# Patient Record
Sex: FEMALE | Race: WHITE | Employment: OTHER | URBAN - NONMETROPOLITAN AREA
[De-identification: names, ages, dates, MRNs, and addresses within clinical notes are randomized per-mention and may not be internally consistent; named-entity substitution may affect disease eponyms.]

---

## 2017-01-12 ENCOUNTER — OFFICE VISIT (OUTPATIENT)
Dept: GASTROENTEROLOGY | Age: 52
End: 2017-01-12
Payer: MEDICARE

## 2017-01-12 VITALS
WEIGHT: 240 LBS | HEIGHT: 66 IN | SYSTOLIC BLOOD PRESSURE: 119 MMHG | OXYGEN SATURATION: 98 % | BODY MASS INDEX: 38.57 KG/M2 | DIASTOLIC BLOOD PRESSURE: 78 MMHG | RESPIRATION RATE: 20 BRPM | HEART RATE: 70 BPM

## 2017-01-12 DIAGNOSIS — K62.5 RECTAL BLEEDING: Primary | ICD-10-CM

## 2017-01-12 PROCEDURE — G8427 DOCREV CUR MEDS BY ELIG CLIN: HCPCS | Performed by: NURSE PRACTITIONER

## 2017-01-12 PROCEDURE — G8484 FLU IMMUNIZE NO ADMIN: HCPCS | Performed by: NURSE PRACTITIONER

## 2017-01-12 PROCEDURE — 1036F TOBACCO NON-USER: CPT | Performed by: NURSE PRACTITIONER

## 2017-01-12 PROCEDURE — 99215 OFFICE O/P EST HI 40 MIN: CPT | Performed by: NURSE PRACTITIONER

## 2017-01-12 PROCEDURE — G8419 CALC BMI OUT NRM PARAM NOF/U: HCPCS | Performed by: NURSE PRACTITIONER

## 2017-01-12 RX ORDER — ONDANSETRON HYDROCHLORIDE 8 MG/1
8 TABLET, FILM COATED ORAL EVERY 8 HOURS PRN
COMMUNITY

## 2017-01-12 RX ORDER — GABAPENTIN 100 MG/1
100 CAPSULE ORAL PRN
Status: ON HOLD | COMMUNITY
End: 2021-01-29 | Stop reason: ALTCHOICE

## 2017-01-12 ASSESSMENT — ENCOUNTER SYMPTOMS
BACK PAIN: 1
DIARRHEA: 0
COUGH: 0
VOMITING: 0
HEMATOCHEZIA: 1
NAUSEA: 0
BLOOD IN STOOL: 0
EYES NEGATIVE: 1
SORE THROAT: 0
SHORTNESS OF BREATH: 1
VOICE CHANGE: 0
RECTAL PAIN: 0
ABDOMINAL PAIN: 0
CHEST TIGHTNESS: 0
ALLERGIC/IMMUNOLOGIC NEGATIVE: 1
CONSTIPATION: 0

## 2017-02-06 ENCOUNTER — ANESTHESIA EVENT (OUTPATIENT)
Dept: ENDOSCOPY | Age: 52
End: 2017-02-06
Payer: MEDICARE

## 2017-02-08 ENCOUNTER — ANESTHESIA (OUTPATIENT)
Dept: ENDOSCOPY | Age: 52
End: 2017-02-08
Payer: MEDICARE

## 2017-02-08 ENCOUNTER — HOSPITAL ENCOUNTER (OUTPATIENT)
Age: 52
Setting detail: OUTPATIENT SURGERY
Discharge: HOME | End: 2017-02-08
Attending: INTERNAL MEDICINE | Admitting: INTERNAL MEDICINE
Payer: MEDICARE

## 2017-02-08 ENCOUNTER — TELEPHONE (OUTPATIENT)
Dept: GASTROENTEROLOGY | Age: 52
End: 2017-02-08

## 2017-02-08 ENCOUNTER — SURGERY (OUTPATIENT)
Age: 52
End: 2017-02-08

## 2017-02-08 VITALS
BODY MASS INDEX: 38.25 KG/M2 | TEMPERATURE: 97.7 F | SYSTOLIC BLOOD PRESSURE: 154 MMHG | HEIGHT: 66 IN | WEIGHT: 238 LBS | RESPIRATION RATE: 14 BRPM | HEART RATE: 65 BPM | DIASTOLIC BLOOD PRESSURE: 83 MMHG | OXYGEN SATURATION: 100 %

## 2017-02-08 VITALS
SYSTOLIC BLOOD PRESSURE: 127 MMHG | OXYGEN SATURATION: 98 % | RESPIRATION RATE: 19 BRPM | DIASTOLIC BLOOD PRESSURE: 54 MMHG

## 2017-02-08 LAB — HCG(URINE) PREGNANCY TEST: NEGATIVE

## 2017-02-08 PROCEDURE — 7100000010 HC PHASE II RECOVERY - FIRST 15 MIN: Performed by: INTERNAL MEDICINE

## 2017-02-08 PROCEDURE — 2500000003 HC RX 250 WO HCPCS: Performed by: NURSE ANESTHETIST, CERTIFIED REGISTERED

## 2017-02-08 PROCEDURE — 3609009500 HC COLONOSCOPY DIAGNOSTIC OR SCREENING: Performed by: INTERNAL MEDICINE

## 2017-02-08 PROCEDURE — 2500000003 HC RX 250 WO HCPCS: Performed by: INTERNAL MEDICINE

## 2017-02-08 PROCEDURE — 45378 DIAGNOSTIC COLONOSCOPY: CPT | Performed by: INTERNAL MEDICINE

## 2017-02-08 PROCEDURE — 6360000002 HC RX W HCPCS: Performed by: NURSE ANESTHETIST, CERTIFIED REGISTERED

## 2017-02-08 PROCEDURE — 2580000003 HC RX 258: Performed by: INTERNAL MEDICINE

## 2017-02-08 PROCEDURE — 81025 URINE PREGNANCY TEST: CPT

## 2017-02-08 PROCEDURE — 7100000011 HC PHASE II RECOVERY - ADDTL 15 MIN: Performed by: INTERNAL MEDICINE

## 2017-02-08 RX ORDER — PROPOFOL 10 MG/ML
INJECTION, EMULSION INTRAVENOUS PRN
Status: DISCONTINUED | OUTPATIENT
Start: 2017-02-08 | End: 2017-02-08 | Stop reason: SDUPTHER

## 2017-02-08 RX ORDER — LIDOCAINE HYDROCHLORIDE 20 MG/ML
INJECTION, SOLUTION INFILTRATION; PERINEURAL PRN
Status: DISCONTINUED | OUTPATIENT
Start: 2017-02-08 | End: 2017-02-08 | Stop reason: SDUPTHER

## 2017-02-08 RX ORDER — SODIUM CHLORIDE, SODIUM LACTATE, POTASSIUM CHLORIDE, CALCIUM CHLORIDE 600; 310; 30; 20 MG/100ML; MG/100ML; MG/100ML; MG/100ML
INJECTION, SOLUTION INTRAVENOUS CONTINUOUS
Status: DISCONTINUED | OUTPATIENT
Start: 2017-02-08 | End: 2017-02-15 | Stop reason: HOSPADM

## 2017-02-08 RX ORDER — LIDOCAINE HYDROCHLORIDE 10 MG/ML
1 INJECTION, SOLUTION EPIDURAL; INFILTRATION; INTRACAUDAL; PERINEURAL ONCE
Status: COMPLETED | OUTPATIENT
Start: 2017-02-08 | End: 2017-02-08

## 2017-02-08 RX ORDER — FENTANYL CITRATE 50 UG/ML
INJECTION, SOLUTION INTRAMUSCULAR; INTRAVENOUS PRN
Status: DISCONTINUED | OUTPATIENT
Start: 2017-02-08 | End: 2017-02-08 | Stop reason: SDUPTHER

## 2017-02-08 RX ORDER — HYDROCORTISONE ACETATE 25 MG/1
25 SUPPOSITORY RECTAL 2 TIMES DAILY
Qty: 28 SUPPOSITORY | Refills: 3 | Status: SHIPPED | OUTPATIENT
Start: 2017-02-08 | End: 2017-02-22

## 2017-02-08 RX ORDER — MIDAZOLAM HYDROCHLORIDE 1 MG/ML
INJECTION INTRAMUSCULAR; INTRAVENOUS PRN
Status: DISCONTINUED | OUTPATIENT
Start: 2017-02-08 | End: 2017-02-08 | Stop reason: SDUPTHER

## 2017-02-08 RX ADMIN — FENTANYL CITRATE 100 MCG: 50 INJECTION INTRAMUSCULAR; INTRAVENOUS at 14:27

## 2017-02-08 RX ADMIN — LIDOCAINE HYDROCHLORIDE 40 MG: 20 INJECTION, SOLUTION INFILTRATION; PERINEURAL at 14:27

## 2017-02-08 RX ADMIN — SODIUM CHLORIDE, POTASSIUM CHLORIDE, SODIUM LACTATE AND CALCIUM CHLORIDE: 600; 310; 30; 20 INJECTION, SOLUTION INTRAVENOUS at 13:54

## 2017-02-08 RX ADMIN — PROPOFOL 250 MG: 10 INJECTION, EMULSION INTRAVENOUS at 14:27

## 2017-02-08 RX ADMIN — MIDAZOLAM HYDROCHLORIDE 2 MG: 1 INJECTION, SOLUTION INTRAMUSCULAR; INTRAVENOUS at 14:27

## 2017-02-08 RX ADMIN — LIDOCAINE HYDROCHLORIDE 1 ML: 10 INJECTION, SOLUTION EPIDURAL; INFILTRATION; INTRACAUDAL; PERINEURAL at 13:54

## 2017-02-08 ASSESSMENT — PAIN - FUNCTIONAL ASSESSMENT: PAIN_FUNCTIONAL_ASSESSMENT: 0-10

## 2017-02-09 ENCOUNTER — TELEPHONE (OUTPATIENT)
Dept: GASTROENTEROLOGY | Age: 52
End: 2017-02-09

## 2017-02-09 DIAGNOSIS — K64.9 HEMORRHOIDS, UNSPECIFIED HEMORRHOID TYPE: Primary | ICD-10-CM

## 2017-03-08 ENCOUNTER — OFFICE VISIT (OUTPATIENT)
Dept: GASTROENTEROLOGY | Age: 52
End: 2017-03-08
Payer: MEDICARE

## 2017-03-08 VITALS
HEART RATE: 90 BPM | OXYGEN SATURATION: 98 % | SYSTOLIC BLOOD PRESSURE: 98 MMHG | WEIGHT: 245 LBS | DIASTOLIC BLOOD PRESSURE: 62 MMHG | HEIGHT: 65 IN | BODY MASS INDEX: 40.82 KG/M2

## 2017-03-08 DIAGNOSIS — K59.00 CONSTIPATION, UNSPECIFIED CONSTIPATION TYPE: ICD-10-CM

## 2017-03-08 DIAGNOSIS — K64.8 INTERNAL HEMORRHOIDS: Primary | ICD-10-CM

## 2017-03-08 PROCEDURE — G8484 FLU IMMUNIZE NO ADMIN: HCPCS | Performed by: NURSE PRACTITIONER

## 2017-03-08 PROCEDURE — 1036F TOBACCO NON-USER: CPT | Performed by: NURSE PRACTITIONER

## 2017-03-08 PROCEDURE — G8427 DOCREV CUR MEDS BY ELIG CLIN: HCPCS | Performed by: NURSE PRACTITIONER

## 2017-03-08 PROCEDURE — 99213 OFFICE O/P EST LOW 20 MIN: CPT | Performed by: NURSE PRACTITIONER

## 2017-03-08 PROCEDURE — G8417 CALC BMI ABV UP PARAM F/U: HCPCS | Performed by: NURSE PRACTITIONER

## 2017-03-15 ASSESSMENT — ENCOUNTER SYMPTOMS
COUGH: 0
NAUSEA: 0
BLOOD IN STOOL: 1
ANAL BLEEDING: 1
TROUBLE SWALLOWING: 0
ABDOMINAL PAIN: 0
VOMITING: 0
CHEST TIGHTNESS: 0
DIARRHEA: 0
RECTAL PAIN: 0
CONSTIPATION: 0

## 2018-04-10 ENCOUNTER — HOSPITAL ENCOUNTER (EMERGENCY)
Age: 53
Discharge: HOME | End: 2018-04-11
Payer: MEDICARE

## 2018-04-10 VITALS
TEMPERATURE: 97.9 F | OXYGEN SATURATION: 96 % | SYSTOLIC BLOOD PRESSURE: 134 MMHG | RESPIRATION RATE: 18 BRPM | DIASTOLIC BLOOD PRESSURE: 89 MMHG | HEART RATE: 84 BPM

## 2018-04-10 DIAGNOSIS — M79.674 PAIN OF TOE OF RIGHT FOOT: Primary | ICD-10-CM

## 2018-04-10 PROCEDURE — 99283 EMERGENCY DEPT VISIT LOW MDM: CPT

## 2018-04-10 RX ORDER — OXYCODONE HYDROCHLORIDE AND ACETAMINOPHEN 5; 325 MG/1; MG/1
1 TABLET ORAL ONCE
Status: COMPLETED | OUTPATIENT
Start: 2018-04-10 | End: 2018-04-11

## 2018-04-10 ASSESSMENT — ENCOUNTER SYMPTOMS
CONSTIPATION: 0
SHORTNESS OF BREATH: 0
DIARRHEA: 0
EYE PAIN: 0
COUGH: 0
CHEST TIGHTNESS: 0
SINUS PRESSURE: 0
NAUSEA: 0
APNEA: 0
ABDOMINAL PAIN: 0
RHINORRHEA: 0
VOMITING: 0
SORE THROAT: 0
WHEEZING: 0
ABDOMINAL DISTENTION: 0

## 2018-04-10 ASSESSMENT — PAIN DESCRIPTION - LOCATION: LOCATION: TOE (COMMENT WHICH ONE);FOOT

## 2018-04-10 ASSESSMENT — PAIN DESCRIPTION - ORIENTATION: ORIENTATION: RIGHT

## 2018-04-11 ENCOUNTER — APPOINTMENT (OUTPATIENT)
Dept: GENERAL RADIOLOGY | Age: 53
End: 2018-04-11
Payer: MEDICARE

## 2018-04-11 PROCEDURE — 73630 X-RAY EXAM OF FOOT: CPT

## 2018-04-11 PROCEDURE — 6370000000 HC RX 637 (ALT 250 FOR IP): Performed by: PHYSICIAN ASSISTANT

## 2018-04-11 PROCEDURE — 99283 EMERGENCY DEPT VISIT LOW MDM: CPT | Performed by: PHYSICIAN ASSISTANT

## 2018-04-11 RX ORDER — NAPROXEN 500 MG/1
500 TABLET ORAL 2 TIMES DAILY
Qty: 20 TABLET | Refills: 0 | Status: SHIPPED | OUTPATIENT
Start: 2018-04-11 | End: 2020-08-27

## 2018-04-11 RX ADMIN — OXYCODONE HYDROCHLORIDE AND ACETAMINOPHEN 1 TABLET: 5; 325 TABLET ORAL at 00:30

## 2018-04-11 ASSESSMENT — PAIN SCALES - GENERAL: PAINLEVEL_OUTOF10: 8

## 2018-12-26 ENCOUNTER — TRANSCRIBE ORDERS (OUTPATIENT)
Dept: LAB | Facility: HOSPITAL | Age: 53
End: 2018-12-26

## 2018-12-26 DIAGNOSIS — Z00.00 ROUTINE ADULT HEALTH MAINTENANCE: Primary | ICD-10-CM

## 2019-01-11 ENCOUNTER — HOSPITAL ENCOUNTER (OUTPATIENT)
Dept: MAMMOGRAPHY | Facility: HOSPITAL | Age: 54
Discharge: HOME | End: 2019-01-11
Admitting: FAMILY MEDICINE

## 2019-01-11 PROCEDURE — 77063 BREAST TOMOSYNTHESIS BI: CPT

## 2019-01-11 PROCEDURE — 77067 SCR MAMMO BI INCL CAD: CPT

## 2019-04-24 ENCOUNTER — NURSE TRIAGE (OUTPATIENT)
Dept: CALL CENTER | Facility: HOSPITAL | Age: 54
End: 2019-04-24

## 2019-04-24 NOTE — TELEPHONE ENCOUNTER
"Abdominal pain since 3A today, has gotten better but is still painful    Reason for Disposition  • [1] SEVERE pain (e.g., excruciating) AND [2] present > 1 hour    Additional Information  • Negative: Severe difficulty breathing (e.g., struggling for each breath, speaks in single words)  • Negative: Shock suspected (e.g., cold/pale/clammy skin, too weak to stand, low BP, rapid pulse)  • Negative: Difficult to awaken or acting confused (e.g., disoriented, slurred speech)  • Negative: Passed out (i.e., lost consciousness, collapsed and was not responding)  • Negative: Visible sweat on face or sweat dripping down face  • Negative: Sounds like a life-threatening emergency to the triager  • Negative: Followed an abdomen (stomach) injury  • Negative: Chest pain  • Negative: [1] Pain lasts > 10 minutes AND [2] age > 50  • Negative: [1] Pain lasts > 10 minutes AND [2] age > 40 AND [3] associated chest, arm, neck, upper back or jaw pain  • Negative: [1] Pain lasts > 10 minutes AND [2] age > 35 AND [3] at least one cardiac risk factor (i.e., hypertension, diabetes, obesity, smoker or strong family history of heart disease)  • Negative: [1] Pain lasts > 10 minutes AND [2] history of heart disease (i.e., heart attack, bypass surgery, angina, angioplasty, CHF; not just a heart murmur)  • Negative: [1] Pain lasts > 10 minutes AND [2] difficulty breathing  • Negative: [1] Vomiting AND [2] contains red blood  (Exception: few streaks and only occurred once)  • Negative: [1] Vomiting AND [2] contains black (\"coffee ground\") material  • Negative: Blood in bowel movements  (Exception: Blood on surface of BM with constipation)  • Negative: Black or tarry bowel movements  (Exception: chronic-unchanged  black-grey bowel movements AND is taking iron pills or Pepto-bismol)  • Negative: [1] Pregnant > 24 weeks AND [2] hand or face swelling  • Negative: Patient sounds very sick or weak to the triager  • Negative: [1] MILD-MODERATE pain AND [2] " "constant AND [3] present > 2 hours  • Negative: [1] MILD-MODERATE pain AND [2] not relieved by antacids  • Negative: [1] Vomiting AND [2] contains bile (green color)  • Negative: [1] Vomiting AND [2] abdomen looks much more swollen than usual  • Negative: White of the eyes have turned yellow (i.e., jaundice)    Answer Assessment - Initial Assessment Questions  1. LOCATION: \"Where does it hurt?\"       Abdominal pain  2. RADIATION: \"Does the pain shoot anywhere else?\" (e.g., chest, back)      back  3. ONSET: \"When did the pain begin?\" (e.g., minutes, hours or days ago)       3A  4. SUDDEN: \"Gradual or sudden onset?\"      suddne  5. PATTERN \"Does the pain come and go, or is it constant?\"     - If constant: \"Is it getting better, staying the same, or worsening?\"       (Note: Constant means the pain never goes away completely; most serious pain is constant and it progresses)      - If intermittent: \"How long does it last?\" \"Do you have pain now?\"      (Note: Intermittent means the pain goes away completely between bouts)      sharp  6. SEVERITY: \"How bad is the pain?\"  (e.g., Scale 1-10; mild, moderate, or severe)     - MILD (1-3): doesn't interfere with normal activities, abdomen soft and not tender to touch      - MODERATE (4-7): interferes with normal activities or awakens from sleep, tender to touch      - SEVERE (8-10): excruciating pain, doubled over, unable to do any normal activities        10, when first occurred she was unable to move  7. RECURRENT SYMPTOM: \"Have you ever had this type of abdominal pain before?\" If so, ask: \"When was the last time?\" and \"What happened that time?\"       no  8. AGGRAVATING FACTORS: \"Does anything seem to cause this pain?\" (e.g., foods, stress, alcohol)      no  9. CARDIAC SYMPTOMS: \"Do you have any of the following symptoms: chest pain, difficulty breathing, sweating, nausea?\"      na  10. OTHER SYMPTOMS: \"Do you have any other symptoms?\" (e.g., fever, vomiting, diarrhea)        " "no  11. PREGNANCY: \"Is there any chance you are pregnant?\" \"When was your last menstrual period?\"        na    Protocols used: ABDOMINAL PAIN - UPPER-ADULT-AH      "

## 2019-04-25 ENCOUNTER — HOSPITAL ENCOUNTER (EMERGENCY)
Facility: HOSPITAL | Age: 54
Discharge: HOME | End: 2019-04-25
Attending: EMERGENCY MEDICINE | Admitting: EMERGENCY MEDICINE

## 2019-04-25 ENCOUNTER — APPOINTMENT (OUTPATIENT)
Dept: CT IMAGING | Facility: HOSPITAL | Age: 54
End: 2019-04-25

## 2019-04-25 VITALS
WEIGHT: 293 LBS | HEIGHT: 65 IN | RESPIRATION RATE: 18 BRPM | BODY MASS INDEX: 48.82 KG/M2 | TEMPERATURE: 98.8 F | DIASTOLIC BLOOD PRESSURE: 91 MMHG | HEART RATE: 91 BPM | OXYGEN SATURATION: 97 % | SYSTOLIC BLOOD PRESSURE: 149 MMHG

## 2019-04-25 DIAGNOSIS — B96.89 BACTERIAL VAGINOSIS: ICD-10-CM

## 2019-04-25 DIAGNOSIS — N76.0 BACTERIAL VAGINOSIS: ICD-10-CM

## 2019-04-25 DIAGNOSIS — N20.0 KIDNEY STONE: Primary | ICD-10-CM

## 2019-04-25 LAB
ALBUMIN SERPL-MCNC: 4.1 G/DL (ref 3.5–5)
ALBUMIN/GLOB SERPL: 1.2 G/DL (ref 1.1–2.5)
ALP SERPL-CCNC: 100 U/L (ref 24–120)
ALT SERPL W P-5'-P-CCNC: 16 U/L (ref 0–54)
ANION GAP SERPL CALCULATED.3IONS-SCNC: 11 MMOL/L (ref 4–13)
AST SERPL-CCNC: 24 U/L (ref 7–45)
BACTERIA UR QL AUTO: ABNORMAL /HPF
BASOPHILS # BLD AUTO: 0.04 10*3/MM3 (ref 0–0.2)
BASOPHILS NFR BLD AUTO: 0.5 % (ref 0–2)
BILIRUB SERPL-MCNC: 0.2 MG/DL (ref 0.1–1)
BILIRUB UR QL STRIP: NEGATIVE
BUN BLD-MCNC: 18 MG/DL (ref 5–21)
BUN/CREAT SERPL: 22.8 (ref 7–25)
CALCIUM SPEC-SCNC: 9.4 MG/DL (ref 8.4–10.4)
CHLORIDE SERPL-SCNC: 102 MMOL/L (ref 98–110)
CLARITY UR: CLEAR
CLUE CELLS SPEC QL WET PREP: ABNORMAL
CO2 SERPL-SCNC: 27 MMOL/L (ref 24–31)
COLOR UR: YELLOW
CREAT BLD-MCNC: 0.79 MG/DL (ref 0.5–1.4)
D-LACTATE SERPL-SCNC: 1.6 MMOL/L (ref 0.5–2)
DEPRECATED RDW RBC AUTO: 41.7 FL (ref 40–54)
EOSINOPHIL # BLD AUTO: 0.19 10*3/MM3 (ref 0–0.7)
EOSINOPHIL NFR BLD AUTO: 2.4 % (ref 0–4)
ERYTHROCYTE [DISTWIDTH] IN BLOOD BY AUTOMATED COUNT: 13.9 % (ref 12–15)
GFR SERPL CREATININE-BSD FRML MDRD: 77 ML/MIN/1.73
GLOBULIN UR ELPH-MCNC: 3.3 GM/DL
GLUCOSE BLD-MCNC: 147 MG/DL (ref 70–100)
GLUCOSE UR STRIP-MCNC: NEGATIVE MG/DL
HCG SERPL QL: NEGATIVE
HCT VFR BLD AUTO: 38.4 % (ref 37–47)
HGB BLD-MCNC: 12.6 G/DL (ref 12–16)
HGB UR QL STRIP.AUTO: ABNORMAL
HYALINE CASTS UR QL AUTO: ABNORMAL /LPF
HYDATID CYST SPEC WET PREP: ABNORMAL
IMM GRANULOCYTES # BLD AUTO: 0.02 10*3/MM3 (ref 0–0.05)
IMM GRANULOCYTES NFR BLD AUTO: 0.3 % (ref 0–5)
KETONES UR QL STRIP: NEGATIVE
LEUKOCYTE ESTERASE UR QL STRIP.AUTO: NEGATIVE
LIPASE SERPL-CCNC: 80 U/L (ref 23–203)
LYMPHOCYTES # BLD AUTO: 2.39 10*3/MM3 (ref 0.72–4.86)
LYMPHOCYTES NFR BLD AUTO: 30.7 % (ref 15–45)
MCH RBC QN AUTO: 27.3 PG (ref 28–32)
MCHC RBC AUTO-ENTMCNC: 32.8 G/DL (ref 33–36)
MCV RBC AUTO: 83.1 FL (ref 82–98)
MONOCYTES # BLD AUTO: 0.86 10*3/MM3 (ref 0.19–1.3)
MONOCYTES NFR BLD AUTO: 11.1 % (ref 4–12)
NEUTROPHILS # BLD AUTO: 4.28 10*3/MM3 (ref 1.87–8.4)
NEUTROPHILS NFR BLD AUTO: 55 % (ref 39–78)
NITRITE UR QL STRIP: NEGATIVE
NRBC BLD AUTO-RTO: 0 /100 WBC (ref 0–0.2)
PH UR STRIP.AUTO: <=5 [PH] (ref 5–8)
PLATELET # BLD AUTO: 344 10*3/MM3 (ref 130–400)
PMV BLD AUTO: 10.2 FL (ref 6–12)
POTASSIUM BLD-SCNC: 4.1 MMOL/L (ref 3.5–5.3)
PROT SERPL-MCNC: 7.4 G/DL (ref 6.3–8.7)
PROT UR QL STRIP: NEGATIVE
RBC # BLD AUTO: 4.62 10*6/MM3 (ref 4.2–5.4)
RBC # UR: ABNORMAL /HPF
REF LAB TEST METHOD: ABNORMAL
SODIUM BLD-SCNC: 140 MMOL/L (ref 135–145)
SP GR UR STRIP: 1.02 (ref 1–1.03)
SQUAMOUS #/AREA URNS HPF: ABNORMAL /HPF
T VAGINALIS SPEC QL WET PREP: ABNORMAL
UROBILINOGEN UR QL STRIP: ABNORMAL
WBC NRBC COR # BLD: 7.78 10*3/MM3 (ref 4.8–10.8)
WBC SPEC QL WET PREP: ABNORMAL
WBC UR QL AUTO: ABNORMAL /HPF
YEAST GENITAL QL WET PREP: ABNORMAL

## 2019-04-25 PROCEDURE — 96375 TX/PRO/DX INJ NEW DRUG ADDON: CPT

## 2019-04-25 PROCEDURE — 25010000002 KETOROLAC TROMETHAMINE PER 15 MG: Performed by: NURSE PRACTITIONER

## 2019-04-25 PROCEDURE — 99285 EMERGENCY DEPT VISIT HI MDM: CPT

## 2019-04-25 PROCEDURE — 84703 CHORIONIC GONADOTROPIN ASSAY: CPT | Performed by: NURSE PRACTITIONER

## 2019-04-25 PROCEDURE — 83605 ASSAY OF LACTIC ACID: CPT | Performed by: NURSE PRACTITIONER

## 2019-04-25 PROCEDURE — 87210 SMEAR WET MOUNT SALINE/INK: CPT | Performed by: NURSE PRACTITIONER

## 2019-04-25 PROCEDURE — 83690 ASSAY OF LIPASE: CPT | Performed by: NURSE PRACTITIONER

## 2019-04-25 PROCEDURE — 74177 CT ABD & PELVIS W/CONTRAST: CPT

## 2019-04-25 PROCEDURE — 87491 CHLMYD TRACH DNA AMP PROBE: CPT | Performed by: NURSE PRACTITIONER

## 2019-04-25 PROCEDURE — 87591 N.GONORRHOEAE DNA AMP PROB: CPT | Performed by: NURSE PRACTITIONER

## 2019-04-25 PROCEDURE — 25010000002 ONDANSETRON PER 1 MG: Performed by: EMERGENCY MEDICINE

## 2019-04-25 PROCEDURE — 81001 URINALYSIS AUTO W/SCOPE: CPT | Performed by: NURSE PRACTITIONER

## 2019-04-25 PROCEDURE — 25010000002 IOPAMIDOL 61 % SOLUTION: Performed by: EMERGENCY MEDICINE

## 2019-04-25 PROCEDURE — 80053 COMPREHEN METABOLIC PANEL: CPT | Performed by: NURSE PRACTITIONER

## 2019-04-25 PROCEDURE — 85025 COMPLETE CBC W/AUTO DIFF WBC: CPT | Performed by: NURSE PRACTITIONER

## 2019-04-25 PROCEDURE — 96374 THER/PROPH/DIAG INJ IV PUSH: CPT

## 2019-04-25 RX ORDER — KETOROLAC TROMETHAMINE 15 MG/ML
15 INJECTION, SOLUTION INTRAMUSCULAR; INTRAVENOUS ONCE
Status: COMPLETED | OUTPATIENT
Start: 2019-04-25 | End: 2019-04-25

## 2019-04-25 RX ORDER — METRONIDAZOLE 500 MG/1
500 TABLET ORAL 2 TIMES DAILY
Qty: 14 TABLET | Refills: 0 | Status: SHIPPED | OUTPATIENT
Start: 2019-04-25 | End: 2020-11-13

## 2019-04-25 RX ORDER — OXYCODONE AND ACETAMINOPHEN 7.5; 325 MG/1; MG/1
1 TABLET ORAL EVERY 6 HOURS PRN
Qty: 8 TABLET | Refills: 0 | Status: SHIPPED | OUTPATIENT
Start: 2019-04-25 | End: 2020-11-13

## 2019-04-25 RX ORDER — MEPERIDINE HYDROCHLORIDE 25 MG/ML
25 INJECTION INTRAMUSCULAR; INTRAVENOUS; SUBCUTANEOUS ONCE
Status: COMPLETED | OUTPATIENT
Start: 2019-04-25 | End: 2019-04-25

## 2019-04-25 RX ORDER — ALBUTEROL SULFATE 90 UG/1
2 AEROSOL, METERED RESPIRATORY (INHALATION) EVERY 4 HOURS PRN
COMMUNITY

## 2019-04-25 RX ORDER — FLUCONAZOLE 150 MG/1
150 TABLET ORAL ONCE
Qty: 1 TABLET | Refills: 0 | Status: SHIPPED | OUTPATIENT
Start: 2019-04-25 | End: 2019-04-25

## 2019-04-25 RX ORDER — ONDANSETRON 2 MG/ML
4 INJECTION INTRAMUSCULAR; INTRAVENOUS ONCE
Status: COMPLETED | OUTPATIENT
Start: 2019-04-25 | End: 2019-04-25

## 2019-04-25 RX ORDER — SODIUM CHLORIDE 0.9 % (FLUSH) 0.9 %
10 SYRINGE (ML) INJECTION AS NEEDED
Status: DISCONTINUED | OUTPATIENT
Start: 2019-04-25 | End: 2019-04-25 | Stop reason: HOSPADM

## 2019-04-25 RX ORDER — ALPRAZOLAM 1 MG/1
1 TABLET ORAL NIGHTLY PRN
COMMUNITY

## 2019-04-25 RX ADMIN — SODIUM CHLORIDE 1000 ML: 9 INJECTION, SOLUTION INTRAVENOUS at 02:56

## 2019-04-25 RX ADMIN — MEPERIDINE HYDROCHLORIDE 25 MG: 25 INJECTION INTRAMUSCULAR; INTRAVENOUS; SUBCUTANEOUS at 04:32

## 2019-04-25 RX ADMIN — IOPAMIDOL 125 ML: 612 INJECTION, SOLUTION INTRAVENOUS at 03:40

## 2019-04-25 RX ADMIN — ONDANSETRON HYDROCHLORIDE 4 MG: 2 INJECTION, SOLUTION INTRAMUSCULAR; INTRAVENOUS at 04:32

## 2019-04-25 RX ADMIN — KETOROLAC TROMETHAMINE 15 MG: 15 INJECTION, SOLUTION INTRAMUSCULAR; INTRAVENOUS at 02:38

## 2019-04-26 ENCOUNTER — NURSE TRIAGE (OUTPATIENT)
Dept: CALL CENTER | Facility: HOSPITAL | Age: 54
End: 2019-04-26

## 2019-04-26 LAB
C TRACH RRNA SPEC DONR QL NAA+PROBE: NEGATIVE
N GONORRHOEA DNA SPEC QL NAA+PROBE: NEGATIVE

## 2019-04-26 NOTE — TELEPHONE ENCOUNTER
"Was seen at [facility] ER dx: with kidney stone. Prescribed antibiotic and Percocet for pain was prescribed 8 tablets but she hasn't taken anything for pain.  She says she is andrade scared of pain medication.  Was told by friends that she should sit in a tub of hot water and to drink fresh squeezed lemon juice. Will that help?  Is using the strainer with each urination, has not passed a stone. Has not made appt. with urology. Advised hot bath may help with pain. Drink plenty of fluids. Make appt with Dr. Tsang, phone number given to caller. No fever. Caller says she was also tested for STD's and has not heard from them.  Advised if tests are positive she will be notified.     Reason for Disposition  • Health Information question, no triage required and triager able to answer question    Additional Information  • Negative: [1] Caller is not with the adult (patient) AND [2] reporting urgent symptoms  • Negative: Lab result questions  • Negative: Medication questions  • Negative: Caller cannot be reached by phone  • Negative: Caller has already spoken to PCP or another triager  • Negative: RN needs further essential information from caller in order to complete triage  • Negative: Requesting regular office appointment  • Negative: [1] Caller requesting NON-URGENT health information AND [2] PCP's office is the best resource    Answer Assessment - Initial Assessment Questions  1. REASON FOR CALL or QUESTION: \"What is your reason for calling today?\" or \"How can I best help you?\" or \"What question do you have that I can help answer?\"      Information on kidney stone    Protocols used: INFORMATION ONLY CALL-ADULT-      "

## 2019-05-07 NOTE — PROGRESS NOTES
Ms. Agarwal is 53 y.o. female    CHIEF COMPLAINT: I am here for kidney stone.    HPI    Acute stone event  Location: Right sided ureterovesical junction  Quality: Unknown type of stone since patient was unable to recover it.  Severity: Mild now but her pain was very severe at the time of presentation to the emergency room on April 25, 2019  Duration: Started about 3 weeks ago  Timing: Out of the blue  Context: Out of the blue.    Modifying factors: Oral analgesics helped her discomfort.  Associated signs or symptoms: Initially she had severe flank and lower abdominal discomfort.  That gave way to some urinary urgency which still persists.  She thinks however she is probably passed it.  She did not recover the stone.            The following portions of the patient's history were reviewed and updated as appropriate: allergies, current medications, past family history, past medical history, past social history, past surgical history and problem list.      Review of Systems   Constitutional: Negative for appetite change, diaphoresis and fever.   HENT: Negative for facial swelling and sore throat.    Eyes: Negative for discharge and visual disturbance.   Respiratory: Negative for cough and shortness of breath.    Cardiovascular: Negative for chest pain and leg swelling.   Gastrointestinal: Negative for anal bleeding, nausea and vomiting.   Endocrine: Negative for cold intolerance and heat intolerance.   Genitourinary: Positive for frequency. Negative for flank pain, hematuria, pelvic pain and urgency.   Musculoskeletal: Negative for back pain and gait problem.   Skin: Negative for pallor and rash.   Allergic/Immunologic: Negative for immunocompromised state.   Neurological: Negative for seizures and headaches.   Hematological: Negative for adenopathy. Does not bruise/bleed easily.   Psychiatric/Behavioral: Negative for dysphoric mood, self-injury and suicidal ideas.           Current Outpatient Medications:   •   "albuterol sulfate  (90 Base) MCG/ACT inhaler, Inhale 2 puffs Every 4 (Four) Hours As Needed for Wheezing., Disp: , Rfl:   •  ALPRAZolam (XANAX) 0.25 MG tablet, Take 0.25 mg by mouth At Night As Needed for Sleep., Disp: , Rfl:   •  metroNIDAZOLE (FLAGYL) 500 MG tablet, Take 1 tablet by mouth 2 (Two) Times a Day., Disp: 14 tablet, Rfl: 0  •  oxyCODONE-acetaminophen (PERCOCET) 7.5-325 MG per tablet, Take 1 tablet by mouth Every 6 (Six) Hours As Needed for Severe Pain ., Disp: 8 tablet, Rfl: 0    Past Medical History:   Diagnosis Date   • COPD (chronic obstructive pulmonary disease) (CMS/HCC)    • Disease of thyroid gland    • Hypertension        Past Surgical History:   Procedure Laterality Date   • BACK SURGERY     • CARPAL TUNNEL RELEASE     • CHOLECYSTECTOMY     • GASTRIC SLEEVE LAPAROSCOPIC         Social History     Socioeconomic History   • Marital status: Single     Spouse name: Not on file   • Number of children: Not on file   • Years of education: Not on file   • Highest education level: Not on file   Tobacco Use   • Smoking status: Former Smoker     Last attempt to quit: 6/10/2011     Years since quittin.9   Substance and Sexual Activity   • Alcohol use: No     Frequency: Never   • Drug use: No   • Sexual activity: Defer       History reviewed. No pertinent family history.      /70   Temp 97.1 °F (36.2 °C)   Ht 165.1 cm (65\")   Wt (!) 140 kg (309 lb 6.4 oz)   LMP 2019   BMI 51.49 kg/m²       Physical Exam  Constitutional: Morbidly obese; No apparent distress; Vital reviewed as above  Psychiatric: Appropriate affect; Alert and oriented  Eyes: Unremarkable  Musculoskeletal: Normal gait and station  GI: Abdomen is soft, non-tender  Respiratory: No distress; Unlabored movement; No accessory musculature needed with symmetric movements  Skin: No pallor or diaphoresis  Lymphatic: No adenopathy neck or groin      Data  Results for orders placed or performed in visit on 19   POC " Urinalysis Dipstick, Multipro   Result Value Ref Range    Color Yellow Yellow, Straw, Dark Yellow, Jenny    Clarity, UA Clear Clear    Glucose, UA Negative Negative, 1000 mg/dL (3+) mg/dL    Bilirubin Negative Negative    Ketones, UA Negative Negative    Specific Gravity  1.030 1.005 - 1.030    Blood, UA Large (A) Negative    pH, Urine 5.5 5.0 - 8.0    Protein, POC Negative Negative mg/dL    Urobilinogen, UA Normal Normal    Nitrite, UA Negative Negative    Leukocytes Negative Negative     CT ABDOMEN PELVIS W CONTRAST-      4/25/2019 3:31 AM CDT     HISTORY: RLQ pain, rt flank pain; R10.31-Right lower quadrant pain     In order to have a CT radiation dose as low as reasonably achievable  Automated Exposure Control was utilized for adjustment of the mA and/or  KV according to patient size.     DLP in mGycm= 1201.     A preliminary [facility] report was faxed to the Emergency Department  immediately after this study was interpreted at 4:12 AM.     Abdomen/pelvis CT with IV contrast injection.  No comparison.     Normal heart size.  Clear lung bases.     No focal liver abnormality.  Cholecystectomy clips are present.  Normal pancreas and spleen.     Normal and symmetric adrenal glands.  Normal left kidney.     Mild right hydronephrosis and mild perinephric edema.  Mild ureteral dilation based on a 2-3 mm obstructing stone at the right  ureterovesical junction.     No bowel dilation.  No appendicitis, diverticulitis, or colitis.     No pelvic mass or fluid.     Incidental 24 mm left ovarian cyst or follicle.     Summary:  1. Low-grade right-sided obstruction based on a 2-3 mm right UVJ stone.  This report was finalized on 04/25/2019 07:10 by Dr. iMchael Sanders MD.    These images were made available to me to review independently.  I did review the radiologist's report described above with regard to the urologic findings.  Small distal stone with hydronephrosis.  /Nino Drew MD      XR ABDOMEN KUB- 5/9/2019 1:01 PM CDT      HISTORY: kidney stone; N20.0-Calculus of kidney       COMPARISON: None     FINDINGS:  There is a nonspecific bowel gas pattern. No pathologic calcifications  identified. Surgical clip is noted from previous cholecystectomy.  Sutures are present from gastric sleeve procedure..     No acute skeletal abnormality is identified.      IMPRESSION:  1. Non specific bowel gas pattern..         This report was finalized on 05/09/2019 13:31 by Dr. Tien Nix MD.  I am unable to identify the stone either on today's film.    Patient's Body mass index is 51.49 kg/m². BMI is above normal parameters. Recommendations include: educational material.      Assessment and Plan  Diagnoses and all orders for this visit:    Right ureteral calculus  -     POC Urinalysis Dipstick, Multipro  -     XR abdomen kub; Future      I think she spontaneously passed the stone.  She should increase water intake and weight loss would be beneficial.  It is probably her biggest stone risk.  Her BMI exceeds 50.  It is possible she still has a stone especially with the frequency and urgency.    (Please note that portions of this note were completed with a voice recognition program.)  Nino Drew MD  05/09/19  3:26 PM      Cc: Myron Cabrales MD

## 2019-05-09 ENCOUNTER — OFFICE VISIT (OUTPATIENT)
Dept: UROLOGY | Facility: CLINIC | Age: 54
End: 2019-05-09

## 2019-05-09 ENCOUNTER — HOSPITAL ENCOUNTER (OUTPATIENT)
Dept: GENERAL RADIOLOGY | Facility: HOSPITAL | Age: 54
Discharge: HOME | End: 2019-05-09
Admitting: UROLOGY

## 2019-05-09 VITALS
SYSTOLIC BLOOD PRESSURE: 130 MMHG | TEMPERATURE: 97.1 F | DIASTOLIC BLOOD PRESSURE: 70 MMHG | HEIGHT: 65 IN | WEIGHT: 293 LBS | BODY MASS INDEX: 48.82 KG/M2

## 2019-05-09 DIAGNOSIS — N20.0 KIDNEY STONE: ICD-10-CM

## 2019-05-09 DIAGNOSIS — N20.1 RIGHT URETERAL CALCULUS: Primary | ICD-10-CM

## 2019-05-09 LAB
BILIRUB BLD-MCNC: NEGATIVE MG/DL
CLARITY, POC: CLEAR
COLOR UR: YELLOW
GLUCOSE UR STRIP-MCNC: NEGATIVE MG/DL
KETONES UR QL: NEGATIVE
LEUKOCYTE EST, POC: NEGATIVE
NITRITE UR-MCNC: NEGATIVE MG/ML
PH UR: 5.5 [PH] (ref 5–8)
PROT UR STRIP-MCNC: NEGATIVE MG/DL
RBC # UR STRIP: ABNORMAL /UL
SP GR UR: 1.03 (ref 1–1.03)
UROBILINOGEN UR QL: NORMAL

## 2019-05-09 PROCEDURE — 74018 RADEX ABDOMEN 1 VIEW: CPT

## 2019-05-09 PROCEDURE — 81003 URINALYSIS AUTO W/O SCOPE: CPT | Performed by: UROLOGY

## 2019-05-09 PROCEDURE — 99203 OFFICE O/P NEW LOW 30 MIN: CPT | Performed by: UROLOGY

## 2019-05-09 NOTE — PATIENT INSTRUCTIONS
BMI for Adults  Body mass index (BMI) is a number that is calculated from a person's weight and height. In most adults, the number is used to find how much of an adult's weight is made up of fat. BMI is not as accurate as a direct measure of body fat.  How is BMI calculated?  BMI is calculated by dividing weight in kilograms by height in meters squared. It can also be calculated by dividing weight in pounds by height in inches squared, then multiplying the resulting number by 703. Charts are available to help you find your BMI quickly and easily without doing this calculation.  How is BMI interpreted?  Health care professionals use BMI charts to identify whether an adult is underweight, at a normal weight, or overweight based on the following guidelines:  · Underweight: BMI less than 18.5.  · Normal weight: BMI between 18.5 and 24.9.  · Overweight: BMI between 25 and 29.9.  · Obese: BMI of 30 and above.    BMI is usually interpreted the same for males and females.  Weight includes both fat and muscle, so someone with a muscular build, such as an athlete, may have a BMI that is higher than 24.9. In cases like these, BMI may not accurately depict body fat. To determine if excess body fat is the cause of a BMI of 25 or higher, further assessments may need to be done by a health care provider.  Why is BMI a useful tool?  BMI is used to identify a possible weight problem that may be related to a medical problem or may increase the risk for medical problems. BMI can also be used to promote changes to reach a healthy weight.  This information is not intended to replace advice given to you by your health care provider. Make sure you discuss any questions you have with your health care provider.  Document Released: 08/29/2005 Document Revised: 04/27/2017 Document Reviewed: 05/15/2015  "[employer]" Interactive Patient Education © 2018 "[employer]    STONE DIET RECOMMENDATIONS  -We recommend increasing the fluid intake so that a  urine volume will be somewhere between 2-4 L/day.  To accomplish this will require a special effort to hydrate during the evening hours to produce nocturia.  This is probably the most challenging portion of hydrating to prevent stones.  It is recommended that you have two  8 ounce glasses of water between supper and bedtime.  Another 8 ounce glass should be consumed when you get up to go to the bathroom during the night.  It is explained that water hardness does not seem to predispose to stone formation and epidemiologic studies indicate the incidence of stones is lower and hard water regions then in soft water regions.  With regard to other types of fluids it is best to avoid large amounts of tea, cocoa, cola drinks, and fruit juice(excluding lemonade), which contain significant amounts of oxalate in soluble form. You can use a small amount of  Lemonade and/or soft drinks that are high in citrate (Diet Sprite, Diet 7-UP, Diet Fresca, Diet Mountain Dew, Diet Kraig Yellow) to increase the urine volume as well as the amount of citrate that is in the urine.      With adequate hydration you should notice .........   -The urine should remain colorless.   -Urine specific gravity should measure between 1.005-1.010 on any urinalysis.   -Direct volume measurement should be greater than 2 L.. Avoid tea, excessive amounts of caffeine and alcohol.     You should consider measuring your urine output to make at least 2.5 liters of urine per day. Be sure to hydrate when urine appears dark, concentrated or excessively colored.     Most patients do not need to restrict calcium in your diet. A moderate amount of calcium is believed to reduce calcium oxalate absorption in the intestine.     Limit the amount of non-dairy protein consumed to two palm sized servings per day.     Limit sodium intake to 2 grams each day.     Increase number of servings with fruits and vegetables to make urine less acidic (more basic) and increase citrate  in the urine.     High-oxalate foods to limit, if you eat them, are:   Spinach.   Bran flakes.   Rhubarb.   Beets.   Potato chips.   French fries.   Nuts and nut butters.  Tea  Chocolate

## 2019-05-15 NOTE — ED PROVIDER NOTES
"Subjective   History of Present Illness    Review of Systems    Past Medical History:   Diagnosis Date   • COPD (chronic obstructive pulmonary disease) (CMS/HCC)    • Disease of thyroid gland    • Hypertension        Allergies   Allergen Reactions   • Codeine Other (See Comments)     \"I DONT REMEMBER\"   • Penicillins Other (See Comments)     WHEN I WAS A CHILD, I DON'T KNOW   • Adhesive Tape Rash   • Latex Rash       Past Surgical History:   Procedure Laterality Date   • BACK SURGERY     • CARPAL TUNNEL RELEASE     • CHOLECYSTECTOMY     • GASTRIC SLEEVE LAPAROSCOPIC         History reviewed. No pertinent family history.    Social History     Socioeconomic History   • Marital status: Single     Spouse name: Not on file   • Number of children: Not on file   • Years of education: Not on file   • Highest education level: Not on file   Tobacco Use   • Smoking status: Former Smoker     Last attempt to quit: 6/10/2011     Years since quittin.9   Substance and Sexual Activity   • Alcohol use: No     Frequency: Never   • Drug use: No   • Sexual activity: Defer           Objective   Physical Exam    Procedures           ED Course      CT Abdomen Pelvis With Contrast   Final Result        Labs Reviewed   WET PREP, GENITAL - Abnormal; Notable for the following components:       Result Value    WBC'S 1+ WBC's seen (*)     Clue Cells, Wet Prep 1+ Clue cells seen (*)     All other components within normal limits   COMPREHENSIVE METABOLIC PANEL - Abnormal; Notable for the following components:    Glucose 147 (*)     All other components within normal limits    Narrative:     GFR Normal >60  Chronic Kidney Disease <60  Kidney Failure <15   URINALYSIS W/ CULTURE IF INDICATED - Abnormal; Notable for the following components:    Blood, UA Large (3+) (*)     All other components within normal limits   CBC WITH AUTO DIFFERENTIAL - Abnormal; Notable for the following components:    MCH 27.3 (*)     MCHC 32.8 (*)     All other " components within normal limits   URINALYSIS, MICROSCOPIC ONLY - Abnormal; Notable for the following components:    RBC, UA Too Numerous to Count (*)     WBC, UA 3-5 (*)     Squamous Epithelial Cells, UA 3-6 (*)     All other components within normal limits   LIPASE - Normal   LACTIC ACID, PLASMA - Normal   HCG, SERUM, QUALITATIVE - Normal   CHLAMYDIA TRACHOMATIS, NEISSERIA GONORRHOEAE, PCR    Narrative:     Performed at:  [address]  [id number]  : Ana Lilia Fournier MD, Phone:  [phone]   CBC AND DIFFERENTIAL    Narrative:     The following orders were created for panel order CBC & Differential.  Procedure                               Abnormality         Status                     ---------                               -----------         ------                     CBC Auto Differential[[id number]]         Abnormal            Final result                 Please view results for these tests on the individual orders.               MDM  Number of Diagnoses or Management Options  Bacterial vaginosis: new and requires workup  Kidney stone: new and requires workup     Amount and/or Complexity of Data Reviewed  Clinical lab tests: ordered  Tests in the radiology section of CPT®: ordered  Discuss the patient with other providers: yes    Patient Progress  Patient progress: stable        Final diagnoses:   Kidney stone   Bacterial vaginosis            Beatris Mendes, APRN  05/15/19 1525

## 2019-08-20 ENCOUNTER — TRANSCRIBE ORDERS (OUTPATIENT)
Dept: ADMINISTRATIVE | Facility: HOSPITAL | Age: 54
End: 2019-08-20

## 2019-08-20 DIAGNOSIS — M25.511 RIGHT SHOULDER PAIN, UNSPECIFIED CHRONICITY: Primary | ICD-10-CM

## 2019-09-06 ENCOUNTER — HOSPITAL ENCOUNTER (OUTPATIENT)
Dept: GENERAL RADIOLOGY | Facility: HOSPITAL | Age: 54
Discharge: HOME | End: 2019-09-06
Admitting: FAMILY MEDICINE

## 2019-09-06 DIAGNOSIS — M25.511 RIGHT SHOULDER PAIN, UNSPECIFIED CHRONICITY: ICD-10-CM

## 2019-09-06 PROCEDURE — 73030 X-RAY EXAM OF SHOULDER: CPT

## 2020-08-27 ENCOUNTER — TELEPHONE (OUTPATIENT)
Dept: GASTROENTEROLOGY | Age: 55
End: 2020-08-27

## 2020-08-27 ENCOUNTER — OFFICE VISIT (OUTPATIENT)
Dept: GASTROENTEROLOGY | Age: 55
End: 2020-08-27
Payer: MEDICARE

## 2020-08-27 VITALS
BODY MASS INDEX: 46.28 KG/M2 | HEART RATE: 88 BPM | WEIGHT: 288 LBS | HEIGHT: 66 IN | OXYGEN SATURATION: 98 % | SYSTOLIC BLOOD PRESSURE: 130 MMHG | DIASTOLIC BLOOD PRESSURE: 80 MMHG

## 2020-08-27 DIAGNOSIS — D64.9 ANEMIA, UNSPECIFIED TYPE: ICD-10-CM

## 2020-08-27 PROBLEM — K62.89 RECTAL PAIN: Status: ACTIVE | Noted: 2020-08-27

## 2020-08-27 PROBLEM — R11.0 CHRONIC NAUSEA: Status: ACTIVE | Noted: 2020-08-27

## 2020-08-27 PROBLEM — N92.1 MENORRHAGIA WITH IRREGULAR CYCLE: Status: ACTIVE | Noted: 2020-08-27

## 2020-08-27 PROBLEM — L29.0 RECTAL ITCHING: Status: ACTIVE | Noted: 2020-08-27

## 2020-08-27 PROBLEM — K62.5 BRBPR (BRIGHT RED BLOOD PER RECTUM): Status: ACTIVE | Noted: 2020-08-27

## 2020-08-27 PROBLEM — R12 CHRONIC HEARTBURN: Status: ACTIVE | Noted: 2020-08-27

## 2020-08-27 PROBLEM — R10.2 PELVIC PAIN: Status: ACTIVE | Noted: 2020-08-27

## 2020-08-27 PROBLEM — Z98.84 S/P LAPAROSCOPIC SLEEVE GASTRECTOMY: Status: ACTIVE | Noted: 2020-08-27

## 2020-08-27 PROBLEM — K52.9 CHRONIC DIARRHEA: Status: ACTIVE | Noted: 2020-08-27

## 2020-08-27 PROBLEM — K92.0 HEMATEMESIS WITH NAUSEA: Status: ACTIVE | Noted: 2020-08-27

## 2020-08-27 PROBLEM — R20.8 RECTAL BURNING: Status: ACTIVE | Noted: 2020-08-27

## 2020-08-27 LAB
FOLATE: >20 NG/ML (ref 4.8–37.3)
IRON SATURATION: 4 % (ref 14–50)
IRON: 18 UG/DL (ref 37–145)
TOTAL IRON BINDING CAPACITY: 456 UG/DL (ref 250–400)
VITAMIN B-12: 1210 PG/ML (ref 211–946)

## 2020-08-27 PROCEDURE — 3017F COLORECTAL CA SCREEN DOC REV: CPT | Performed by: NURSE PRACTITIONER

## 2020-08-27 PROCEDURE — 99204 OFFICE O/P NEW MOD 45 MIN: CPT | Performed by: NURSE PRACTITIONER

## 2020-08-27 PROCEDURE — 1036F TOBACCO NON-USER: CPT | Performed by: NURSE PRACTITIONER

## 2020-08-27 PROCEDURE — G8417 CALC BMI ABV UP PARAM F/U: HCPCS | Performed by: NURSE PRACTITIONER

## 2020-08-27 PROCEDURE — G8427 DOCREV CUR MEDS BY ELIG CLIN: HCPCS | Performed by: NURSE PRACTITIONER

## 2020-08-27 RX ORDER — ASCORBIC ACID 500 MG
1000 TABLET ORAL DAILY
COMMUNITY

## 2020-08-27 RX ORDER — MECLIZINE HYDROCHLORIDE 25 MG/1
25 TABLET ORAL 3 TIMES DAILY PRN
COMMUNITY

## 2020-08-27 RX ORDER — AZITHROMYCIN 250 MG/1
TABLET, FILM COATED ORAL
Status: ON HOLD | COMMUNITY
Start: 2020-08-20 | End: 2020-09-11 | Stop reason: ALTCHOICE

## 2020-08-27 RX ORDER — LISINOPRIL AND HYDROCHLOROTHIAZIDE 20; 12.5 MG/1; MG/1
1 TABLET ORAL DAILY
COMMUNITY
Start: 2020-08-21

## 2020-08-27 ASSESSMENT — ENCOUNTER SYMPTOMS
COUGH: 0
TROUBLE SWALLOWING: 0
PHOTOPHOBIA: 0
SORE THROAT: 0
VOICE CHANGE: 0
RECTAL PAIN: 1
BACK PAIN: 1
BLOOD IN STOOL: 0
VOMITING: 1
CONSTIPATION: 0
NAUSEA: 1
ABDOMINAL DISTENTION: 0
ABDOMINAL PAIN: 0
SHORTNESS OF BREATH: 0
ANAL BLEEDING: 1
DIARRHEA: 1

## 2020-08-27 NOTE — TELEPHONE ENCOUNTER
Please let pt know I have reviewed her labs. Her B12 is high, this is good. Folate is normal.  Iron is low at 18, normal is >37. Have her start on OTC ferrous sulfate 325mg daily for 3-6 months please. Please let her know it may make her stools look black.  thanks

## 2020-08-27 NOTE — PATIENT INSTRUCTIONS

## 2020-08-27 NOTE — PROGRESS NOTES
Subjective:      Toña Latif is a53 y.o. female  Chief Complaint   Patient presents with    Anemia     from PCP       HPI  PCP: Lisa Marroquin MD  Referring Provider: Kevin Carrington MD  Pt is referred here by her PCP for anemia. Pt reports this is a new finding. Labs per referral notes 8/2020 notes H/H of 9.8/ 34.3  Pt reports she has had 3 episodes of  hematemesis over the past 9 months. She vomits and sometimes will have brb in it. Also notices brbpr, which is chronic for >10 years. Occurs at random episodes. Has comorbid rectal pain,burning, and itching. Uses preparation H and this helps. Reports she has very heavy menses, sometimes her period lasts 3 weeks. And she uses the super thick/large maxi pads for extra heavy flow. Reports this has been ongoing since 1988. She reports her last pap was at least 3-4 years ago. [facility]. Reports she has chronic nausea. Ever since her gastric sleeve in Jan 2014. C/o diarrhea. Reports she was advised she has \"dumping syndrome\" s/p gastric sleeve and cholecystectomy. She has 3-4 diarrhea stools daily on average. On a rare occasion she has constipation. She doesn't want to take any medications to treat this. She reports prior to when all this started she had chronic constipation and would go 3 weeks with no BM. So these loose stools are good for her. C/o pelvic pain. Noted during times of constipation only. Having a good BM resolves it. C/o heartburn. Chronic since 2004. Currently on prilosec 40mg daily as prescribed by her PCP. GI scope reports in history per MA per OV policy, I reviewed this. Family HX:  Maternal grandfather had colon cancer  Pt denies family hx of colon polyps, inflammatory bowel dx, gastric CA and esophageal CA.     Past Medical History:   Diagnosis Date    Asthma     Bronchitis, acute     COPD (chronic obstructive pulmonary disease) (HCC)     Hyperlipidemia     Hypertension     Hyperthyroidism           Past Surgical History:   Procedure Laterality Date    AXILLARY SURGERY Bilateral     excision and drainage of staph abscesses    BACK SURGERY  10/2004    Dr. Dudley Singh, [place] L4, L5    CARPAL TUNNEL RELEASE Right     CHOLECYSTECTOMY      COLONOSCOPY  2007    Dr Garrido Reynolds  2017    Dr Rojas Decker, actively inflamed internal hemorrhoids, residulal liquid and some solid food particles in the colon-5 yr recall due to prep    [place] COLONOSCOPY FLX DX W/COLLJ SPEC WHEN PFRMD N/A 2017    COLONOSCOPY DIAGNOSTIC OR SCREENING performed by Cheryl Yao MD at [facility]    STOMACH SURGERY  2014    Dr. Henok De La Torre ( gastric sleeve)    UPPER GASTROINTESTINAL ENDOSCOPY  10/30/2012    Dr Benita Dupree esophagitis, gastritis, Iliana (-)    UPPER GASTROINTESTINAL ENDOSCOPY  2008    Dr Roel Prasad esophagitis       Social History     Socioeconomic History    Marital status:      Spouse name: None    Number of children: None    Years of education: None    Highest education level: None   Occupational History    None   Social Needs    Financial resource strain: None    Food insecurity     Worry: None     Inability: None    Transportation needs     Medical: None     Non-medical: None   Tobacco Use    Smoking status: Former Smoker     Last attempt to quit: 2011     Years since quittin.3    Smokeless tobacco: Never Used   Substance and Sexual Activity    Alcohol use: No    Drug use: No    Sexual activity: Yes     Partners: Male   Lifestyle    Physical activity     Days per week: None     Minutes per session: None    Stress: None   Relationships    Social connections     Talks on phone: None     Gets together: None     Attends Hoahaoism service: None     Active member of club or organization: None     Attends meetings of clubs or organizations: None     Relationship status: None    Intimate partner violence     Fear of current or ex partner: None     Emotionally abused: None     Physically abused: None     Forced sexual activity: None   Other Topics Concern    None   Social History Narrative    None       Allergies   Allergen Reactions    Latex Rash    Codeine Itching    Penicillins Itching    Tape [Adhesive Tape] Rash       Current Outpatient Medications   Medication Sig Dispense Refill    Omega-3 Fatty Acids (FISH OIL) 1200 MG CPDR Take by mouth daily      vitamin C (ASCORBIC ACID) 500 MG tablet Take 1,000 mg by mouth daily      hydrocortisone (ANUSOL-HC) 2.5 % rectal cream Apply rectally 1-2 times daily (after a BM) for 5 days during hemorrhoid flare. 30 g 1    gabapentin (NEURONTIN) 100 MG capsule Take 100 mg by mouth as needed.  ondansetron (ZOFRAN) 8 MG tablet Take 8 mg by mouth every 8 hours as needed for Nausea or Vomiting      albuterol (PROVENTIL) (2.5 MG/3ML) 0.083% nebulizer solution Take 2.5 mg by nebulization every 4 hours as needed       ALPRAZolam (XANAX) 1 MG tablet Take 1 mg by mouth nightly as needed       cyanocobalamin 1000 MCG/ML injection Inject 1,000 mcg into the muscle every 7 days       furosemide (LASIX) 40 MG tablet Take 40 mg by mouth as needed       HYDROcodone-acetaminophen (NORCO) 7.5-325 MG per tablet Take 1 tablet by mouth every 6 hours as needed  .       levothyroxine (SYNTHROID) 100 MCG tablet Take 100 mcg by mouth daily       loratadine (CLARITIN) 10 MG tablet Take 10 mg by mouth daily       omeprazole (PRILOSEC) 20 MG capsule Take 40 mg by mouth Daily       polyethylene glycol (GLYCOLAX) powder Take 17 g by mouth daily       pravastatin (PRAVACHOL) 40 MG tablet Take 40 mg by mouth daily       B-D 3CC LUER-SHO SYR 25GX1\" 25G X 1\" 3 ML MISC       timolol (TIMOPTIC) 0.5 % ophthalmic solution       lisinopril-hydroCHLOROthiazide (PRINZIDE;ZESTORETIC) 20-12.5 MG per tablet       meclizine (ANTIVERT) 25 MG tablet Take 25 mg by mouth 3 times daily as needed for Dizziness or Nausea  diclofenac sodium (VOLTAREN) 1 % GEL       azithromycin (ZITHROMAX) 250 MG tablet        No current facility-administered medications for this visit. Review of Systems   Constitutional: Negative for appetite change, fatigue, fever and unexpected weight change. HENT: Negative for sore throat, trouble swallowing and voice change. Eyes: Negative for photophobia and visual disturbance. Respiratory: Negative for cough and shortness of breath. Cardiovascular: Negative for chest pain, palpitations and leg swelling. Gastrointestinal: Positive for anal bleeding, diarrhea, nausea, rectal pain and vomiting. Negative for abdominal distention, abdominal pain, blood in stool and constipation. Genitourinary: Positive for hematuria, menstrual problem and pelvic pain. Musculoskeletal: Positive for back pain and neck pain. Negative for arthralgias. Allergic/Immunologic: Negative for immunocompromised state. Neurological: Positive for weakness. Negative for syncope. Hematological: Negative for adenopathy. Does not bruise/bleed easily. Psychiatric/Behavioral: Positive for dysphoric mood. Negative for sleep disturbance. The patient is nervous/anxious. All other systems reviewed and are negative. Objective:     Physical Exam  Vitals signs and nursing note reviewed. Constitutional:       General: She is not in acute distress. Appearance: She is well-developed. Comments: /80   Pulse 88   Ht 5' 5.5\" (1.664 m)   Wt 288 lb (130.6 kg)   SpO2 98%   BMI 47.20 kg/m²    HENT:      Head: Normocephalic and atraumatic. Right Ear: External ear normal.      Left Ear: External ear normal.   Eyes:      General: No scleral icterus. Conjunctiva/sclera: Conjunctivae normal.      Pupils: Pupils are equal, round, and reactive to light. Neck:      Musculoskeletal: Normal range of motion and neck supple. Thyroid: No thyromegaly.    Cardiovascular:      Rate and Rhythm: Normal rate and regular rhythm. Heart sounds: Normal heart sounds. No murmur. No friction rub. No gallop. Comments: No edema noted to albertina lower extremities   Pulmonary:      Effort: Pulmonary effort is normal. No respiratory distress. Breath sounds: Normal breath sounds. Abdominal:      General: Bowel sounds are normal. There is no distension. Palpations: Abdomen is soft. Tenderness: There is abdominal tenderness (pelvic pain with palpation). There is no rebound. Comments: Hepatosplenomegaly not appreciated   Musculoskeletal: Normal range of motion. General: No deformity. Comments: Normal gait on exam   Neurological:      Mental Status: She is alert and oriented to person, place, and time. Cranial Nerves: No cranial nerve deficit. Psychiatric:         Judgment: Judgment normal.           Assessment:       Diagnosis Orders   1. Anemia, unspecified type  COLONOSCOPY W/ OR W/O BIOPSY    ESOPHAGOSCOPY / EGD    Blood Occult Stool Screen #3    Blood Occult Stool Screen #2    Blood Occult Stool Screen #1    Folate    Vitamin B12    Iron and TIBC   2. Menorrhagia with irregular cycle  [facility]   3. BRBPR (bright red blood per rectum)  COLONOSCOPY W/ OR W/O BIOPSY   4. Rectal pain  COLONOSCOPY W/ OR W/O BIOPSY   5. Rectal burning  COLONOSCOPY W/ OR W/O BIOPSY   6. Rectal itching  COLONOSCOPY W/ OR W/O BIOPSY   7. Hematemesis with nausea  ESOPHAGOSCOPY / EGD   8. Chronic nausea  ESOPHAGOSCOPY / EGD   9. S/P laparoscopic sleeve gastrectomy  ESOPHAGOSCOPY / EGD   10. Chronic diarrhea  COLONOSCOPY W/ OR W/O BIOPSY    ESOPHAGOSCOPY / EGD   11. Pelvic pain  COLONOSCOPY W/ OR W/O BIOPSY   12. Chronic heartburn  ESOPHAGOSCOPY / EGD         Plan:      1. Labs and stool OB x3- will call her with results  2. [facility] m[facility]/anemia  3. Schedule outpatient endoscopy r/o celiac and h pylori.   Patient advised no Aspirin, Fish Oil, Vit E or NSAIDs 5 (five) days before procedure. Follow-up Visit: per   Pt Education:   Risks, benefits, and alternatives to endoscopy were discussed. Patient voices understanding of risks of, but not limited to, perforation, bleeding, and infection. The risk of perforation is increased with esophageal dilatation. All questions answered to patient's satisfaction. Patient is agreable to proceed. 4. Schedule outpatient colonoscopy with random colon bx. Patient advised no Aspirin, Fish Oil, Vit E or NSAIDs 5 (five) days before procedure. Follow-up Visit: per Dr Senait Mishra  Pt education:  Risks, benefits, and alternatives to colonoscopy were discussed. Risks of colonoscopy include, but are not limited to, perforation, bleeding, and infection. We discussed that the risk for perforation is 1-3 in 5,000  at the time of colonoscopy;   and 1-2% risk of bleeding post-polypectomy. All questions answered to the satisfaction of the patient. Pt is agreeable to proceed.

## 2020-08-28 NOTE — TELEPHONE ENCOUNTER
8/28/20  Pt has been notified. She asked to send RX to [id number]. Her insurance will pay and they [employer] will deliver it to her. She does not drive.   Thanks

## 2020-08-31 DIAGNOSIS — D64.9 ANEMIA, UNSPECIFIED TYPE: ICD-10-CM

## 2020-08-31 LAB
HEMOCCULT SP2 STL QL: NORMAL
HEMOCCULT SP3 STL QL: NORMAL
OCCULT BLOOD QC: NORMAL
OCCULT BLOOD SCREENING: NORMAL

## 2020-09-01 ENCOUNTER — TELEPHONE (OUTPATIENT)
Dept: GASTROENTEROLOGY | Age: 55
End: 2020-09-01

## 2020-09-08 ENCOUNTER — OFFICE VISIT (OUTPATIENT)
Age: 55
End: 2020-09-08

## 2020-09-08 VITALS — TEMPERATURE: 97.4 F | OXYGEN SATURATION: 98 % | HEART RATE: 90 BPM

## 2020-09-08 PROCEDURE — 99999 PR OFFICE/OUTPT VISIT,PROCEDURE ONLY: CPT | Performed by: NURSE PRACTITIONER

## 2020-09-10 LAB — SARS-COV-2, NAA: NOT DETECTED

## 2020-09-11 ENCOUNTER — ANESTHESIA EVENT (OUTPATIENT)
Dept: ENDOSCOPY | Age: 55
End: 2020-09-11
Payer: MEDICARE

## 2020-09-11 ENCOUNTER — HOSPITAL ENCOUNTER (OUTPATIENT)
Age: 55
Setting detail: OUTPATIENT SURGERY
Discharge: HOME | End: 2020-09-11
Attending: INTERNAL MEDICINE | Admitting: INTERNAL MEDICINE
Payer: MEDICARE

## 2020-09-11 ENCOUNTER — ANESTHESIA (OUTPATIENT)
Dept: ENDOSCOPY | Age: 55
End: 2020-09-11
Payer: MEDICARE

## 2020-09-11 VITALS
OXYGEN SATURATION: 98 % | BODY MASS INDEX: 46.61 KG/M2 | RESPIRATION RATE: 18 BRPM | TEMPERATURE: 98 F | WEIGHT: 290 LBS | SYSTOLIC BLOOD PRESSURE: 150 MMHG | HEIGHT: 66 IN | HEART RATE: 86 BPM | DIASTOLIC BLOOD PRESSURE: 90 MMHG

## 2020-09-11 VITALS — DIASTOLIC BLOOD PRESSURE: 72 MMHG | OXYGEN SATURATION: 98 % | SYSTOLIC BLOOD PRESSURE: 132 MMHG

## 2020-09-11 LAB — HCG(URINE) PREGNANCY TEST: NEGATIVE

## 2020-09-11 PROCEDURE — 6360000002 HC RX W HCPCS: Performed by: NURSE ANESTHETIST, CERTIFIED REGISTERED

## 2020-09-11 PROCEDURE — 3700000001 HC ADD 15 MINUTES (ANESTHESIA): Performed by: INTERNAL MEDICINE

## 2020-09-11 PROCEDURE — 2580000003 HC RX 258: Performed by: INTERNAL MEDICINE

## 2020-09-11 PROCEDURE — 3609027000 HC COLONOSCOPY: Performed by: INTERNAL MEDICINE

## 2020-09-11 PROCEDURE — 2709999900 HC NON-CHARGEABLE SUPPLY: Performed by: INTERNAL MEDICINE

## 2020-09-11 PROCEDURE — 43239 EGD BIOPSY SINGLE/MULTIPLE: CPT | Performed by: INTERNAL MEDICINE

## 2020-09-11 PROCEDURE — 3700000000 HC ANESTHESIA ATTENDED CARE: Performed by: INTERNAL MEDICINE

## 2020-09-11 PROCEDURE — 7100000011 HC PHASE II RECOVERY - ADDTL 15 MIN: Performed by: INTERNAL MEDICINE

## 2020-09-11 PROCEDURE — 88305 TISSUE EXAM BY PATHOLOGIST: CPT

## 2020-09-11 PROCEDURE — 3609012400 HC EGD TRANSORAL BIOPSY SINGLE/MULTIPLE: Performed by: INTERNAL MEDICINE

## 2020-09-11 PROCEDURE — 45378 DIAGNOSTIC COLONOSCOPY: CPT | Performed by: INTERNAL MEDICINE

## 2020-09-11 PROCEDURE — 2500000003 HC RX 250 WO HCPCS: Performed by: NURSE ANESTHETIST, CERTIFIED REGISTERED

## 2020-09-11 PROCEDURE — 84703 CHORIONIC GONADOTROPIN ASSAY: CPT

## 2020-09-11 PROCEDURE — 88342 IMHCHEM/IMCYTCHM 1ST ANTB: CPT

## 2020-09-11 PROCEDURE — 7100000010 HC PHASE II RECOVERY - FIRST 15 MIN: Performed by: INTERNAL MEDICINE

## 2020-09-11 RX ORDER — PROPOFOL 10 MG/ML
INJECTION, EMULSION INTRAVENOUS PRN
Status: DISCONTINUED | OUTPATIENT
Start: 2020-09-11 | End: 2020-09-11 | Stop reason: SDUPTHER

## 2020-09-11 RX ORDER — PROMETHAZINE HYDROCHLORIDE 25 MG/ML
6.25 INJECTION, SOLUTION INTRAMUSCULAR; INTRAVENOUS
Status: DISCONTINUED | OUTPATIENT
Start: 2020-09-11 | End: 2020-09-11 | Stop reason: HOSPADM

## 2020-09-11 RX ORDER — ACETAMINOPHEN 160 MG
TABLET,DISINTEGRATING ORAL DAILY
COMMUNITY

## 2020-09-11 RX ORDER — SODIUM CHLORIDE, SODIUM LACTATE, POTASSIUM CHLORIDE, CALCIUM CHLORIDE 600; 310; 30; 20 MG/100ML; MG/100ML; MG/100ML; MG/100ML
INJECTION, SOLUTION INTRAVENOUS CONTINUOUS
Status: DISCONTINUED | OUTPATIENT
Start: 2020-09-11 | End: 2020-09-11 | Stop reason: HOSPADM

## 2020-09-11 RX ORDER — LIDOCAINE HYDROCHLORIDE 10 MG/ML
INJECTION, SOLUTION INFILTRATION; PERINEURAL PRN
Status: DISCONTINUED | OUTPATIENT
Start: 2020-09-11 | End: 2020-09-11 | Stop reason: SDUPTHER

## 2020-09-11 RX ORDER — FENTANYL CITRATE 50 UG/ML
INJECTION, SOLUTION INTRAMUSCULAR; INTRAVENOUS PRN
Status: DISCONTINUED | OUTPATIENT
Start: 2020-09-11 | End: 2020-09-11 | Stop reason: SDUPTHER

## 2020-09-11 RX ORDER — DIPHENHYDRAMINE HYDROCHLORIDE 50 MG/ML
12.5 INJECTION INTRAMUSCULAR; INTRAVENOUS
Status: DISCONTINUED | OUTPATIENT
Start: 2020-09-11 | End: 2020-09-11 | Stop reason: HOSPADM

## 2020-09-11 RX ORDER — ONDANSETRON 2 MG/ML
4 INJECTION INTRAMUSCULAR; INTRAVENOUS EVERY 6 HOURS PRN
Status: DISCONTINUED | OUTPATIENT
Start: 2020-09-11 | End: 2020-09-11 | Stop reason: HOSPADM

## 2020-09-11 RX ORDER — ONDANSETRON 2 MG/ML
4 INJECTION INTRAMUSCULAR; INTRAVENOUS ONCE
Status: COMPLETED | OUTPATIENT
Start: 2020-09-11 | End: 2020-09-11

## 2020-09-11 RX ORDER — FERROUS SULFATE 325(65) MG
325 TABLET ORAL
Status: ON HOLD | COMMUNITY
End: 2021-01-29 | Stop reason: ALTCHOICE

## 2020-09-11 RX ORDER — ONDANSETRON 2 MG/ML
4 INJECTION INTRAMUSCULAR; INTRAVENOUS
Status: DISCONTINUED | OUTPATIENT
Start: 2020-09-11 | End: 2020-09-11

## 2020-09-11 RX ORDER — MIDAZOLAM HYDROCHLORIDE 1 MG/ML
2 INJECTION INTRAMUSCULAR; INTRAVENOUS ONCE
Status: COMPLETED | OUTPATIENT
Start: 2020-09-11 | End: 2020-09-11

## 2020-09-11 RX ADMIN — SODIUM CHLORIDE, SODIUM LACTATE, POTASSIUM CHLORIDE, AND CALCIUM CHLORIDE: 600; 310; 30; 20 INJECTION, SOLUTION INTRAVENOUS at 12:22

## 2020-09-11 RX ADMIN — LIDOCAINE HYDROCHLORIDE 50 MG: 10 INJECTION, SOLUTION INFILTRATION; PERINEURAL at 13:15

## 2020-09-11 RX ADMIN — FENTANYL CITRATE 100 MCG: 50 INJECTION INTRAMUSCULAR; INTRAVENOUS at 13:15

## 2020-09-11 RX ADMIN — PROPOFOL 350 MG: 10 INJECTION, EMULSION INTRAVENOUS at 13:15

## 2020-09-11 RX ADMIN — SODIUM CHLORIDE, SODIUM LACTATE, POTASSIUM CHLORIDE, AND CALCIUM CHLORIDE: 600; 310; 30; 20 INJECTION, SOLUTION INTRAVENOUS at 13:07

## 2020-09-11 RX ADMIN — MIDAZOLAM HYDROCHLORIDE 2 MG: 2 INJECTION, SOLUTION INTRAMUSCULAR; INTRAVENOUS at 13:05

## 2020-09-11 RX ADMIN — ONDANSETRON HYDROCHLORIDE 4 MG: 2 INJECTION, SOLUTION INTRAMUSCULAR; INTRAVENOUS at 13:02

## 2020-09-11 ASSESSMENT — LIFESTYLE VARIABLES: SMOKING_STATUS: 0

## 2020-09-11 ASSESSMENT — PAIN - FUNCTIONAL ASSESSMENT: PAIN_FUNCTIONAL_ASSESSMENT: 0-10

## 2020-09-11 ASSESSMENT — PAIN SCALES - GENERAL: PAINLEVEL_OUTOF10: 0

## 2020-09-11 NOTE — OP NOTE
of Dr. Bernadette Gresham MD.  Electronically signed by Cresencio Castellon RN on 9/11/2020 at 12:25 PM    I personally performed the services described in this documentation as scribed by Farhad Godfrey, and it appears accurate and complete.      Calin Hagan MD  9/11/2020

## 2020-09-11 NOTE — ANESTHESIA PRE PROCEDURE
Department of Anesthesiology  Preprocedure Note       Name:  Abhishek James   Age:  50 y.o.  :  1965                                          MRN:  [id number]         Date:  2020      Surgeon: Sherine Jain):  Avani Lockwood MD    Procedure: Procedure(s):  EGD ESOPHAGOGASTRODUODENOSCOPY  COLONOSCOPY DIAGNOSTIC    Medications prior to admission:   Prior to Admission medications    Medication Sig Start Date End Date Taking? Authorizing Provider   Omega-3 Fatty Acids (FISH OIL) 1200 MG CPDR Take by mouth daily    Historical Provider, MD   vitamin C (ASCORBIC ACID) 500 MG tablet Take 1,000 mg by mouth daily    Historical Provider, MD   lisinopril-hydroCHLOROthiazide (PRINZIDE;ZESTORETIC) 20-12.5 MG per tablet  20   Historical Provider, MD   meclizine (ANTIVERT) 25 MG tablet Take 25 mg by mouth 3 times daily as needed for Dizziness or Nausea    Historical Provider, MD   diclofenac sodium (VOLTAREN) 1 % GEL  20   Historical Provider, MD   azithromycin (ZITHROMAX) 250 MG tablet  20   Historical Provider, MD   hydrocortisone (ANUSOL-HC) 2.5 % rectal cream Apply rectally 1-2 times daily (after a BM) for 5 days during hemorrhoid flare. 3/8/17   PELON Capellan   gabapentin (NEURONTIN) 100 MG capsule Take 100 mg by mouth as needed.      Historical Provider, MD   ondansetron (ZOFRAN) 8 MG tablet Take 8 mg by mouth every 8 hours as needed for Nausea or Vomiting    Historical Provider, MD   albuterol (PROVENTIL) (2.5 MG/3ML) 0.083% nebulizer solution Take 2.5 mg by nebulization every 4 hours as needed  4/9/15   Historical Provider, MD   ALPRAZolam [employer]) 1 MG tablet Take 1 mg by mouth nightly as needed  4/9/15   Historical Provider, MD   cyanocobalamin 1000 MCG/ML injection Inject 1,000 mcg into the muscle every 7 days  4/14/15   Historical Provider, MD   furosemide (LASIX) 40 MG tablet Take 40 mg by mouth as needed  4/9/15   Historical Provider, MD   HYDROcodone-acetaminophen ([facility]) 7.5-325 MG abscesses    BACK SURGERY  10/2004    Dr. Patrice Larsen, [place] L4, L5    CARPAL TUNNEL RELEASE Right     CHOLECYSTECTOMY      COLONOSCOPY  2007    Dr Katelyn Paulino COLONOSCOPY  2017    Dr Colby Abel, actively inflamed internal hemorrhoids, residulal liquid and some solid food particles in the colon-5 yr recall due to prep    [place] COLONOSCOPY FLX DX W/COLLJ SPEC WHEN PFRMD N/A 2017    COLONOSCOPY DIAGNOSTIC OR SCREENING performed by Payton Mckeon MD at [facility]    STOMACH SURGERY  2014    Dr. Ihsan Baig ( gastric sleeve)    UPPER GASTROINTESTINAL ENDOSCOPY  10/30/2012    Dr Toño Dunham esophagitis, gastritis, Iliana (-)    UPPER GASTROINTESTINAL ENDOSCOPY  2008    Dr Marlene Miramontes esophagitis       Social History:    Social History     Tobacco Use    Smoking status: Former Smoker     Last attempt to quit: 2011     Years since quittin.3    Smokeless tobacco: Never Used   Substance Use Topics    Alcohol use: No                                Counseling given: Not Answered      Vital Signs (Current): There were no vitals filed for this visit.                                            BP Readings from Last 3 Encounters:   20 130/80   04/10/18 134/89   17 98/62       NPO Status:                                                                                 BMI:   Wt Readings from Last 3 Encounters:   20 288 lb (130.6 kg)   17 245 lb (111.1 kg)   17 238 lb (108 kg)     There is no height or weight on file to calculate BMI.    CBC:   Lab Results   Component Value Date    WBC 3.71 2011    RBC 5.16 2011    HGB 13.6 2011    HCT 42.2 2011    MCV 81.8 2011    RDW 15.4 2011     2011       CMP:   Lab Results   Component Value Date     2011    K 3.9 2011     2011    CO2 25 2011    BUN 10 2011    CREATININE 0.9 2011    LABGLOM 73 2011    GLUCOSE 95 2011 PROT 7.1 06/19/2011    CALCIUM 9.2 06/19/2011    ALKPHOS 123 06/19/2011    AST 31 06/19/2011    ALT 35 06/19/2011       POC Tests: No results for input(s): POCGLU, POCNA, POCK, POCCL, POCBUN, POCHEMO, POCHCT in the last 72 hours. Coags: No results found for: PROTIME, INR, APTT    HCG (If Applicable):   Lab Results   Component Value Date    PREGTESTUR Negative 02/08/2017        ABGs: No results found for: PHART, PO2ART, EUN2LBM, BUH1SEO, BEART, K7ZKEPAP     Type & Screen (If Applicable):  No results found for: LABABO, LABRH    Drug/Infectious Status (If Applicable):  No results found for: HIV, HEPCAB    COVID-19 Screening (If Applicable):   Lab Results   Component Value Date    COVID19 NOT DETECTED 09/08/2020         Anesthesia Evaluation  Patient summary reviewed no history of anesthetic complications:   Airway: Mallampati: II  TM distance: >3 FB   Neck ROM: full  Mouth opening: > = 3 FB Dental: normal exam         Pulmonary: breath sounds clear to auscultation  (+) COPD:  sleep apnea: on CPAP,  asthma:     (-) not a current smoker                           Cardiovascular:  Exercise tolerance: good (>4 METS),   (+) hypertension:, hyperlipidemia                  Neuro/Psych:   Negative Neuro/Psych ROS  (+) psychiatric history:            GI/Hepatic/Renal: Neg GI/Hepatic/Renal ROS  (+) GERD:, morbid obesity          Endo/Other:    (+) hypothyroidism::., .                 Abdominal:           Vascular:                                    Anesthesia Plan      general and TIVA     ASA 3       Induction: intravenous. Anesthetic plan and risks discussed with patient. Plan discussed with CRNA.     Attending anesthesiologist reviewed and agrees with Khai Gallo 192, PELON - CRNA   9/11/2020

## 2020-09-11 NOTE — H&P
Patient Name: Jhonatan Galdamez  : 1965  MRN: [id number]  DATE: 20    Allergies: Allergies   Allergen Reactions    Latex Rash    Codeine Itching     Other reaction(s): Other (See Comments)  \"I DONT REMEMBER\"    Penicillins Itching and Other (See Comments)     WHEN I WAS A CHILD, I DON'T KNOW    Tape [Adhesive Tape] Rash        ENDOSCOPY  History and Physical    Procedure:    [x] Diagnostic Colonoscopy       [] Screening Colonoscopy  [x] EGD      [] ERCP      [] EUS       [] Other    [x] Previous office notes/History and Physical reviewed from the patients chart. Please see EMR for further details of HPI. I have examined the patient's status immediately prior to the procedure and:      Indications/HPI:    []Abdominal Pain   []Cancer- GI/Lung     []Fhx of colon CA/polyps  []History of Polyps  []Barretts            []Melena  []Abnormal Imaging              []Dysphagia              []Persistent Pneumonia   [x]Anemia                            []Food Impaction        []History of Polyps  [] GI Bleed             []Pulmonary nodule/Mass   []Change in bowel habits [x]Heartburn/Reflux  [x]Rectal Bleed (BRBPR)  []Chest Pain - Non Cardiac []Heme (+) Stool []Ulcers  []Constipation  []Hemoptysis  []Varices  []Diarrhea  []Hypoxemia    []Nausea/Vomiting   []Screening   []Crohns/Colitis  []Other:     Anesthesia:   [x] MAC [] Moderate Sedation   [] General   [] None     ROS: 12 pt Review of Symptoms was negative unless mentioned above    Medications:   Prior to Admission medications    Medication Sig Start Date End Date Taking?  Authorizing Provider   Omega-3 Fatty Acids (FISH OIL) 1200 MG CPDR Take by mouth daily    Historical Provider, MD   vitamin C (ASCORBIC ACID) 500 MG tablet Take 1,000 mg by mouth daily    Historical Provider, MD   lisinopril-hydroCHLOROthiazide (PRINZIDE;ZESTORETIC) 20-12.5 MG per tablet  20   Historical Provider, MD   meclizine (ANTIVERT) 25 MG tablet Take 25 mg by mouth 3 times daily as needed for Dizziness or Nausea    Historical Provider, MD   diclofenac sodium (VOLTAREN) 1 % GEL  8/20/20   Historical Provider, MD   azithromycin (ZITHROMAX) 250 MG tablet  8/20/20   Historical Provider, MD   hydrocortisone (ANUSOL-HC) 2.5 % rectal cream Apply rectally 1-2 times daily (after a BM) for 5 days during hemorrhoid flare. 3/8/17   PELON Suarez   gabapentin (NEURONTIN) 100 MG capsule Take 100 mg by mouth as needed. Historical Provider, MD   ondansetron (ZOFRAN) 8 MG tablet Take 8 mg by mouth every 8 hours as needed for Nausea or Vomiting    Historical Provider, MD   albuterol (PROVENTIL) (2.5 MG/3ML) 0.083% nebulizer solution Take 2.5 mg by nebulization every 4 hours as needed  4/9/15   Historical Provider, MD   ALPRAZolam [employer]) 1 MG tablet Take 1 mg by mouth nightly as needed  4/9/15   Historical Provider, MD   cyanocobalamin 1000 MCG/ML injection Inject 1,000 mcg into the muscle every 7 days  4/14/15   Historical Provider, MD   furosemide (LASIX) 40 MG tablet Take 40 mg by mouth as needed  4/9/15   Historical Provider, MD   HYDROcodone-acetaminophen (NORCO) 7.5-325 MG per tablet Take 1 tablet by mouth every 6 hours as needed  .  4/9/15   Historical Provider, MD   levothyroxine (SYNTHROID) 100 MCG tablet Take 100 mcg by mouth daily  4/9/15   Historical Provider, MD   loratadine (CLARITIN) 10 MG tablet Take 10 mg by mouth daily  4/9/15   Historical Provider, MD   omeprazole (PRILOSEC) 20 MG capsule Take 40 mg by mouth Daily  4/9/15   Historical Provider, MD   polyethylene glycol (GLYCOLAX) powder Take 17 g by mouth daily  4/9/15   Historical Provider, MD   pravastatin (PRAVACHOL) 40 MG tablet Take 40 mg by mouth daily  4/9/15   Historical Provider, MD ÁLVAREZ 3CC LUER-SHO SYR 25GX1\" 25G X 1\" 3 ML MISC  4/14/15   Historical Provider, MD   timolol (TIMOPTIC) 0.5 % ophthalmic solution  4/9/15   Historical Provider, MD       Past Medical History:  Past Medical History:   Diagnosis Date    Anemia     Asthma     Bronchitis, acute     COPD (chronic obstructive pulmonary disease) (HCC)     Glaucoma     Hyperlipidemia     Hypertension     Hyperthyroidism     Sleep apnea     uses c-pap       Past Surgical History:  Past Surgical History:   Procedure Laterality Date    AXILLARY SURGERY Bilateral     excision and drainage of staph abscesses    BACK SURGERY  10/2004    Dr. Laila Sweet, [place] L4, L5    CARPAL TUNNEL RELEASE Right     CHOLECYSTECTOMY      COLONOSCOPY  2007    Dr Juhi Cardenas COLONOSCOPY  2017    Dr Flanagan Rater, actively inflamed internal hemorrhoids, residulal liquid and some solid food particles in the colon-5 yr recall due to prep    [place] COLONOSCOPY FLX DX W/COLLJ SPEC WHEN PFRMD N/A 2017    COLONOSCOPY DIAGNOSTIC OR SCREENING performed by Satish Tanner MD at [facility]    STOMACH SURGERY  2014    Dr. Erica Breen ( gastric sleeve)    UPPER GASTROINTESTINAL ENDOSCOPY  10/30/2012    Dr Josue Wakefield esophagitis, gastritis, Iliana (-)    UPPER GASTROINTESTINAL ENDOSCOPY  2008    Dr Johanna Herring esophagitis       Social History:  Social History     Tobacco Use    Smoking status: Former Smoker     Last attempt to quit: 2011     Years since quittin.3    Smokeless tobacco: Never Used   Substance Use Topics    Alcohol use: No    Drug use: No       Vital Signs:   Vitals:    20 1201   BP: 125/85   Pulse: 112   Resp: 22   Temp: 96.5 °F (35.8 °C)   SpO2: 98%        Physical Exam:  Cardiac:  [x]WNL  []Comments:  Pulmonary:  [x]WNL   []Comments:  Neuro/Mental Status:  [x]WNL  []Comments:  Abdominal:  [x]WNL    []Comments:  Other:   []WNL  []Comments:    Informed Consent:  The risks and benefits of the procedure have been discussed with either the patient or if they cannot consent, their representative. Assessment:  Patient examined and appropriate for planned sedation and procedure.      Plan:  Proceed with planned sedation and procedure as above.     Prashant Rivero am scribing for and in the presence of Dr. Phil Gonzalez MD.  Electronically signed by Sheyla Hernández RN on 9/11/2020 at 12:23 PM    I personally performed the services described in this documentation as scribed by Deep Garcia, and it appears accurate and complete.      Phil Gonzalez MD  9/11/2020

## 2020-09-11 NOTE — ANESTHESIA POSTPROCEDURE EVALUATION
Department of Anesthesiology  Postprocedure Note    Patient: Bree Arriaza  MRN: [id number]  YOB: 1965  Date of evaluation: 9/11/2020  Time:  1:33 PM     Procedure Summary     Date:  09/11/20 Room / Location:  [address]    Anesthesia Start:  8286 Anesthesia Stop:  0799    Procedures:       EGD BIOPSY (N/A )      COLONOSCOPY DIAGNOSTIC (N/A ) Diagnosis:  (ANEMIA)    Surgeon:  Lebron oBnilla MD Responsible Provider:  PELON Marcus CRNA    Anesthesia Type:  general, TIVA ASA Status:  3          Anesthesia Type: general, TIVA    Abelino Phase I: Abelino Score: 10    Abelion Phase II:      Last vitals: Reviewed and per EMR flowsheets. Anesthesia Post Evaluation    Patient location during evaluation: PACU  Patient participation: complete - patient participated  Level of consciousness: awake and alert  Pain score: 0  Airway patency: patent  Nausea & Vomiting: no nausea and no vomiting  Complications: no  Cardiovascular status: hemodynamically stable and blood pressure returned to baseline  Respiratory status: acceptable and nasal cannula  Hydration status: stable  Comments: Vital Signs Stable. Exchanging well. PACU RN received care.

## 2020-10-07 ENCOUNTER — LAB (OUTPATIENT)
Dept: LAB | Facility: HOSPITAL | Age: 55
End: 2020-10-07

## 2020-10-07 ENCOUNTER — CONSULT (OUTPATIENT)
Dept: ONCOLOGY | Facility: CLINIC | Age: 55
End: 2020-10-07

## 2020-10-07 ENCOUNTER — TELEPHONE (OUTPATIENT)
Dept: ONCOLOGY | Facility: CLINIC | Age: 55
End: 2020-10-07

## 2020-10-07 VITALS
TEMPERATURE: 97.3 F | HEIGHT: 65 IN | WEIGHT: 292 LBS | OXYGEN SATURATION: 97 % | SYSTOLIC BLOOD PRESSURE: 140 MMHG | RESPIRATION RATE: 20 BRPM | HEART RATE: 86 BPM | DIASTOLIC BLOOD PRESSURE: 100 MMHG | BODY MASS INDEX: 48.65 KG/M2

## 2020-10-07 DIAGNOSIS — Z12.31 SCREENING MAMMOGRAM, ENCOUNTER FOR: ICD-10-CM

## 2020-10-07 DIAGNOSIS — D64.9 ANEMIA, UNSPECIFIED TYPE: ICD-10-CM

## 2020-10-07 DIAGNOSIS — D50.9 MICROCYTIC ANEMIA: Primary | ICD-10-CM

## 2020-10-07 DIAGNOSIS — D50.9 IRON DEFICIENCY ANEMIA, UNSPECIFIED IRON DEFICIENCY ANEMIA TYPE: ICD-10-CM

## 2020-10-07 LAB
ALBUMIN SERPL-MCNC: 4.4 G/DL (ref 3.5–5.2)
ALBUMIN/GLOB SERPL: 1.4 G/DL
ALP SERPL-CCNC: 115 U/L (ref 39–117)
ALT SERPL W P-5'-P-CCNC: 17 U/L (ref 1–33)
ANION GAP SERPL CALCULATED.3IONS-SCNC: 9 MMOL/L (ref 5–15)
AST SERPL-CCNC: 21 U/L (ref 1–32)
BASOPHILS # BLD MANUAL: 0.06 10*3/MM3 (ref 0–0.2)
BASOPHILS NFR BLD AUTO: 1 % (ref 0–1.5)
BILIRUB SERPL-MCNC: 0.2 MG/DL (ref 0–1.2)
BUN SERPL-MCNC: 16 MG/DL (ref 6–20)
BUN/CREAT SERPL: 25.4 (ref 7–25)
CALCIUM SPEC-SCNC: 9.3 MG/DL (ref 8.6–10.5)
CHLORIDE SERPL-SCNC: 106 MMOL/L (ref 98–107)
CO2 SERPL-SCNC: 26 MMOL/L (ref 22–29)
CREAT SERPL-MCNC: 0.63 MG/DL (ref 0.57–1)
DEPRECATED RDW RBC AUTO: 60.1 FL (ref 37–54)
EOSINOPHIL # BLD MANUAL: 0.44 10*3/MM3 (ref 0–0.4)
EOSINOPHIL NFR BLD MANUAL: 7.1 % (ref 0.3–6.2)
ERYTHROCYTE [DISTWIDTH] IN BLOOD BY AUTOMATED COUNT: 23.4 % (ref 12.3–15.4)
FOLATE SERPL-MCNC: >20 NG/ML (ref 4.78–24.2)
GFR SERPL CREATININE-BSD FRML MDRD: 99 ML/MIN/1.73
GIANT PLATELETS: ABNORMAL
GLOBULIN UR ELPH-MCNC: 3.1 GM/DL
GLUCOSE SERPL-MCNC: 130 MG/DL (ref 65–99)
HAPTOGLOB SERPL-MCNC: 214 MG/DL (ref 30–200)
HCT VFR BLD AUTO: 36.8 % (ref 34–46.6)
HGB BLD-MCNC: 10.8 G/DL (ref 12–15.9)
HOLD SPECIMEN: NORMAL
IRON 24H UR-MRATE: 21 MCG/DL (ref 37–145)
IRON SATN MFR SERPL: 4 % (ref 20–50)
LYMPHOCYTES # BLD MANUAL: 1.58 10*3/MM3 (ref 0.7–3.1)
LYMPHOCYTES NFR BLD MANUAL: 25.3 % (ref 19.6–45.3)
LYMPHOCYTES NFR BLD MANUAL: 9.1 % (ref 5–12)
MCH RBC QN AUTO: 21.5 PG (ref 26.6–33)
MCHC RBC AUTO-ENTMCNC: 29.3 G/DL (ref 31.5–35.7)
MCV RBC AUTO: 73.2 FL (ref 79–97)
MICROCYTES BLD QL: ABNORMAL
MONOCYTES # BLD AUTO: 0.57 10*3/MM3 (ref 0.1–0.9)
NEUTROPHILS # BLD AUTO: 3.54 10*3/MM3 (ref 1.7–7)
NEUTROPHILS NFR BLD MANUAL: 56.6 % (ref 42.7–76)
PLATELET # BLD AUTO: 446 10*3/MM3 (ref 140–450)
PMV BLD AUTO: 9.2 FL (ref 6–12)
POTASSIUM SERPL-SCNC: 4.3 MMOL/L (ref 3.5–5.2)
PROT SERPL-MCNC: 7.5 G/DL (ref 6–8.5)
RBC # BLD AUTO: 5.03 10*6/MM3 (ref 3.77–5.28)
RETICS # AUTO: 0.06 10*6/MM3 (ref 0.02–0.13)
RETICS/RBC NFR AUTO: 1.23 % (ref 0.7–1.9)
SODIUM SERPL-SCNC: 141 MMOL/L (ref 136–145)
TIBC SERPL-MCNC: 516 MCG/DL (ref 298–536)
TRANSFERRIN SERPL-MCNC: 346 MG/DL (ref 200–360)
VARIANT LYMPHS NFR BLD MANUAL: 1 % (ref 0–5)
VIT B12 BLD-MCNC: 1330 PG/ML (ref 211–946)
WBC # BLD AUTO: 6.26 10*3/MM3 (ref 3.4–10.8)
WBC MORPH BLD: NORMAL

## 2020-10-07 PROCEDURE — 99203 OFFICE O/P NEW LOW 30 MIN: CPT | Performed by: NURSE PRACTITIONER

## 2020-10-07 PROCEDURE — 36415 COLL VENOUS BLD VENIPUNCTURE: CPT | Performed by: NURSE PRACTITIONER

## 2020-10-07 PROCEDURE — 85007 BL SMEAR W/DIFF WBC COUNT: CPT | Performed by: NURSE PRACTITIONER

## 2020-10-07 PROCEDURE — 82728 ASSAY OF FERRITIN: CPT | Performed by: NURSE PRACTITIONER

## 2020-10-07 PROCEDURE — 82525 ASSAY OF COPPER: CPT | Performed by: NURSE PRACTITIONER

## 2020-10-07 PROCEDURE — 80053 COMPREHEN METABOLIC PANEL: CPT | Performed by: NURSE PRACTITIONER

## 2020-10-07 PROCEDURE — 84466 ASSAY OF TRANSFERRIN: CPT | Performed by: NURSE PRACTITIONER

## 2020-10-07 PROCEDURE — 83540 ASSAY OF IRON: CPT | Performed by: NURSE PRACTITIONER

## 2020-10-07 PROCEDURE — 83010 ASSAY OF HAPTOGLOBIN QUANT: CPT | Performed by: NURSE PRACTITIONER

## 2020-10-07 PROCEDURE — 82746 ASSAY OF FOLIC ACID SERUM: CPT | Performed by: NURSE PRACTITIONER

## 2020-10-07 PROCEDURE — 85025 COMPLETE CBC W/AUTO DIFF WBC: CPT | Performed by: NURSE PRACTITIONER

## 2020-10-07 PROCEDURE — 82607 VITAMIN B-12: CPT | Performed by: NURSE PRACTITIONER

## 2020-10-07 PROCEDURE — 85045 AUTOMATED RETICULOCYTE COUNT: CPT | Performed by: NURSE PRACTITIONER

## 2020-10-07 RX ORDER — HYDROCODONE BITARTRATE AND ACETAMINOPHEN 7.5; 325 MG/1; MG/1
1 TABLET ORAL EVERY 6 HOURS PRN
COMMUNITY

## 2020-10-07 NOTE — TELEPHONE ENCOUNTER
TRIED TO CALL PT TO GIVE APPT DATE AND TIME FOR MAMMOGRAM BUT COULD NOT LEAVE VM.     APPOINTMENT IS SCHEDULED FOR October 14TH AT 12:20 AT [facility]. INSTRUCT PT NOT TO WEAR DEODORANT OR BODY SPRAY BEFORE APPT TIME.    ALSO, WE DID RECEIVE PTS MED LIST FROM [facility].

## 2020-10-07 NOTE — PROGRESS NOTES
[facility] HEMATOLOGY AND ONCOLOGY  [address]  [place] [facility] [place] [address] [phone]    Patient Name: Vicki Agarwal  Encounter Date: 10/07/2020  YOB: 1965  Patient Number: [id number]    Initial Note    REASON FOR CONSULTATION: Vicki Agarwal is a 55 y.o. female referred by Myron Cabrales MD for diagnostic and management recommendations for anemia. History is obtained from patient. History is considered to be accurate.    HISTORY OF PRESENT ILLNESS: Vicki Agarwal is a 56 yo female patient that presents today for initial consultation for anemia .Mrs. Agarwal had lab work obtained by her primary care provider on 8/20/2020 that showed a hemoglobin of 9.8, hematocrit 34.3, MCV 70.6 and MCHC of 28.6.  Her platelet count was slightly elevated at 471,000 and had a normal white count of 7.1.  She did have an iron saturation on August 27 of 20 that showed 4%.  This was in comparison to back in April 2019 when her hemoglobin was normal at 12.3 MCV was normal at 87.3.    Mrs. Agarwal underwent a colonoscopy on 9/11/2020 due to her anemia that was negative.  She also had an endoscopy on the same date that also was negative.  They found no evidence of bleeding.  She does have significant menstrual blood loss.  She states that she has a menstrual cycle that last anywhere from 12 to 23 days at a time.  She states this last month was the shortest one she has had in a long time and it was 9 days.  She states they are very heavy and lengthy.  She does have an appointment with a GYN on October 15.      She has attempted iron pills but they cause her significant amount of nausea.  She had a gastric sleeve procedure back in January 2014 and just does not tolerate things well she states.  She does have significant fatigue and tiredness.  She states that she tires out extremely easy.  She has no significant shortness of breath or chest  "pain.  No headaches or dizziness.  No GI complaints.    LABS    Lab Results - Last 18 Months   Lab Units 10/07/20  1041 04/25/19  0153   HEMOGLOBIN g/dL 10.8* 12.6   HEMATOCRIT % 36.8 38.4   MCV fL 73.2* 83.1   WBC 10*3/mm3 6.26 7.78   RDW % 23.4* 13.9   MPV fL 9.2 10.2   PLATELETS 10*3/mm3 446 344   IMM GRAN % %  --  0.3   NEUTROS ABS 10*3/mm3 3.54 4.28   LYMPHS ABS 10*3/mm3  --  2.39   MONOS ABS 10*3/mm3  --  0.86   EOS ABS 10*3/mm3 0.44* 0.19   BASOS ABS 10*3/mm3 0.06 0.04   IMMATURE GRANS (ABS) 10*3/mm3  --  0.02   NRBC /100 WBC  --  0.0   NEUTROPHIL % % 56.6  --    MONOCYTES % % 9.1  --    BASOPHIL % % 1.0  --    ATYP LYMPH % % 1.0  --    GIANT PLT  Slight/1+  --        Lab Results - Last 18 Months   Lab Units 10/07/20  1041 04/25/19  0153   GLUCOSE mg/dL 130* 147*   SODIUM mmol/L 141 140   POTASSIUM mmol/L 4.3 4.1   CO2 mmol/L 26.0 27.0   CHLORIDE mmol/L 106 102   ANION GAP mmol/L 9.0 11.0   CREATININE mg/dL 0.63 0.79   BUN mg/dL 16 18   BUN / CREAT RATIO  25.4* 22.8   CALCIUM mg/dL 9.3 9.4   EGFR IF NONAFRICN AM mL/min/1.73 99 77   ALK PHOS U/L 115 100   TOTAL PROTEIN g/dL 7.5 7.4   ALT (SGPT) U/L 17 16   AST (SGOT) U/L 21 24   BILIRUBIN mg/dL 0.2 0.2   ALBUMIN g/dL 4.40 4.10   GLOBULIN gm/dL 3.1 3.3       Lab Results - Last 18 Months   Lab Units 10/07/20  1041 08/27/20  1452   IRON mcg/dL 21* 18*   TIBC mcg/dL 516 456*   IRON SATURATION % 4* 4*   FERRITIN ng/mL 7.30*  --    FOLATE ng/mL >20.00 >20.0         PAST MEDICAL HISTORY:  ALLERGIES:  Allergies   Allergen Reactions   • Codeine Other (See Comments)     \"I DONT REMEMBER\"   • Penicillins Other (See Comments)     WHEN I WAS A CHILD, I DON'T KNOW   • Adhesive Tape Rash   • Latex Rash     CURRENT MEDICATIONS:  Outpatient Encounter Medications as of 10/7/2020   Medication Sig Dispense Refill   • albuterol sulfate  (90 Base) MCG/ACT inhaler Inhale 2 puffs Every 4 (Four) Hours As Needed for Wheezing.     • ALPRAZolam (XANAX) 0.25 MG tablet Take 0.25 mg " by mouth At Night As Needed for Sleep.     • HYDROcodone-acetaminophen (NORCO) 7.5-325 MG per tablet Take 1 tablet by mouth Every 6 (Six) Hours As Needed for Moderate Pain .     • metroNIDAZOLE (FLAGYL) 500 MG tablet Take 1 tablet by mouth 2 (Two) Times a Day. 14 tablet 0   • oxyCODONE-acetaminophen (PERCOCET) 7.5-325 MG per tablet Take 1 tablet by mouth Every 6 (Six) Hours As Needed for Severe Pain . 8 tablet 0     No facility-administered encounter medications on file as of 10/7/2020.      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  SOCIAL HISTORY:  Social History     Socioeconomic History   • Marital status: Single     Spouse name: Not on file   • Number of children: Not on file   • Years of education: Not on file   • Highest education level: Not on file   Tobacco Use   • Smoking status: Former Smoker     Quit date: 6/10/2011     Years since quittin.3   Substance and Sexual Activity   • Alcohol use: No     Frequency: Never   • Drug use: No   • Sexual activity: Defer       REVIEW OF SYSTEMS:  Review of Systems   Constitutional: Positive for fatigue. Negative for activity change, appetite change, chills, diaphoresis, fever and unexpected weight loss.   HENT: Negative for ear pain, nosebleeds, sinus pressure, sore throat and voice change.    Eyes: Negative for blurred vision, double vision,  "pain and visual disturbance.   Respiratory: Negative for cough and shortness of breath.    Cardiovascular: Negative for chest pain, palpitations and leg swelling.   Gastrointestinal: Negative for abdominal pain, anal bleeding, blood in stool, constipation, diarrhea, nausea and vomiting.   Endocrine: Negative for heat intolerance, polydipsia and polyuria.   Genitourinary: Positive for menstrual problem. Negative for dysuria, frequency, hematuria, urgency and urinary incontinence.   Musculoskeletal: Positive for arthralgias and myalgias.   Skin: Negative for rash and skin lesions.   Neurological: Positive for weakness. Negative for dizziness, tremors, seizures, syncope, speech difficulty and headache.   Hematological: Negative for adenopathy. Does not bruise/bleed easily.   Psychiatric/Behavioral: Negative for dysphoric mood, sleep disturbance, suicidal ideas and depressed mood.       /100   Pulse 86   Temp 97.3 °F (36.3 °C)   Resp 20   Ht 165.1 cm (65\")   Wt 132 kg (292 lb)   SpO2 97%   Breastfeeding No   BMI 48.59 kg/m²  Body surface area is 2.32 meters squared.  Pain Score    10/07/20 0926   PainSc:   8       Physical Exam:  Physical Exam  Vitals signs reviewed.   Constitutional:       Appearance: Normal appearance. She is well-developed.   HENT:      Head: Atraumatic.      Mouth/Throat:      Mouth: Mucous membranes are moist.   Eyes:      General: No scleral icterus.     Pupils: Pupils are equal, round, and reactive to light.   Neck:      Musculoskeletal: Neck supple.      Trachea: Trachea normal.   Cardiovascular:      Rate and Rhythm: Normal rate and regular rhythm.      Heart sounds: No murmur. No friction rub. No gallop.    Pulmonary:      Effort: Pulmonary effort is normal.      Breath sounds: Normal breath sounds. No wheezing, rhonchi or rales.   Abdominal:      Palpations: Abdomen is soft.      Tenderness: There is no abdominal tenderness. There is no guarding or rebound.   Lymphadenopathy:    "   Cervical: No cervical adenopathy.      Upper Body:      Right upper body: No supraclavicular adenopathy.      Left upper body: No supraclavicular adenopathy.   Skin:     General: Skin is warm and dry.   Neurological:      Mental Status: She is alert and oriented to person, place, and time.      Sensory: No sensory deficit.   Psychiatric:         Judgment: Judgment normal.         Vicki Agarwal reports a pain score of 8   Pain Score    10/07/20 0926   PainSc:   8     Given her pain assessment as noted, treatment options were discussed and the following options were decided upon as a follow-up plan to address the patient's pain: continuation of current treatment plan for pain per pcp.    Patient's Body mass index is 48.59 kg/m². BMI is above normal parameters. Recommendations include: referral to a weight management program and defer to pcp.      ASSESSMENT/PLANS  **Microcytic anemia likely secondary to iron deficiency from menorrhagia  • Based on history of the patient she has a menstrual cycle that last typically 12 to 23 days and are quite heavy.  This significant menstrual loss could definitely be the source of her iron deficiency.  She had an iron saturation on 8/27/2020 of only 4%.  Hemoglobin was around 9.8, MCV 70.6 as mentioned above.  • Colonoscopy and endoscopy found no signs of bleeding  • She has normal renal function   • Normal liver function studies   • Normal bilirubin and with low MCV do not expect hemolysis   Additional work-up will include:  o reticulocyte counts  o folate  o B12 level  o Haptoglobin  o Ferritin  o Iron profile  o Copper  o CBC with differential  o CMP  • Other possible causes for anemia in this case include the following:  Anemia secondary to hypothyroidism (thyroid hormones have been shown to potentiate the effects of EPO on erythroid colony formation) thyroid studies have been normal in the past.  Splenomegaly could also cause anemia.  CAT scan of the abdomen pelvis back in  April 2019 found a normal spleen therefore doubt this is etiology as well.    --Wants obtain results of the work-up above, we can make further recommendations.  She has already attempted iron pills for which she cannot tolerate therefore IV iron replacement will be recommendation.  We will give Injectafer [facility] at [facility] if needed.  Then will make further recommendations upon work-up results.    ** Infection status:   · No current issues   **Metabolic / Renal:   · No current issues   ** Endocrine:   · No current issues   ** Coagulation / VTE prophylaxis:   - no current issues  ** GI:   -no current issues  ** Pulmonary:   -Former smoker, quit in 2011   - no current issues  ** Cardiology:   --Hypertension controlled with medications.  Blood pressure today initially was at 140/100 for which she states is secondary to being nervous as she is at a cancer clinic.  This was rechecked at 138/90.  ** Skin / Rash:   · No current issues   ** :   Significant menorrhagia with very lengthy heavy periods.  Patient seeing GYN on 10/15/2020 for which I encouraged her to keep this appointment  ** Neurologic:   · No current issues   ** Psychosocial:   · No current issues   ** Pain control:   - no current issue    FOLLOW UP  Return in 4-6 weeks for follow up. We will be calling her with the work up results and recommendations.  When she returns she will need a pre-office CBC, CMP, iron profile and ferritin    I spent 42 total minutes, face-to-face, caring for Vicki today.  Greater than 50% of this time involved counseling and/or coordination of care as documented within this note regarding the patient's illness(es), pros and cons of various treatment options, instructions and/or risk reduction.    Rosalinda Freedman, APRN  10/07/2020

## 2020-10-09 ENCOUNTER — TELEPHONE (OUTPATIENT)
Dept: ONCOLOGY | Facility: CLINIC | Age: 55
End: 2020-10-09

## 2020-10-09 DIAGNOSIS — D50.9 IRON DEFICIENCY ANEMIA, UNSPECIFIED IRON DEFICIENCY ANEMIA TYPE: Primary | ICD-10-CM

## 2020-10-09 DIAGNOSIS — K90.9 IRON MALABSORPTION: ICD-10-CM

## 2020-10-09 DIAGNOSIS — D50.9 MICROCYTIC ANEMIA: Primary | ICD-10-CM

## 2020-10-09 LAB
COPPER SERPL-MCNC: 141 UG/DL (ref 72–166)
FERRITIN SERPL-MCNC: 7.3 NG/ML (ref 13–150)

## 2020-10-09 RX ORDER — FAMOTIDINE 10 MG/ML
20 INJECTION, SOLUTION INTRAVENOUS AS NEEDED
Status: CANCELLED | OUTPATIENT
Start: 2020-10-21

## 2020-10-09 RX ORDER — DIPHENHYDRAMINE HYDROCHLORIDE 50 MG/ML
50 INJECTION INTRAMUSCULAR; INTRAVENOUS AS NEEDED
Status: CANCELLED | OUTPATIENT
Start: 2020-10-14

## 2020-10-09 RX ORDER — SODIUM CHLORIDE 9 MG/ML
250 INJECTION, SOLUTION INTRAVENOUS ONCE
Status: CANCELLED | OUTPATIENT
Start: 2020-10-21

## 2020-10-09 RX ORDER — DIPHENHYDRAMINE HYDROCHLORIDE 50 MG/ML
50 INJECTION INTRAMUSCULAR; INTRAVENOUS AS NEEDED
Status: CANCELLED | OUTPATIENT
Start: 2020-10-21

## 2020-10-09 RX ORDER — FAMOTIDINE 10 MG/ML
20 INJECTION, SOLUTION INTRAVENOUS AS NEEDED
Status: CANCELLED | OUTPATIENT
Start: 2020-10-14

## 2020-10-09 RX ORDER — SODIUM CHLORIDE 9 MG/ML
250 INJECTION, SOLUTION INTRAVENOUS ONCE
Status: CANCELLED | OUTPATIENT
Start: 2020-10-14

## 2020-10-09 NOTE — TELEPHONE ENCOUNTER
Patient returned call to the office I confirmed Mammogram appt pt confirmed she will be there  She would like a call back with her Lab results  Best call back number [phone]

## 2020-10-09 NOTE — TELEPHONE ENCOUNTER
Called patient regarding low iron sat level 4%, ferritin 7.3.  Instructed that DC Jarrett reviewed her results and wants her to get 2 doses of IV Injectafer.  Instructed patient on medication, dosage, and frequency.  This has been set up  For 10/14 and 10/21 for 1400.  Patient v/u and agreeable to plan.

## 2020-10-14 ENCOUNTER — INFUSION (OUTPATIENT)
Dept: ONCOLOGY | Facility: HOSPITAL | Age: 55
End: 2020-10-14

## 2020-10-14 ENCOUNTER — HOSPITAL ENCOUNTER (OUTPATIENT)
Dept: MAMMOGRAPHY | Facility: HOSPITAL | Age: 55
Discharge: HOME | End: 2020-10-14
Admitting: NURSE PRACTITIONER

## 2020-10-14 VITALS
OXYGEN SATURATION: 100 % | BODY MASS INDEX: 47.09 KG/M2 | WEIGHT: 293 LBS | RESPIRATION RATE: 18 BRPM | TEMPERATURE: 97.7 F | SYSTOLIC BLOOD PRESSURE: 150 MMHG | DIASTOLIC BLOOD PRESSURE: 77 MMHG | HEART RATE: 75 BPM | HEIGHT: 66 IN

## 2020-10-14 DIAGNOSIS — K90.9 IRON MALABSORPTION: ICD-10-CM

## 2020-10-14 DIAGNOSIS — D50.9 IRON DEFICIENCY ANEMIA, UNSPECIFIED IRON DEFICIENCY ANEMIA TYPE: Primary | ICD-10-CM

## 2020-10-14 DIAGNOSIS — Z12.31 SCREENING MAMMOGRAM, ENCOUNTER FOR: ICD-10-CM

## 2020-10-14 PROCEDURE — 25010000002 FERRIC CARBOXYMALTOSE 750 MG/15ML SOLUTION 15 ML VIAL: Performed by: NURSE PRACTITIONER

## 2020-10-14 PROCEDURE — 96365 THER/PROPH/DIAG IV INF INIT: CPT

## 2020-10-14 PROCEDURE — 77063 BREAST TOMOSYNTHESIS BI: CPT

## 2020-10-14 PROCEDURE — 77067 SCR MAMMO BI INCL CAD: CPT

## 2020-10-14 RX ORDER — SODIUM CHLORIDE 9 MG/ML
250 INJECTION, SOLUTION INTRAVENOUS ONCE
Status: COMPLETED | OUTPATIENT
Start: 2020-10-14 | End: 2020-10-14

## 2020-10-14 RX ORDER — DIPHENHYDRAMINE HYDROCHLORIDE 50 MG/ML
50 INJECTION INTRAMUSCULAR; INTRAVENOUS AS NEEDED
Status: DISCONTINUED | OUTPATIENT
Start: 2020-10-14 | End: 2020-10-14 | Stop reason: HOSPADM

## 2020-10-14 RX ORDER — FAMOTIDINE 10 MG/ML
20 INJECTION, SOLUTION INTRAVENOUS AS NEEDED
Status: DISCONTINUED | OUTPATIENT
Start: 2020-10-14 | End: 2020-10-14 | Stop reason: HOSPADM

## 2020-10-14 RX ADMIN — SODIUM CHLORIDE 250 ML: 9 INJECTION, SOLUTION INTRAVENOUS at 14:50

## 2020-10-14 RX ADMIN — FERRIC CARBOXYMALTOSE INJECTION 750 MG: 50 INJECTION, SOLUTION INTRAVENOUS at 14:50

## 2020-10-15 ENCOUNTER — HOSPITAL ENCOUNTER (OUTPATIENT)
Dept: ULTRASOUND IMAGING | Age: 55
Discharge: HOME | End: 2020-10-15
Payer: MEDICARE

## 2020-10-15 ENCOUNTER — OFFICE VISIT (OUTPATIENT)
Dept: OBGYN | Age: 55
End: 2020-10-15
Payer: MEDICARE

## 2020-10-15 VITALS
BODY MASS INDEX: 48.82 KG/M2 | DIASTOLIC BLOOD PRESSURE: 84 MMHG | WEIGHT: 293 LBS | SYSTOLIC BLOOD PRESSURE: 157 MMHG | HEIGHT: 65 IN | HEART RATE: 83 BPM

## 2020-10-15 PROCEDURE — 1036F TOBACCO NON-USER: CPT | Performed by: NURSE PRACTITIONER

## 2020-10-15 PROCEDURE — 99204 OFFICE O/P NEW MOD 45 MIN: CPT | Performed by: NURSE PRACTITIONER

## 2020-10-15 PROCEDURE — G8484 FLU IMMUNIZE NO ADMIN: HCPCS | Performed by: NURSE PRACTITIONER

## 2020-10-15 PROCEDURE — G8427 DOCREV CUR MEDS BY ELIG CLIN: HCPCS | Performed by: NURSE PRACTITIONER

## 2020-10-15 PROCEDURE — 76830 TRANSVAGINAL US NON-OB: CPT

## 2020-10-15 PROCEDURE — 3017F COLORECTAL CA SCREEN DOC REV: CPT | Performed by: NURSE PRACTITIONER

## 2020-10-15 PROCEDURE — G8417 CALC BMI ABV UP PARAM F/U: HCPCS | Performed by: NURSE PRACTITIONER

## 2020-10-15 PROCEDURE — G0101 CA SCREEN;PELVIC/BREAST EXAM: HCPCS | Performed by: NURSE PRACTITIONER

## 2020-10-15 RX ORDER — MEGESTROL ACETATE 40 MG/1
40 TABLET ORAL DAILY
Qty: 30 TABLET | Refills: 0 | Status: SHIPPED | OUTPATIENT
Start: 2020-10-15 | End: 2020-10-27 | Stop reason: SDUPTHER

## 2020-10-15 ASSESSMENT — ENCOUNTER SYMPTOMS
EYES NEGATIVE: 1
ABDOMINAL PAIN: 1
CONSTIPATION: 1
ALLERGIC/IMMUNOLOGIC NEGATIVE: 1
RESPIRATORY NEGATIVE: 1
DIARRHEA: 0

## 2020-10-15 NOTE — PROGRESS NOTES
Pt presents today to establish care and for pap smear and breast exam. She states her periods are long and heavy she usually has a period for about 23 days. She is wanting to have an us. She was on b/p medicine and was taken off for it being low and now is running high. Mammo:10/14/2020 [employer]   Pap smear:2015  Contraception:none   G:  P:  Ab:   Bone density:NA   Colonoscopy:09/11/2020

## 2020-10-15 NOTE — PATIENT INSTRUCTIONS
Patient Education        Breast Self-Exam: Care Instructions  Your Care Instructions     A breast self-exam is when you check your breasts for lumps or changes. This regular exam helps you learn how your breasts normally look and feel. Most breast problems or changes are not because of cancer. Breast self-exam is not a substitute for a mammogram. Having regular breast exams by your doctor and regular mammograms improve your chances of finding any problems with your breasts. Some women set a time each month to do a step-by-step breast self-exam. Other women like a less formal system. They might look at their breasts as they brush their teeth, or feel their breasts once in a while in the shower. If you notice a change in your breast, tell your doctor. Follow-up care is a key part of your treatment and safety. Be sure to make and go to all appointments, and call your doctor if you are having problems. It's also a good idea to know your test results and keep a list of the medicines you take. How do you do a breast self-exam?  · The best time to examine your breasts is usually one week after your menstrual period begins. Your breasts should not be tender then. If you do not have periods, you might do your exam on a day of the month that is easy to remember. · To examine your breasts:  ? Remove all your clothes above the waist and lie down. When you are lying down, your breast tissue spreads evenly over your chest wall, which makes it easier to feel all your breast tissue. ? Use the pads--not the fingertips--of the 3 middle fingers of your left hand to check your right breast. Move your fingers slowly in small coin-sized circles that overlap. ? Use three levels of pressure to feel of all your breast tissue. Use light pressure to feel the tissue close to the skin surface. Use medium pressure to feel a little deeper. Use firm pressure to feel your tissue close to your breastbone and ribs.  Use each pressure level to feel your breast tissue before moving on to the next spot. ? Check your entire breast, moving up and down as if following a strip from the collarbone to the bra line, and from the armpit to the ribs. Repeat until you have covered the entire breast.  ? Repeat this procedure for your left breast, using the pads of the 3 middle fingers of your right hand. · To examine your breasts while in the shower:  ? Place one arm over your head and lightly soap your breast on that side. ? Using the pads of your fingers, gently move your hand over your breast (in the strip pattern described above), feeling carefully for any lumps or changes. ? Repeat for the other breast.  · Have your doctor inspect anything you notice to see if you need further testing. Where can you learn more? Go to [email or web address].[email or web address]. org and sign in to your [employer] account. Enter P148 in the [employer] box to learn more about \"Breast Self-Exam: Care Instructions. \"     If you do not have an account, please click on the \"Sign Up Now\" link. Current as of: April 29, 2020               Content Version: 12.6  © 8644-9016 [employer]. Care instructions adapted under license by [facility] ([facility]). If you have questions about a medical condition or this instruction, always ask your healthcare professional. [address] any warranty or liability for your use of this information.

## 2020-10-15 NOTE — PROGRESS NOTES
Robel Sykes is a 50 y.o. female who presents today for her medical conditions/ complaints as noted below. Robel Sykes is c/o of Establish Care; Gynecologic Exam; and Menorrhagia        HPI  New pt presents for annual exam and pap smear. Also c/o having very heavy, irregular periods. Has been having to get iron infusions. Has had normal endoscopy and colonoscopy. Feels she is anemic because her periods last 9-15 days and are heavy with clots. Has not been a full year without a period. Having hot flashes. Wanting to have a hysterectomy for definitive treatment. Patient's last menstrual period was 2020 (exact date).       Past Medical History:   Diagnosis Date    Anemia     Asthma     Bronchitis, acute     COPD (chronic obstructive pulmonary disease) (HCC)     Glaucoma     Hyperlipidemia     Hypertension     Hyperthyroidism     Sleep apnea     uses c-pap     Past Surgical History:   Procedure Laterality Date    AXILLARY SURGERY Bilateral     excision and drainage of staph abscesses    BACK SURGERY  10/2004    Dr. Arlene Babinski, [place] L4, L5    CARPAL TUNNEL RELEASE Right     CHOLECYSTECTOMY      COLONOSCOPY  2007    Dr Supriya Delacruz N/A 2020    Dr Kaleigh Guevara yr recall    DC COLONOSCOPY FLX DX W/COLLJ SPEC WHEN PFRMD N/A 2017    Dr Jose Antonio Scott, actively inflamed internal hemorrhoids, residulal liquid and some solid food particles in the colon-5 yr recall due to prep    STOMACH SURGERY  2014    Dr. Mayank Pitt ( gastric sleeve)    UPPER GASTROINTESTINAL ENDOSCOPY  10/30/2012    Dr Cannon Serum esophagitis, gastritis, Iliana (-)    UPPER GASTROINTESTINAL ENDOSCOPY  2008    Dr Maddie Perez esophagitis    UPPER GASTROINTESTINAL ENDOSCOPY N/A 2020    Dr Yasemin Bui     Family History   Problem Relation Age of Onset    Lung Cancer Mother     Kidney Cancer Mother     Lung Cancer Maternal Grandmother     Colon Cancer Maternal Grandfather  Liver Cancer Neg Hx     Liver Disease Neg Hx     Stomach Cancer Neg Hx     Rectal Cancer Neg Hx     Esophageal Cancer Neg Hx     Colon Polyps Neg Hx      Social History     Tobacco Use    Smoking status: Former Smoker     Last attempt to quit: 2011     Years since quittin.4    Smokeless tobacco: Never Used   Substance Use Topics    Alcohol use: No       Current Outpatient Medications   Medication Sig Dispense Refill    NONFORMULARY Indications: iv       megestrol (MEGACE) 40 MG tablet Take 1 tablet by mouth daily 30 tablet 0    Cholecalciferol (VITAMIN D3) 50 MCG (2000 UT) CAPS Take by mouth daily      ferrous sulfate (IRON 325) 325 (65 Fe) MG tablet Take 325 mg by mouth daily (with breakfast)      Omega-3 Fatty Acids (FISH OIL) 1200 MG CPDR Take by mouth daily      vitamin C (ASCORBIC ACID) 500 MG tablet Take 1,000 mg by mouth daily      lisinopril-hydroCHLOROthiazide (PRINZIDE;ZESTORETIC) 20-12.5 MG per tablet       meclizine (ANTIVERT) 25 MG tablet Take 25 mg by mouth 3 times daily as needed for Dizziness or Nausea      diclofenac sodium (VOLTAREN) 1 % GEL       hydrocortisone (ANUSOL-HC) 2.5 % rectal cream Apply rectally 1-2 times daily (after a BM) for 5 days during hemorrhoid flare. 30 g 1    gabapentin (NEURONTIN) 100 MG capsule Take 100 mg by mouth as needed.  ondansetron (ZOFRAN) 8 MG tablet Take 8 mg by mouth every 8 hours as needed for Nausea or Vomiting      albuterol (PROVENTIL) (2.5 MG/3ML) 0.083% nebulizer solution Take 2.5 mg by nebulization every 4 hours as needed       ALPRAZolam (XANAX) 1 MG tablet Take 1 mg by mouth nightly as needed       cyanocobalamin 1000 MCG/ML injection Inject 1,000 mcg into the muscle every 7 days       furosemide (LASIX) 40 MG tablet Take 40 mg by mouth as needed       HYDROcodone-acetaminophen (NORCO) 7.5-325 MG per tablet Take 1 tablet by mouth every 6 hours as needed  .       levothyroxine (SYNTHROID) 100 MCG tablet Take 100 mcg by mouth daily       loratadine (CLARITIN) 10 MG tablet Take 10 mg by mouth daily       omeprazole (PRILOSEC) 20 MG capsule Take 40 mg by mouth Daily       polyethylene glycol (GLYCOLAX) powder Take 17 g by mouth daily       pravastatin (PRAVACHOL) 40 MG tablet Take 40 mg by mouth daily       B-D 3CC LUER-SHO SYR 25GX1\" 25G X 1\" 3 ML MISC       timolol (TIMOPTIC) 0.5 % ophthalmic solution Place into both eyes 2 times daily        No current facility-administered medications for this visit. Allergies   Allergen Reactions    Latex Rash    Codeine Itching     Other reaction(s): Other (See Comments)  \"I DONT REMEMBER\"    Penicillins Itching and Other (See Comments)     WHEN I WAS A CHILD, I DON'T KNOW    Tape [Adhesive Tape] Rash     Vitals:    10/15/20 1107   BP: (!) 157/84   Pulse: 83     Body mass index is 49.59 kg/m². Review of Systems   Constitutional: Negative. HENT: Negative. Eyes: Negative. Respiratory: Negative. Cardiovascular: Negative. Gastrointestinal: Positive for abdominal pain and constipation. Negative for diarrhea. Endocrine: Negative. Genitourinary: Positive for menstrual problem and pelvic pain. Negative for frequency and urgency. Musculoskeletal: Negative. Skin: Negative. Allergic/Immunologic: Negative. Neurological: Negative. Hematological: Negative. Psychiatric/Behavioral: Negative. All other systems reviewed and are negative. Physical Exam  Vitals signs and nursing note reviewed. Constitutional:       Appearance: She is well-developed. HENT:      Head: Normocephalic. Neck:      Musculoskeletal: Normal range of motion and neck supple. Thyroid: No thyroid mass or thyromegaly. Cardiovascular:      Rate and Rhythm: Normal rate and regular rhythm. Pulmonary:      Effort: Pulmonary effort is normal.      Breath sounds: Normal breath sounds.    Chest:      Breasts:         Right: No inverted nipple, mass, nipple discharge or skin change. Left: No inverted nipple, mass, nipple discharge or skin change. Abdominal:      Palpations: Abdomen is soft. There is no mass. Tenderness: There is no abdominal tenderness. Genitourinary:     General: Normal vulva. Vagina: Normal.      Cervix: No cervical motion tenderness. Uterus: Normal. Not enlarged. Adnexa:         Right: Tenderness present. No mass. Left: Tenderness present. No mass. Comments: Pap collected  Exam difficult d/t pt body habitus  Musculoskeletal: Normal range of motion. Skin:     General: Skin is warm and dry. Neurological:      Mental Status: She is alert and oriented to person, place, and time. Psychiatric:         Attention and Perception: Attention normal.         Mood and Affect: Mood normal.         Speech: Speech normal.         Behavior: Behavior normal.         Thought Content: Thought content normal.         Cognition and Memory: Cognition normal.         Judgment: Judgment normal.          Diagnosis Orders   1. Encounter to establish care     2. Women's annual routine gynecological examination  DC CA SCREEN;PELVIC/BREAST EXAM   3. Screening for cervical cancer  PAP SMEAR    Human papillomavirus (HPV) DNA probe thin prep high risk    DC CA SCREEN;PELVIC/BREAST EXAM   4. Screening breast examination  DC CA SCREEN;PELVIC/BREAST EXAM   5. Menorrhagia with irregular cycle  US NON OB TRANSVAGINAL    CBC Auto Differential    Follicle Stimulating Hormone    Prolactin    TSH without Reflex    T4, Free   6.  Other specified hypothyroidism   TSH without Reflex    T4, Free       MEDICATIONS:  Orders Placed This Encounter   Medications    megestrol (MEGACE) 40 MG tablet     Sig: Take 1 tablet by mouth daily     Dispense:  30 tablet     Refill:  0       ORDERS:  Orders Placed This Encounter   Procedures    US NON OB TRANSVAGINAL    CBC Auto Differential    Follicle Stimulating Hormone    Prolactin    TSH without Reflex    breast. Move your fingers slowly in small coin-sized circles that overlap. ? Use three levels of pressure to feel of all your breast tissue. Use light pressure to feel the tissue close to the skin surface. Use medium pressure to feel a little deeper. Use firm pressure to feel your tissue close to your breastbone and ribs. Use each pressure level to feel your breast tissue before moving on to the next spot. ? Check your entire breast, moving up and down as if following a strip from the collarbone to the bra line, and from the armpit to the ribs. Repeat until you have covered the entire breast.  ? Repeat this procedure for your left breast, using the pads of the 3 middle fingers of your right hand. · To examine your breasts while in the shower:  ? Place one arm over your head and lightly soap your breast on that side. ? Using the pads of your fingers, gently move your hand over your breast (in the strip pattern described above), feeling carefully for any lumps or changes. ? Repeat for the other breast.  · Have your doctor inspect anything you notice to see if you need further testing. Where can you learn more? Go to [email or web address]://[email or web address] Technologyeb.[employer]. org and sign in to your GameSkinny account. Enter P148 in the [facility] box to learn more about \"Breast Self-Exam: Care Instructions. \"     If you do not have an account, please click on the \"Sign Up Now\" link. Current as of: April 29, 2020               Content Version: 12.6  © 2006-2020 [employer]. Care instructions adapted under license by [facility] ([facility]). If you have questions about a medical condition or this instruction, always ask your healthcare professional. [place] [address] any warranty or liability for your use of this information.

## 2020-10-19 ENCOUNTER — TELEPHONE (OUTPATIENT)
Dept: OBGYN | Age: 55
End: 2020-10-19

## 2020-10-19 NOTE — TELEPHONE ENCOUNTER
----- Message from PELON Isabel - CNP sent at 10/16/2020  5:11 PM CDT -----  Pt not scheduled for f/u. Was having heavy bleeding and wanting hyst. Let her know u/s looks good, but lining is thickened. Will need biopsy to r/o any abnormal cells.  Thanks ML

## 2020-10-21 ENCOUNTER — INFUSION (OUTPATIENT)
Dept: ONCOLOGY | Facility: HOSPITAL | Age: 55
End: 2020-10-21

## 2020-10-21 VITALS
WEIGHT: 293 LBS | HEIGHT: 66 IN | SYSTOLIC BLOOD PRESSURE: 120 MMHG | RESPIRATION RATE: 16 BRPM | HEART RATE: 69 BPM | TEMPERATURE: 97.3 F | BODY MASS INDEX: 47.09 KG/M2 | OXYGEN SATURATION: 97 % | DIASTOLIC BLOOD PRESSURE: 70 MMHG

## 2020-10-21 DIAGNOSIS — D50.9 IRON DEFICIENCY ANEMIA, UNSPECIFIED IRON DEFICIENCY ANEMIA TYPE: ICD-10-CM

## 2020-10-21 DIAGNOSIS — K90.9 IRON MALABSORPTION: Primary | ICD-10-CM

## 2020-10-21 LAB
HPV COMMENT: NORMAL
HPV TYPE 16: NOT DETECTED
HPV TYPE 18: NOT DETECTED
HPVOH (OTHER TYPES): NOT DETECTED

## 2020-10-21 PROCEDURE — 25010000002 FERRIC CARBOXYMALTOSE 750 MG/15ML SOLUTION 15 ML VIAL: Performed by: NURSE PRACTITIONER

## 2020-10-21 PROCEDURE — 96365 THER/PROPH/DIAG IV INF INIT: CPT

## 2020-10-21 RX ORDER — SODIUM CHLORIDE 9 MG/ML
250 INJECTION, SOLUTION INTRAVENOUS ONCE
Status: COMPLETED | OUTPATIENT
Start: 2020-10-21 | End: 2020-10-21

## 2020-10-21 RX ADMIN — FERRIC CARBOXYMALTOSE INJECTION 750 MG: 50 INJECTION, SOLUTION INTRAVENOUS at 14:40

## 2020-10-21 RX ADMIN — SODIUM CHLORIDE 250 ML: 9 INJECTION, SOLUTION INTRAVENOUS at 14:33

## 2020-10-27 ENCOUNTER — PROCEDURE VISIT (OUTPATIENT)
Dept: OBGYN | Age: 55
End: 2020-10-27
Payer: MEDICARE

## 2020-10-27 VITALS
WEIGHT: 293 LBS | BODY MASS INDEX: 48.82 KG/M2 | SYSTOLIC BLOOD PRESSURE: 146 MMHG | DIASTOLIC BLOOD PRESSURE: 91 MMHG | TEMPERATURE: 97.8 F | HEIGHT: 65 IN | HEART RATE: 75 BPM

## 2020-10-27 DIAGNOSIS — N92.1 MENORRHAGIA WITH IRREGULAR CYCLE: ICD-10-CM

## 2020-10-27 DIAGNOSIS — E03.8 OTHER SPECIFIED HYPOTHYROIDISM: ICD-10-CM

## 2020-10-27 DIAGNOSIS — N92.6 IRREGULAR MENSES: ICD-10-CM

## 2020-10-27 LAB
ANISOCYTOSIS: ABNORMAL
BASOPHILS ABSOLUTE: 0.1 K/UL (ref 0–0.2)
BASOPHILS RELATIVE PERCENT: 0.8 % (ref 0–1)
EOSINOPHILS ABSOLUTE: 0.2 K/UL (ref 0–0.6)
EOSINOPHILS RELATIVE PERCENT: 2.4 % (ref 0–5)
FOLLICLE STIMULATING HORMONE: 13 MIU/ML
HCT VFR BLD CALC: 41.6 % (ref 37–47)
HEMOGLOBIN: 12.4 G/DL (ref 12–16)
HYPOCHROMIA: ABNORMAL
IMMATURE GRANULOCYTES #: 0 K/UL
LYMPHOCYTES ABSOLUTE: 2 K/UL (ref 1.1–4.5)
LYMPHOCYTES RELATIVE PERCENT: 28.3 % (ref 20–40)
MCH RBC QN AUTO: 23.5 PG (ref 27–31)
MCHC RBC AUTO-ENTMCNC: 29.8 G/DL (ref 33–37)
MCV RBC AUTO: 78.9 FL (ref 81–99)
MICROCYTES: ABNORMAL
MONOCYTES ABSOLUTE: 0.8 K/UL (ref 0–0.9)
MONOCYTES RELATIVE PERCENT: 10.4 % (ref 0–10)
NEUTROPHILS ABSOLUTE: 4.1 K/UL (ref 1.5–7.5)
NEUTROPHILS RELATIVE PERCENT: 57.5 % (ref 50–65)
PDW BLD-RTO: 27.9 % (ref 11.5–14.5)
PLATELET # BLD: 332 K/UL (ref 130–400)
PLATELET SLIDE REVIEW: ADEQUATE
PMV BLD AUTO: 10.2 FL (ref 9.4–12.3)
POIKILOCYTES: ABNORMAL
POLYCHROMASIA: ABNORMAL
PROLACTIN: 4.34 NG/ML (ref 4.79–23.3)
RBC # BLD: 5.27 M/UL (ref 4.2–5.4)
T4 FREE: 1.06 NG/DL (ref 0.93–1.7)
TSH SERPL DL<=0.05 MIU/L-ACNC: 0.7 UIU/ML (ref 0.27–4.2)
WBC # BLD: 7.2 K/UL (ref 4.8–10.8)

## 2020-10-27 PROCEDURE — 58100 BIOPSY OF UTERUS LINING: CPT | Performed by: NURSE PRACTITIONER

## 2020-10-27 PROCEDURE — 99213 OFFICE O/P EST LOW 20 MIN: CPT | Performed by: NURSE PRACTITIONER

## 2020-10-27 RX ORDER — MEGESTROL ACETATE 40 MG/1
40 TABLET ORAL DAILY
Qty: 30 TABLET | Refills: 0 | Status: ON HOLD | OUTPATIENT
Start: 2020-10-27 | End: 2021-01-29 | Stop reason: HOSPADM

## 2020-10-27 ASSESSMENT — ENCOUNTER SYMPTOMS
CONSTIPATION: 0
RESPIRATORY NEGATIVE: 1
EYES NEGATIVE: 1
ALLERGIC/IMMUNOLOGIC NEGATIVE: 1
DIARRHEA: 0
GASTROINTESTINAL NEGATIVE: 1

## 2020-10-27 NOTE — PROGRESS NOTES
Ward Quevedo is a 50 y.o. female who presents today for her medical conditions/ complaints as noted below. Ward Quevedo is c/o of Procedure (EMB biospy )        HPI  Pt presents for f/u on heavy, irregular bleeding. Had TVUS showing endo lining 12 mm. Was started on megace but never picked it up from the pharmacy. Started bleeding again 3 days ago, heavy. Questions if she can get pregnant. Interested in treatment options for heavy periods, mainly ablation. Patient's last menstrual period was 10/24/2020 (exact date).       Past Medical History:   Diagnosis Date    Anemia     Asthma     Bronchitis, acute     COPD (chronic obstructive pulmonary disease) (HCC)     Glaucoma     Hyperlipidemia     Hypertension     Hyperthyroidism     Sleep apnea     uses c-pap     Past Surgical History:   Procedure Laterality Date    AXILLARY SURGERY Bilateral     excision and drainage of staph abscesses    BACK SURGERY  10/2004    Dr. Rosebud Meckel, [place] L4, L5    CARPAL TUNNEL RELEASE Right     CHOLECYSTECTOMY      COLONOSCOPY  2007    Dr Park Grimm N/A 2020    Dr Nereyda Hewitt yr recall    MA COLONOSCOPY FLX DX W/COLLJ SPEC WHEN PFRMD N/A 2017    Dr Luis Antonio Garibay, actively inflamed internal hemorrhoids, residulal liquid and some solid food particles in the colon-5 yr recall due to prep    STOMACH SURGERY  2014    Dr. Sammy Mackay ( gastric sleeve)    UPPER GASTROINTESTINAL ENDOSCOPY  10/30/2012    Dr Gayl Prader esophagitis, gastritis, Iliana (-)    UPPER GASTROINTESTINAL ENDOSCOPY  2008    Dr Vanessa Torres esophagitis    UPPER GASTROINTESTINAL ENDOSCOPY N/A 2020    Dr Reagan Villela     Family History   Problem Relation Age of Onset    Lung Cancer Mother     Kidney Cancer Mother     Lung Cancer Maternal Grandmother     Colon Cancer Maternal Grandfather     Liver Cancer Neg Hx     Liver Disease Neg Hx     Stomach Cancer Neg Hx     Rectal Cancer Neg Hx  Esophageal Cancer Neg Hx     Colon Polyps Neg Hx      Social History     Tobacco Use    Smoking status: Former Smoker     Last attempt to quit: 2011     Years since quittin.4    Smokeless tobacco: Never Used   Substance Use Topics    Alcohol use: No       Current Outpatient Medications   Medication Sig Dispense Refill    megestrol (MEGACE) 40 MG tablet Take 1 tablet by mouth daily 30 tablet 0    NONFORMULARY Indications: iv       Cholecalciferol (VITAMIN D3) 50 MCG (2000 UT) CAPS Take by mouth daily      ferrous sulfate (IRON 325) 325 (65 Fe) MG tablet Take 325 mg by mouth daily (with breakfast)      Omega-3 Fatty Acids (FISH OIL) 1200 MG CPDR Take by mouth daily      lisinopril-hydroCHLOROthiazide (PRINZIDE;ZESTORETIC) 20-12.5 MG per tablet       meclizine (ANTIVERT) 25 MG tablet Take 25 mg by mouth 3 times daily as needed for Dizziness or Nausea      diclofenac sodium (VOLTAREN) 1 % GEL       hydrocortisone (ANUSOL-HC) 2.5 % rectal cream Apply rectally 1-2 times daily (after a BM) for 5 days during hemorrhoid flare. 30 g 1    gabapentin (NEURONTIN) 100 MG capsule Take 100 mg by mouth as needed.  ondansetron (ZOFRAN) 8 MG tablet Take 8 mg by mouth every 8 hours as needed for Nausea or Vomiting      albuterol (PROVENTIL) (2.5 MG/3ML) 0.083% nebulizer solution Take 2.5 mg by nebulization every 4 hours as needed       ALPRAZolam (XANAX) 1 MG tablet Take 1 mg by mouth nightly as needed       cyanocobalamin 1000 MCG/ML injection Inject 1,000 mcg into the muscle every 7 days       furosemide (LASIX) 40 MG tablet Take 40 mg by mouth as needed       HYDROcodone-acetaminophen (NORCO) 7.5-325 MG per tablet Take 1 tablet by mouth every 6 hours as needed  .       levothyroxine (SYNTHROID) 100 MCG tablet Take 100 mcg by mouth daily       loratadine (CLARITIN) 10 MG tablet Take 10 mg by mouth daily       omeprazole (PRILOSEC) 20 MG capsule Take 40 mg by mouth Daily       polyethylene glycol (GLYCOLAX) powder Take 17 g by mouth daily       pravastatin (PRAVACHOL) 40 MG tablet Take 40 mg by mouth daily       B-D 3CC LUER-SHO SYR 25GX1\" 25G X 1\" 3 ML MISC       timolol (TIMOPTIC) 0.5 % ophthalmic solution Place into both eyes 2 times daily       vitamin C (ASCORBIC ACID) 500 MG tablet Take 1,000 mg by mouth daily       No current facility-administered medications for this visit. Allergies   Allergen Reactions    Latex Rash    Codeine Itching     Other reaction(s): Other (See Comments)  \"I DONT REMEMBER\"    Penicillins Itching and Other (See Comments)     WHEN I WAS A CHILD, I DON'T KNOW    Tape [Adhesive Tape] Rash     Vitals:    10/27/20 1324   BP: (!) 146/91   Pulse: 75   Temp: 97.8 °F (36.6 °C)     Body mass index is 49.42 kg/m². Review of Systems   Constitutional: Negative. HENT: Negative. Eyes: Negative. Respiratory: Negative. Cardiovascular: Negative. Gastrointestinal: Negative. Negative for constipation and diarrhea. Endocrine: Negative. Genitourinary: Positive for menstrual problem. Negative for frequency and urgency. Musculoskeletal: Negative. Skin: Negative. Allergic/Immunologic: Negative. Neurological: Negative. Hematological: Negative. Psychiatric/Behavioral: Negative. All other systems reviewed and are negative. Physical Exam  Vitals signs and nursing note reviewed. Constitutional:       Appearance: She is well-developed. HENT:      Head: Normocephalic. Right Ear: External ear normal.      Left Ear: External ear normal.      Nose: Nose normal.   Neck:      Musculoskeletal: Normal range of motion. Genitourinary:     Comments: Gertrudis Craven is a 50 y.o.  with history of menorrhagia who presents today for endometrial biopsy.     BP (!) 146/91   Pulse 75   Temp 97.8 °F (36.6 °C)   Ht 5' 5\" (1.651 m)   Wt 297 lb (134.7 kg)   LMP 10/24/2020 (Exact Date)   Breastfeeding No   BMI 49.42 kg/m²     Endometrial Biopsy Procedure Note    Pre-operative Diagnosis: heavy periods    Post-operative Diagnosis: same    Indications: abnormal uterine bleeding    Procedure Details    Urine pregnancy test was not done. The risks (including infection, bleeding, pain, and uterine perforation) and benefits of the procedure were explained to the patient and Written informed consent was obtained. The patient was placed in the dorsal lithotomy position. Speculum inserted in the vagina, and the cervix prepped with povidone iodine. Single tooth tenaculum applied to anterior cervical lip. Uterus was sounded to 7cm. Pipelle endometrial aspirator was inserted and gentle pressure applied. Adequate tissue sample obtained. Sent for pathology. Pt had significant pain and felt faint. Was given cool cloth, water and Tylenol. Pt took her own Zofran for nausea. Condition:  Stable    Complications:  None    Plan:    The patient was advised to call for any fever or for prolonged or severe pain or bleeding. She was advised to use OTC acetaminophen as needed for mild to moderate pain. She was advised to avoid vaginal intercourse for 48 hours or until the bleeding has completely stopped. Musculoskeletal: Normal range of motion. Skin:     General: Skin is warm and dry. Neurological:      Mental Status: She is alert and oriented to person, place, and time. Psychiatric:         Attention and Perception: Attention normal.         Mood and Affect: Mood normal.         Speech: Speech normal.         Behavior: Behavior normal.         Thought Content: Thought content normal.         Cognition and Memory: Cognition normal.         Judgment: Judgment normal.          Diagnosis Orders   1. Menorrhagia with irregular cycle  Surgical Pathology   2. Irregular menses  Follicle Stimulating Hormone    71316 - MD BIOPSY OF UTERUS LINING   3.  History of heavy vaginal bleeding  62309 - MD BIOPSY OF UTERUS LINING       MEDICATIONS:  Orders Placed This Encounter   Medications    megestrol (MEGACE) 40 MG tablet     Sig: Take 1 tablet by mouth daily     Dispense:  30 tablet     Refill:  0       ORDERS:  Orders Placed This Encounter   Procedures    Follicle Stimulating Hormone    Surgical Pathology    93224 - DC BIOPSY OF UTERUS LINING       PLAN:  [address] pending  Called in Megace to help with bleeding  Endo bx done  Will forward chart to MD to schedule ablation    Patient Instructions   Patient Education        Endometrial Biopsy: About This Test  What is it? An endometrial biopsy is a way for your doctor to take a small sample of the lining of the uterus (endometrium). The sample is looked at under a microscope for abnormal cells. An endometrial biopsy helps your doctor find problems in the endometrium. Why is this test done? An endometrial biopsy is done to check for cancer of the uterus. The test is also done if you have abnormal bleeding from your uterus. The test results show how your body's hormones are affecting the lining of the uterus, such as during menopause. How do you prepare for the test?  · If you take aspirin or some other blood thinner, ask your doctor if you should stop taking it before your test. Make sure that you understand exactly what your doctor wants you to do. These medicines increase the risk of bleeding. · Ask your doctor if you should take a pain reliever, such as ibuprofen (Advil or Motrin), 30 to 60 minutes before the test. This can help reduce any cramping pain that the test can cause. How is the test done? · You will lie on an exam table. Your feet will be in stirrups. · The doctor may use a spray or injection to numb your cervix. The cervix is the opening to the uterus. · The doctor will use a tool called a speculum to see the cervix. · Then the doctor will pass a thin tube through the cervix to take a sample of the uterus lining. · The sample is sent to a lab. How long does the test take?   The test will take about 5 to 15 minutes. What happens after the test?  · You will probably be able to go home right away. · You likely will have mild vaginal bleeding and may have cramps for a few days after the test. The cramps may feel like bad menstrual cramps. How can you care for yourself at home? · Ask your doctor if you can take an over-the-counter pain medicine, such as acetaminophen (Tylenol), ibuprofen (Advil, Motrin), or naproxen (Aleve). Be safe with medicines. Read and follow all instructions on the label. · Use pads or tampons for vaginal bleeding or discharge. · You may return to all your usual activities (including sex) when you feel like it. Follow-up care is a key part of your treatment and safety. Be sure to make and go to all appointments, and call your doctor if you are having problems. It's also a good idea to keep a list of the medicines you take. Ask your doctor when you can expect to have your test results. Where can you learn more? Go to https://CodasystempeFreshOffice.[employer]. org and sign in to your NewsiT account. Enter [phone] in the [facility] box to learn more about \"Endometrial Biopsy: About This Test.\"     If you do not have an account, please click on the \"Sign Up Now\" link. Current as of: November 8, 2019               Content Version: 12.6  © 4770-5662 [employer]. Care instructions adapted under license by [facility] ([facility]). If you have questions about a medical condition or this instruction, always ask your healthcare professional. [address] any warranty or liability for your use of this information.

## 2020-10-27 NOTE — PATIENT INSTRUCTIONS
Patient Education        Endometrial Biopsy: About This Test  What is it? An endometrial biopsy is a way for your doctor to take a small sample of the lining of the uterus (endometrium). The sample is looked at under a microscope for abnormal cells. An endometrial biopsy helps your doctor find problems in the endometrium. Why is this test done? An endometrial biopsy is done to check for cancer of the uterus. The test is also done if you have abnormal bleeding from your uterus. The test results show how your body's hormones are affecting the lining of the uterus, such as during menopause. How do you prepare for the test?  · If you take aspirin or some other blood thinner, ask your doctor if you should stop taking it before your test. Make sure that you understand exactly what your doctor wants you to do. These medicines increase the risk of bleeding. · Ask your doctor if you should take a pain reliever, such as ibuprofen (Advil or Motrin), 30 to 60 minutes before the test. This can help reduce any cramping pain that the test can cause. How is the test done? · You will lie on an exam table. Your feet will be in stirrups. · The doctor may use a spray or injection to numb your cervix. The cervix is the opening to the uterus. · The doctor will use a tool called a speculum to see the cervix. · Then the doctor will pass a thin tube through the cervix to take a sample of the uterus lining. · The sample is sent to a lab. How long does the test take? The test will take about 5 to 15 minutes. What happens after the test?  · You will probably be able to go home right away. · You likely will have mild vaginal bleeding and may have cramps for a few days after the test. The cramps may feel like bad menstrual cramps. How can you care for yourself at home? · Ask your doctor if you can take an over-the-counter pain medicine, such as acetaminophen (Tylenol), ibuprofen (Advil, Motrin), or naproxen (Aleve).  Be safe with medicines. Read and follow all instructions on the label. · Use pads or tampons for vaginal bleeding or discharge. · You may return to all your usual activities (including sex) when you feel like it. Follow-up care is a key part of your treatment and safety. Be sure to make and go to all appointments, and call your doctor if you are having problems. It's also a good idea to keep a list of the medicines you take. Ask your doctor when you can expect to have your test results. Where can you learn more? Go to [email or web address] and sign in to your [employer] account. Enter [phone] in the Critical Links box to learn more about \"Endometrial Biopsy: About This Test.\"     If you do not have an account, please click on the \"Sign Up Now\" link. Current as of: November 8, 2019               Content Version: 12.6  © 1974-7803 [employer]. Care instructions adapted under license by [employer]. If you have questions about a medical condition or this instruction, always ask your healthcare professional. [address] any warranty or liability for your use of this information.

## 2020-10-28 ENCOUNTER — TELEPHONE (OUTPATIENT)
Dept: OBGYN | Age: 55
End: 2020-10-28

## 2020-10-28 NOTE — TELEPHONE ENCOUNTER
Called to schedule surgery for D&C, hysteroscopy, and ablation per Dr. Yeni Mendez. Pt saw ML and is desiring surgery. No answer and no option to leave a message. Can arlin surgery on 11/13 or 12/11. Will need preop with AW about 1 week before surgery. Will try to call pt again on Friday.

## 2020-10-29 NOTE — TELEPHONE ENCOUNTER
Pt called back. She is more interested in surgery in January. Please call her ASAP.  Pt asks that you call her more than once if she doesn't answer at first.

## 2020-10-30 ENCOUNTER — TELEPHONE (OUTPATIENT)
Dept: OBGYN | Age: 55
End: 2020-10-30

## 2020-10-30 NOTE — TELEPHONE ENCOUNTER
Ok to let her know her biopsy was normal. She is DR Lewis Going pt and wanting to be set up for ablation. Can send to [employer] for scheduling. Thanks ML     Pt informed and she already has her surgery for the ablation scheduled. She asked about her labs and informed when they are reviewed we will call her.  Nishi/DIOGO

## 2020-10-30 NOTE — TELEPHONE ENCOUNTER
Surgery arlin'd on 1/29/21 and preop on 1/22 with Dr. Jakub Dickerson. All questions answered.  Pt VU

## 2020-11-10 ENCOUNTER — TELEPHONE (OUTPATIENT)
Dept: ONCOLOGY | Facility: CLINIC | Age: 55
End: 2020-11-10

## 2020-11-13 ENCOUNTER — OFFICE VISIT (OUTPATIENT)
Dept: ONCOLOGY | Facility: CLINIC | Age: 55
End: 2020-11-13

## 2020-11-13 ENCOUNTER — LAB (OUTPATIENT)
Dept: LAB | Facility: HOSPITAL | Age: 55
End: 2020-11-13

## 2020-11-13 VITALS
HEIGHT: 66 IN | HEART RATE: 84 BPM | TEMPERATURE: 97.1 F | DIASTOLIC BLOOD PRESSURE: 82 MMHG | OXYGEN SATURATION: 98 % | SYSTOLIC BLOOD PRESSURE: 128 MMHG | RESPIRATION RATE: 16 BRPM | WEIGHT: 293 LBS | BODY MASS INDEX: 47.09 KG/M2

## 2020-11-13 DIAGNOSIS — I10 ESSENTIAL HYPERTENSION: ICD-10-CM

## 2020-11-13 DIAGNOSIS — D50.9 IRON DEFICIENCY ANEMIA, UNSPECIFIED IRON DEFICIENCY ANEMIA TYPE: Primary | ICD-10-CM

## 2020-11-13 DIAGNOSIS — N92.1 MENORRHAGIA WITH IRREGULAR CYCLE: ICD-10-CM

## 2020-11-13 DIAGNOSIS — D50.9 IRON DEFICIENCY ANEMIA, UNSPECIFIED IRON DEFICIENCY ANEMIA TYPE: ICD-10-CM

## 2020-11-13 LAB
ALBUMIN SERPL-MCNC: 4 G/DL (ref 3.5–5.2)
ALBUMIN/GLOB SERPL: 1.3 G/DL
ALP SERPL-CCNC: 114 U/L (ref 39–117)
ALT SERPL W P-5'-P-CCNC: 17 U/L (ref 1–33)
ANION GAP SERPL CALCULATED.3IONS-SCNC: 8 MMOL/L (ref 5–15)
AST SERPL-CCNC: 18 U/L (ref 1–32)
BASOPHILS # BLD AUTO: 0.04 10*3/MM3 (ref 0–0.2)
BASOPHILS NFR BLD AUTO: 0.7 % (ref 0–1.5)
BILIRUB SERPL-MCNC: 0.3 MG/DL (ref 0–1.2)
BUN SERPL-MCNC: 10 MG/DL (ref 6–20)
BUN/CREAT SERPL: 21.7 (ref 7–25)
CALCIUM SPEC-SCNC: 8.7 MG/DL (ref 8.6–10.5)
CHLORIDE SERPL-SCNC: 104 MMOL/L (ref 98–107)
CO2 SERPL-SCNC: 27 MMOL/L (ref 22–29)
CREAT SERPL-MCNC: 0.46 MG/DL (ref 0.57–1)
DEPRECATED RDW RBC AUTO: 78.3 FL (ref 37–54)
EOSINOPHIL # BLD AUTO: 0.13 10*3/MM3 (ref 0–0.4)
EOSINOPHIL NFR BLD AUTO: 2.1 % (ref 0.3–6.2)
ERYTHROCYTE [DISTWIDTH] IN BLOOD BY AUTOMATED COUNT: 27.1 % (ref 12.3–15.4)
FERRITIN SERPL-MCNC: 142.1 NG/ML (ref 13–150)
GFR SERPL CREATININE-BSD FRML MDRD: 142 ML/MIN/1.73
GLOBULIN UR ELPH-MCNC: 3.1 GM/DL
GLUCOSE SERPL-MCNC: 124 MG/DL (ref 65–99)
HCT VFR BLD AUTO: 41.9 % (ref 34–46.6)
HGB BLD-MCNC: 13.2 G/DL (ref 12–15.9)
IMM GRANULOCYTES # BLD AUTO: 0.01 10*3/MM3 (ref 0–0.05)
IMM GRANULOCYTES NFR BLD AUTO: 0.2 % (ref 0–0.5)
IRON 24H UR-MRATE: 101 MCG/DL (ref 37–145)
IRON SATN MFR SERPL: 28 % (ref 20–50)
LYMPHOCYTES # BLD AUTO: 1.68 10*3/MM3 (ref 0.7–3.1)
LYMPHOCYTES NFR BLD AUTO: 27.5 % (ref 19.6–45.3)
MCH RBC QN AUTO: 25.7 PG (ref 26.6–33)
MCHC RBC AUTO-ENTMCNC: 31.5 G/DL (ref 31.5–35.7)
MCV RBC AUTO: 81.5 FL (ref 79–97)
MONOCYTES # BLD AUTO: 0.66 10*3/MM3 (ref 0.1–0.9)
MONOCYTES NFR BLD AUTO: 10.8 % (ref 5–12)
NEUTROPHILS NFR BLD AUTO: 3.59 10*3/MM3 (ref 1.7–7)
NEUTROPHILS NFR BLD AUTO: 58.7 % (ref 42.7–76)
NRBC BLD AUTO-RTO: 0 /100 WBC (ref 0–0.2)
PLATELET # BLD AUTO: 262 10*3/MM3 (ref 140–450)
PMV BLD AUTO: 9.7 FL (ref 6–12)
POTASSIUM SERPL-SCNC: 3.8 MMOL/L (ref 3.5–5.2)
PROT SERPL-MCNC: 7.1 G/DL (ref 6–8.5)
RBC # BLD AUTO: 5.14 10*6/MM3 (ref 3.77–5.28)
SODIUM SERPL-SCNC: 139 MMOL/L (ref 136–145)
TIBC SERPL-MCNC: 365 MCG/DL (ref 298–536)
TRANSFERRIN SERPL-MCNC: 245 MG/DL (ref 200–360)
WBC # BLD AUTO: 6.11 10*3/MM3 (ref 3.4–10.8)

## 2020-11-13 PROCEDURE — 36415 COLL VENOUS BLD VENIPUNCTURE: CPT

## 2020-11-13 PROCEDURE — 99214 OFFICE O/P EST MOD 30 MIN: CPT | Performed by: NURSE PRACTITIONER

## 2020-11-13 PROCEDURE — 84466 ASSAY OF TRANSFERRIN: CPT

## 2020-11-13 PROCEDURE — 80053 COMPREHEN METABOLIC PANEL: CPT

## 2020-11-13 PROCEDURE — 83540 ASSAY OF IRON: CPT

## 2020-11-13 PROCEDURE — 85025 COMPLETE CBC W/AUTO DIFF WBC: CPT

## 2020-11-13 PROCEDURE — 82728 ASSAY OF FERRITIN: CPT

## 2020-11-13 NOTE — PROGRESS NOTES
[facility] HEMATOLOGY AND ONCOLOGY  [address]  [place] [address] [phone]    Patient Name: Vicki Agarwal  Encounter Date: 11/13/2020  YOB: 1965  Patient Number: [id number]     HEMATOLOGIC HISTORY  Vicki Agarwal is a 54 yo female patient that was referred on 10/7/2020 for anemia .Mrs. Agarwal had lab work obtained by her primary care provider on 8/20/2020 that showed a hemoglobin of 9.8, hematocrit 34.3, MCV 70.6 and MCHC of 28.6.  Her platelet count was slightly elevated at 471,000 and had a normal white count of 7.1.  She did have an iron saturation on August 27 of 20 that showed 4%.  This was in comparison to back in April 2019 when her hemoglobin was normal at 12.3 MCV was normal at 87.3.    Mrs. Agarwal underwent a colonoscopy on 9/11/2020 due to her anemia that was negative.  She also had an endoscopy on the same date that also was negative.  They found no evidence of bleeding.  She does have significant menstrual blood loss.  She states that she has a menstrual cycle that last anywhere from 12 to 23 days at a time.  She states this last month was the shortest one she has had in a long time and it was 9 days.  She states they are very heavy and lengthy.  She does have an appointment with a GYN on October 15.      She has attempted iron pills but they cause her significant amount of nausea.  She had a gastric sleeve procedure back in January 2014 and just does not tolerate things well she states.     The October 7, 2020 work-up results are as follows:    INTERVAL HISTORY  Vicki Agarwal is a 55 y.o. female here today in follow-up for her iron deficiency anemia.  She was seen initially on 10/7/2020.  Work-up at that time found a ferritin of only 7.30.  A CMP that was essentially unremarkable.  Iron saturation of 4%.  B12 was normal at 1330, folate greater than 20.  Retake count 1.23%.  Haptoglobin 214.   Her hemoglobin at that time was 10.8 with an MCV of 73.2.  Platelets were normal at 446,000 and white count of 6.26.  She had a copper value of 141.    She has significant menstrual loss for which she is following with a GYN physician.  It is suspected that this is the reason for her iron deficiency.  She has seen a GYN on October 15 and they are planning an ablation on January 29th.     She went on to receive Injectafer on 10/14/2020 and 10/21/2020.      She states since then she has felt some better but is still fatigued and tired.  She states that she tires out extremely easy.  She has no significant shortness of breath or chest pain.  No headaches or dizziness.  No GI complaints.    Labs today show a significant improvement in her hemoglobin to 13.2 and hematocrit of 41.9.  Her platelets remain normal at 262,000 with a white count of 6.11.  CMP remains normal except for glucose of 124.  Her ferritin has normalized at 142.10 as well as the iron saturation at 28%.    LABS    Lab Results - Last 18 Months   Lab Units 11/13/20  0937 10/27/20  1415 10/07/20  1041   HEMOGLOBIN g/dL 13.2 12.4 10.8*   HEMATOCRIT % 41.9 41.6 36.8   MCV fL 81.5 78.9* 73.2*   WBC 10*3/mm3 6.11 7.2 6.26   RDW % 27.1* 27.9* 23.4*   MPV fL 9.7 10.2 9.2   PLATELETS 10*3/mm3 262 332 446   IMM GRAN % % 0.2  --   --    NEUTROS ABS 10*3/mm3 3.59 4.1 3.54   LYMPHS ABS 10*3/mm3 1.68 2.0  --    MONOS ABS 10*3/mm3 0.66 0.80  --    EOS ABS 10*3/mm3 0.13 0.20 0.44*   BASOS ABS 10*3/mm3 0.04 0.10 0.06   IMMATURE GRANS (ABS) 10*3/mm3 0.01 0.0  --    NRBC /100 WBC 0.0  --   --    NEUTROPHIL % %  --   --  56.6   MONOCYTES % %  --   --  9.1   BASOPHIL % %  --   --  1.0   ATYP LYMPH % %  --   --  1.0   ANISOCYTOSIS   --  1+*  --    GIANT PLT   --   --  Slight/1+       Lab Results - Last 18 Months   Lab Units 11/13/20  0937 10/07/20  1041   GLUCOSE mg/dL 124* 130*   SODIUM mmol/L 139 141   POTASSIUM mmol/L 3.8 4.3   CO2 mmol/L 27.0 26.0   CHLORIDE mmol/L 104  "106   ANION GAP mmol/L 8.0 9.0   CREATININE mg/dL 0.46* 0.63   BUN mg/dL 10 16   BUN / CREAT RATIO  21.7 25.4*   CALCIUM mg/dL 8.7 9.3   EGFR IF NONAFRICN AM mL/min/1.73 142 99   ALK PHOS U/L 114 115   TOTAL PROTEIN g/dL 7.1 7.5   ALT (SGPT) U/L 17 17   AST (SGOT) U/L 18 21   BILIRUBIN mg/dL 0.3 0.2   ALBUMIN g/dL 4.00 4.40   GLOBULIN gm/dL 3.1 3.1       Lab Results - Last 18 Months   Lab Units 11/13/20  0937 10/27/20  1415 10/07/20  1041 08/27/20  1452   IRON mcg/dL 101  --  21* 18*   TIBC mcg/dL 365  --  516 456*   IRON SATURATION % 28  --  4* 4*   FERRITIN ng/mL 142.10  --  7.30*  --    TSH uIU/mL  --  0.696  --   --    FOLATE ng/mL  --   --  >20.00 >20.0         PAST MEDICAL HISTORY:  ALLERGIES:  Allergies   Allergen Reactions   • Codeine Other (See Comments)     \"I DONT REMEMBER\"   • Penicillins Other (See Comments)     WHEN I WAS A CHILD, I DON'T KNOW   • Adhesive Tape Rash   • Latex Rash     CURRENT MEDICATIONS:  Outpatient Encounter Medications as of 11/13/2020   Medication Sig Dispense Refill   • albuterol sulfate  (90 Base) MCG/ACT inhaler Inhale 2 puffs Every 4 (Four) Hours As Needed for Wheezing.     • ALPRAZolam (XANAX) 0.25 MG tablet Take 0.25 mg by mouth At Night As Needed for Sleep.     • HYDROcodone-acetaminophen (NORCO) 7.5-325 MG per tablet Take 1 tablet by mouth Every 6 (Six) Hours As Needed for Moderate Pain .     • [DISCONTINUED] metroNIDAZOLE (FLAGYL) 500 MG tablet Take 1 tablet by mouth 2 (Two) Times a Day. 14 tablet 0   • [DISCONTINUED] oxyCODONE-acetaminophen (PERCOCET) 7.5-325 MG per tablet Take 1 tablet by mouth Every 6 (Six) Hours As Needed for Severe Pain . 8 tablet 0     No facility-administered encounter medications on file as of 11/13/2020.      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   • Hypertension I10     SURGERIES:  Past Surgical History:   Procedure Laterality Date   • BACK SURGERY     • " CARPAL TUNNEL RELEASE     • CHOLECYSTECTOMY     • GASTRIC SLEEVE LAPAROSCOPIC       HEALTH MAINTENANCE ITEMS:    <no information>  Last Completed Colonoscopy       Status Date      COLONOSCOPY  on 2020 and negative          Last Completed Mammogram       Status Date      MAMMOGRAM Done 2019 MAMMO SCREENING DIGITAL TOMOSYNTHESIS BILATERAL W CAD     2019 NO MALIGNANCY            FAMILY HISTORY:  Family History   Problem Relation Age of Onset   • Breast cancer Neg Hx      SOCIAL HISTORY:  Social History     Socioeconomic History   • Marital status: Single     Spouse name: Not on file   • Number of children: Not on file   • Years of education: Not on file   • Highest education level: Not on file   Tobacco Use   • Smoking status: Former Smoker     Quit date: 6/10/2011     Years since quittin.4   Substance and Sexual Activity   • Alcohol use: No     Frequency: Never   • Drug use: No   • Sexual activity: Defer       REVIEW OF SYSTEMS:  Review of systems is unchanged since the 10/7/2020 visit.  Review of Systems   Constitutional: Positive for fatigue. Negative for activity change, appetite change, chills, diaphoresis, fever and unexpected weight loss.   HENT: Negative for ear pain, nosebleeds, sinus pressure, sore throat and voice change.    Eyes: Negative for blurred vision, double vision, pain and visual disturbance.   Respiratory: Negative for cough and shortness of breath.    Cardiovascular: Negative for chest pain, palpitations and leg swelling.   Gastrointestinal: Negative for abdominal pain, anal bleeding, blood in stool, constipation, diarrhea, nausea and vomiting.   Endocrine: Negative for heat intolerance, polydipsia and polyuria.   Genitourinary: Positive for menstrual problem. Negative for dysuria, frequency, hematuria, urgency and urinary incontinence.   Musculoskeletal: Positive for arthralgias and myalgias.   Skin: Negative for rash and skin lesions.   Neurological: Positive for weakness.  "Negative for dizziness, tremors, seizures, syncope, speech difficulty and headache.   Hematological: Negative for adenopathy. Does not bruise/bleed easily.   Psychiatric/Behavioral: Negative for dysphoric mood, sleep disturbance, suicidal ideas and depressed mood.       /82   Pulse 84   Temp 97.1 °F (36.2 °C) (Temporal)   Resp 16   Ht 166.4 cm (65.5\")   Wt (!) 137 kg (302 lb 14.4 oz)   SpO2 98%   Breastfeeding No   BMI 49.64 kg/m²  Body surface area is 2.37 meters squared.      Physical Exam:  Physical exam unchanged since 10/7/2020 visit.  Physical Exam  Vitals signs reviewed.   Constitutional:       Appearance: Normal appearance. She is well-developed.   HENT:      Head: Atraumatic.      Mouth/Throat:      Mouth: Mucous membranes are moist.   Eyes:      General: No scleral icterus.     Pupils: Pupils are equal, round, and reactive to light.   Neck:      Musculoskeletal: Neck supple.      Trachea: Trachea normal.   Cardiovascular:      Rate and Rhythm: Normal rate and regular rhythm.      Heart sounds: No murmur. No friction rub. No gallop.    Pulmonary:      Effort: Pulmonary effort is normal.      Breath sounds: Normal breath sounds. No wheezing, rhonchi or rales.   Abdominal:      Palpations: Abdomen is soft.      Tenderness: There is no abdominal tenderness. There is no guarding or rebound.   Lymphadenopathy:      Cervical: No cervical adenopathy.      Upper Body:      Right upper body: No supraclavicular adenopathy.      Left upper body: No supraclavicular adenopathy.   Skin:     General: Skin is warm and dry.   Neurological:      Mental Status: She is alert and oriented to person, place, and time.      Sensory: No sensory deficit.   Psychiatric:         Judgment: Judgment normal.       Patient's Body mass index is 49.64 kg/m². BMI is above normal parameters. Recommendations include: referral to a weight management program and defer to pcp.      ASSESSMENT/PLANS  **Microcytic anemia likely " secondary to iron deficiency from menorrhagia  • Based on history of the patient she has a menstrual cycle that last typically 12 to 23 days and are quite heavy.  This significant menstrual loss could definitely be the source of her iron deficiency.  She had an iron saturation on 8/27/2020 of only 4%.  Hemoglobin was around 9.8, MCV 70.6 as mentioned above.  • Colonoscopy and endoscopy found no signs of bleeding  • She has normal renal function   • Normal liver function studies   • Normal bilirubin and with low MCV do not expect hemolysis   Additional work-up results on 10/7/2020 include:  o reticulocyte counts 1.23%  o Folate - >20  o B12 level - 1330  o Haptoglobin - 214  o Ferritin - 7.30  o Iron profile- sat 4%  o Copper - 141  o CBC with differential - hgb 10.8, mcv 73.2, wbc 6.26, plt 446,000  o CMP - normal except nonfasting glucose of 124  o TSH - 0.696, T4 1.06  • Other possible causes for anemia in this case include the following: Splenomegaly could also cause anemia.  CAT scan of the abdomen pelvis back in April 2019 found a normal spleen therefore doubt this is etiology as well.    - She received 2 doses of Injectafer 10/14/20 and 10/21/20.  Hgb responded well and normalized as well as her iron studies.  Her hgb is now up to 13.2, iron saturation 28% and ferritin of 142.10.    -She will not need further iron replacement currently, but will continue to monitor closely as she is still having significant menstrual loss.   -Patient planned to have an endometrial ablation in January 2021 which should help resolve the ADELFO.    ** Infection status:   · No current issues   **Metabolic / Renal:   · No current issues   ** Endocrine:   · No current issues   ** Coagulation / VTE prophylaxis:   - no current issues  ** GI:   -no current issues  ** Pulmonary:   -Former smoker, quit in 2011   - no current issues  ** Cardiology:   --Hypertension controlled with medications.  Blood pressure today stable today at  128/82.  ** Skin / Rash:   · No current issues   ** :   --Significant menorrhagia with very lengthy heavy periods.  Patient seen GYN on 10/15/2020 and endometrial ablation is planned for January 2021.  Encouraged patient to follow with GYN for this as it will help the iron deficiency.   --History or renal stone in May 2019 followed by urology, Dr Drew  ** Neurologic:   · No current issues   ** Psychosocial:   · No current issues   ** Pain control:   - no current issue    FOLLOW UP  Return in 12 weeks for follow up.   When she returns she will need a pre-office CBC, CMP, iron profile and ferritin    I spent 26 total minutes, face-to-face, caring for iVcki today.  Greater than 50% of this time involved counseling and/or coordination of care as documented within this note regarding the patient's illness(es), pros and cons of various treatment options, instructions and/or risk reduction.    Rosalinda Freedman, APRN  11/13/2020

## 2020-11-16 ENCOUNTER — TELEPHONE (OUTPATIENT)
Dept: ONCOLOGY | Facility: CLINIC | Age: 55
End: 2020-11-16

## 2020-11-16 NOTE — TELEPHONE ENCOUNTER
Notified pt she v/u    ----- Message from DC Elias sent at 11/16/2020  9:51 AM CST -----  Please advise Vicki that her iron studies are normal at this time.  We will need to continue to monitor and replace when and if they drop once again.

## 2020-11-17 ENCOUNTER — APPOINTMENT (OUTPATIENT)
Dept: LAB | Facility: HOSPITAL | Age: 55
End: 2020-11-17

## 2021-01-22 ENCOUNTER — OFFICE VISIT (OUTPATIENT)
Dept: OBGYN CLINIC | Age: 56
End: 2021-01-22

## 2021-01-22 VITALS
HEIGHT: 65 IN | BODY MASS INDEX: 48.82 KG/M2 | SYSTOLIC BLOOD PRESSURE: 152 MMHG | WEIGHT: 293 LBS | DIASTOLIC BLOOD PRESSURE: 88 MMHG | HEART RATE: 102 BPM

## 2021-01-22 DIAGNOSIS — N92.1 MENORRHAGIA WITH IRREGULAR CYCLE: Primary | ICD-10-CM

## 2021-01-22 PROCEDURE — PREOPEXAM PRE-OP EXAM: Performed by: OBSTETRICS & GYNECOLOGY

## 2021-01-22 ASSESSMENT — ENCOUNTER SYMPTOMS
RESPIRATORY NEGATIVE: 1
GASTROINTESTINAL NEGATIVE: 1
EYES NEGATIVE: 1

## 2021-01-22 NOTE — PROGRESS NOTES
I, Gladys Jackson RN, am scribing for and in the presence of Dr. Caitlin Shah 2021/9:53 AM/sign         2021    Ashley Thompson (:  1965) is a 50 y.o. female, here for a preop visit. She has yung having heavy, irregular bleeding for the past year. An TVUS was done and it showed   Transvaginal pelvic ultrasound was performed in the transverse and   longitudinal projections. Uterus measures 8.6 x 5 x 5.2 cm in size. The endometrium measures 12   mm in double AP thickness. Several nabothian cysts are seen in the cervix, which is otherwise   unremarkable. The right ovary measures 2.2 x 2.9 x 1.7 cm in size with normal color   flow. The left ovary measures 2.9 x 2.1 x 2.7 cm in size and contains   a dominant follicle measuring 1.9 cm in size. Normal color flow is   demonstrated. EMB was obtained in 10/2020 and it was negative. Patient Active Problem List   Diagnosis    DUB (dysfunctional uterine bleeding)    Dyspareunia    Anemia    Menorrhagia with irregular cycle    BRBPR (bright red blood per rectum)    Rectal pain    Rectal burning    Rectal itching    Hematemesis with nausea    Chronic nausea    S/P laparoscopic sleeve gastrectomy    Chronic diarrhea    Pelvic pain    Chronic heartburn       Review of Systems   Constitutional: Negative. HENT: Negative. Eyes: Negative. Respiratory: Negative. Cardiovascular: Negative. Gastrointestinal: Negative. Genitourinary: Negative for difficulty urinating, dyspareunia, dysuria, enuresis, frequency, hematuria, menstrual problem, pelvic pain, urgency and vaginal discharge. Musculoskeletal: Negative. Skin: Negative. Neurological: Negative. Psychiatric/Behavioral: Negative. Prior to Visit Medications    Medication Sig Taking?  Authorizing Provider   megestrol (MEGACE) 40 MG tablet Take 1 tablet by mouth daily  PELON Steele - CNP   NONFORMULARY Indications: iv   Historical Provider, MD Cholecalciferol (VITAMIN D3) 50 MCG (2000 UT) CAPS Take by mouth daily  Historical Provider, MD   ferrous sulfate (IRON 325) 325 (65 Fe) MG tablet Take 325 mg by mouth daily (with breakfast)  Historical Provider, MD   Omega-3 Fatty Acids (FISH OIL) 1200 MG CPDR Take by mouth daily  Historical Provider, MD   vitamin C (ASCORBIC ACID) 500 MG tablet Take 1,000 mg by mouth daily  Historical Provider, MD   lisinopril-hydroCHLOROthiazide (PRINZIDE;ZESTORETIC) 20-12.5 MG per tablet   Historical Provider, MD   meclizine (ANTIVERT) 25 MG tablet Take 25 mg by mouth 3 times daily as needed for Dizziness or Nausea  Historical Provider, MD   diclofenac sodium (VOLTAREN) 1 % GEL   Historical Provider, MD   hydrocortisone (ANUSOL-HC) 2.5 % rectal cream Apply rectally 1-2 times daily (after a BM) for 5 days during hemorrhoid flare. PELON Reich   gabapentin (NEURONTIN) 100 MG capsule Take 100 mg by mouth as needed. Historical Provider, MD   ondansetron (ZOFRAN) 8 MG tablet Take 8 mg by mouth every 8 hours as needed for Nausea or Vomiting  Historical Provider, MD   albuterol (PROVENTIL) (2.5 MG/3ML) 0.083% nebulizer solution Take 2.5 mg by nebulization every 4 hours as needed   Historical Provider, MD   ALPRAZolam (XANAX) 1 MG tablet Take 1 mg by mouth nightly as needed   Historical Provider, MD   cyanocobalamin 1000 MCG/ML injection Inject 1,000 mcg into the muscle every 7 days   Historical Provider, MD   furosemide (LASIX) 40 MG tablet Take 40 mg by mouth as needed   Historical Provider, MD   HYDROcodone-acetaminophen (NORCO) 7.5-325 MG per tablet Take 1 tablet by mouth every 6 hours as needed  .   Historical Provider, MD   levothyroxine (SYNTHROID) 100 MCG tablet Take 100 mcg by mouth daily   Historical Provider, MD   loratadine (CLARITIN) 10 MG tablet Take 10 mg by mouth daily   Historical Provider, MD   omeprazole (PRILOSEC) 20 MG capsule Take 40 mg by mouth Daily   Historical Provider, MD   polyethylene glycol (GLYCOLAX) powder Take 17 g by mouth daily   Historical Provider, MD   pravastatin (PRAVACHOL) 40 MG tablet Take 40 mg by mouth daily   Historical Provider, MD ÁLVAREZ 3CC LUER-SHO SYR 25GX1\" 25G X 1\" 3 ML MISC   Historical Provider, MD   timolol (TIMOPTIC) 0.5 % ophthalmic solution Place into both eyes 2 times daily   Historical Provider, MD        Allergies   Allergen Reactions    Latex Rash    Codeine Itching     Other reaction(s):  Other (See Comments)  \"I DONT REMEMBER\"    Penicillins Itching and Other (See Comments)     WHEN I WAS A CHILD, I DON'T KNOW    Tape [Adhesive Tape] Rash       Past Medical History:   Diagnosis Date    Anemia     Asthma     Bronchitis, acute     COPD (chronic obstructive pulmonary disease) ([facility] [address]     Glaucoma     Hyperlipidemia     Hypertension     Hyperthyroidism     Sleep apnea     uses c-pap       Past Surgical History:   Procedure Laterality Date    AXILLARY SURGERY Bilateral     excision and drainage of staph abscesses    BACK SURGERY  10/2004    Dr. Christiano Schmidt, [place] L4, L5    CARPAL TUNNEL RELEASE Right     CHOLECYSTECTOMY      COLONOSCOPY  03/20/2007    Dr Shan Pang N/A 9/11/2020    Dr María Elena Baires yr recall    DE COLONOSCOPY FLX DX W/COLLJ SPEC WHEN PFRMD N/A 2/8/2017    Dr Natalya Jacome, actively inflamed internal hemorrhoids, residulal liquid and some solid food particles in the colon-5 yr recall due to prep    STOMACH SURGERY  01/20/2014    Dr. Jeffrey Estimable ( gastric sleeve)    UPPER GASTROINTESTINAL ENDOSCOPY  10/30/2012    Dr Nando Schwarz esophagitis, gastritis, Iliana (-)    UPPER GASTROINTESTINAL ENDOSCOPY  01/23/2008    Dr Ian Hamm esophagitis    UPPER GASTROINTESTINAL ENDOSCOPY N/A 9/11/2020    Dr Bañuelos Perfect       Social History     Socioeconomic History    Marital status:      Spouse name: Not on file    Number of children: Not on file    Years of education: Not on file    Highest education level: Not on file Occupational History    Not on file   Social Needs    Financial resource strain: Not on file    Food insecurity     Worry: Not on file     Inability: Not on file    Transportation needs     Medical: Not on file     Non-medical: Not on file   Tobacco Use    Smoking status: Former Smoker     Quit date: 2011     Years since quittin.7    Smokeless tobacco: Never Used   Substance and Sexual Activity    Alcohol use: No    Drug use: No    Sexual activity: Yes     Partners: Male   Lifestyle    Physical activity     Days per week: Not on file     Minutes per session: Not on file    Stress: Not on file   Relationships    Social connections     Talks on phone: Not on file     Gets together: Not on file     Attends Gnosticist service: Not on file     Active member of club or organization: Not on file     Attends meetings of clubs or organizations: Not on file     Relationship status: Not on file    Intimate partner violence     Fear of current or ex partner: Not on file     Emotionally abused: Not on file     Physically abused: Not on file     Forced sexual activity: Not on file   Other Topics Concern    Not on file   Social History Narrative    Not on file        Family History   Problem Relation Age of Onset    Lung Cancer Mother     Kidney Cancer Mother     Lung Cancer Maternal Grandmother     Colon Cancer Maternal Grandfather     Liver Cancer Neg Hx     Liver Disease Neg Hx     Stomach Cancer Neg Hx     Rectal Cancer Neg Hx     Esophageal Cancer Neg Hx     Colon Polyps Neg Hx        ADVANCE DIRECTIVE: N, <no information>    Vitals:    21 0912   BP: (!) 152/88   Site: Left Upper Arm   Position: Sitting   Cuff Size: Large Adult   Pulse: 102   Weight: (!) 324 lb (147 kg)   Height: 5' 5\" (1.651 m)     Estimated body mass index is 53.92 kg/m² as calculated from the following:    Height as of this encounter: 5' 5\" (1.651 m). Weight as of this encounter: 324 lb (147 kg).     Physical Exam  Constitutional:       Appearance: She is well-developed. HENT:      Head: Normocephalic and atraumatic. Right Ear: Hearing normal.      Left Ear: Hearing normal.      Nose: Nose normal.   Eyes:      General: Lids are normal.      Conjunctiva/sclera: Conjunctivae normal.      Pupils: Pupils are equal, round, and reactive to light. Neck:      Musculoskeletal: Normal range of motion and neck supple. Cardiovascular:      Rate and Rhythm: Normal rate and regular rhythm. Pulmonary:      Effort: Pulmonary effort is normal.      Breath sounds: Normal breath sounds. Abdominal:      General: There is no distension. Palpations: Abdomen is soft. Tenderness: There is no abdominal tenderness. Musculoskeletal: Normal range of motion. Comments: Normal ROM in all four extremities; normal gait   Skin:     General: Skin is warm and dry. Neurological:      Mental Status: She is alert and oriented to person, place, and time. Psychiatric:         Behavior: Behavior normal.         No flowsheet data found. Lab Results   Component Value Date    GLUCOSE 95 06/19/2011       The ASCVD Risk score (Thurman Boeck., et al., 2013) failed to calculate for the following reasons:    Cannot find a previous HDL lab    Cannot find a previous total cholesterol lab      There is no immunization history on file for this patient.     Health Maintenance   Topic Date Due    Hepatitis C screen  1965    Lipid screen  07/13/1977    HIV screen  07/13/1982    DTaP/Tdap/Td vaccine (1 - Tdap) 07/13/1986    Diabetes screen  07/13/2007    Potassium monitoring  06/19/2012    Creatinine monitoring  06/19/2012    Breast cancer screen  07/13/2017    Shingles Vaccine (1 of 2) 07/13/2017    Annual Wellness Visit (AWV)  06/23/2019    Colon cancer screen colonoscopy  09/11/2022    Cervical cancer screen  10/15/2025    Flu vaccine  Completed    Hepatitis A vaccine  Aged Out    Hepatitis B vaccine  Aged Out    Hib vaccine  Aged Out    Meningococcal (ACWY) vaccine  Aged Out    Pneumococcal 0-64 years Vaccine  Aged Out       ASSESSMENT/PLAN:  1. Menorrhagia with irregular cycle    Counseling was offered and accepted by patient. Discussed at length the risks, benefits and alternatives to surgery including medical management and no intervention. Some patients will need a full medical clearance . Preop testing ordered and we will review scheduling to determine date and time. Consents for surgery signed and all questions  proceeding. Pt will have Covid swab and labs testing on 1/25. All questions answered and  patient voiced understanding of above  Pt is in agreement to move forward with D&C, hysteroscopy, and Novasure ablation. Surgery scheduled on 1/29/21. Karole Babinski, MD, personally performed services described in this document as scribed by Mike Parry RN in my presence, and it is both accurate and complete. Return if symptoms worsen or fail to improve. An electronic signature was used to authenticate this note.

## 2021-01-25 ENCOUNTER — HOSPITAL ENCOUNTER (OUTPATIENT)
Dept: PREADMISSION TESTING | Age: 56
Discharge: HOME | End: 2021-01-29
Payer: MEDICARE

## 2021-01-25 LAB
ANION GAP SERPL CALCULATED.3IONS-SCNC: 10 MMOL/L (ref 7–19)
BASOPHILS ABSOLUTE: 0.1 K/UL (ref 0–0.2)
BASOPHILS RELATIVE PERCENT: 1 % (ref 0–1)
BUN BLDV-MCNC: 13 MG/DL (ref 6–20)
CALCIUM SERPL-MCNC: 9.1 MG/DL (ref 8.6–10)
CHLORIDE BLD-SCNC: 104 MMOL/L (ref 98–111)
CO2: 26 MMOL/L (ref 22–29)
CREAT SERPL-MCNC: 0.6 MG/DL (ref 0.5–0.9)
EKG P AXIS: 35 DEGREES
EKG P-R INTERVAL: 152 MS
EKG Q-T INTERVAL: 388 MS
EKG QRS DURATION: 96 MS
EKG QTC CALCULATION (BAZETT): 421 MS
EKG T AXIS: 39 DEGREES
EOSINOPHILS ABSOLUTE: 0.3 K/UL (ref 0–0.6)
EOSINOPHILS RELATIVE PERCENT: 4.2 % (ref 0–5)
GFR AFRICAN AMERICAN: >59
GFR NON-AFRICAN AMERICAN: >60
GLUCOSE BLD-MCNC: 85 MG/DL (ref 74–109)
HCT VFR BLD CALC: 48.2 % (ref 37–47)
HEMOGLOBIN: 15.2 G/DL (ref 12–16)
IMMATURE GRANULOCYTES #: 0 K/UL
LYMPHOCYTES ABSOLUTE: 2.2 K/UL (ref 1.1–4.5)
LYMPHOCYTES RELATIVE PERCENT: 36.3 % (ref 20–40)
MCH RBC QN AUTO: 29.4 PG (ref 27–31)
MCHC RBC AUTO-ENTMCNC: 31.5 G/DL (ref 33–37)
MCV RBC AUTO: 93.2 FL (ref 81–99)
MONOCYTES ABSOLUTE: 0.8 K/UL (ref 0–0.9)
MONOCYTES RELATIVE PERCENT: 14 % (ref 0–10)
NEUTROPHILS ABSOLUTE: 2.6 K/UL (ref 1.5–7.5)
NEUTROPHILS RELATIVE PERCENT: 44 % (ref 50–65)
PDW BLD-RTO: 14.8 % (ref 11.5–14.5)
PLATELET # BLD: 250 K/UL (ref 130–400)
PMV BLD AUTO: 11.1 FL (ref 9.4–12.3)
POTASSIUM SERPL-SCNC: 4.6 MMOL/L (ref 3.5–5)
RBC # BLD: 5.17 M/UL (ref 4.2–5.4)
SODIUM BLD-SCNC: 140 MMOL/L (ref 136–145)
WBC # BLD: 6 K/UL (ref 4.8–10.8)

## 2021-01-25 PROCEDURE — 93005 ELECTROCARDIOGRAM TRACING: CPT | Performed by: OBSTETRICS & GYNECOLOGY

## 2021-01-25 PROCEDURE — 85025 COMPLETE CBC W/AUTO DIFF WBC: CPT

## 2021-01-25 PROCEDURE — 93010 ELECTROCARDIOGRAM REPORT: CPT | Performed by: INTERNAL MEDICINE

## 2021-01-25 PROCEDURE — 80048 BASIC METABOLIC PNL TOTAL CA: CPT

## 2021-01-25 RX ORDER — CALCIUM CARBONATE 500(1250)
500 TABLET ORAL DAILY
COMMUNITY

## 2021-01-25 RX ORDER — PROMETHAZINE HYDROCHLORIDE 6.25 MG/5ML
2.5 SYRUP ORAL 4 TIMES DAILY PRN
COMMUNITY

## 2021-01-27 LAB — SARS-COV-2, NAA: NOT DETECTED

## 2021-01-29 ENCOUNTER — HOSPITAL ENCOUNTER (OUTPATIENT)
Age: 56
Setting detail: OUTPATIENT SURGERY
Discharge: HOME | End: 2021-01-29
Attending: OBSTETRICS & GYNECOLOGY | Admitting: OBSTETRICS & GYNECOLOGY
Payer: MEDICARE

## 2021-01-29 ENCOUNTER — ANESTHESIA EVENT (OUTPATIENT)
Dept: OPERATING ROOM | Age: 56
End: 2021-01-29
Payer: MEDICARE

## 2021-01-29 ENCOUNTER — ANESTHESIA (OUTPATIENT)
Dept: OPERATING ROOM | Age: 56
End: 2021-01-29
Payer: MEDICARE

## 2021-01-29 VITALS
DIASTOLIC BLOOD PRESSURE: 92 MMHG | OXYGEN SATURATION: 96 % | SYSTOLIC BLOOD PRESSURE: 185 MMHG | RESPIRATION RATE: 7 BRPM | TEMPERATURE: 98.3 F

## 2021-01-29 VITALS
OXYGEN SATURATION: 98 % | HEART RATE: 80 BPM | TEMPERATURE: 97.1 F | RESPIRATION RATE: 16 BRPM | BODY MASS INDEX: 48.82 KG/M2 | WEIGHT: 293 LBS | DIASTOLIC BLOOD PRESSURE: 70 MMHG | SYSTOLIC BLOOD PRESSURE: 146 MMHG | HEIGHT: 65 IN

## 2021-01-29 LAB — HCG(URINE) PREGNANCY TEST: NEGATIVE

## 2021-01-29 PROCEDURE — 7100000011 HC PHASE II RECOVERY - ADDTL 15 MIN: Performed by: OBSTETRICS & GYNECOLOGY

## 2021-01-29 PROCEDURE — 6360000002 HC RX W HCPCS: Performed by: ANESTHESIOLOGY

## 2021-01-29 PROCEDURE — 3600000004 HC SURGERY LEVEL 4 BASE: Performed by: OBSTETRICS & GYNECOLOGY

## 2021-01-29 PROCEDURE — 3600000014 HC SURGERY LEVEL 4 ADDTL 15MIN: Performed by: OBSTETRICS & GYNECOLOGY

## 2021-01-29 PROCEDURE — 2709999900 HC NON-CHARGEABLE SUPPLY: Performed by: OBSTETRICS & GYNECOLOGY

## 2021-01-29 PROCEDURE — 3700000001 HC ADD 15 MINUTES (ANESTHESIA): Performed by: OBSTETRICS & GYNECOLOGY

## 2021-01-29 PROCEDURE — 7100000000 HC PACU RECOVERY - FIRST 15 MIN: Performed by: OBSTETRICS & GYNECOLOGY

## 2021-01-29 PROCEDURE — 6360000002 HC RX W HCPCS: Performed by: NURSE ANESTHETIST, CERTIFIED REGISTERED

## 2021-01-29 PROCEDURE — 6370000000 HC RX 637 (ALT 250 FOR IP): Performed by: ANESTHESIOLOGY

## 2021-01-29 PROCEDURE — 58563 HYSTEROSCOPY ABLATION: CPT | Performed by: OBSTETRICS & GYNECOLOGY

## 2021-01-29 PROCEDURE — 84703 CHORIONIC GONADOTROPIN ASSAY: CPT

## 2021-01-29 PROCEDURE — 3700000000 HC ANESTHESIA ATTENDED CARE: Performed by: OBSTETRICS & GYNECOLOGY

## 2021-01-29 PROCEDURE — 2580000003 HC RX 258: Performed by: ANESTHESIOLOGY

## 2021-01-29 PROCEDURE — 88305 TISSUE EXAM BY PATHOLOGIST: CPT

## 2021-01-29 PROCEDURE — 6360000002 HC RX W HCPCS: Performed by: OBSTETRICS & GYNECOLOGY

## 2021-01-29 PROCEDURE — C1886 CATHETER, ABLATION: HCPCS | Performed by: OBSTETRICS & GYNECOLOGY

## 2021-01-29 PROCEDURE — 7100000010 HC PHASE II RECOVERY - FIRST 15 MIN: Performed by: OBSTETRICS & GYNECOLOGY

## 2021-01-29 PROCEDURE — 7100000001 HC PACU RECOVERY - ADDTL 15 MIN: Performed by: OBSTETRICS & GYNECOLOGY

## 2021-01-29 PROCEDURE — 2580000003 HC RX 258: Performed by: OBSTETRICS & GYNECOLOGY

## 2021-01-29 PROCEDURE — 2500000003 HC RX 250 WO HCPCS: Performed by: NURSE ANESTHETIST, CERTIFIED REGISTERED

## 2021-01-29 PROCEDURE — 6370000000 HC RX 637 (ALT 250 FOR IP): Performed by: OBSTETRICS & GYNECOLOGY

## 2021-01-29 RX ORDER — SODIUM CHLORIDE, SODIUM LACTATE, POTASSIUM CHLORIDE, CALCIUM CHLORIDE 600; 310; 30; 20 MG/100ML; MG/100ML; MG/100ML; MG/100ML
INJECTION, SOLUTION INTRAVENOUS CONTINUOUS
Status: DISCONTINUED | OUTPATIENT
Start: 2021-01-29 | End: 2021-01-29 | Stop reason: HOSPADM

## 2021-01-29 RX ORDER — LIDOCAINE HYDROCHLORIDE 10 MG/ML
1 INJECTION, SOLUTION EPIDURAL; INFILTRATION; INTRACAUDAL; PERINEURAL
Status: DISCONTINUED | OUTPATIENT
Start: 2021-01-29 | End: 2021-01-29 | Stop reason: HOSPADM

## 2021-01-29 RX ORDER — SUCCINYLCHOLINE CHLORIDE 20 MG/ML
INJECTION INTRAMUSCULAR; INTRAVENOUS PRN
Status: DISCONTINUED | OUTPATIENT
Start: 2021-01-29 | End: 2021-01-29 | Stop reason: SDUPTHER

## 2021-01-29 RX ORDER — FENTANYL CITRATE 50 UG/ML
INJECTION, SOLUTION INTRAMUSCULAR; INTRAVENOUS PRN
Status: DISCONTINUED | OUTPATIENT
Start: 2021-01-29 | End: 2021-01-29 | Stop reason: SDUPTHER

## 2021-01-29 RX ORDER — METOCLOPRAMIDE HYDROCHLORIDE 5 MG/ML
10 INJECTION INTRAMUSCULAR; INTRAVENOUS
Status: DISCONTINUED | OUTPATIENT
Start: 2021-01-29 | End: 2021-01-29 | Stop reason: HOSPADM

## 2021-01-29 RX ORDER — FAMOTIDINE 20 MG/1
20 TABLET, FILM COATED ORAL
Status: COMPLETED | OUTPATIENT
Start: 2021-01-29 | End: 2021-01-29

## 2021-01-29 RX ORDER — HYDROCODONE BITARTRATE AND ACETAMINOPHEN 7.5; 325 MG/1; MG/1
1 TABLET ORAL
Status: COMPLETED | OUTPATIENT
Start: 2021-01-29 | End: 2021-01-29

## 2021-01-29 RX ORDER — APREPITANT 40 MG/1
40 CAPSULE ORAL ONCE
Status: COMPLETED | OUTPATIENT
Start: 2021-01-29 | End: 2021-01-29

## 2021-01-29 RX ORDER — DEXAMETHASONE SODIUM PHOSPHATE 10 MG/ML
INJECTION, SOLUTION INTRAMUSCULAR; INTRAVENOUS PRN
Status: DISCONTINUED | OUTPATIENT
Start: 2021-01-29 | End: 2021-01-29 | Stop reason: SDUPTHER

## 2021-01-29 RX ORDER — FENTANYL CITRATE 50 UG/ML
50 INJECTION, SOLUTION INTRAMUSCULAR; INTRAVENOUS
Status: DISCONTINUED | OUTPATIENT
Start: 2021-01-29 | End: 2021-01-29 | Stop reason: HOSPADM

## 2021-01-29 RX ORDER — LIDOCAINE HYDROCHLORIDE 10 MG/ML
INJECTION, SOLUTION EPIDURAL; INFILTRATION; INTRACAUDAL; PERINEURAL PRN
Status: DISCONTINUED | OUTPATIENT
Start: 2021-01-29 | End: 2021-01-29 | Stop reason: SDUPTHER

## 2021-01-29 RX ORDER — METOCLOPRAMIDE 10 MG/1
10 TABLET ORAL ONCE
Status: COMPLETED | OUTPATIENT
Start: 2021-01-29 | End: 2021-01-29

## 2021-01-29 RX ORDER — SODIUM CHLORIDE 0.9 % (FLUSH) 0.9 %
10 SYRINGE (ML) INJECTION PRN
Status: DISCONTINUED | OUTPATIENT
Start: 2021-01-29 | End: 2021-01-29 | Stop reason: HOSPADM

## 2021-01-29 RX ORDER — MORPHINE SULFATE 4 MG/ML
2 INJECTION, SOLUTION INTRAMUSCULAR; INTRAVENOUS EVERY 5 MIN PRN
Status: DISCONTINUED | OUTPATIENT
Start: 2021-01-29 | End: 2021-01-29 | Stop reason: HOSPADM

## 2021-01-29 RX ORDER — HYDROMORPHONE HYDROCHLORIDE 1 MG/ML
0.25 INJECTION, SOLUTION INTRAMUSCULAR; INTRAVENOUS; SUBCUTANEOUS EVERY 5 MIN PRN
Status: DISCONTINUED | OUTPATIENT
Start: 2021-01-29 | End: 2021-01-29 | Stop reason: HOSPADM

## 2021-01-29 RX ORDER — HYDROXYZINE HYDROCHLORIDE 25 MG/ML
25 INJECTION, SOLUTION INTRAMUSCULAR
Status: COMPLETED | OUTPATIENT
Start: 2021-01-29 | End: 2021-01-29

## 2021-01-29 RX ORDER — MORPHINE SULFATE 4 MG/ML
4 INJECTION, SOLUTION INTRAMUSCULAR; INTRAVENOUS
Status: DISCONTINUED | OUTPATIENT
Start: 2021-01-29 | End: 2021-01-29 | Stop reason: HOSPADM

## 2021-01-29 RX ORDER — MEPERIDINE HYDROCHLORIDE 50 MG/ML
12.5 INJECTION INTRAMUSCULAR; INTRAVENOUS; SUBCUTANEOUS EVERY 5 MIN PRN
Status: DISCONTINUED | OUTPATIENT
Start: 2021-01-29 | End: 2021-01-29 | Stop reason: HOSPADM

## 2021-01-29 RX ORDER — HYDRALAZINE HYDROCHLORIDE 20 MG/ML
5 INJECTION INTRAMUSCULAR; INTRAVENOUS EVERY 10 MIN PRN
Status: DISCONTINUED | OUTPATIENT
Start: 2021-01-29 | End: 2021-01-29 | Stop reason: HOSPADM

## 2021-01-29 RX ORDER — ONDANSETRON 2 MG/ML
INJECTION INTRAMUSCULAR; INTRAVENOUS PRN
Status: DISCONTINUED | OUTPATIENT
Start: 2021-01-29 | End: 2021-01-29 | Stop reason: SDUPTHER

## 2021-01-29 RX ORDER — SCOLOPAMINE TRANSDERMAL SYSTEM 1 MG/1
1 PATCH, EXTENDED RELEASE TRANSDERMAL ONCE
Status: DISCONTINUED | OUTPATIENT
Start: 2021-01-29 | End: 2021-01-29

## 2021-01-29 RX ORDER — HYDROMORPHONE HYDROCHLORIDE 1 MG/ML
0.5 INJECTION, SOLUTION INTRAMUSCULAR; INTRAVENOUS; SUBCUTANEOUS EVERY 5 MIN PRN
Status: DISCONTINUED | OUTPATIENT
Start: 2021-01-29 | End: 2021-01-29 | Stop reason: HOSPADM

## 2021-01-29 RX ORDER — LABETALOL HYDROCHLORIDE 5 MG/ML
5 INJECTION, SOLUTION INTRAVENOUS EVERY 10 MIN PRN
Status: DISCONTINUED | OUTPATIENT
Start: 2021-01-29 | End: 2021-01-29 | Stop reason: HOSPADM

## 2021-01-29 RX ORDER — PROMETHAZINE HYDROCHLORIDE 25 MG/ML
6.25 INJECTION, SOLUTION INTRAMUSCULAR; INTRAVENOUS
Status: DISCONTINUED | OUTPATIENT
Start: 2021-01-29 | End: 2021-01-29 | Stop reason: HOSPADM

## 2021-01-29 RX ORDER — DIPHENHYDRAMINE HYDROCHLORIDE 50 MG/ML
12.5 INJECTION INTRAMUSCULAR; INTRAVENOUS
Status: DISCONTINUED | OUTPATIENT
Start: 2021-01-29 | End: 2021-01-29 | Stop reason: HOSPADM

## 2021-01-29 RX ORDER — MORPHINE SULFATE 4 MG/ML
4 INJECTION, SOLUTION INTRAMUSCULAR; INTRAVENOUS EVERY 5 MIN PRN
Status: DISCONTINUED | OUTPATIENT
Start: 2021-01-29 | End: 2021-01-29 | Stop reason: HOSPADM

## 2021-01-29 RX ORDER — MIDAZOLAM HYDROCHLORIDE 1 MG/ML
INJECTION INTRAMUSCULAR; INTRAVENOUS PRN
Status: DISCONTINUED | OUTPATIENT
Start: 2021-01-29 | End: 2021-01-29 | Stop reason: SDUPTHER

## 2021-01-29 RX ORDER — SODIUM CHLORIDE 0.9 % (FLUSH) 0.9 %
10 SYRINGE (ML) INJECTION EVERY 12 HOURS SCHEDULED
Status: DISCONTINUED | OUTPATIENT
Start: 2021-01-29 | End: 2021-01-29 | Stop reason: HOSPADM

## 2021-01-29 RX ORDER — MIDAZOLAM HYDROCHLORIDE 1 MG/ML
2 INJECTION INTRAMUSCULAR; INTRAVENOUS
Status: COMPLETED | OUTPATIENT
Start: 2021-01-29 | End: 2021-01-29

## 2021-01-29 RX ORDER — PROPOFOL 10 MG/ML
INJECTION, EMULSION INTRAVENOUS PRN
Status: DISCONTINUED | OUTPATIENT
Start: 2021-01-29 | End: 2021-01-29 | Stop reason: SDUPTHER

## 2021-01-29 RX ORDER — DEXAMETHASONE SODIUM PHOSPHATE 10 MG/ML
10 INJECTION, SOLUTION INTRAMUSCULAR; INTRAVENOUS
Status: COMPLETED | OUTPATIENT
Start: 2021-01-29 | End: 2021-01-29

## 2021-01-29 RX ADMIN — PROPOFOL 200 MG: 10 INJECTION, EMULSION INTRAVENOUS at 08:09

## 2021-01-29 RX ADMIN — HYDROCODONE BITARTRATE AND ACETAMINOPHEN 1 TABLET: 7.5; 325 TABLET ORAL at 10:05

## 2021-01-29 RX ADMIN — DEXAMETHASONE SODIUM PHOSPHATE 10 MG: 10 INJECTION, SOLUTION INTRAMUSCULAR; INTRAVENOUS at 08:13

## 2021-01-29 RX ADMIN — HYDROXYZINE HYDROCHLORIDE 25 MG: 25 INJECTION, SOLUTION INTRAMUSCULAR at 07:01

## 2021-01-29 RX ADMIN — SUCCINYLCHOLINE CHLORIDE 200 MG: 20 INJECTION, SOLUTION INTRAMUSCULAR; INTRAVENOUS at 08:10

## 2021-01-29 RX ADMIN — METOCLOPRAMIDE 10 MG: 10 TABLET ORAL at 07:01

## 2021-01-29 RX ADMIN — ONDANSETRON HYDROCHLORIDE 4 MG: 2 INJECTION, SOLUTION INTRAMUSCULAR; INTRAVENOUS at 08:29

## 2021-01-29 RX ADMIN — MIDAZOLAM 2 MG: 1 INJECTION INTRAMUSCULAR; INTRAVENOUS at 08:04

## 2021-01-29 RX ADMIN — MIDAZOLAM 2 MG: 1 INJECTION INTRAMUSCULAR; INTRAVENOUS at 07:08

## 2021-01-29 RX ADMIN — FAMOTIDINE 20 MG: 20 TABLET, FILM COATED ORAL at 07:01

## 2021-01-29 RX ADMIN — CEFAZOLIN SODIUM 3 G: 1 INJECTION, POWDER, FOR SOLUTION INTRAMUSCULAR; INTRAVENOUS at 08:17

## 2021-01-29 RX ADMIN — HYDROMORPHONE HYDROCHLORIDE 0.5 MG: 1 INJECTION, SOLUTION INTRAMUSCULAR; INTRAVENOUS; SUBCUTANEOUS at 08:57

## 2021-01-29 RX ADMIN — FENTANYL CITRATE 100 MCG: 50 INJECTION, SOLUTION INTRAMUSCULAR; INTRAVENOUS at 08:07

## 2021-01-29 RX ADMIN — HYDROMORPHONE HYDROCHLORIDE 0.5 MG: 1 INJECTION, SOLUTION INTRAMUSCULAR; INTRAVENOUS; SUBCUTANEOUS at 08:46

## 2021-01-29 RX ADMIN — DEXAMETHASONE SODIUM PHOSPHATE 10 MG: 10 INJECTION, SOLUTION INTRAMUSCULAR; INTRAVENOUS at 07:01

## 2021-01-29 RX ADMIN — LIDOCAINE HYDROCHLORIDE 50 MG: 10 INJECTION, SOLUTION EPIDURAL; INFILTRATION; INTRACAUDAL; PERINEURAL at 08:09

## 2021-01-29 RX ADMIN — SODIUM CHLORIDE, POTASSIUM CHLORIDE, SODIUM LACTATE AND CALCIUM CHLORIDE: 600; 310; 30; 20 INJECTION, SOLUTION INTRAVENOUS at 07:01

## 2021-01-29 RX ADMIN — APREPITANT 40 MG: 40 CAPSULE ORAL at 07:01

## 2021-01-29 ASSESSMENT — PAIN DESCRIPTION - PAIN TYPE
TYPE: SURGICAL PAIN
TYPE: SURGICAL PAIN

## 2021-01-29 ASSESSMENT — LIFESTYLE VARIABLES: SMOKING_STATUS: 0

## 2021-01-29 ASSESSMENT — PAIN SCALES - GENERAL
PAINLEVEL_OUTOF10: 7

## 2021-01-29 ASSESSMENT — ENCOUNTER SYMPTOMS: SHORTNESS OF BREATH: 0

## 2021-01-29 ASSESSMENT — PAIN DESCRIPTION - DESCRIPTORS: DESCRIPTORS: CRAMPING

## 2021-01-29 NOTE — OP NOTE
OPERATIVE NOTE        Date of Service: 01/29/21     Pre-operative Diagnosis: MENORRHAGIA    Post-operative Diagnosis:  Same    Procedure: Procedure(s):  DILATATION AND CURETTAGE HYSTEROSCOPY [address] [facility] ABLATION    Anesthesia: General    Surgeon: Calixto Bailey MD    Assistants: Scrub Person First: Luci Feldman    Procedure Note:  After informed consents were obtained, the patient was taken to the operating  room where she was placed in the dorsal supine position. After adequate  anesthesia, she was placed in the dorsal lithotomy position, prepped and  draped in the usual sterile fashion for a vaginal procedure. Exam under anesthesia was unremarkable. Retractor was  then placed in the anterior and posterior wall of the vagina and the anterior lip  of the cervix was grasped with a single-tooth tenaculum. The cervix was  serially dilated and the hysteroscope was inserted. Hysteroscopy revealed endometrial cavity normal in contour without mass or polyp. Following hysteroscopy, sharp curettage was performed. This tissue was sent  to pathology for analysis. Following this, the NovaSure endometrial ablation device was deployed within  the cavity according to the predetermined measurements of cavity length and width. The cavity test was passed and the ablation was carried out. It  takes approximately 90 seconds to complete. Following the NovaSure  endometrial ablation, hysteroscopy revealed the post-ablated endometrium  and the procedure was terminated. The tenaculum was removed from the anterior lip of the cervix and all the  retractors were removed from the vagina. Good hemostasis was observed. The patient tolerated the procedure well. There were no complications. She  was awakened from anesthesia, transferred to the recovery room in stable  condition. Sponge lap and needle counts were correct x2.       Estimated Blood Loss: None    Complications: None    Specimens:   ID Type Source Tests Collected by Time Destination   A : endometrial Tissue Uterus SURGICAL PATHOLOGY Lewanda Lesch, MD 1/29/2021 [facility] Enoc London MD  01/29/21  8:50 AM

## 2021-01-29 NOTE — H&P
Homero Reilly (:  1965) is a 50 y.o. female, here for a preop visit. She has yung having heavy, irregular bleeding for the past year. An TVUS was done and it showed   Transvaginal pelvic ultrasound was performed in the transverse and   longitudinal projections. Uterus measures 8.6 x 5 x 5.2 cm in size. The endometrium measures 12   mm in double AP thickness. Several nabothian cysts are seen in the cervix, which is otherwise   unremarkable. The right ovary measures 2.2 x 2.9 x 1.7 cm in size with normal color   flow. The left ovary measures 2.9 x 2.1 x 2.7 cm in size and contains   a dominant follicle measuring 1.9 cm in size. Normal color flow is   demonstrated.      EMB was obtained in 10/2020 and it was negative.          Patient Active Problem List   Diagnosis    DUB (dysfunctional uterine bleeding)    Dyspareunia    Anemia    Menorrhagia with irregular cycle    BRBPR (bright red blood per rectum)    Rectal pain    Rectal burning    Rectal itching    Hematemesis with nausea    Chronic nausea    S/P laparoscopic sleeve gastrectomy    Chronic diarrhea    Pelvic pain    Chronic heartburn         Review of Systems   Constitutional: Negative. HENT: Negative. Eyes: Negative. Respiratory: Negative. Cardiovascular: Negative. Gastrointestinal: Negative. Genitourinary: Negative for difficulty urinating, dyspareunia, dysuria, enuresis, frequency, hematuria, menstrual problem, pelvic pain, urgency and vaginal discharge. Musculoskeletal: Negative. Skin: Negative. Neurological: Negative. Psychiatric/Behavioral: Negative.                Prior to Visit Medications    Medication Sig Taking?  Authorizing Provider   megestrol (MEGACE) 40 MG tablet Take 1 tablet by mouth daily   Karyle Mate, APRN - CNP   NONFORMULARY Indications: iv    Historical Provider, MD   Cholecalciferol (VITAMIN D3) 50 MCG (2000) CAPS Take by mouth daily   Historical Provider, MD   ferrous sulfate (IRON 325) 325 (65 Fe) MG tablet Take 325 mg by mouth daily (with breakfast)   Historical Provider, MD   Omega-3 Fatty Acids (FISH OIL) 1200 MG CPDR Take by mouth daily   Historical Provider, MD   vitamin C (ASCORBIC ACID) 500 MG tablet Take 1,000 mg by mouth daily   Historical Provider, MD   lisinopril-hydroCHLOROthiazide (PRINZIDE;ZESTORETIC) 20-12.5 MG per tablet     Historical Provider, MD   meclizine (ANTIVERT) 25 MG tablet Take 25 mg by mouth 3 times daily as needed for Dizziness or Nausea   Historical Provider, MD   diclofenac sodium (VOLTAREN) 1 % GEL     Historical Provider, MD   hydrocortisone (ANUSOL-HC) 2.5 % rectal cream Apply rectally 1-2 times daily (after a BM) for 5 days during hemorrhoid flare.   PELON Rose   gabapentin (NEURONTIN) 100 MG capsule Take 100 mg by mouth as needed.    Historical Provider, MD   ondansetron (ZOFRAN) 8 MG tablet Take 8 mg by mouth every 8 hours as needed for Nausea or Vomiting   Historical Provider, MD   albuterol (PROVENTIL) (2.5 MG/3ML) 0.083% nebulizer solution Take 2.5 mg by nebulization every 4 hours as needed    Historical Provider, MD   ALPRAZolam (XANAX) 1 MG tablet Take 1 mg by mouth nightly as needed    Historical Provider, MD   cyanocobalamin 1000 MCG/ML injection Inject 1,000 mcg into the muscle every 7 days    Historical Provider, MD   furosemide (LASIX) 40 MG tablet Take 40 mg by mouth as needed    Historical Provider, MD   HYDROcodone-acetaminophen (NORCO) 7.5-325 MG per tablet Take 1 tablet by mouth every 6 hours as needed  .   Historical Provider, MD   levothyroxine (SYNTHROID) 100 MCG tablet Take 100 mcg by mouth daily    Historical Provider, MD   loratadine (CLARITIN) 10 MG tablet Take 10 mg by mouth daily    Historical Provider, MD   omeprazole (PRILOSEC) 20 MG capsule Take 40 mg by mouth Daily    Historical Provider, MD   polyethylene glycol (GLYCOLAX) powder Take 17 g by mouth daily    Historical Provider, MD   pravastatin (PRAVACHOL) 40 kg)   SpO2 98%   BMI 53.92 kg/m²     Constitutional:       Appearance: She is well-developed. HENT:      Head: Normocephalic and atraumatic. Right Ear: Hearing normal.      Left Ear: Hearing normal.      Nose: Nose normal.   Eyes:      General: Lids are normal.      Conjunctiva/sclera: Conjunctivae normal.      Pupils: Pupils are equal, round, and reactive to light. Neck:      Musculoskeletal: Normal range of motion and neck supple. Cardiovascular:      Rate and Rhythm: Normal rate and regular rhythm. Pulmonary:      Effort: Pulmonary effort is normal.      Breath sounds: Normal breath sounds. Abdominal:      General: There is no distension. Palpations: Abdomen is soft. Tenderness: There is no abdominal tenderness. Musculoskeletal: Normal range of motion. Comments: Normal ROM in all four extremities; normal gait   Skin:     General: Skin is warm and dry. Neurological:      Mental Status: She is alert and oriented to person, place, and time. Psychiatric:         Behavior: Behavior normal.            No flowsheet data found.           Lab Results   Component Value Date     GLUCOSE 95 06/19/2011              Health Maintenance   Topic Date Due    Hepatitis C screen  1965    Lipid screen  07/13/1977    HIV screen  07/13/1982    DTaP/Tdap/Td vaccine (1 - Tdap) 07/13/1986    Diabetes screen  07/13/2007    Potassium monitoring  06/19/2012    Creatinine monitoring  06/19/2012    Breast cancer screen  07/13/2017    Shingles Vaccine (1 of 2) 07/13/2017    Annual Wellness Visit (AWV)  06/23/2019    Colon cancer screen colonoscopy  09/11/2022    Cervical cancer screen  10/15/2025    Flu vaccine  Completed    Hepatitis A vaccine  Aged Out    Hepatitis B vaccine  Aged Out    Hib vaccine  Aged Out    Meningococcal (ACWY) vaccine  Aged Out    Pneumococcal 0-64 years Vaccine  Aged Out         ASSESSMENT/PLAN:  1.  Menorrhagia with irregular cycle  D&C, Hysteroscopy with Novasure Endometrial Ablation  Counseling was offered and accepted by patient. Discussed at length the risks, benefits and alternatives to surgery including medical management and no intervention. Pt is in agreement with surgery. Consents for surgery signed.  All questions answered and patient voiced understanding of above.

## 2021-01-29 NOTE — ANESTHESIA PRE PROCEDURE
Department of Anesthesiology  Preprocedure Note       Name:  Stiven Hess   Age:  50 y.o.  :  1965                                          MRN:  [id number]         Date:  2021      Surgeon: Pedro Titus):  Melisa Hernandez MD    Procedure: Procedure(s):  DILATATION AND CURETTAGE HYSTEROSCOPY NOVASURE ABLATION    Medications prior to admission:   Prior to Admission medications    Medication Sig Start Date End Date Taking? Authorizing Provider   Albuterol Sulfate (PROAIR HFA IN) Inhale 2 puffs into the lungs 4 times daily   Yes Historical Provider, MD   Cholecalciferol (VITAMIN D3) 50 MCG (2000) CAPS Take by mouth daily   Yes Historical Provider, MD   Omega-3 Fatty Acids (FISH OIL) 1200 MG CPDR Take 1,200 mg by mouth daily    Yes Historical Provider, MD   vitamin C (ASCORBIC ACID) 500 MG tablet Take 1,000 mg by mouth daily   Yes Historical Provider, MD   lisinopril-hydroCHLOROthiazide (PRINZIDE;ZESTORETIC) 20-12.5 MG per tablet Take 1 tablet by mouth daily  20  Yes Historical Provider, MD   ondansetron (ZOFRAN) 8 MG tablet Take 8 mg by mouth every 8 hours as needed for Nausea or Vomiting   Yes Historical Provider, MD   albuterol (PROVENTIL) (2.5 MG/3ML) 0.083% nebulizer solution Take 2.5 mg by nebulization every 4 hours as needed for Wheezing  4/9/15  Yes Historical Provider, MD   cyanocobalamin 1000 MCG/ML injection Inject 1,000 mcg into the muscle every 7 days Thursday 4/14/15  Yes Historical Provider, MD   furosemide (LASIX) 40 MG tablet Take 40 mg by mouth as needed  4/9/15  Yes Historical Provider, MD   HYDROcodone-acetaminophen (NORCO) 7.5-325 MG per tablet Take 1 tablet by mouth every 6 hours as needed for Pain (Dr. Nita Sheriff (pcp)).   4/9/15  Yes Historical Provider, MD   levothyroxine (SYNTHROID) 100 MCG tablet Take 100 mcg by mouth Daily  4/9/15  Yes Historical Provider, MD   loratadine (CLARITIN) 10 MG tablet Take 10 mg by mouth daily  4/9/15  Yes Historical Provider, MD   omeprazole (PRILOSEC) 20 MG capsule Take 40 mg by mouth Daily  4/9/15  Yes Historical Provider, MD   pravastatin (PRAVACHOL) 40 MG tablet Take 40 mg by mouth daily  4/9/15  Yes Historical Provider, MD   timolol (TIMOPTIC) 0.5 % ophthalmic solution Place into both eyes 2 times daily  4/9/15  Yes Historical Provider, MD   promethazine (PHENERGAN) 6.25 MG/5ML syrup Take 2.5 mg/mL by mouth 4 times daily as needed for Nausea    Historical Provider, MD   calcium carbonate (OSCAL) 500 MG TABS tablet Take 500 mg by mouth daily    Historical Provider, MD   megestrol (MEGACE) 40 MG tablet Take 1 tablet by mouth daily 10/27/20   PELON Ruiz - CNP   NONFORMULARY Indications: iv     Historical Provider, MD   meclizine (ANTIVERT) 25 MG tablet Take 25 mg by mouth 3 times daily as needed for Dizziness or Nausea    Historical Provider, MD   diclofenac sodium (VOLTAREN) 1 % GEL  8/20/20   Historical Provider, MD   hydrocortisone (ANUSOL-HC) 2.5 % rectal cream Apply rectally 1-2 times daily (after a BM) for 5 days during hemorrhoid flare. 3/8/17   PELON Espinoza   ALPRAZolam Zully Tremanie) 1 MG tablet Take 1 mg by mouth nightly as needed for Sleep. 4/9/15   Historical Provider, MD   polyethylene glycol (GLYCOLAX) powder Take 17 g by mouth daily  4/9/15   Historical Provider, MD ÁLVAREZ 3CC LUER-SHO SYR 25GX1\" 25G X 1\" 3 ML MISC  4/14/15   Historical Provider, MD       Current medications:    Current Facility-Administered Medications   Medication Dose Route Frequency Provider Last Rate Last Admin    lactated ringers infusion   Intravenous Continuous Brittaney Glaser MD        ceFAZolin (ANCEF) 1,000 mg in sterile water 10 mL IV syringe  1,000 mg Intravenous Once Tiffanie Brown MD           Allergies:     Allergies   Allergen Reactions    Latex Rash    Penicillins Itching and Other (See Comments)     WHEN I WAS A CHILD, I DON'T KNOW    Codeine Itching     \"I DONT REMEMBER\"    Tape Simmie Abdullahi Tape] Rash       Problem List:    Patient Active Problem List   Diagnosis Code    DUB (dysfunctional uterine bleeding) N93.8    Dyspareunia [id number]    Anemia D64.9    Menorrhagia with irregular cycle N92.1    BRBPR (bright red blood per rectum) K62.5    Rectal pain K62.89    Rectal burning R20.8    Rectal itching L29.0    Hematemesis with nausea K92.0    Chronic nausea R11.0    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 Melissa Redd, [place] L4, L5    CARPAL TUNNEL RELEASE Right     CHOLECYSTECTOMY      COLONOSCOPY  2007    Dr Sukh Bowen COLONOSCOPY N/A 2020    Dr Costa Stover yr recall    [place] COLONOSCOPY FLX DX W/COLLJ SPEC WHEN PFRMD N/A 2017    Dr Serge Babb, actively inflamed internal hemorrhoids, residulal liquid and some solid food particles in the colon-5 yr recall due to prep    STOMACH SURGERY  2014    Dr. Mary Ash ( gastric sleeve)    UPPER GASTROINTESTINAL ENDOSCOPY  10/30/2012    Dr Cordell Ann esophagitis, gastritis, Iliana (-)    UPPER GASTROINTESTINAL ENDOSCOPY  2008    Dr Kelechi Salazar esophagitis    UPPER GASTROINTESTINAL ENDOSCOPY N/A 2020    Dr Abdias Orlando       Social History:    Social History     Tobacco Use    Smoking status: Former Smoker     Packs/day: 3.00     Years: 26.00     Pack years: 78.00     Types: Cigarettes     Quit date: 2011     Years since quittin.7    Smokeless tobacco: Never Used   Substance Use Topics    Alcohol use:  No Counseling given: Not Answered      Vital Signs (Current):   Vitals:    01/29/21 0623 01/29/21 0644   BP:  (!) 158/102   Pulse:  106   Resp:  18   Temp:  98 °F (36.7 °C)   TempSrc:  Temporal   SpO2:  98%   Weight: (!) 324 lb (147 kg)    Height: 5' 5\" (1.651 m)                                               BP Readings from Last 3 Encounters:   01/29/21 (!) 158/102   01/22/21 (!) 152/88   10/27/20 (!) 146/91       NPO Status: Time of last liquid consumption: 0000                        Time of last solid consumption: 0000                        Date of last liquid consumption: 01/28/21                        Date of last solid food consumption: 01/28/21    BMI:   Wt Readings from Last 3 Encounters:   01/29/21 (!) 324 lb (147 kg)   01/22/21 (!) 324 lb (147 kg)   10/27/20 297 lb (134.7 kg)     Body mass index is 53.92 kg/m². CBC:   Lab Results   Component Value Date    WBC 6.0 01/25/2021    RBC 5.17 01/25/2021    HGB 15.2 01/25/2021    HCT 48.2 01/25/2021    HCT 42.2 06/19/2011    MCV 93.2 01/25/2021    RDW 14.8 01/25/2021     01/25/2021     06/19/2011       CMP:   Lab Results   Component Value Date     01/25/2021     06/19/2011    K 4.6 01/25/2021    K 3.9 06/19/2011     01/25/2021     06/19/2011    CO2 26 01/25/2021    BUN 13 01/25/2021    CREATININE 0.6 01/25/2021    CREATININE 0.9 06/19/2011    GFRAA >59 01/25/2021    LABGLOM >60 01/25/2021    GLUCOSE 85 01/25/2021    PROT 7.1 06/19/2011    CALCIUM 9.1 01/25/2021    ALKPHOS 123 06/19/2011    AST 31 06/19/2011    ALT 35 06/19/2011       POC Tests: No results for input(s): POCGLU, POCNA, POCK, POCCL, POCBUN, POCHEMO, POCHCT in the last 72 hours.     Coags: No results found for: PROTIME, INR, APTT    HCG (If Applicable):   Lab Results   Component Value Date    PREGTESTUR Negative 01/29/2021        ABGs: No results found for: PHART, PO2ART, IAY2FZC, TAT2WRM, BEART, N7MWPCWN     Type & Screen (If Applicable):  No results found for: Jamarcus Chaves    Drug/Infectious Status (If Applicable):  No results found for: HIV, HEPCAB    COVID-19 Screening (If Applicable):   Lab Results   Component Value Date    COVID19 Not Detected 01/25/2021         Anesthesia Evaluation  Patient summary reviewed and Nursing notes reviewed   history of anesthetic complications: PONV. Airway: Mallampati: II  TM distance: >3 FB   Neck ROM: full  Mouth opening: > = 3 FB Dental: normal exam         Pulmonary:Negative Pulmonary ROS and normal exam  breath sounds clear to auscultation  (+) COPD:  sleep apnea:  asthma:     (-) shortness of breath and not a current smoker          Patient did not smoke on day of surgery. Cardiovascular:    (+) hypertension:,     (-) CAD,  angina and  CHF    NYHA Classification: I  ECG reviewed  Rhythm: regular  Rate: normal           Beta Blocker:  Not on Beta Blocker         Neuro/Psych:   Negative Neuro/Psych ROS     (-) seizures, CVA and depression/anxiety             ROS comment: gLAUCOMA  GI/Hepatic/Renal: Neg GI/Hepatic/Renal ROS  (+) morbid obesity     (-) hiatal hernia and GERD       Endo/Other: Negative Endo/Other ROS   (+) hyperthyroidism::., .          Pt had PAT visit. Abdominal:   (+) obese,     Abdomen: soft. Vascular:                                        Anesthesia Plan      general     ASA 3     (Due to high risk of ponv, will plan on a multimodal antiemetic regimen. (Scopalamine, emend, pepcid, zofran and perhaps decadron) unless contraindicated in this patient  )  Induction: intravenous. BIS  MIPS: Postoperative opioids intended and Prophylactic antiemetics administered. Anesthetic plan and risks discussed with patient. Use of blood products discussed with patient whom. Plan discussed with CRNA.     Attending anesthesiologist reviewed and agrees with Pre Eval content              Magdaleno Avila MD   1/29/2021

## 2021-02-01 ENCOUNTER — TELEPHONE (OUTPATIENT)
Dept: OBGYN CLINIC | Age: 56
End: 2021-02-01

## 2021-02-01 NOTE — TELEPHONE ENCOUNTER
Pt called and c/o Abd pain/ bloating since ablation on 1/30. Pt cannot take Ibuprofen due to kidney disease. She is taking her norco for the pain. She is having regular bowel movements. No bleeding. explained that it will get better with time. Also informed pt of benign pathology. Pt VU. Pt will call if no improvement in symptoms.

## 2021-02-04 DIAGNOSIS — D50.9 IRON DEFICIENCY ANEMIA, UNSPECIFIED IRON DEFICIENCY ANEMIA TYPE: Primary | ICD-10-CM

## 2021-02-04 DIAGNOSIS — N92.1 MENORRHAGIA WITH IRREGULAR CYCLE: ICD-10-CM

## 2021-02-10 ENCOUNTER — LAB (OUTPATIENT)
Dept: LAB | Facility: HOSPITAL | Age: 56
End: 2021-02-10

## 2021-02-10 ENCOUNTER — OFFICE VISIT (OUTPATIENT)
Dept: ONCOLOGY | Facility: CLINIC | Age: 56
End: 2021-02-10

## 2021-02-10 VITALS
RESPIRATION RATE: 16 BRPM | HEIGHT: 66 IN | DIASTOLIC BLOOD PRESSURE: 78 MMHG | OXYGEN SATURATION: 98 % | SYSTOLIC BLOOD PRESSURE: 138 MMHG | BODY MASS INDEX: 47.09 KG/M2 | WEIGHT: 293 LBS | TEMPERATURE: 97.6 F | HEART RATE: 68 BPM

## 2021-02-10 DIAGNOSIS — N92.1 MENORRHAGIA WITH IRREGULAR CYCLE: ICD-10-CM

## 2021-02-10 DIAGNOSIS — D50.9 IRON DEFICIENCY ANEMIA, UNSPECIFIED IRON DEFICIENCY ANEMIA TYPE: Primary | ICD-10-CM

## 2021-02-10 LAB
ALBUMIN SERPL-MCNC: 3.8 G/DL (ref 3.5–5.2)
ALBUMIN/GLOB SERPL: 1.1 G/DL
ALP SERPL-CCNC: 131 U/L (ref 39–117)
ALT SERPL W P-5'-P-CCNC: 30 U/L (ref 1–33)
ANION GAP SERPL CALCULATED.3IONS-SCNC: 9 MMOL/L (ref 5–15)
AST SERPL-CCNC: 20 U/L (ref 1–32)
BASOPHILS # BLD AUTO: 0.04 10*3/MM3 (ref 0–0.2)
BASOPHILS NFR BLD AUTO: 0.6 % (ref 0–1.5)
BILIRUB SERPL-MCNC: 0.3 MG/DL (ref 0–1.2)
BUN SERPL-MCNC: 10 MG/DL (ref 6–20)
BUN/CREAT SERPL: 18.2 (ref 7–25)
CALCIUM SPEC-SCNC: 9 MG/DL (ref 8.6–10.5)
CHLORIDE SERPL-SCNC: 104 MMOL/L (ref 98–107)
CO2 SERPL-SCNC: 27 MMOL/L (ref 22–29)
CREAT SERPL-MCNC: 0.55 MG/DL (ref 0.57–1)
DEPRECATED RDW RBC AUTO: 41.1 FL (ref 37–54)
EOSINOPHIL # BLD AUTO: 0.2 10*3/MM3 (ref 0–0.4)
EOSINOPHIL NFR BLD AUTO: 2.8 % (ref 0.3–6.2)
ERYTHROCYTE [DISTWIDTH] IN BLOOD BY AUTOMATED COUNT: 12.9 % (ref 12.3–15.4)
FERRITIN SERPL-MCNC: 68.64 NG/ML (ref 13–150)
GFR SERPL CREATININE-BSD FRML MDRD: 116 ML/MIN/1.73
GLOBULIN UR ELPH-MCNC: 3.6 GM/DL
GLUCOSE SERPL-MCNC: 134 MG/DL (ref 65–99)
HCT VFR BLD AUTO: 45.1 % (ref 34–46.6)
HGB BLD-MCNC: 15.1 G/DL (ref 12–15.9)
HOLD SPECIMEN: NORMAL
IMM GRANULOCYTES # BLD AUTO: 0.05 10*3/MM3 (ref 0–0.05)
IMM GRANULOCYTES NFR BLD AUTO: 0.7 % (ref 0–0.5)
IRON 24H UR-MRATE: 117 MCG/DL (ref 37–145)
IRON SATN MFR SERPL: 29 % (ref 20–50)
LYMPHOCYTES # BLD AUTO: 1.69 10*3/MM3 (ref 0.7–3.1)
LYMPHOCYTES NFR BLD AUTO: 23.5 % (ref 19.6–45.3)
MCH RBC QN AUTO: 30 PG (ref 26.6–33)
MCHC RBC AUTO-ENTMCNC: 33.5 G/DL (ref 31.5–35.7)
MCV RBC AUTO: 89.7 FL (ref 79–97)
MONOCYTES # BLD AUTO: 0.69 10*3/MM3 (ref 0.1–0.9)
MONOCYTES NFR BLD AUTO: 9.6 % (ref 5–12)
NEUTROPHILS NFR BLD AUTO: 4.52 10*3/MM3 (ref 1.7–7)
NEUTROPHILS NFR BLD AUTO: 62.8 % (ref 42.7–76)
NRBC BLD AUTO-RTO: 0 /100 WBC (ref 0–0.2)
PLATELET # BLD AUTO: 262 10*3/MM3 (ref 140–450)
PMV BLD AUTO: 9.6 FL (ref 6–12)
POTASSIUM SERPL-SCNC: 4.1 MMOL/L (ref 3.5–5.2)
PROT SERPL-MCNC: 7.4 G/DL (ref 6–8.5)
RBC # BLD AUTO: 5.03 10*6/MM3 (ref 3.77–5.28)
SODIUM SERPL-SCNC: 140 MMOL/L (ref 136–145)
TIBC SERPL-MCNC: 401 MCG/DL (ref 298–536)
TRANSFERRIN SERPL-MCNC: 269 MG/DL (ref 200–360)
WBC # BLD AUTO: 7.19 10*3/MM3 (ref 3.4–10.8)

## 2021-02-10 PROCEDURE — 82728 ASSAY OF FERRITIN: CPT | Performed by: NURSE PRACTITIONER

## 2021-02-10 PROCEDURE — 99213 OFFICE O/P EST LOW 20 MIN: CPT | Performed by: NURSE PRACTITIONER

## 2021-02-10 PROCEDURE — 85025 COMPLETE CBC W/AUTO DIFF WBC: CPT

## 2021-02-10 PROCEDURE — 84466 ASSAY OF TRANSFERRIN: CPT

## 2021-02-10 PROCEDURE — 80053 COMPREHEN METABOLIC PANEL: CPT

## 2021-02-10 PROCEDURE — 83540 ASSAY OF IRON: CPT

## 2021-02-10 PROCEDURE — 36415 COLL VENOUS BLD VENIPUNCTURE: CPT

## 2021-02-10 NOTE — PROGRESS NOTES
[facility]  HEMATOLOGY & ONCOLOGY    [facility] HEMATOLOGY AND ONCOLOGY  [address]  [place] [address] [phone]    Patient Name: Vicki Agarwal  Encounter Date: 02/10/2021  YOB: 1965  Patient Number: [id number]    Chief Complaint   Patient presents with   • ADELFO     Here for f//u     HEMATOLOGIC HISTORY  Vicik Agarwal is a 56 yo female patient that was referred on 10/7/2020 for anemia .Mrs. Agarwal had lab work obtained by her primary care provider on 8/20/2020 that showed a hemoglobin of 9.8, hematocrit 34.3, MCV 70.6 and MCHC of 28.6.  Her platelet count was slightly elevated at 471,000 and had a normal white count of 7.1.  She did have an iron saturation on August 27 of 20 that showed 4%.  This was in comparison to back in April 2019 when her hemoglobin was normal at 12.3 MCV was normal at 87.3.     Mrs. Agarwal underwent a colonoscopy on 9/11/2020 due to her anemia that was negative.  She also had an endoscopy on the same date that also was negative.  They found no evidence of bleeding.  She does have significant menstrual blood loss.  She states that she has a menstrual cycle that last anywhere from 12 to 23 days at a time.  She states this last month was the shortest one she has had in a long time and it was 9 days.  She states they are very heavy and lengthy.  She does have an appointment with a GYN on October 15.       She has attempted iron pills but they cause her significant amount of nausea.  She had a gastric sleeve procedure back in January 2014 and just does not tolerate things well she states.      The October 7, 2020 work-up results are as follows:  Additional work-up results on 10/7/2020 include:  ? reticulocyte counts 1.23%  ? Folate - >20  ? B12 level - 1330  ? Haptoglobin - 214  ? Ferritin - 7.30  ? Iron profile- sat 4%  ? Copper - 141  ? CBC with differential - hgb 10.8,  "mcv 73.2, wbc 6.26, plt 446,000  ? CMP - normal except nonfasting glucose of 124  ? TSH - 0.696, T4 1.06  · Other possible causes for anemia in this case include the following: Splenomegaly could also cause anemia.  CAT scan of the abdomen pelvis back in April 2019 found a normal spleen therefore doubt this is etiology as well.  Patient underwent Injectafer 10/14/20 and 10/21/20. Hgb and Iron studies normalized.         INTERVAL HISTORY  Vicki Agarwal is a 55 y.o. female here today in follow-up for her iron deficiency anemia.  She was seen initially on 10/7/2020.  Work-up at that time found a ferritin of only 7.30.  A CMP that was essentially unremarkable.  Iron saturation of 4%.  B12 was normal at 1330, folate greater than 20. Retake count 1.23%.  Haptoglobin 214.  Her hemoglobin at that time was 10.8 with an MCV of 73.2. Platelets were normal at 446,000 and white count of 6.26.  She had a copper value of 141.    She has been intolerant to oral iron therapy therefore she went on to receive Injectafer on 10/14/2020 and 10/21/2020.  Her labs significantly improved after the Injectafer.  Her hgb has now normalized to 15.1 and hematocrit of 45.1. Her ferritin had went up to 142 post injectafer but it is now at 68.64. Her iron saturation is normal at 29%.     She has had significant menstrual loss for which she recently underwent an endometrial ablation on January 29th. She has not had any bleeding since.  She has tolerated the procedure well except for some cramping.      She states today that her energy level has improved and overall she is feeling well.  She state that her arthritis is \"acting up\" in this weather but otherwise she is doing well.        PAST MEDICAL HISTORY:  ALLERGIES:  Allergies   Allergen Reactions   • Codeine Other (See Comments)     \"I DONT REMEMBER\"   • Penicillins Other (See Comments)     WHEN I WAS A CHILD, I DON'T KNOW   • Adhesive Tape Rash   • Latex Rash       CURRENT " MEDICATIONS:  Outpatient Encounter Medications as of 2/10/2021   Medication Sig Dispense Refill   • albuterol sulfate  (90 Base) MCG/ACT inhaler Inhale 2 puffs Every 4 (Four) Hours As Needed for Wheezing.     • ALPRAZolam (XANAX) 0.25 MG tablet Take 0.25 mg by mouth At Night As Needed for Sleep.     • HYDROcodone-acetaminophen (NORCO) 7.5-325 MG per tablet Take 1 tablet by mouth Every 6 (Six) Hours As Needed for Moderate Pain .       No facility-administered encounter medications on file as of 2/10/2021.        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          FAMILY HISTORY:  Family History   Problem Relation Age of Onset   • Breast cancer Neg Hx        SOCIAL HISTORY:  Social History     Socioeconomic History   • Marital status: Single     Spouse name: Not on file   • Number of children: Not on file   • Years of education: Not on file   • Highest education level: Not on file   Tobacco Use   • Smoking status: Former Smoker     Quit date: 6/10/2011     Years since quittin.6   Substance and Sexual Activity   • Alcohol use: No     Frequency: Never   • Drug use: No   • Sexual activity: Defer       REVIEW OF SYSTEMS:  Review of Systems   Constitutional: Negative for activity change, appetite change, chills, diaphoresis, fatigue, fever and unexpected weight loss.   HENT:  "Negative for ear pain, nosebleeds, sinus pressure, sore throat and voice change.    Eyes: Negative for blurred vision, double vision, pain and visual disturbance.   Respiratory: Negative for cough and shortness of breath.    Cardiovascular: Negative for chest pain, palpitations and leg swelling.   Gastrointestinal: Negative for abdominal pain, anal bleeding, blood in stool, constipation, diarrhea, nausea and vomiting.   Endocrine: Negative for heat intolerance, polydipsia and polyuria.   Genitourinary: Negative for dysuria, frequency, hematuria, urgency and urinary incontinence.   Musculoskeletal: Positive for arthralgias (mainly in the knees). Negative for myalgias.   Skin: Negative for rash and skin lesions.   Neurological: Negative for dizziness, weakness and headache.   Hematological: Negative for adenopathy. Does not bruise/bleed easily.   Psychiatric/Behavioral: Negative for dysphoric mood, sleep disturbance, suicidal ideas and depressed mood.       /78   Pulse 68   Temp 97.6 °F (36.4 °C) (Temporal)   Resp 16   Ht 166.4 cm (65.5\")   Wt (!) 148 kg (326 lb)   SpO2 98%   Breastfeeding No   BMI 53.42 kg/m²  Body surface area is 2.45 meters squared.  Pain Score    02/10/21 1030   PainSc: 0-No pain       Physical Exam:  Physical Exam  Vitals signs reviewed.   Constitutional:       Appearance: Normal appearance. She is well-developed.   HENT:      Head: Atraumatic.   Eyes:      General: No scleral icterus.     Pupils: Pupils are equal, round, and reactive to light.   Neck:      Musculoskeletal: Neck supple.      Trachea: Trachea normal.   Cardiovascular:      Rate and Rhythm: Normal rate and regular rhythm.      Heart sounds: No murmur. No friction rub. No gallop.    Pulmonary:      Effort: Pulmonary effort is normal.      Breath sounds: Normal breath sounds. No wheezing, rhonchi or rales.   Abdominal:      Palpations: Abdomen is soft.      Tenderness: There is no abdominal tenderness.   Neurological: "      Mental Status: She is alert and oriented to person, place, and time.      Sensory: No sensory deficit.   Psychiatric:         Judgment: Judgment normal.       Vicki Agarwal reports a pain score of 0.    Patient's Body mass index is 53.42 kg/m². BMI is above normal parameters. Recommendations include: defer to pcp.      LABS    Lab Results - Last 18 Months   Lab Units 02/10/21  1024 01/25/21  1010 11/13/20  0937 10/27/20  1415 10/07/20  1041   HEMOGLOBIN g/dL 15.1 15.2 13.2 12.4 10.8*   HEMATOCRIT % 45.1 48.2* 41.9 41.6 36.8   MCV fL 89.7 93.2 81.5 78.9* 73.2*   WBC 10*3/mm3 7.19 6.0 6.11 7.2 6.26   RDW % 12.9 14.8* 27.1* 27.9* 23.4*   MPV fL 9.6 11.1 9.7 10.2 9.2   PLATELETS 10*3/mm3 262 250 262 332 446   IMM GRAN % % 0.7*  --  0.2  --   --    NEUTROS ABS 10*3/mm3 4.52 2.6 3.59 4.1 3.54   LYMPHS ABS 10*3/mm3 1.69 2.2 1.68 2.0  --    MONOS ABS 10*3/mm3 0.69 0.80 0.66 0.80  --    EOS ABS 10*3/mm3 0.20 0.30 0.13 0.20 0.44*   BASOS ABS 10*3/mm3 0.04 0.10 0.04 0.10 0.06   IMMATURE GRANS (ABS) 10*3/mm3 0.05 0.0 0.01 0.0  --    NRBC /100 WBC 0.0  --  0.0  --   --    NEUTROPHIL % %  --   --   --   --  56.6   MONOCYTES % %  --   --   --   --  9.1   BASOPHIL % %  --   --   --   --  1.0   ATYP LYMPH % %  --   --   --   --  1.0   ANISOCYTOSIS   --   --   --  1+*  --    GIANT PLT   --   --   --   --  Slight/1+       Lab Results - Last 18 Months   Lab Units 01/25/21  1010 11/13/20  0937 10/07/20  1041   GLUCOSE mg/dL 85 124* 130*   SODIUM mmol/L 140 139 141   POTASSIUM mmol/L 4.6 3.8 4.3   TOTAL CO2 mmol/L 26  --   --    CO2 mmol/L  --  27.0 26.0   CHLORIDE mmol/L 104 104 106   ANION GAP mmol/L 10 8.0 9.0   CREATININE mg/dL 0.6 0.46* 0.63   BUN mg/dL 13 10 16   BUN / CREAT RATIO   --  21.7 25.4*   CALCIUM mg/dL 9.1 8.7 9.3   EGFR IF NONAFRICN AM  >60 142 99   ALK PHOS U/L  --  114 115   TOTAL PROTEIN g/dL  --  7.1 7.5   ALT (SGPT) U/L  --  17 17   AST (SGOT) U/L  --  18 21   BILIRUBIN mg/dL  --  0.3 0.2   ALBUMIN g/dL   --  4.00 4.40   GLOBULIN gm/dL  --  3.1 3.1       Lab Results - Last 18 Months   Lab Units 11/13/20  0937 10/27/20  1415 10/07/20  1041 08/27/20  1452   IRON mcg/dL 101  --  21* 18*   TIBC mcg/dL 365  --  516 456*   IRON SATURATION % 28  --  4* 4*   FERRITIN ng/mL 142.10  --  7.30*  --    TSH uIU/mL  --  0.696  --   --    FOLATE ng/mL  --   --  >20.00 >20.0     ASSESSMENT/PLANS  **Microcytic anemia likely secondary to iron deficiency from menorrhagia  · Iron deficiency secondary to menorrhagia based on history of the patient having a menstrual cycle that lasted typically 12 to 23 days and quite heavy.  The significant menstrual loss could definitely be the source of her iron deficiency.  Her initial iron saturation on 8/27/2020 was only 4% and ferritin of 7.30.  Hemoglobin was around 9.8, MCV 70.6 as mentioned above.  · Colonoscopy and endoscopy found no signs of bleeding  · She has normal renal function   · Normal liver function studies   · Normal bilirubin and with low MCV did not expect hemolysis   · CAT scan of the abdomen pelvis back in April 2019 found a normal spleen therefore doubt this is etiology as well.     - She received 2 doses of Injectafer 10/14/20 and 10/21/20.  Hgb responded well and normalized as well as her iron studies.  Her hgb is now up to 15.1, iron saturation 29% and ferritin of 68.64.    -She will not need further iron replacement currently but will continue to monitor   -Patient had an endometrial ablation on January 29th 2021 which should help resolve the ADELFO.     ** Infection status:   · No current issues   **Metabolic / Renal:   · No current issues   ** Endocrine:   · No current issues   ** Coagulation / VTE prophylaxis:   - no current issues  ** GI:   -no current issues  ** Pulmonary:   -Former smoker, quit in 2011   - no current issues  ** Cardiology:   --Hypertension controlled with medications.  Blood pressure today stable today at 138/78.  ** Skin / Rash:   · No current issues   **  :   --History of significant menorrhagia with very lengthy heavy periods.  Patient had endometrial ablation January 29th 2021. Patient has had no bleeding since.   --History or renal stone in May 2019 followed by urology, Dr Drew  ** Neurologic:   · No current issues   **Musculoskeletal  --Patient with increase in arthralgias today in knees  ** Psychosocial:   · No current issues   ** Pain control:   - no current issue     FOLLOW UP  Return in 16 weeks for follow up.   When she returns she will need a pre-office CBC, CMP, iron profile and ferritin     I spent 24 minutes caring for Vicki on this date of service. This time includes time spent by me in the following activities: preparing for the visit, reviewing tests, counseling and educating the patient/family/caregiver and documenting information in the medical record.     Rosalinda Freedman, APRN  02/10/2021  14:38 CST

## 2021-02-12 ENCOUNTER — TELEPHONE (OUTPATIENT)
Dept: ONCOLOGY | Facility: CLINIC | Age: 56
End: 2021-02-12

## 2021-02-12 RX ORDER — PRAVASTATIN SODIUM 40 MG
40 TABLET ORAL DAILY
COMMUNITY

## 2021-02-12 RX ORDER — ONDANSETRON HYDROCHLORIDE 8 MG/1
TABLET, FILM COATED ORAL EVERY 8 HOURS PRN
COMMUNITY

## 2021-02-12 RX ORDER — LISINOPRIL AND HYDROCHLOROTHIAZIDE 20; 12.5 MG/1; MG/1
1 TABLET ORAL DAILY
COMMUNITY
End: 2021-08-25 | Stop reason: ALTCHOICE

## 2021-02-12 RX ORDER — LORATADINE 10 MG/1
10 TABLET ORAL DAILY
COMMUNITY

## 2021-02-12 RX ORDER — LANOLIN ALCOHOL/MO/W.PET/CERES
1000 CREAM (GRAM) TOPICAL DAILY
COMMUNITY

## 2021-02-12 RX ORDER — PROMETHAZINE HYDROCHLORIDE 6.25 MG/5ML
SYRUP ORAL 4 TIMES DAILY PRN
COMMUNITY

## 2021-02-12 RX ORDER — FUROSEMIDE 40 MG/1
40 TABLET ORAL DAILY
COMMUNITY

## 2021-02-12 RX ORDER — OMEPRAZOLE 40 MG/1
40 CAPSULE, DELAYED RELEASE ORAL DAILY
COMMUNITY

## 2021-02-12 RX ORDER — LEVOTHYROXINE SODIUM 0.1 MG/1
100 TABLET ORAL DAILY
COMMUNITY

## 2021-02-12 RX ORDER — TIMOLOL MALEATE 5 MG/ML
1 SOLUTION/ DROPS OPHTHALMIC 2 TIMES DAILY
COMMUNITY

## 2021-02-12 NOTE — TELEPHONE ENCOUNTER
NOTIFIED PT SHE V/U      ----- Message from DC Elias sent at 2/10/2021  2:43 PM CST -----  Please let Vicki know that her iron studies are normal

## 2021-04-21 ENCOUNTER — TELEPHONE (OUTPATIENT)
Dept: OBGYN CLINIC | Age: 56
End: 2021-04-21

## 2021-04-26 ENCOUNTER — OFFICE VISIT (OUTPATIENT)
Dept: OBGYN CLINIC | Age: 56
End: 2021-04-26
Payer: MEDICARE

## 2021-04-26 VITALS
HEART RATE: 87 BPM | DIASTOLIC BLOOD PRESSURE: 91 MMHG | HEIGHT: 65 IN | WEIGHT: 293 LBS | BODY MASS INDEX: 48.82 KG/M2 | SYSTOLIC BLOOD PRESSURE: 146 MMHG

## 2021-04-26 DIAGNOSIS — N89.8 VAGINAL ODOR: Primary | ICD-10-CM

## 2021-04-26 DIAGNOSIS — N89.8 VAGINAL DISCHARGE: ICD-10-CM

## 2021-04-26 PROCEDURE — G8427 DOCREV CUR MEDS BY ELIG CLIN: HCPCS | Performed by: NURSE PRACTITIONER

## 2021-04-26 PROCEDURE — 99213 OFFICE O/P EST LOW 20 MIN: CPT | Performed by: NURSE PRACTITIONER

## 2021-04-26 PROCEDURE — 1036F TOBACCO NON-USER: CPT | Performed by: NURSE PRACTITIONER

## 2021-04-26 PROCEDURE — G8417 CALC BMI ABV UP PARAM F/U: HCPCS | Performed by: NURSE PRACTITIONER

## 2021-04-26 PROCEDURE — 3017F COLORECTAL CA SCREEN DOC REV: CPT | Performed by: NURSE PRACTITIONER

## 2021-04-26 RX ORDER — METRONIDAZOLE 500 MG/1
500 TABLET ORAL 2 TIMES DAILY
Qty: 14 TABLET | Refills: 0 | Status: SHIPPED | OUTPATIENT
Start: 2021-04-26 | End: 2021-05-03

## 2021-04-26 ASSESSMENT — ENCOUNTER SYMPTOMS
DIARRHEA: 0
ALLERGIC/IMMUNOLOGIC NEGATIVE: 1
EYES NEGATIVE: 1
CONSTIPATION: 0
GASTROINTESTINAL NEGATIVE: 1
RESPIRATORY NEGATIVE: 1

## 2021-04-26 NOTE — PROGRESS NOTES
Cancer Maternal Grandmother     Colon Cancer Maternal Grandfather     Liver Cancer Neg Hx     Liver Disease Neg Hx     Stomach Cancer Neg Hx     Rectal Cancer Neg Hx     Esophageal Cancer Neg Hx     Colon Polyps Neg Hx      Social History     Tobacco Use    Smoking status: Former Smoker     Packs/day: 3.00     Years: 26.00     Pack years: 78.00     Types: Cigarettes     Quit date: 2011     Years since quittin.9    Smokeless tobacco: Never Used   Substance Use Topics    Alcohol use: No       Current Outpatient Medications   Medication Sig Dispense Refill    metroNIDAZOLE (FLAGYL) 500 MG tablet Take 1 tablet by mouth 2 times daily for 7 days 14 tablet 0    Albuterol Sulfate (PROAIR HFA IN) Inhale 2 puffs into the lungs 4 times daily      promethazine (PHENERGAN) 6.25 MG/5ML syrup Take 2.5 mg/mL by mouth 4 times daily as needed for Nausea      calcium carbonate (OSCAL) 500 MG TABS tablet Take 500 mg by mouth daily      NONFORMULARY Indications: iv       Cholecalciferol (VITAMIN D3) 50 MCG (2000 UT) CAPS Take by mouth daily      Omega-3 Fatty Acids (FISH OIL) 1200 MG CPDR Take 1,200 mg by mouth daily       vitamin C (ASCORBIC ACID) 500 MG tablet Take 1,000 mg by mouth daily      meclizine (ANTIVERT) 25 MG tablet Take 25 mg by mouth 3 times daily as needed for Dizziness or Nausea      diclofenac sodium (VOLTAREN) 1 % GEL       hydrocortisone (ANUSOL-HC) 2.5 % rectal cream Apply rectally 1-2 times daily (after a BM) for 5 days during hemorrhoid flare. 30 g 1    ondansetron (ZOFRAN) 8 MG tablet Take 8 mg by mouth every 8 hours as needed for Nausea or Vomiting      albuterol (PROVENTIL) (2.5 MG/3ML) 0.083% nebulizer solution Take 2.5 mg by nebulization every 4 hours as needed for Wheezing       ALPRAZolam (XANAX) 1 MG tablet Take 1 mg by mouth nightly as needed for Sleep.        cyanocobalamin 1000 MCG/ML injection Inject 1,000 mcg into the muscle every 7 days Thursday      furosemide (LASIX) 40 MG tablet Take 40 mg by mouth as needed       HYDROcodone-acetaminophen (NORCO) 7.5-325 MG per tablet Take 1 tablet by mouth every 6 hours as needed for Pain (Dr. Pradeep Tamayo (pcp)).  levothyroxine (SYNTHROID) 100 MCG tablet Take 100 mcg by mouth Daily       loratadine (CLARITIN) 10 MG tablet Take 10 mg by mouth daily       omeprazole (PRILOSEC) 20 MG capsule Take 40 mg by mouth Daily       polyethylene glycol (GLYCOLAX) powder Take 17 g by mouth daily       pravastatin (PRAVACHOL) 40 MG tablet Take 40 mg by mouth daily       B-D 3CC LUER-SHO SYR 25GX1\" 25G X 1\" 3 ML MISC       timolol (TIMOPTIC) 0.5 % ophthalmic solution Place into both eyes 2 times daily       lisinopril-hydroCHLOROthiazide (PRINZIDE;ZESTORETIC) 20-12.5 MG per tablet Take 1 tablet by mouth daily        No current facility-administered medications for this visit. Allergies   Allergen Reactions    Latex Rash    Penicillins Itching and Other (See Comments)     WHEN I WAS A CHILD, I DON'T KNOW    Codeine Itching     \"I DONT REMEMBER\"    Tape [Adhesive Tape] Rash     Vitals:    04/26/21 1117   BP: (!) 146/91   Pulse: 87     Body mass index is 55.91 kg/m². Review of Systems   Constitutional: Negative. HENT: Negative. Eyes: Negative. Respiratory: Negative. Cardiovascular: Negative. Gastrointestinal: Negative. Negative for constipation and diarrhea. Endocrine: Negative. Genitourinary: Positive for vaginal discharge. Negative for frequency, menstrual problem and urgency. Musculoskeletal: Negative. Skin: Negative. Allergic/Immunologic: Negative. Neurological: Negative. Hematological: Negative. Psychiatric/Behavioral: Negative. All other systems reviewed and are negative. Physical Exam  Vitals signs and nursing note reviewed. Constitutional:       Appearance: She is well-developed. HENT:      Head: Normocephalic.       Right Ear: External ear normal.      Left Ear: External With treatment, bacterial vaginosis usually clears up in 5 to 7 days. Follow-up care is a key part of your treatment and safety. Be sure to make and go to all appointments, and call your doctor if you are having problems. It's also a good idea to know your test results and keep a list of the medicines you take. How can you care for yourself at home? · Take your antibiotics as directed. Do not stop taking them just because you feel better. You need to take the full course of antibiotics. · Do not eat or drink anything that contains alcohol if you are taking metronidazole or tinidazole. · Keep using your medicine if you start your period. Use pads instead of tampons while using a vaginal cream or suppository. Tampons can absorb the medicine. · Wear loose cotton clothing. Do not wear nylon and other materials that hold body heat and moisture close to the skin. · Do not scratch. Relieve itching with a cold pack or a cool bath. · Do not wash your vaginal area more than once a day. Use plain water or a mild, unscented soap. Do not douche. When should you call for help? Watch closely for changes in your health, and be sure to contact your doctor if:    · You have unexpected vaginal bleeding.     · You have a fever.     · You have new or increased pain in your vagina or pelvis.     · You are not getting better after 1 week.     · Your symptoms return after you finish the course of your medicine. Where can you learn more? Go to https://W[employer] IntelligencepeSETVI.[employer]. org and sign in to your [employer] account. Enter R340 in the [facility] box to learn more about \"Bacterial Vaginosis: Care Instructions. \"     If you do not have an account, please click on the \"Sign Up Now\" link. Current as of: July 17, 2020               Content Version: 12.8  © 2006-2021 Healthwise, Inc[employer]. Care instructions adapted under license by [facility] [employer] [facility] ([facility]).  If you have questions about a medical condition or this instruction, always ask your healthcare professional. [place] [address] any warranty or liability for your use of this information.

## 2021-04-26 NOTE — PROGRESS NOTES
Pt states she has bad vaginal odor and brown discharge. She had an ablation and D&C done on 01/29/2021 and this has been going on since then. She has bad cramps breast get sore, and has diarrhea every month still goes through symptoms like she is going to have her period. Has to wear a pad every day and has been since the surgery. Her PCP changed her b/p medication and has not started the new one yet.

## 2021-04-26 NOTE — PATIENT INSTRUCTIONS
Patient Education        Bacterial Vaginosis: Care Instructions  Overview     Bacterial vaginosis is a type of vaginal infection. It is caused by excess growth of certain bacteria that are normally found in the vagina. Symptoms can include itching, swelling, pain when you urinate or have sex, and a gray or yellow discharge with a \"fishy\" odor. It is not considered an infection that is spread through sexual contact. Symptoms can be annoying and uncomfortable. But bacterial vaginosis does not usually cause other health problems. However, if you have it while you are pregnant, it can cause complications. While the infection may go away on its own, most doctors use antibiotics to treat it. You may have been prescribed pills or vaginal cream. With treatment, bacterial vaginosis usually clears up in 5 to 7 days. Follow-up care is a key part of your treatment and safety. Be sure to make and go to all appointments, and call your doctor if you are having problems. It's also a good idea to know your test results and keep a list of the medicines you take. How can you care for yourself at home? · Take your antibiotics as directed. Do not stop taking them just because you feel better. You need to take the full course of antibiotics. · Do not eat or drink anything that contains alcohol if you are taking metronidazole or tinidazole. · Keep using your medicine if you start your period. Use pads instead of tampons while using a vaginal cream or suppository. Tampons can absorb the medicine. · Wear loose cotton clothing. Do not wear nylon and other materials that hold body heat and moisture close to the skin. · Do not scratch. Relieve itching with a cold pack or a cool bath. · Do not wash your vaginal area more than once a day. Use plain water or a mild, unscented soap. Do not douche. When should you call for help?   Watch closely for changes in your health, and be sure to contact your doctor if:    · You have unexpected vaginal bleeding.     · You have a fever.     · You have new or increased pain in your vagina or pelvis.     · You are not getting better after 1 week.     · Your symptoms return after you finish the course of your medicine. Where can you learn more? Go to https://chsofiaeb.healthvitalclip. org and sign in to your [employer] account. Enter T367 in the [employer] box to learn more about \"Bacterial Vaginosis: Care Instructions. \"     If you do not have an account, please click on the \"Sign Up Now\" link. Current as of: July 17, 2020               Content Version: 12.8  © 6484-9229 Healthwise, [employer]. Care instructions adapted under license by [facility] ([facility]). If you have questions about a medical condition or this instruction, always ask your healthcare professional. [address] any warranty or liability for your use of this information.

## 2021-05-03 ENCOUNTER — TELEPHONE (OUTPATIENT)
Dept: OBGYN CLINIC | Age: 56
End: 2021-05-03

## 2021-05-03 RX ORDER — FLUCONAZOLE 150 MG/1
150 TABLET ORAL
Qty: 2 TABLET | Refills: 0 | Status: SHIPPED | OUTPATIENT
Start: 2021-05-03 | End: 2021-05-09

## 2021-05-03 NOTE — TELEPHONE ENCOUNTER
No mychart. She has BV on her swab, but was given treatment in office so hopefully she's feeling better!  Thanks ML

## 2021-05-03 NOTE — TELEPHONE ENCOUNTER
BV, already started treatment. Pt states she has 2 days left but continues to have odor. Rx sent in for Diflucan since patient gets yeast infections after antibiotics. Pt continues to have brown discharge and odor. She is to call back if it continues after antibiotic. Pt states this has been going on since surgery in January.

## 2021-06-11 DIAGNOSIS — D50.9 IRON DEFICIENCY ANEMIA, UNSPECIFIED IRON DEFICIENCY ANEMIA TYPE: Primary | ICD-10-CM

## 2021-06-16 ENCOUNTER — TELEPHONE (OUTPATIENT)
Dept: ONCOLOGY | Facility: CLINIC | Age: 56
End: 2021-06-16

## 2021-06-16 NOTE — TELEPHONE ENCOUNTER
Caller: PAULA    Relationship to patient: SELF    Best call back number: [phone]    Chief complaint: RESCHEDULE APPT    Type of visit: LAB AND F/U    Requested date: PREFER NEXT WEEK ON Wednesday PREFER AM APPT 11AM OR 11:30 AM  OR 1 OR 1:30PM    If rescheduling, when is the original appointment: 6/16    Additional notes: RESCHEDULE APPT

## 2021-06-30 ENCOUNTER — LAB (OUTPATIENT)
Dept: LAB | Facility: HOSPITAL | Age: 56
End: 2021-06-30

## 2021-06-30 ENCOUNTER — OFFICE VISIT (OUTPATIENT)
Dept: ONCOLOGY | Facility: CLINIC | Age: 56
End: 2021-06-30

## 2021-06-30 VITALS
BODY MASS INDEX: 47.09 KG/M2 | OXYGEN SATURATION: 97 % | HEART RATE: 96 BPM | TEMPERATURE: 97 F | HEIGHT: 66 IN | WEIGHT: 293 LBS | DIASTOLIC BLOOD PRESSURE: 98 MMHG | RESPIRATION RATE: 16 BRPM | SYSTOLIC BLOOD PRESSURE: 172 MMHG

## 2021-06-30 DIAGNOSIS — D50.9 IRON DEFICIENCY ANEMIA, UNSPECIFIED IRON DEFICIENCY ANEMIA TYPE: ICD-10-CM

## 2021-06-30 DIAGNOSIS — I10 ESSENTIAL HYPERTENSION: Primary | ICD-10-CM

## 2021-06-30 LAB
ALBUMIN SERPL-MCNC: 3.8 G/DL (ref 3.5–5.2)
ALBUMIN/GLOB SERPL: 1.2 G/DL
ALP SERPL-CCNC: 127 U/L (ref 39–117)
ALT SERPL W P-5'-P-CCNC: 28 U/L (ref 1–33)
ANION GAP SERPL CALCULATED.3IONS-SCNC: 10 MMOL/L (ref 5–15)
AST SERPL-CCNC: 25 U/L (ref 1–32)
BASOPHILS # BLD AUTO: 0.05 10*3/MM3 (ref 0–0.2)
BASOPHILS NFR BLD AUTO: 0.8 % (ref 0–1.5)
BILIRUB SERPL-MCNC: 0.2 MG/DL (ref 0–1.2)
BUN SERPL-MCNC: 10 MG/DL (ref 6–20)
BUN/CREAT SERPL: 18.2 (ref 7–25)
CALCIUM SPEC-SCNC: 9.1 MG/DL (ref 8.6–10.5)
CHLORIDE SERPL-SCNC: 104 MMOL/L (ref 98–107)
CO2 SERPL-SCNC: 25 MMOL/L (ref 22–29)
CREAT SERPL-MCNC: 0.55 MG/DL (ref 0.57–1)
DEPRECATED RDW RBC AUTO: 41.6 FL (ref 37–54)
EOSINOPHIL # BLD AUTO: 0.15 10*3/MM3 (ref 0–0.4)
EOSINOPHIL NFR BLD AUTO: 2.5 % (ref 0.3–6.2)
ERYTHROCYTE [DISTWIDTH] IN BLOOD BY AUTOMATED COUNT: 12.8 % (ref 12.3–15.4)
FERRITIN SERPL-MCNC: 73.19 NG/ML (ref 13–150)
GFR SERPL CREATININE-BSD FRML MDRD: 116 ML/MIN/1.73
GLOBULIN UR ELPH-MCNC: 3.3 GM/DL
GLUCOSE SERPL-MCNC: 221 MG/DL (ref 65–99)
HCT VFR BLD AUTO: 45.5 % (ref 34–46.6)
HGB BLD-MCNC: 15.2 G/DL (ref 12–15.9)
IMM GRANULOCYTES # BLD AUTO: 0.02 10*3/MM3 (ref 0–0.05)
IMM GRANULOCYTES NFR BLD AUTO: 0.3 % (ref 0–0.5)
IRON 24H UR-MRATE: 78 MCG/DL (ref 37–145)
IRON SATN MFR SERPL: 20 % (ref 20–50)
LYMPHOCYTES # BLD AUTO: 1.95 10*3/MM3 (ref 0.7–3.1)
LYMPHOCYTES NFR BLD AUTO: 32.2 % (ref 19.6–45.3)
MCH RBC QN AUTO: 30.2 PG (ref 26.6–33)
MCHC RBC AUTO-ENTMCNC: 33.4 G/DL (ref 31.5–35.7)
MCV RBC AUTO: 90.5 FL (ref 79–97)
MONOCYTES # BLD AUTO: 0.51 10*3/MM3 (ref 0.1–0.9)
MONOCYTES NFR BLD AUTO: 8.4 % (ref 5–12)
NEUTROPHILS NFR BLD AUTO: 3.37 10*3/MM3 (ref 1.7–7)
NEUTROPHILS NFR BLD AUTO: 55.8 % (ref 42.7–76)
NRBC BLD AUTO-RTO: 0 /100 WBC (ref 0–0.2)
PLATELET # BLD AUTO: 280 10*3/MM3 (ref 140–450)
PMV BLD AUTO: 10.1 FL (ref 6–12)
POTASSIUM SERPL-SCNC: 3.8 MMOL/L (ref 3.5–5.2)
PROT SERPL-MCNC: 7.1 G/DL (ref 6–8.5)
RBC # BLD AUTO: 5.03 10*6/MM3 (ref 3.77–5.28)
SODIUM SERPL-SCNC: 139 MMOL/L (ref 136–145)
TIBC SERPL-MCNC: 392 MCG/DL (ref 298–536)
TRANSFERRIN SERPL-MCNC: 263 MG/DL (ref 200–360)
WBC # BLD AUTO: 6.05 10*3/MM3 (ref 3.4–10.8)

## 2021-06-30 PROCEDURE — 85025 COMPLETE CBC W/AUTO DIFF WBC: CPT

## 2021-06-30 PROCEDURE — 99213 OFFICE O/P EST LOW 20 MIN: CPT | Performed by: NURSE PRACTITIONER

## 2021-06-30 PROCEDURE — 80053 COMPREHEN METABOLIC PANEL: CPT

## 2021-06-30 PROCEDURE — 82728 ASSAY OF FERRITIN: CPT

## 2021-06-30 PROCEDURE — 84466 ASSAY OF TRANSFERRIN: CPT

## 2021-06-30 PROCEDURE — 36415 COLL VENOUS BLD VENIPUNCTURE: CPT

## 2021-06-30 PROCEDURE — 83540 ASSAY OF IRON: CPT

## 2021-07-18 NOTE — PROGRESS NOTES
[facility]  HEMATOLOGY & ONCOLOGY    W ONC [facility] HEMATOLOGY AND ONCOLOGY  [address]  [place] [id number] [phone]    Patient Name: Vicki Agarwal  Encounter Date: 02/10/2021  YOB: 1965  Patient Number: [id number]    Chief Complaint   Patient presents with   • ADELFO     Here for f/u   • Edema     legs and feet   • Chest Pain     chest tightness, breathing issues     HEMATOLOGIC HISTORY  Vicki Agarwal is a 55 yo female patient that was referred on 10/7/2020 for anemia .Mrs. Agarwal had lab work obtained by her primary care provider on 8/20/2020 that showed a hemoglobin of 9.8, hematocrit 34.3, MCV 70.6 and MCHC of 28.6.  Her platelet count was slightly elevated at 471,000 and had a normal white count of 7.1.  She did have an iron saturation on August 27, 2020 that showed 4%.  This was in comparison to back in April 2019 when her hemoglobin was normal at 12.3 MCV was normal at 87.3.     Mrs. Agarwal underwent a colonoscopy on 9/11/2020 due to her anemia that was negative.  She also had an endoscopy on the same date that also was negative. They found no evidence of bleeding.  She did have significant menstrual blood loss. She states that she has a menstrual cycle that last anywhere from 12 to 23 days at a time.      She has attempted iron pills but they cause her significant amount of nausea.  She had a gastric sleeve procedure back in January 2014 and just does not tolerate things well she states.      The October 7, 2020 work-up results are as follows:  Additional work-up results on 10/7/2020 include:  ? reticulocyte counts 1.23%  ? Folate - >20  ? B12 level - 1330  ? Haptoglobin - 214  ? Ferritin - 7.30  ? Iron profile- sat 4%  ? Copper - 141  ? CBC with differential - hgb 10.8, mcv 73.2, wbc 6.26, plt 446,000  ? CMP - normal except nonfasting glucose of 124  ? TSH - 0.696, T4 1.06  · Other  "possible causes for anemia in this case include the following: Splenomegaly could also cause anemia.  CAT scan of the abdomen pelvis back in April 2019 found a normal spleen therefore doubt this is etiology as well.  Patient underwent Injectafer 10/14/20 and 10/21/20. Hgb and Iron studies normalized.         INTERVAL HISTORY  Vicki Agarwal is a 55 y.o. female here today in follow-up for her iron deficiency anemia.  She was seen initially on 10/7/2020.  Work-up at that time found a ferritin of only 7.30.  A CMP that was essentially unremarkable.  Iron saturation of 4%.  B12 was normal at 1330, folate greater than 20. Reti count was 1.23%.  Haptoglobin 214.  Her hemoglobin at that time was 10.8 with an MCV of 73.2. Platelets were normal at 446,000 and white count of 6.26.  She had a copper value of 141.    She has been intolerant to oral iron therapy therefore she went on to receive Injectafer on 10/14/2020 and 10/21/2020.  Her anemia resolved after the Injectafer.     Her iron deficiency was felt to be secondary to significant menstrual loss for which she underwent a D&C with endometrial ablation on January 29th, 2021. She has not had any bleeding since.      Her hgb has remained normal at 15.2 and hematocrit of 45.5. Her ferritin is stable at 73.19 and iron saturation is stable at 20%.       She states today that she still doesn't feel well.  She is fatigued and tired but feels like it is all secondary to her weight gain and other medical issues such as her chronic back pain.     PAST MEDICAL HISTORY:  ALLERGIES:  Allergies   Allergen Reactions   • Codeine Other (See Comments)     \"I DONT REMEMBER\"   • Penicillins Other (See Comments)     WHEN I WAS A CHILD, I DON'T KNOW   • Adhesive Tape Rash   • Latex Rash       CURRENT MEDICATIONS:  Outpatient Encounter Medications as of 6/30/2021   Medication Sig Dispense Refill   • albuterol sulfate  (90 Base) MCG/ACT inhaler Inhale 2 puffs Every 4 (Four) Hours As " Needed for Wheezing.     • ALPRAZolam (XANAX) 0.25 MG tablet Take 0.25 mg by mouth At Night As Needed for Sleep.     • furosemide (LASIX) 40 MG tablet Take 40 mg by mouth 2 (Two) Times a Day.     • HYDROcodone-acetaminophen (NORCO) 7.5-325 MG per tablet Take 1 tablet by mouth Every 6 (Six) Hours As Needed for Moderate Pain .     • levothyroxine (SYNTHROID, LEVOTHROID) 100 MCG tablet Take 100 mcg by mouth Daily.     • lisinopril-hydrochlorothiazide (PRINZIDE,ZESTORETIC) 20-12.5 MG per tablet Take 1 tablet by mouth Daily.     • loratadine (CLARITIN) 10 MG tablet Take 10 mg by mouth Daily.     • omeprazole (priLOSEC) 40 MG capsule Take 40 mg by mouth Daily.     • ondansetron (ZOFRAN) 8 MG tablet Take  by mouth Every 8 (Eight) Hours As Needed for Nausea or Vomiting.     • Polyethylene Glycol 3350 (CLEARLAX PO) Take  by mouth.     • pravastatin (PRAVACHOL) 40 MG tablet Take 40 mg by mouth Daily.     • promethazine (PHENERGAN) 6.25 MG/5ML syrup Take  by mouth 4 (Four) Times a Day As Needed for Nausea or Vomiting.     • timolol (TIMOPTIC) 0.5 % ophthalmic solution 1 drop 2 (Two) Times a Day.     • vitamin B-12 (CYANOCOBALAMIN) 1000 MCG tablet Take 1,000 mcg by mouth Daily.       No facility-administered encounter medications on file as of 6/30/2021.       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          Last Completed Mammogram          MAMMOGRAM (Every 2 Years) Next due on 10/14/2022    10/14/2020  Mammo Screening Digital Tomosynthesis Bilateral With CAD    01/11/2019  Mammo Screening Digital Tomosynthesis  "Bilateral With CAD    02/19/2016  Mammo screening bilateral w CAD                FAMILY HISTORY:  Family History   Problem Relation Age of Onset   • Breast cancer Neg Hx        SOCIAL HISTORY:  Social History     Socioeconomic History   • Marital status: Single     Spouse name: Not on file   • Number of children: Not on file   • Years of education: Not on file   • Highest education level: Not on file   Tobacco Use   • Smoking status: Former Smoker     Quit date: 6/10/2011     Years since quitting: 10.1   Substance and Sexual Activity   • Alcohol use: No   • Drug use: No   • Sexual activity: Defer       REVIEW OF SYSTEMS:  Review of Systems   Constitutional: Positive for fatigue. Negative for activity change, appetite change, chills, diaphoresis, fever and unexpected weight loss.   HENT: Negative for ear pain, nosebleeds, sinus pressure, sore throat and voice change.    Eyes: Negative for blurred vision, double vision, pain and visual disturbance.   Respiratory: Negative for cough and shortness of breath.    Cardiovascular: Negative for chest pain, palpitations and leg swelling.   Gastrointestinal: Negative for abdominal pain, anal bleeding, blood in stool, constipation, diarrhea, nausea and vomiting.   Genitourinary: Negative for dysuria, frequency, hematuria, urgency and urinary incontinence.   Musculoskeletal: Positive for arthralgias (mainly in the knees) and back pain. Negative for myalgias.   Skin: Negative for rash and skin lesions.   Neurological: Negative for dizziness, weakness and headache.   Hematological: Negative for adenopathy. Does not bruise/bleed easily.   Psychiatric/Behavioral: Negative for dysphoric mood, sleep disturbance, suicidal ideas and depressed mood.       /98   Pulse 96   Temp 97 °F (36.1 °C) (Temporal)   Resp 16   Ht 166.4 cm (65.5\")   Wt (!) 154 kg (339 lb 14.4 oz)   SpO2 97%   Breastfeeding No   BMI 55.70 kg/m²  Body surface area is 2.49 meters squared.  Pain Score    " 06/30/21 1525   PainSc: 10-Worst pain ever   PainLoc: Generalized       Physical Exam:  Physical Exam  Vitals reviewed.   Constitutional:       Appearance: Normal appearance. She is well-developed.   HENT:      Head: Atraumatic.   Eyes:      General: No scleral icterus.     Pupils: Pupils are equal, round, and reactive to light.   Neck:      Trachea: Trachea normal.   Cardiovascular:      Rate and Rhythm: Normal rate and regular rhythm.      Heart sounds: Normal heart sounds. No murmur heard.     Pulmonary:      Effort: Pulmonary effort is normal.      Breath sounds: Normal breath sounds. No wheezing, rhonchi or rales.   Abdominal:      Palpations: Abdomen is soft.      Tenderness: There is no abdominal tenderness.   Musculoskeletal:      Cervical back: Neck supple.   Skin:     General: Skin is warm and dry.   Neurological:      General: No focal deficit present.      Mental Status: She is alert and oriented to person, place, and time.   Psychiatric:         Mood and Affect: Mood normal.         Behavior: Behavior normal.       Vicki Agarwal reports a pain score of 10.  Advised patient to seek management for her pain with her pcp.   Patient's Body mass index is 55.7 kg/m². BMI is above normal parameters. Recommendations include: defer to pcp.      LABS    Lab Results - Last 18 Months   Lab Units 06/30/21  1506 02/10/21  1024 01/25/21  1010 11/13/20  0937 10/27/20  1415 10/07/20  1041   HEMOGLOBIN g/dL 15.2 15.1 15.2 13.2 12.4 10.8*   HEMATOCRIT % 45.5 45.1 48.2* 41.9 41.6 36.8   MCV fL 90.5 89.7 93.2 81.5 78.9* 73.2*   WBC 10*3/mm3 6.05 7.19 6.0 6.11 7.2 6.26   RDW % 12.8 12.9 14.8* 27.1* 27.9* 23.4*   MPV fL 10.1 9.6 11.1 9.7 10.2 9.2   PLATELETS 10*3/mm3 280 262 250 262 332 446   IMM GRAN % % 0.3 0.7*  --  0.2  --   --    NEUTROS ABS 10*3/mm3 3.37 4.52 2.6 3.59 4.1 3.54   LYMPHS ABS 10*3/mm3 1.95 1.69 2.2 1.68 2.0  --    MONOS ABS 10*3/mm3 0.51 0.69 0.80 0.66 0.80  --    EOS ABS 10*3/mm3 0.15 0.20 0.30 0.13  0.20 0.44*   BASOS ABS 10*3/mm3 0.05 0.04 0.10 0.04 0.10 0.06   IMMATURE GRANS (ABS) 10*3/mm3 0.02 0.05 0.0 0.01 0.0  --    NRBC /100 WBC 0.0 0.0  --  0.0  --   --    NEUTROPHIL % %  --   --   --   --   --  56.6   MONOCYTES % %  --   --   --   --   --  9.1   BASOPHIL % %  --   --   --   --   --  1.0   ATYP LYMPH % %  --   --   --   --   --  1.0   ANISOCYTOSIS   --   --   --   --  1+*  --    GIANT PLT   --   --   --   --   --  Slight/1+       Lab Results - Last 18 Months   Lab Units 06/30/21  1506 02/10/21  1024 01/25/21  1010 11/13/20  0937 10/07/20  1041   GLUCOSE mg/dL 221* 134* 85 124* 130*   SODIUM mmol/L 139 140 140 139 141   POTASSIUM mmol/L 3.8 4.1 4.6 3.8 4.3   TOTAL CO2 mmol/L  --   --  26  --   --    CO2 mmol/L 25.0 27.0  --  27.0 26.0   CHLORIDE mmol/L 104 104 104 104 106   ANION GAP mmol/L 10.0 9.0 10 8.0 9.0   CREATININE mg/dL 0.55* 0.55* 0.6 0.46* 0.63   BUN mg/dL 10 10 13 10 16   BUN / CREAT RATIO  18.2 18.2  --  21.7 25.4*   CALCIUM mg/dL 9.1 9.0 9.1 8.7 9.3   EGFR IF NONAFRICN AM mL/min/1.73 116 116 >60 142 99   ALK PHOS U/L 127* 131*  --  114 115   TOTAL PROTEIN g/dL 7.1 7.4  --  7.1 7.5   ALT (SGPT) U/L 28 30  --  17 17   AST (SGOT) U/L 25 20  --  18 21   BILIRUBIN mg/dL 0.2 0.3  --  0.3 0.2   ALBUMIN g/dL 3.80 3.80  --  4.00 4.40   GLOBULIN gm/dL 3.3 3.6  --  3.1 3.1       Lab Results - Last 18 Months   Lab Units 06/30/21  1506 02/10/21  1024 11/13/20  0937 10/27/20  1415 10/07/20  1041 08/27/20  1452   IRON mcg/dL 78 117 101  --  21* 18*   TIBC mcg/dL 392 401 365  --  516 456*   IRON SATURATION % 20 29 28  --  4* 4*   FERRITIN ng/mL 73.19 68.64 142.10  --  7.30*  --    TSH uIU/mL  --   --   --  0.696  --   --    FOLATE ng/mL  --   --   --   --  >20.00 >20.0     ASSESSMENT/PLANS  **Microcytic anemia likely secondary to iron deficiency from menorrhagia  · Iron deficiency secondary to menorrhagia based on history of the patient having a menstrual cycle that lasted typically 12 to 23 days and quite  heavy. The significant menstrual loss could definitely be the source of her iron deficiency.  Her initial iron saturation on 8/27/2020 was only 4% and ferritin of 7.30.  Hemoglobin was around 9.8, MCV 70.6 as mentioned above.  · Colonoscopy and endoscopy found no signs of bleeding  · She has normal renal function   · Normal liver function studies   · Normal bilirubin and with low MCV did not expect hemolysis   · CAT scan of the abdomen pelvis back in April 2019 found a normal spleen therefore doubt this is etiology as well.     - She received 2 doses of Injectafer 10/14/20 and 10/21/20.  Hgb responded well and normalized as well as her iron studies.    -Patient had an endometrial ablation on January 29th 2021 which has stopped her menstrual loss.    -Hgb remaining normal today at 15.2, Hct 45.5  -Ferritin remaining stable at 73.19 and iron saturation of 20%.     --NO need for iron replacement at present. Will follow and monitor.      ** Infection status:   · No current issues   **Metabolic / Renal:   · No current issues   ** Endocrine:   · No current issues   ** Coagulation / VTE prophylaxis:   - no current issues  ** GI:   -no current issues  ** Pulmonary:   -Former smoker, quit in 2011   - no current issues  ** Cardiology:   --Hypertension controlled with medications.  Blood pressure today at 172/98.  ** Skin / Rash:   · No current issues   ** :   --History of significant menorrhagia with very lengthy heavy periods.  Patient had endometrial ablation January 29th 2021. Patient has had no bleeding since.   --History or renal stone in May 2019 followed by urology, Dr Drew  ** Neurologic:   · No current issues   **Musculoskeletal  --Patient with increase in arthralgias today in knees  ** Psychosocial:   · No current issues   ** Pain control:   -- Significant back pain, advised patient to follow with pcp for management     FOLLOW UP  Return in 6 months for follow up.   When she returns she will need a pre-office  CBC, CMP, iron profile and ferritin     I spent 20 minutes caring for Vicki on this date of service. This time includes time spent by me in the following activities: preparing for the visit, reviewing tests, counseling and educating the patient/family/caregiver and documenting information in the medical record.     Rosalinda Freedman, APRN  06/30/2021

## 2021-08-25 ENCOUNTER — OFFICE VISIT (OUTPATIENT)
Dept: CARDIOLOGY | Facility: CLINIC | Age: 56
End: 2021-08-25

## 2021-08-25 VITALS
HEART RATE: 76 BPM | SYSTOLIC BLOOD PRESSURE: 130 MMHG | WEIGHT: 293 LBS | DIASTOLIC BLOOD PRESSURE: 78 MMHG | OXYGEN SATURATION: 95 % | HEIGHT: 66 IN | BODY MASS INDEX: 47.09 KG/M2

## 2021-08-25 DIAGNOSIS — E66.01 MORBID OBESITY WITH BMI OF 50.0-59.9, ADULT (HCC): ICD-10-CM

## 2021-08-25 DIAGNOSIS — I10 ESSENTIAL HYPERTENSION: ICD-10-CM

## 2021-08-25 DIAGNOSIS — R07.9 CHEST PAIN, UNSPECIFIED TYPE: Primary | ICD-10-CM

## 2021-08-25 PROCEDURE — 93000 ELECTROCARDIOGRAM COMPLETE: CPT | Performed by: INTERNAL MEDICINE

## 2021-08-25 PROCEDURE — 99204 OFFICE O/P NEW MOD 45 MIN: CPT | Performed by: INTERNAL MEDICINE

## 2021-08-25 RX ORDER — GABAPENTIN 100 MG/1
100 CAPSULE ORAL 3 TIMES DAILY
COMMUNITY

## 2021-08-25 RX ORDER — ASPIRIN 81 MG/1
81 TABLET ORAL DAILY
Qty: 30 TABLET | Refills: 11 | Status: SHIPPED | OUTPATIENT
Start: 2021-08-25

## 2021-08-25 RX ORDER — AMOXICILLIN 500 MG
1200 CAPSULE ORAL DAILY
COMMUNITY

## 2021-08-25 RX ORDER — MULTIPLE VITAMINS W/ MINERALS TAB 9MG-400MCG
1 TAB ORAL DAILY
COMMUNITY

## 2021-08-25 RX ORDER — LISINOPRIL 20 MG/1
20 TABLET ORAL DAILY
COMMUNITY

## 2021-08-25 NOTE — PROGRESS NOTES
"    [facility] - CARDIOLOGY  New Patient Initial Outpatient Evaluation    Primary Care Physician: Myron Cabrales MD    Subjective     Chief Complaint: Chest pain and shortness of breath.     History of Present Illness    Ms. Agarwal is a 56-year-old morbidly obese female with sleep apnea, chronic obstructive pulmonary disease, diabetes, and thyroid issues. She was referred to me by Dr. Cabrales for evaluation of shortness of breath and chest pain. Dr. Cabrales placed this referral after a routine visit in early 07/2021 and does not appear from my review of that note that any other testing or work-up was performed.    The patient reports that she has experienced several episodes of chest pain over the course of the last 3 months. During her first and most severe episode, she reports waking from her sleep due a sharp pain in her chest accompanied by pressure sensation, which she describes as feeling as if \"someone was standing on my chest.\"  Additionally, she had some associated symptoms of dyspnea, diaphoresis, flushing of the skin on her face, and numbness and tingling in the fingers of her left upper extremity. The patient states that she attempted to walk to the kitchen to get a glass of water but had to turn around and go back to her room, as ambulation seemed to exacerbate her pain and shortness of breath. She then sat upright on her bed until her symptoms resolved. She notes that lying down also caused the symptoms to worsen. Altogether, this episode lasted for approximately 1 hour and then resolved on its own. After the symptoms resolved, she then developed a headache and dizziness, which she describes as a room-spinning sensation. She was not able to check her blood pressure during this episode.    Her second episode of these symptoms occurred 3 to 4 weeks following the first. She was awake and sitting upright on her couch during the onset of the same symptoms she experienced the first time. She notes that the " duration of this episode did not seem to last as long as it did previously.    When the patient experienced her third episode, she was sitting on the toilet. She endorses all of the same symptoms as the prior 2 episodes. She remained on the toilet until the symptoms resolved, and again, she developed a headache and dizziness following this. The patient reports that she has not noticed any identifiable triggers associated with these episodes of chest pain.    Furthermore, the patient also endorses developing chest pain underneath her rib/breast today, accompanied by chest pressure and mild shortness of breath. However, this episode was not as severe as the previous 3, as it did not last as long, and she did not experience the numbness and tingling in her left upper extremity or flushing. She also did not develop a headache or dizziness following the chest pain.    When asked about her use of a wheelchair when presenting to the office today, the patient states that she was dropped off on the wrong side of the building and she could not tolerate the walk to our office due to her chronic back and knee pain, as well as dyspnea on exertion. She is status-post bariatric sleeve surgery in 2014 at [facility] but has since gained all of the weight back, which she notes is partially due to depression after the death of her brothers in addition to the stress of the pandemic. According to the patient, she has gained a significant amount of weight over the course of the pandemic, a total of 142 pounds. She cannot remember the name of her surgeon that performed her bariatric surgery and states that he moved away. She is requesting a referral to bariatric surgery here at [facility]. She attributes her symptoms of chest pain likely to her weight gain and her current weight of 352 pounds. During her daily routine, she is very sedentary due to her chronic pain and dyspnea on exertion, but she assures me that her  limitation of physical activity is not secondary to chest pain.    She notes that she is status-post endometrial ablation due to menorrhagia. She has also received iron infusions for anemia that she developed secondary to the heavy bleeding. Since the ablation, she is no longer anemic.    The patient is a former smoker, but she quit in 2011. She smoked for a total of 26 years. She does have breathing treatments for chronic obstructive pulmonary disease. She has never attempted to perform a breathing treatment during 1 of the previously mentioned episodes of chest pain and dyspnea.    She reportedly had lab work performed recently but she has not been notified of the results. She has been told she is prediabetic but is not currently on any medications for this.        Review of Systems   Constitutional: Positive for diaphoresis. Negative for malaise/fatigue.   Cardiovascular: Positive for chest pain and dyspnea on exertion. Negative for claudication, leg swelling, near-syncope, orthopnea, palpitations, paroxysmal nocturnal dyspnea and syncope.   Respiratory: Positive for shortness of breath.    Hematologic/Lymphatic: Does not bruise/bleed easily.   Skin: Positive for flushing.   Musculoskeletal: Positive for back pain (chronic).   Neurological: Positive for dizziness, headaches, numbness and paresthesias.   Otherwise complete ROS reviewed and negative except as mentioned in the HPI.      Past Medical History:   Past Medical History:   Diagnosis Date   • Asthma    • COPD (chronic obstructive pulmonary disease) (CMS/HCC)    • Diabetes mellitus (CMS/HCC)    • Disease of thyroid gland    • Hyperlipidemia    • Hypertension    • Iron deficiency anemia 10/9/2020   • Iron malabsorption 10/9/2020   • Sleep apnea        Past Surgical History:  Past Surgical History:   Procedure Laterality Date   • BACK SURGERY     • CARPAL TUNNEL RELEASE     • CHOLECYSTECTOMY     • GASTRIC SLEEVE LAPAROSCOPIC         Family History: family  history is not on file.    Social History:  reports that she quit smoking about 10 years ago. She has never used smokeless tobacco. She reports current alcohol use. She reports that she does not use drugs.    Medications:  Prior to Admission medications    Medication Sig Start Date End Date Taking? Authorizing Provider   albuterol sulfate  (90 Base) MCG/ACT inhaler Inhale 2 puffs Every 4 (Four) Hours As Needed for Wheezing.   Yes Chencho Dye MD   ALPRAZolam (XANAX) 1 MG tablet Take 1 mg by mouth At Night As Needed for Sleep.   Yes Chencho Dye MD   Ascorbic Acid (MAGALY-C PO) Take 2,000 mg by mouth.   Yes Chencho Dye MD   Calcium Carb-Cholecalciferol 600-500 MG-UNIT capsule Take 1,200 mg by mouth.   Yes Chencho Dye MD   Capsicum Oleoresin (ARTHRITIS PAIN RELIEF RUB EX) Apply  topically.   Yes Chencho Dye MD   furosemide (LASIX) 40 MG tablet Take 40 mg by mouth Daily.   Yes Chencho Dye MD   gabapentin (NEURONTIN) 100 MG capsule Take 100 mg by mouth 3 (Three) Times a Day.   Yes Chencho Dye MD   HYDROcodone-acetaminophen (NORCO) 7.5-325 MG per tablet Take 1 tablet by mouth Every 6 (Six) Hours As Needed for Moderate Pain .   Yes Chencho Dye MD   levothyroxine (SYNTHROID, LEVOTHROID) 100 MCG tablet Take 100 mcg by mouth Daily.   Yes Chencho Dye MD   lisinopril (PRINIVIL,ZESTRIL) 20 MG tablet Take 20 mg by mouth Daily.   Yes Chencho Dye MD   loratadine (CLARITIN) 10 MG tablet Take 10 mg by mouth Daily.   Yes Chencho Dye MD   multivitamin with minerals tablet tablet Take 1 tablet by mouth Daily.   Yes Chencho Dye MD   Nebulizer misc 4 (Four) Times a Day As Needed.   Yes Chencho Dye MD   Omega-3 Fatty Acids (fish oil) 1200 MG capsule capsule Take 1,200 mg by mouth Daily.   Yes Chencho Dye MD   omeprazole (priLOSEC) 40 MG capsule Take 40 mg by mouth Daily.   Yes Chencho Dye  "MD   ondansetron (ZOFRAN) 8 MG tablet Take  by mouth Every 8 (Eight) Hours As Needed for Nausea or Vomiting.   Yes ProviderChencho MD   Polyethylene Glycol 3350 (CLEARLAX PO) Take  by mouth.   Yes ProviderChencho MD   pravastatin (PRAVACHOL) 40 MG tablet Take 40 mg by mouth Daily.   Yes ProviderChencho MD   promethazine (PHENERGAN) 6.25 MG/5ML syrup Take  by mouth 4 (Four) Times a Day As Needed for Nausea or Vomiting.   Yes ProviderChencho MD   timolol (TIMOPTIC) 0.5 % ophthalmic solution 1 drop 2 (Two) Times a Day.   Yes ProviderChencho MD   vitamin B-12 (CYANOCOBALAMIN) 1000 MCG tablet Take 1,000 mcg by mouth Daily.   Yes ProviderChencho MD   lisinopril-hydrochlorothiazide (PRINZIDE,ZESTORETIC) 20-12.5 MG per tablet Take 1 tablet by mouth Daily.  8/25/21 Yes Chencho Dye MD   aspirin (aspirin) 81 MG EC tablet Take 1 tablet by mouth Daily. 8/25/21   Enrike Fabian MD     Allergies:  Allergies   Allergen Reactions   • Codeine Other (See Comments)     \"I DONT REMEMBER\"   • Penicillins Other (See Comments)     WHEN I WAS A CHILD, I DON'T KNOW   • Adhesive Tape Rash   • Latex Rash       Objective     Vital Signs: /78   Pulse 76   Ht 166.4 cm (65.5\")   Wt (!) 160 kg (352 lb)   SpO2 95%   BMI 57.68 kg/m²     Vitals and nursing note reviewed.   Constitutional:       General: Not in acute distress.     Appearance: Not in distress.   Neck:      Vascular: No JVD or JVR. JVD normal.   Pulmonary:      Effort: Pulmonary effort is normal.      Breath sounds: Normal breath sounds.   Cardiovascular:      Normal rate. Regular rhythm.      Murmurs: There is no murmur.      No gallop. No rub.   Pulses:     Intact distal pulses.   Edema:     Peripheral edema absent.   Skin:     General: Skin is warm and dry.   Neurological:      Mental Status: Alert, oriented to person, place, and time and oriented to person, place and time.         Results Reviewed:      ECG 12 " Lead    Date/Time: 8/25/2021 2:10 PM  Performed by: Enrike Fabian MD  Authorized by: Enrike Fabian MD   Comparison: not compared with previous ECG   Previous ECG: no previous ECG available  Rhythm: sinus rhythm  BPM: 76    Clinical impression: normal ECG                Assessment / Plan        Problem List Items Addressed This Visit        Cardiac and Vasculature    Hypertension    Relevant Medications    lisinopril (PRINIVIL,ZESTRIL) 20 MG tablet       Endocrine and Metabolic    Morbid obesity with BMI of 50.0-59.9, adult (CMS/[facility])    Relevant Orders    Ambulatory Referral to Bariatric Surgery      Other Visit Diagnoses     Chest pain, unspecified type    -  Primary    Relevant Orders    Adult Stress Echo W/ Cont or Stress Agent if Necessary Per Protocol        Recommendations/Plans:   The patient certainly has risk factors for developing coronary disease in terms of her morbid obesity, essential hypertension, and borderline diabetes, as well as her smoking history (albeit and thankfully, remote as it may be).  As such, certainly her symptoms of chest pressure are concerning and deserve an ischemic evaluation. However, of note, she has only had 4 episodes over the last 3 months and all those occurred at rest, yet, she does not have any of these symptoms in her typical day-to-day activities when she is up and walking around. We will attempt to perform a dobutamine stress echocardiogram though quality of imaging may be quite limited due to her body habitus.    I did advise an aspirin 81 mg daily simply for primary protection given the risk factor profile noted above; however, with her previous bariatric surgery, I am not sure the risk that this may pose for gastrointestinal toxicity. Unfortunately, the surgeon who performed her bariatric surgery is no longer in the area and she does not even recall his name. She does request referral to our bariatric clinic here, so I placed that today both for assessment for  multimodality, aggressive weight loss, as well as potential need for surgical intervention. If our bariatric surgeon's review of her previous anatomy can yield an opinion regarding her safety of taking aspirin 81 mg daily, certainly that would be greatly appreciated from our standpoint.    Follow-up to be determined on the basis of stress test results. The patient thinks, and I certainly agree, that her 140 pound weight gain in the last 1.5 years could certainly be contributing to her symptoms as well.      Transcribed from ambient dictation for Enrike Fabian MD by Stacey Robison.  08/25/21   16:52 CDT    I Enrike Fabian MD have personally performed the services described in this document as scribed by the above individual, and it is both accurate and complete.   I have edited each component as needed.    Enrike Fabian MD  8/25/2021  17:24 CDT

## 2021-09-01 ENCOUNTER — HOSPITAL ENCOUNTER (OUTPATIENT)
Dept: CARDIOLOGY | Facility: HOSPITAL | Age: 56
Discharge: HOME | End: 2021-09-01
Admitting: INTERNAL MEDICINE

## 2021-09-01 VITALS
HEART RATE: 84 BPM | DIASTOLIC BLOOD PRESSURE: 76 MMHG | BODY MASS INDEX: 47.09 KG/M2 | SYSTOLIC BLOOD PRESSURE: 130 MMHG | WEIGHT: 293 LBS | HEIGHT: 66 IN

## 2021-09-01 DIAGNOSIS — R07.9 CHEST PAIN, UNSPECIFIED TYPE: ICD-10-CM

## 2021-09-01 LAB
BH CV STRESS BP STAGE 1: NORMAL
BH CV STRESS BP STAGE 2: NORMAL
BH CV STRESS DOSE DOBUTAMINE STAGE 1: 10
BH CV STRESS DOSE DOBUTAMINE STAGE 2: 20
BH CV STRESS DURATION MIN STAGE 1: 3
BH CV STRESS DURATION MIN STAGE 2: 3
BH CV STRESS DURATION SEC STAGE 1: 0
BH CV STRESS DURATION SEC STAGE 2: 0
BH CV STRESS HR STAGE 1: 117
BH CV STRESS HR STAGE 2: 152
BH CV STRESS PROTOCOL 1: NORMAL
BH CV STRESS RECOVERY BP: NORMAL MMHG
BH CV STRESS RECOVERY HR: 100 BPM
BH CV STRESS STAGE 1: 1
BH CV STRESS STAGE 2: 2
MAXIMAL PREDICTED HEART RATE: 166 BPM
PERCENT MAX PREDICTED HR: 91.57 %
STRESS BASELINE BP: NORMAL MMHG
STRESS BASELINE HR: 84 BPM
STRESS PERCENT HR: 108 %
STRESS POST EXERCISE DUR MIN: 6 MIN
STRESS POST EXERCISE DUR SEC: 0 SEC
STRESS POST PEAK BP: NORMAL MMHG
STRESS POST PEAK HR: 152 BPM
STRESS TARGET HR: 141 BPM

## 2021-09-01 PROCEDURE — 93017 CV STRESS TEST TRACING ONLY: CPT

## 2021-09-01 PROCEDURE — 25010000002 PERFLUTREN 6.52 MG/ML SUSPENSION: Performed by: INTERNAL MEDICINE

## 2021-09-01 PROCEDURE — 93018 CV STRESS TEST I&R ONLY: CPT | Performed by: INTERNAL MEDICINE

## 2021-09-01 PROCEDURE — 93352 ADMIN ECG CONTRAST AGENT: CPT | Performed by: INTERNAL MEDICINE

## 2021-09-01 PROCEDURE — 93350 STRESS TTE ONLY: CPT | Performed by: INTERNAL MEDICINE

## 2021-09-01 PROCEDURE — 25010000003 DOBUTAMINE PER 250 MG: Performed by: INTERNAL MEDICINE

## 2021-09-01 PROCEDURE — 93350 STRESS TTE ONLY: CPT

## 2021-09-01 RX ORDER — DOBUTAMINE HYDROCHLORIDE 100 MG/100ML
10-50 INJECTION INTRAVENOUS CONTINUOUS
Status: DISCONTINUED | OUTPATIENT
Start: 2021-09-01 | End: 2021-09-02 | Stop reason: HOSPADM

## 2021-09-01 RX ADMIN — PERFLUTREN 8.48 MG: 6.52 INJECTION, SUSPENSION INTRAVENOUS at 15:31

## 2021-09-01 RX ADMIN — Medication 10 MCG/KG/MIN: at 15:32

## 2021-11-15 ENCOUNTER — TELEPHONE (OUTPATIENT)
Dept: ONCOLOGY | Facility: CLINIC | Age: 56
End: 2021-11-15

## 2021-11-15 NOTE — TELEPHONE ENCOUNTER
WARMED TRANSFERRED PATIENT TO  STAFF DUE TO THEY HAD JUST CALLED HER PERTAINING HER UPCOMING APPT.          [facility]

## 2021-12-04 ENCOUNTER — APPOINTMENT (OUTPATIENT)
Dept: CT IMAGING | Facility: HOSPITAL | Age: 56
End: 2021-12-04

## 2021-12-04 ENCOUNTER — APPOINTMENT (OUTPATIENT)
Dept: GENERAL RADIOLOGY | Facility: HOSPITAL | Age: 56
End: 2021-12-04

## 2021-12-04 ENCOUNTER — HOSPITAL ENCOUNTER (EMERGENCY)
Facility: HOSPITAL | Age: 56
Discharge: HOME | End: 2021-12-04
Admitting: EMERGENCY MEDICINE

## 2021-12-04 VITALS
BODY MASS INDEX: 48.82 KG/M2 | WEIGHT: 293 LBS | DIASTOLIC BLOOD PRESSURE: 60 MMHG | RESPIRATION RATE: 22 BRPM | TEMPERATURE: 98.4 F | HEIGHT: 65 IN | SYSTOLIC BLOOD PRESSURE: 147 MMHG | HEART RATE: 88 BPM | OXYGEN SATURATION: 97 %

## 2021-12-04 DIAGNOSIS — U07.1 COVID-19: Primary | ICD-10-CM

## 2021-12-04 LAB
ALBUMIN SERPL-MCNC: 4 G/DL (ref 3.5–5.2)
ALBUMIN/GLOB SERPL: 1.3 G/DL
ALP SERPL-CCNC: 116 U/L (ref 39–117)
ALT SERPL W P-5'-P-CCNC: 64 U/L (ref 1–33)
ANION GAP SERPL CALCULATED.3IONS-SCNC: 15 MMOL/L (ref 5–15)
ARTERIAL PATENCY WRIST A: POSITIVE
AST SERPL-CCNC: 59 U/L (ref 1–32)
ATMOSPHERIC PRESS: 754 MMHG
BASE EXCESS BLDA CALC-SCNC: -0.1 MMOL/L (ref 0–2)
BASOPHILS # BLD AUTO: 0.02 10*3/MM3 (ref 0–0.2)
BASOPHILS NFR BLD AUTO: 0.4 % (ref 0–1.5)
BDY SITE: ABNORMAL
BILIRUB SERPL-MCNC: 0.3 MG/DL (ref 0–1.2)
BODY TEMPERATURE: 37 C
BUN SERPL-MCNC: 13 MG/DL (ref 6–20)
BUN/CREAT SERPL: 16 (ref 7–25)
CALCIUM SPEC-SCNC: 8.7 MG/DL (ref 8.6–10.5)
CHLORIDE SERPL-SCNC: 95 MMOL/L (ref 98–107)
CO2 SERPL-SCNC: 21 MMOL/L (ref 22–29)
CREAT SERPL-MCNC: 0.81 MG/DL (ref 0.57–1)
D DIMER PPP FEU-MCNC: 0.63 MG/L (FEU) (ref 0–0.5)
D-LACTATE SERPL-SCNC: 2.8 MMOL/L (ref 0.5–2)
DEPRECATED RDW RBC AUTO: 44.7 FL (ref 37–54)
EOSINOPHIL # BLD AUTO: 0 10*3/MM3 (ref 0–0.4)
EOSINOPHIL NFR BLD AUTO: 0 % (ref 0.3–6.2)
ERYTHROCYTE [DISTWIDTH] IN BLOOD BY AUTOMATED COUNT: 13.3 % (ref 12.3–15.4)
FLUAV RNA RESP QL NAA+PROBE: NOT DETECTED
FLUBV RNA RESP QL NAA+PROBE: NOT DETECTED
GFR SERPL CREATININE-BSD FRML MDRD: 74 ML/MIN/1.73
GLOBULIN UR ELPH-MCNC: 3.2 GM/DL
GLUCOSE SERPL-MCNC: 259 MG/DL (ref 65–99)
HCO3 BLDA-SCNC: 23 MMOL/L (ref 20–26)
HCT VFR BLD AUTO: 49 % (ref 34–46.6)
HGB BLD-MCNC: 16.3 G/DL (ref 12–15.9)
IMM GRANULOCYTES # BLD AUTO: 0.08 10*3/MM3 (ref 0–0.05)
IMM GRANULOCYTES NFR BLD AUTO: 1.5 % (ref 0–0.5)
LYMPHOCYTES # BLD AUTO: 1.12 10*3/MM3 (ref 0.7–3.1)
LYMPHOCYTES NFR BLD AUTO: 21.1 % (ref 19.6–45.3)
Lab: ABNORMAL
MCH RBC QN AUTO: 30.2 PG (ref 26.6–33)
MCHC RBC AUTO-ENTMCNC: 33.3 G/DL (ref 31.5–35.7)
MCV RBC AUTO: 90.9 FL (ref 79–97)
MODALITY: ABNORMAL
MONOCYTES # BLD AUTO: 0.71 10*3/MM3 (ref 0.1–0.9)
MONOCYTES NFR BLD AUTO: 13.3 % (ref 5–12)
NEUTROPHILS NFR BLD AUTO: 3.39 10*3/MM3 (ref 1.7–7)
NEUTROPHILS NFR BLD AUTO: 63.7 % (ref 42.7–76)
NRBC BLD AUTO-RTO: 0.4 /100 WBC (ref 0–0.2)
NT-PROBNP SERPL-MCNC: 13.8 PG/ML (ref 0–900)
PCO2 BLDA: 32.8 MM HG (ref 35–45)
PCO2 TEMP ADJ BLD: 32.8 MM HG (ref 35–45)
PH BLDA: 7.46 PH UNITS (ref 7.35–7.45)
PH, TEMP CORRECTED: 7.46 PH UNITS (ref 7.35–7.45)
PLATELET # BLD AUTO: 184 10*3/MM3 (ref 140–450)
PMV BLD AUTO: 10.4 FL (ref 6–12)
PO2 BLDA: 78.1 MM HG (ref 83–108)
PO2 TEMP ADJ BLD: 78.1 MM HG (ref 83–108)
POTASSIUM SERPL-SCNC: 4.7 MMOL/L (ref 3.5–5.2)
PROT SERPL-MCNC: 7.2 G/DL (ref 6–8.5)
RBC # BLD AUTO: 5.39 10*6/MM3 (ref 3.77–5.28)
SAO2 % BLDCOA: 94.7 % (ref 94–99)
SARS-COV-2 RNA RESP QL NAA+PROBE: DETECTED
SARS-COV-2: DETECTED
SODIUM SERPL-SCNC: 131 MMOL/L (ref 136–145)
TROPONIN T SERPL-MCNC: <0.01 NG/ML (ref 0–0.03)
VENTILATOR MODE: ABNORMAL
WBC NRBC COR # BLD: 5.32 10*3/MM3 (ref 3.4–10.8)

## 2021-12-04 PROCEDURE — 82803 BLOOD GASES ANY COMBINATION: CPT

## 2021-12-04 PROCEDURE — 85025 COMPLETE CBC W/AUTO DIFF WBC: CPT | Performed by: NURSE PRACTITIONER

## 2021-12-04 PROCEDURE — 87636 SARSCOV2 & INF A&B AMP PRB: CPT | Performed by: NURSE PRACTITIONER

## 2021-12-04 PROCEDURE — 36415 COLL VENOUS BLD VENIPUNCTURE: CPT | Performed by: NURSE PRACTITIONER

## 2021-12-04 PROCEDURE — 93005 ELECTROCARDIOGRAM TRACING: CPT | Performed by: NURSE PRACTITIONER

## 2021-12-04 PROCEDURE — 83605 ASSAY OF LACTIC ACID: CPT | Performed by: NURSE PRACTITIONER

## 2021-12-04 PROCEDURE — 87040 BLOOD CULTURE FOR BACTERIA: CPT | Performed by: NURSE PRACTITIONER

## 2021-12-04 PROCEDURE — 71045 X-RAY EXAM CHEST 1 VIEW: CPT

## 2021-12-04 PROCEDURE — 85379 FIBRIN DEGRADATION QUANT: CPT | Performed by: NURSE PRACTITIONER

## 2021-12-04 PROCEDURE — 83880 ASSAY OF NATRIURETIC PEPTIDE: CPT | Performed by: NURSE PRACTITIONER

## 2021-12-04 PROCEDURE — 80053 COMPREHEN METABOLIC PANEL: CPT | Performed by: NURSE PRACTITIONER

## 2021-12-04 PROCEDURE — 0 IOPAMIDOL PER 1 ML: Performed by: NURSE PRACTITIONER

## 2021-12-04 PROCEDURE — 99283 EMERGENCY DEPT VISIT LOW MDM: CPT

## 2021-12-04 PROCEDURE — 84484 ASSAY OF TROPONIN QUANT: CPT | Performed by: NURSE PRACTITIONER

## 2021-12-04 PROCEDURE — 36600 WITHDRAWAL OF ARTERIAL BLOOD: CPT

## 2021-12-04 PROCEDURE — 71275 CT ANGIOGRAPHY CHEST: CPT

## 2021-12-04 RX ORDER — BENZONATATE 200 MG/1
200 CAPSULE ORAL 3 TIMES DAILY PRN
Qty: 9 CAPSULE | Refills: 0 | Status: SHIPPED | OUTPATIENT
Start: 2021-12-04 | End: 2021-12-07

## 2021-12-04 RX ORDER — ACETAMINOPHEN 500 MG
1000 TABLET ORAL ONCE
Status: COMPLETED | OUTPATIENT
Start: 2021-12-04 | End: 2021-12-04

## 2021-12-04 RX ORDER — ONDANSETRON 4 MG/1
4 TABLET, ORALLY DISINTEGRATING ORAL EVERY 6 HOURS PRN
Qty: 8 TABLET | Refills: 0 | Status: SHIPPED | OUTPATIENT
Start: 2021-12-04 | End: 2021-12-06

## 2021-12-04 RX ORDER — SODIUM CHLORIDE 0.9 % (FLUSH) 0.9 %
10 SYRINGE (ML) INJECTION AS NEEDED
Status: DISCONTINUED | OUTPATIENT
Start: 2021-12-04 | End: 2021-12-04 | Stop reason: HOSPADM

## 2021-12-04 RX ADMIN — IOPAMIDOL 100 ML: 755 INJECTION, SOLUTION INTRAVENOUS at 19:43

## 2021-12-04 RX ADMIN — ACETAMINOPHEN 1000 MG: 500 TABLET, FILM COATED ORAL at 17:21

## 2021-12-04 RX ADMIN — SODIUM CHLORIDE, POTASSIUM CHLORIDE, SODIUM LACTATE AND CALCIUM CHLORIDE 500 ML: 600; 310; 30; 20 INJECTION, SOLUTION INTRAVENOUS at 19:12

## 2021-12-04 NOTE — ED PROVIDER NOTES
Subjective   Patient is a 56-year-old female that presents ER today with complaint of shortness of breath, cough, nausea vomiting and diarrhea.  Patient reports her symptoms began on Tuesday with fever and chills.  States that they have gotten worse since then.  The patient states that she is not vaccinated against COVID-19.  She would like to be tested for this.  She denies any known contacts who have had this.  The patient denies any chest pain.  She was initially seen in urgent care and sent here for further evaluation.      History provided by:  Patient   used: No    Shortness of Breath  Severity:  Moderate  Onset quality:  Sudden  Duration:  5 days  Timing:  Constant  Progression:  Worsening  Chronicity:  New  Context: not activity, not animal exposure, not emotional upset, not fumes, not known allergens, not occupational exposure, not pollens, not smoke exposure, not strong odors, not URI and not weather changes    Relieved by:  Nothing  Worsened by:  Nothing  Ineffective treatments:  None tried  Associated symptoms: cough, fever and sputum production    Associated symptoms: no abdominal pain, no chest pain, no claudication, no diaphoresis, no ear pain, no headaches, no hemoptysis, no neck pain, no PND, no rash, no sore throat, no syncope, no swollen glands, no vomiting and no wheezing    Risk factors: no recent alcohol use, no family hx of DVT, no hx of cancer, no hx of PE/DVT, no obesity, no oral contraceptive use, no prolonged immobilization, no recent surgery and no tobacco use        Review of Systems   Constitutional: Positive for fever. Negative for diaphoresis.   HENT: Negative for ear pain and sore throat.    Respiratory: Positive for cough, sputum production and shortness of breath. Negative for hemoptysis and wheezing.    Cardiovascular: Negative for chest pain, claudication, syncope and PND.   Gastrointestinal: Negative for abdominal pain and vomiting.   Musculoskeletal:  "Negative for neck pain.   Skin: Negative for rash.   Neurological: Negative for headaches.   All other systems reviewed and are negative.      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   • Sleep apnea        Allergies   Allergen Reactions   • Codeine Other (See Comments)     \"I DONT REMEMBER\"   • Penicillins Other (See Comments)     WHEN I WAS A CHILD, I DON'T KNOW   • Adhesive Tape Rash   • Latex Rash       Past Surgical History:   Procedure Laterality Date   • BACK SURGERY     • CARPAL TUNNEL RELEASE     • CHOLECYSTECTOMY     • GASTRIC SLEEVE LAPAROSCOPIC         Family History   Problem Relation Age of Onset   • Breast cancer Neg Hx        Social History     Socioeconomic History   • Marital status:    Tobacco Use   • Smoking status: Former Smoker     Quit date: 6/10/2011     Years since quitting: 10.4   • Smokeless tobacco: Never Used   Vaping Use   • Vaping Use: Never used   Substance and Sexual Activity   • Alcohol use: Yes     Comment: occassional   • Drug use: No   • Sexual activity: Defer           Objective   Physical Exam  Vitals and nursing note reviewed.   Constitutional:       Appearance: She is obese.   HENT:      Head: Normocephalic and atraumatic.      Mouth/Throat:      Pharynx: Oropharynx is clear.   Eyes:      Conjunctiva/sclera: Conjunctivae normal.   Cardiovascular:      Rate and Rhythm: Normal rate and regular rhythm.   Pulmonary:      Effort: Tachypnea present.      Breath sounds: Normal breath sounds.   Abdominal:      General: Bowel sounds are normal.      Palpations: Abdomen is soft.   Skin:     General: Skin is warm and dry.      Capillary Refill: Capillary refill takes less than 2 seconds.   Neurological:      General: No focal deficit " present.      Mental Status: She is alert.   Psychiatric:         Mood and Affect: Mood normal.         Procedures           ED Course  ED Course as of 12/05/21 0043   Sat Dec 04, 2021   1921 XR Chest 1 View [LF]   Sun Dec 05, 2021   0040 Patient's labs show a negative troponin.  Patient's lactate initially was 2.8.  D-dimer was elevated.  Patient was Covid positive. [LF]   0040 CT chest showed no evidence of PE.  Advised follow-up for pulmonary nodule. [LF]   0041 Patient's O2 sat did not drop when she walked.  At this time I discussed the findings with the patient.  I did offer the monoclonal antibody infusion however the patient has declined this.  Patient will be discharged home in stable condition.  Patient will be given a prescription for Zofran and Tessalon.  She has a nebulizer machine, nebulizer treatments, and albuterol inhaler at home.  I advised her to keep a close follow-up with her primary care provider regarding her symptoms.  She is advised to quarantine for 10 days from symptom onset.  The patient is advised to follow-up with her primary care provider via teleconference in 1 to 2 days for a recheck.  The patient will be discharged home at this time in stable condition advised return the ER for any new or worsening symptoms.  We will send the patient home with a pulse oximeter and she is advised that should her O2 saturation dropped to 90% or below to return immediately to the ER.  Patient voices verbal understanding.  She will be discharged home at this time in stable condition. [LF]      ED Course User Index  [LF] Beatris Mendes, APRN                                         CT Angiogram Chest   Final Result   1. Motion artifact related to respiration limits the exam, however no   evidence of pulmonary embolism is identified.   2. Bibasilar atelectasis, there is a focal groundglass nodule in the   left lower lobe measuring 16 mm. Follow-up CT is recommended in 6 months   for this finding. No  lung consolidation is identified. There is no   typical pattern of infiltrate to confirm COVID-19 pneumonia. Clinical   correlation is recommended.   3. Fatty infiltration of the liver. Evidence of previous   cholecystectomy.                   This report was finalized on 12/04/2021 20:12 by Dr. Ilan Frye MD.      XR Chest 1 View   Final Result   1. No radiographic evidence of acute cardiopulmonary process.           This report was finalized on 12/04/2021 15:25 by Dr. Tien Nix MD.        Labs Reviewed   COVID-19 AND FLU A/B, NP SWAB IN TRANSPORT MEDIA 8-12 HR TAT - Abnormal; Notable for the following components:       Result Value    COVID19 Detected (*)     All other components within normal limits    Narrative:     Fact sheet for providers: https://www.fda.gov/media/002700/download    Fact sheet for patients: https://www.fda.gov/media/624891/download    Test performed by PCR.  Influenza A and Influenza B negative results should be considered presumptive in samples that have a positive SARS-CoV-2 result.    Competitive inhibition studies showed that SARS-CoV-2 virus, when present at concentrations above 3.6E+04 copies/mL, can inhibit the detection and amplification of influenza A and influenza B virus RNA if present at or below 1.8E+02 copies/mL or 4.9E+02 copies/mL, respectively, and may lead to false negative influenza virus results. If co-infection with influenza A or influenza B virus is suspected in samples with a positive SARS-CoV-2 result, the sample should be re-tested with another FDA cleared, approved, or authorized influenza test, if influenza virus detection would change clinical management.   COMPREHENSIVE METABOLIC PANEL - Abnormal; Notable for the following components:    Glucose 259 (*)     Sodium 131 (*)     Chloride 95 (*)     CO2 21.0 (*)     ALT (SGPT) 64 (*)     AST (SGOT) 59 (*)     All other components within normal limits    Narrative:     GFR Normal >60  Chronic Kidney Disease  <60  Kidney Failure <15     D-DIMER, QUANTITATIVE - Abnormal; Notable for the following components:    D-Dimer, Quantitative 0.63 (*)     All other components within normal limits    Narrative:     Reference Range is 0-0.50 mg/L FEU. However, results <0.50 mg/L FEU tends to rule out DVT or PE. Results >0.50 mg/L FEU are not useful in predicting absence or presence of DVT or PE.     LACTIC ACID, PLASMA - Abnormal; Notable for the following components:    Lactate 2.8 (*)     All other components within normal limits   CBC WITH AUTO DIFFERENTIAL - Abnormal; Notable for the following components:    RBC 5.39 (*)     Hemoglobin 16.3 (*)     Hematocrit 49.0 (*)     Monocyte % 13.3 (*)     Eosinophil % 0.0 (*)     Immature Grans % 1.5 (*)     Immature Grans, Absolute 0.08 (*)     nRBC 0.4 (*)     All other components within normal limits   BLOOD GAS, ARTERIAL - Abnormal; Notable for the following components:    pH, Arterial 7.455 (*)     pCO2, Arterial 32.8 (*)     pO2, Arterial 78.1 (*)     Base Excess, Arterial -0.1 (*)     pCO2, Temperature Corrected 32.8 (*)     pH, Temp Corrected 7.455 (*)     pO2, Temperature Corrected 78.1 (*)     All other components within normal limits   TROPONIN (IN-HOUSE) - Normal    Narrative:     Troponin T Reference Range:  <= 0.03 ng/mL-   Negative for AMI  >0.03 ng/mL-     Abnormal for myocardial necrosis.  Clinicians would have to utilize clinical acumen, EKG, Troponin and serial changes to determine if it is an Acute Myocardial Infarction or myocardial injury due to an underlying chronic condition.       Results may be falsely decreased if patient taking Biotin.     BNP (IN-HOUSE) - Normal    Narrative:     Among patients with dyspnea, NT-proBNP is highly sensitive for the detection of acute congestive heart failure. In addition NT-proBNP of <300 pg/ml effectively rules out acute congestive heart failure with 99% negative predictive value.    Results may be falsely decreased if patient  taking Biotin.     BLOOD CULTURE   BLOOD CULTURE   BLOOD GAS, ARTERIAL   CBC AND DIFFERENTIAL    Narrative:     The following orders were created for panel order CBC & Differential.  Procedure                               Abnormality         Status                     ---------                               -----------         ------                     CBC Auto Differential[[id number]]        Abnormal            Final result                 Please view results for these tests on the individual orders.             MDM  Number of Diagnoses or Management Options  COVID-19: new and requires workup     Amount and/or Complexity of Data Reviewed  Clinical lab tests: ordered and reviewed  Tests in the radiology section of CPT®: ordered and reviewed  Tests in the medicine section of CPT®: ordered and reviewed  Decide to obtain previous medical records or to obtain history from someone other than the patient: yes  Discuss the patient with other providers: yes    Patient Progress  Patient progress: stable      Final diagnoses:   COVID-19       ED Disposition  ED Disposition     ED Disposition Condition Comment    Discharge Stable           Myron Cabrales MD  [address] [place] [address] [address]  [facility] [id number] [phone]    Call in 1 day           Medication List      New Prescriptions    benzonatate 200 MG capsule  Commonly known as: TESSALON  Take 1 capsule by mouth 3 (Three) Times a Day As Needed for Cough for up to 3 days.     ondansetron ODT 4 MG disintegrating tablet  Commonly known as: ZOFRAN-ODT  Place 1 tablet on the tongue Every 6 (Six) Hours As Needed for Nausea or Vomiting for up to 2 days.           Where to Get Your Medications      These medications were sent to [facility] - [place], [place] - [address] - [phone] [facility] [phone] FX  [address], [facility] [address]    Phone: [phone]   · benzonatate 200 MG capsule  · ondansetron ODT 4 MG disintegrating tablet          Beatris Mendes,  APRN  12/05/21 0043

## 2021-12-05 NOTE — ED NOTES
Pt had episode of loose stool at this time trying to void for urine specimen. Unable to collect accurate specimen.      Rex Chapman RN  12/04/21 6674

## 2021-12-05 NOTE — ED NOTES
Ambulated pt around room. Pt tolerated poorly. Pt quickly became SOA with HR increasing to 130'S and O2 Sat dropped to 91% on RA. Beatris Mendes NP notified.      Rex Chapman, RN  12/04/21 6870

## 2021-12-05 NOTE — ED NOTES
Lab at bedside and unable to collect specimen after several attempts. Second phlebotomist called to come and attempt lab draw     Rex Chapman RN  12/04/21 5664

## 2021-12-05 NOTE — ED NOTES
NOTIFIED LAB FOR PHLEBOTOMIST TO COME AND ATTEMPT TO OBTAIN LABS      Rex Chapman, RN  12/04/21 4999

## 2021-12-05 NOTE — ED NOTES
Spoke with Beatris Mendes NP about pt not being able to void at this time. New orders to be obtained for fluid bolus.      Rex Chapman RN  12/04/21 1929

## 2021-12-09 ENCOUNTER — HOSPITAL ENCOUNTER (INPATIENT)
Age: 56
LOS: 26 days | Discharge: HOME | DRG: 177 | End: 2022-01-04
Attending: FAMILY MEDICINE | Admitting: FAMILY MEDICINE
Payer: MEDICARE

## 2021-12-09 ENCOUNTER — APPOINTMENT (OUTPATIENT)
Dept: GENERAL RADIOLOGY | Age: 56
DRG: 177 | End: 2021-12-09
Payer: MEDICARE

## 2021-12-09 DIAGNOSIS — R09.02 HYPOXIA: Primary | ICD-10-CM

## 2021-12-09 DIAGNOSIS — U07.1 COVID-19: ICD-10-CM

## 2021-12-09 PROBLEM — J12.82 PNEUMONIA DUE TO COVID-19 VIRUS: Status: ACTIVE | Noted: 2021-12-09

## 2021-12-09 PROBLEM — I10 HYPERTENSION: Status: ACTIVE | Noted: 2021-12-09

## 2021-12-09 PROBLEM — E03.9 HYPOTHYROIDISM: Status: ACTIVE | Noted: 2021-12-09

## 2021-12-09 LAB
ALBUMIN SERPL-MCNC: 3.6 G/DL (ref 3.5–5.2)
ALP BLD-CCNC: 90 U/L (ref 35–104)
ALT SERPL-CCNC: 46 U/L (ref 5–33)
ANION GAP SERPL CALCULATED.3IONS-SCNC: 13 MMOL/L (ref 7–19)
AST SERPL-CCNC: 70 U/L (ref 5–32)
BACTERIA SPEC AEROBE CULT: NORMAL
BACTERIA SPEC AEROBE CULT: NORMAL
BASOPHILS ABSOLUTE: 0 K/UL (ref 0–0.2)
BASOPHILS RELATIVE PERCENT: 0 % (ref 0–1)
BILIRUB SERPL-MCNC: 0.3 MG/DL (ref 0.2–1.2)
BUN BLDV-MCNC: 8 MG/DL (ref 6–20)
C-REACTIVE PROTEIN: 15.33 MG/DL (ref 0–0.5)
CALCIUM SERPL-MCNC: 8.5 MG/DL (ref 8.6–10)
CHLORIDE BLD-SCNC: 95 MMOL/L (ref 98–111)
CO2: 23 MMOL/L (ref 22–29)
CREAT SERPL-MCNC: 0.8 MG/DL (ref 0.5–0.9)
EOSINOPHILS ABSOLUTE: 0 K/UL (ref 0–0.6)
EOSINOPHILS RELATIVE PERCENT: 0 % (ref 0–5)
GFR AFRICAN AMERICAN: >59
GFR NON-AFRICAN AMERICAN: >60
GLUCOSE BLD-MCNC: 322 MG/DL (ref 74–109)
GLUCOSE BLD-MCNC: 341 MG/DL (ref 70–99)
HCT VFR BLD CALC: 44.6 % (ref 37–47)
HEMOGLOBIN: 14.6 G/DL (ref 12–16)
IMMATURE GRANULOCYTES #: 0 K/UL
LYMPHOCYTES ABSOLUTE: 0.5 K/UL (ref 1.1–4.5)
LYMPHOCYTES RELATIVE PERCENT: 9.4 % (ref 20–40)
MCH RBC QN AUTO: 30.7 PG (ref 27–31)
MCHC RBC AUTO-ENTMCNC: 32.7 G/DL (ref 33–37)
MCV RBC AUTO: 93.9 FL (ref 81–99)
MONOCYTES ABSOLUTE: 0.4 K/UL (ref 0–0.9)
MONOCYTES RELATIVE PERCENT: 7.1 % (ref 0–10)
NEUTROPHILS ABSOLUTE: 4.4 K/UL (ref 1.5–7.5)
NEUTROPHILS RELATIVE PERCENT: 82.9 % (ref 50–65)
PDW BLD-RTO: 13.6 % (ref 11.5–14.5)
PERFORMED ON: ABNORMAL
PLATELET # BLD: 206 K/UL (ref 130–400)
PMV BLD AUTO: 10 FL (ref 9.4–12.3)
POTASSIUM SERPL-SCNC: 4.5 MMOL/L (ref 3.5–5)
PRO-BNP: 22 PG/ML (ref 0–900)
RBC # BLD: 4.75 M/UL (ref 4.2–5.4)
SODIUM BLD-SCNC: 131 MMOL/L (ref 136–145)
TOTAL PROTEIN: 7.6 G/DL (ref 6.6–8.7)
TROPONIN: <0.01 NG/ML (ref 0–0.03)
WBC # BLD: 5.3 K/UL (ref 4.8–10.8)

## 2021-12-09 PROCEDURE — 71045 X-RAY EXAM CHEST 1 VIEW: CPT

## 2021-12-09 PROCEDURE — 1210000000 HC MED SURG R&B

## 2021-12-09 PROCEDURE — 86140 C-REACTIVE PROTEIN: CPT

## 2021-12-09 PROCEDURE — 99283 EMERGENCY DEPT VISIT LOW MDM: CPT

## 2021-12-09 PROCEDURE — 85025 COMPLETE CBC W/AUTO DIFF WBC: CPT

## 2021-12-09 PROCEDURE — 93005 ELECTROCARDIOGRAM TRACING: CPT | Performed by: NURSE PRACTITIONER

## 2021-12-09 PROCEDURE — 6360000002 HC RX W HCPCS: Performed by: NURSE PRACTITIONER

## 2021-12-09 PROCEDURE — 6370000000 HC RX 637 (ALT 250 FOR IP): Performed by: NURSE PRACTITIONER

## 2021-12-09 PROCEDURE — 2580000003 HC RX 258: Performed by: NURSE PRACTITIONER

## 2021-12-09 PROCEDURE — 36415 COLL VENOUS BLD VENIPUNCTURE: CPT

## 2021-12-09 PROCEDURE — 96374 THER/PROPH/DIAG INJ IV PUSH: CPT

## 2021-12-09 PROCEDURE — 82947 ASSAY GLUCOSE BLOOD QUANT: CPT

## 2021-12-09 PROCEDURE — 3E0333Z INTRODUCTION OF ANTI-INFLAMMATORY INTO PERIPHERAL VEIN, PERCUTANEOUS APPROACH: ICD-10-PCS | Performed by: FAMILY MEDICINE

## 2021-12-09 PROCEDURE — 80053 COMPREHEN METABOLIC PANEL: CPT

## 2021-12-09 PROCEDURE — 83880 ASSAY OF NATRIURETIC PEPTIDE: CPT

## 2021-12-09 PROCEDURE — 96372 THER/PROPH/DIAG INJ SC/IM: CPT

## 2021-12-09 PROCEDURE — XW0DXM6 INTRODUCTION OF BARICITINIB INTO MOUTH AND PHARYNX, EXTERNAL APPROACH, NEW TECHNOLOGY GROUP 6: ICD-10-PCS | Performed by: FAMILY MEDICINE

## 2021-12-09 PROCEDURE — 84484 ASSAY OF TROPONIN QUANT: CPT

## 2021-12-09 RX ORDER — ONDANSETRON 2 MG/ML
4 INJECTION INTRAMUSCULAR; INTRAVENOUS ONCE
Status: COMPLETED | OUTPATIENT
Start: 2021-12-09 | End: 2021-12-09

## 2021-12-09 RX ORDER — ASCORBIC ACID 500 MG
1000 TABLET ORAL DAILY
Status: DISCONTINUED | OUTPATIENT
Start: 2021-12-09 | End: 2021-12-25

## 2021-12-09 RX ORDER — LEVOTHYROXINE SODIUM 0.1 MG/1
100 TABLET ORAL DAILY
Status: DISCONTINUED | OUTPATIENT
Start: 2021-12-10 | End: 2022-01-04 | Stop reason: HOSPADM

## 2021-12-09 RX ORDER — POLYETHYLENE GLYCOL 3350 17 G/17G
17 POWDER, FOR SOLUTION ORAL DAILY PRN
Status: DISCONTINUED | OUTPATIENT
Start: 2021-12-09 | End: 2022-01-04 | Stop reason: HOSPADM

## 2021-12-09 RX ORDER — MECOBALAMIN 5000 MCG
5 TABLET,DISINTEGRATING ORAL NIGHTLY
Status: DISCONTINUED | OUTPATIENT
Start: 2021-12-09 | End: 2021-12-14

## 2021-12-09 RX ORDER — TIMOLOL MALEATE 5 MG/ML
1 SOLUTION/ DROPS OPHTHALMIC 2 TIMES DAILY
Status: DISCONTINUED | OUTPATIENT
Start: 2021-12-09 | End: 2022-01-04 | Stop reason: HOSPADM

## 2021-12-09 RX ORDER — SODIUM CHLORIDE, SODIUM LACTATE, POTASSIUM CHLORIDE, AND CALCIUM CHLORIDE .6; .31; .03; .02 G/100ML; G/100ML; G/100ML; G/100ML
1000 INJECTION, SOLUTION INTRAVENOUS ONCE
Status: COMPLETED | OUTPATIENT
Start: 2021-12-09 | End: 2021-12-09

## 2021-12-09 RX ORDER — POLYETHYLENE GLYCOL 3350 17 G/17G
17 POWDER, FOR SOLUTION ORAL DAILY
Status: DISCONTINUED | OUTPATIENT
Start: 2021-12-09 | End: 2021-12-26

## 2021-12-09 RX ORDER — HYDROCODONE BITARTRATE AND ACETAMINOPHEN 7.5; 325 MG/1; MG/1
1 TABLET ORAL EVERY 6 HOURS PRN
Status: DISCONTINUED | OUTPATIENT
Start: 2021-12-09 | End: 2022-01-04 | Stop reason: HOSPADM

## 2021-12-09 RX ORDER — ACETAMINOPHEN 650 MG/1
650 SUPPOSITORY RECTAL EVERY 6 HOURS PRN
Status: DISCONTINUED | OUTPATIENT
Start: 2021-12-09 | End: 2022-01-04 | Stop reason: HOSPADM

## 2021-12-09 RX ORDER — CETIRIZINE HYDROCHLORIDE 10 MG/1
10 TABLET ORAL DAILY
Status: DISCONTINUED | OUTPATIENT
Start: 2021-12-09 | End: 2021-12-25

## 2021-12-09 RX ORDER — HYDROCHLOROTHIAZIDE 12.5 MG/1
12.5 CAPSULE, GELATIN COATED ORAL DAILY
Status: DISCONTINUED | OUTPATIENT
Start: 2021-12-09 | End: 2021-12-15

## 2021-12-09 RX ORDER — SODIUM CHLORIDE 0.9 % (FLUSH) 0.9 %
5-40 SYRINGE (ML) INJECTION EVERY 12 HOURS SCHEDULED
Status: DISCONTINUED | OUTPATIENT
Start: 2021-12-09 | End: 2022-01-04 | Stop reason: HOSPADM

## 2021-12-09 RX ORDER — ALBUTEROL SULFATE 90 UG/1
2 AEROSOL, METERED RESPIRATORY (INHALATION) 4 TIMES DAILY
Status: DISCONTINUED | OUTPATIENT
Start: 2021-12-09 | End: 2022-01-04 | Stop reason: HOSPADM

## 2021-12-09 RX ORDER — LISINOPRIL 20 MG/1
20 TABLET ORAL DAILY
Status: DISCONTINUED | OUTPATIENT
Start: 2021-12-09 | End: 2021-12-15

## 2021-12-09 RX ORDER — MECLIZINE HYDROCHLORIDE 25 MG/1
25 TABLET ORAL 3 TIMES DAILY PRN
Status: DISCONTINUED | OUTPATIENT
Start: 2021-12-09 | End: 2022-01-04 | Stop reason: HOSPADM

## 2021-12-09 RX ORDER — DEXTROSE MONOHYDRATE 25 G/50ML
12.5 INJECTION, SOLUTION INTRAVENOUS PRN
Status: DISCONTINUED | OUTPATIENT
Start: 2021-12-09 | End: 2022-01-04 | Stop reason: HOSPADM

## 2021-12-09 RX ORDER — PANTOPRAZOLE SODIUM 40 MG/1
40 TABLET, DELAYED RELEASE ORAL
Status: DISCONTINUED | OUTPATIENT
Start: 2021-12-10 | End: 2021-12-15

## 2021-12-09 RX ORDER — GUAIFENESIN/DEXTROMETHORPHAN 100-10MG/5
5 SYRUP ORAL EVERY 4 HOURS PRN
Status: DISCONTINUED | OUTPATIENT
Start: 2021-12-09 | End: 2021-12-15 | Stop reason: ALTCHOICE

## 2021-12-09 RX ORDER — VITAMIN B COMPLEX
1000 TABLET ORAL DAILY
Status: DISCONTINUED | OUTPATIENT
Start: 2021-12-09 | End: 2021-12-11

## 2021-12-09 RX ORDER — ACETAMINOPHEN 325 MG/1
650 TABLET ORAL EVERY 6 HOURS PRN
Status: DISCONTINUED | OUTPATIENT
Start: 2021-12-09 | End: 2022-01-04 | Stop reason: HOSPADM

## 2021-12-09 RX ORDER — ALPRAZOLAM 0.5 MG/1
1 TABLET ORAL NIGHTLY PRN
Status: DISCONTINUED | OUTPATIENT
Start: 2021-12-09 | End: 2021-12-11

## 2021-12-09 RX ORDER — ZINC SULFATE 50(220)MG
50 CAPSULE ORAL DAILY
Status: DISCONTINUED | OUTPATIENT
Start: 2021-12-09 | End: 2021-12-26

## 2021-12-09 RX ORDER — DEXAMETHASONE SODIUM PHOSPHATE 10 MG/ML
6 INJECTION, SOLUTION INTRAMUSCULAR; INTRAVENOUS EVERY 24 HOURS
Status: DISCONTINUED | OUTPATIENT
Start: 2021-12-10 | End: 2021-12-10

## 2021-12-09 RX ORDER — LISINOPRIL AND HYDROCHLOROTHIAZIDE 20; 12.5 MG/1; MG/1
1 TABLET ORAL DAILY
Status: DISCONTINUED | OUTPATIENT
Start: 2021-12-09 | End: 2021-12-09 | Stop reason: CLARIF

## 2021-12-09 RX ORDER — SODIUM CHLORIDE 0.9 % (FLUSH) 0.9 %
5-40 SYRINGE (ML) INJECTION PRN
Status: DISCONTINUED | OUTPATIENT
Start: 2021-12-09 | End: 2022-01-04 | Stop reason: HOSPADM

## 2021-12-09 RX ORDER — DEXTROSE MONOHYDRATE 50 MG/ML
100 INJECTION, SOLUTION INTRAVENOUS PRN
Status: DISCONTINUED | OUTPATIENT
Start: 2021-12-09 | End: 2022-01-04 | Stop reason: HOSPADM

## 2021-12-09 RX ORDER — ONDANSETRON 4 MG/1
8 TABLET, FILM COATED ORAL EVERY 8 HOURS PRN
Status: DISCONTINUED | OUTPATIENT
Start: 2021-12-09 | End: 2022-01-04 | Stop reason: HOSPADM

## 2021-12-09 RX ORDER — FUROSEMIDE 40 MG/1
40 TABLET ORAL PRN
Status: DISCONTINUED | OUTPATIENT
Start: 2021-12-09 | End: 2021-12-14

## 2021-12-09 RX ORDER — NICOTINE POLACRILEX 4 MG
15 LOZENGE BUCCAL PRN
Status: DISCONTINUED | OUTPATIENT
Start: 2021-12-09 | End: 2022-01-04 | Stop reason: HOSPADM

## 2021-12-09 RX ORDER — SODIUM CHLORIDE 9 MG/ML
25 INJECTION, SOLUTION INTRAVENOUS PRN
Status: DISCONTINUED | OUTPATIENT
Start: 2021-12-09 | End: 2022-01-04 | Stop reason: HOSPADM

## 2021-12-09 RX ORDER — DEXAMETHASONE SODIUM PHOSPHATE 10 MG/ML
6 INJECTION, SOLUTION INTRAMUSCULAR; INTRAVENOUS ONCE
Status: COMPLETED | OUTPATIENT
Start: 2021-12-09 | End: 2021-12-09

## 2021-12-09 RX ORDER — PRAVASTATIN SODIUM 20 MG
40 TABLET ORAL DAILY
Status: DISCONTINUED | OUTPATIENT
Start: 2021-12-09 | End: 2022-01-04 | Stop reason: HOSPADM

## 2021-12-09 RX ADMIN — ENOXAPARIN SODIUM 30 MG: 100 INJECTION SUBCUTANEOUS at 20:29

## 2021-12-09 RX ADMIN — SODIUM CHLORIDE, PRESERVATIVE FREE 10 ML: 5 INJECTION INTRAVENOUS at 20:28

## 2021-12-09 RX ADMIN — LISINOPRIL 20 MG: 20 TABLET ORAL at 20:30

## 2021-12-09 RX ADMIN — Medication 5 MG: at 20:29

## 2021-12-09 RX ADMIN — Medication 1000 UNITS: at 20:30

## 2021-12-09 RX ADMIN — HYDROCHLOROTHIAZIDE 12.5 MG: 12.5 CAPSULE ORAL at 20:29

## 2021-12-09 RX ADMIN — ACETAMINOPHEN 650 MG: 325 TABLET ORAL at 19:41

## 2021-12-09 RX ADMIN — Medication 1 TABLET: at 20:42

## 2021-12-09 RX ADMIN — TIMOLOL MALEATE 1 DROP: 5 SOLUTION OPHTHALMIC at 20:29

## 2021-12-09 RX ADMIN — DEXAMETHASONE SODIUM PHOSPHATE 6 MG: 10 INJECTION INTRAMUSCULAR; INTRAVENOUS at 17:10

## 2021-12-09 RX ADMIN — BARICITINIB 4 MG: 2 TABLET, FILM COATED ORAL at 21:47

## 2021-12-09 RX ADMIN — ALBUTEROL SULFATE 2 PUFF: 90 AEROSOL, METERED RESPIRATORY (INHALATION) at 20:28

## 2021-12-09 RX ADMIN — INSULIN LISPRO 6 UNITS: 100 INJECTION, SOLUTION INTRAVENOUS; SUBCUTANEOUS at 21:48

## 2021-12-09 RX ADMIN — ONDANSETRON 4 MG: 2 INJECTION INTRAMUSCULAR; INTRAVENOUS at 17:10

## 2021-12-09 RX ADMIN — CETIRIZINE HYDROCHLORIDE 10 MG: 10 TABLET, FILM COATED ORAL at 20:30

## 2021-12-09 RX ADMIN — SODIUM CHLORIDE 25 ML: 9 INJECTION, SOLUTION INTRAVENOUS at 20:42

## 2021-12-09 RX ADMIN — PRAVASTATIN SODIUM 40 MG: 20 TABLET ORAL at 20:29

## 2021-12-09 RX ADMIN — GUAIFENESIN SYRUP AND DEXTROMETHORPHAN 5 ML: 100; 10 SYRUP ORAL at 20:29

## 2021-12-09 RX ADMIN — AZITHROMYCIN MONOHYDRATE 500 MG: 500 INJECTION, POWDER, LYOPHILIZED, FOR SOLUTION INTRAVENOUS at 20:28

## 2021-12-09 RX ADMIN — SODIUM CHLORIDE, POTASSIUM CHLORIDE, SODIUM LACTATE AND CALCIUM CHLORIDE 1000 ML: 600; 310; 30; 20 INJECTION, SOLUTION INTRAVENOUS at 17:10

## 2021-12-09 ASSESSMENT — ENCOUNTER SYMPTOMS
BACK PAIN: 0
VOMITING: 1
NAUSEA: 1
SHORTNESS OF BREATH: 1
ABDOMINAL PAIN: 0
COUGH: 1
DIARRHEA: 1

## 2021-12-09 ASSESSMENT — PAIN DESCRIPTION - LOCATION: LOCATION: HEAD

## 2021-12-09 ASSESSMENT — PAIN DESCRIPTION - DESCRIPTORS: DESCRIPTORS: HEADACHE

## 2021-12-09 ASSESSMENT — PAIN SCALES - GENERAL
PAINLEVEL_OUTOF10: 3
PAINLEVEL_OUTOF10: 0
PAINLEVEL_OUTOF10: 0

## 2021-12-09 NOTE — ED NOTES
Bed: 05  Expected date:   Expected time:   Means of arrival:   Comments:  bijan Fung RN  12/09/21 6914

## 2021-12-09 NOTE — ED PROVIDER NOTES
[facility] EMERGENCY DEPT  EMERGENCY DEPARTMENT ENCOUNTER      Pt Name: Dang Thacker  MRN: [id number]  [place] 1965  Date of evaluation: 12/9/2021  Provider: PELON Graham CNP    CHIEF COMPLAINT       Chief Complaint   Patient presents with    Shortness of Breath    Positive For Covid-19         HISTORY OF PRESENT ILLNESS   (Location/Symptom, Timing/Onset, Context/Setting, Quality, Duration, Modifying Factors, Severity)  Note limiting factors. Dang Thacker is a 49 y.o. female who presents to the emergency department with concerns for worsening COVID. She was diagnosed on 30th of Nov, seen at [facility] on 4th, now with worsening. Describes fever, chills, n/v/d, shortness of breath throughout. Oxygen saturations 88% on room air on arrival.     Lists of hospitals in the [place]    Nursing Notes were reviewed. REVIEW OF SYSTEMS    (2-9 systems for level 4, 10 or more for level 5)     Review of Systems   Constitutional: Positive for chills and fever. HENT: Positive for congestion. Respiratory: Positive for cough and shortness of breath. Cardiovascular: Negative for chest pain, palpitations and leg swelling. Gastrointestinal: Positive for diarrhea, nausea and vomiting. Negative for abdominal pain. Genitourinary: Negative for dysuria, flank pain, frequency and urgency. Musculoskeletal: Negative for back pain and neck pain. Neurological: Positive for headaches. Negative for dizziness, syncope, weakness and light-headedness. Except as noted above the remainder of the review of systems was reviewed and negative.        PAST MEDICAL HISTORY     Past Medical History:   Diagnosis Date    Anemia     Asthma     Bronchitis, acute     COPD (chronic obstructive pulmonary disease) ([address]     Glaucoma     Hyperlipidemia     Hypertension     Hyperthyroidism     Morbid obesity ([facility]     Sleep apnea     uses c-pap         SURGICAL HISTORY       Past Surgical History:   Procedure Laterality Date    AXILLARY SURGERY Bilateral     excision and drainage of staph abscesses    BACK SURGERY  10/2004    Dr. Navneet Whyte, [place] L4, L5    CARPAL TUNNEL RELEASE Right     CHOLECYSTECTOMY      COLONOSCOPY  03/20/2007    Dr Nato Durant N/A 9/11/2020    Dr Kae Giron yr recall    DILATION AND CURETTAGE OF UTERUS N/A 1/29/2021    DILATATION AND CURETTAGE HYSTEROSCOPY Opal sharron performed by Yissel Eason MD at [address] COLONOSCOPY FLX DX W/COLLJ [address] WHEN PFRMD N/A 2/8/2017    Dr Francisco Eldridge, actively inflamed internal hemorrhoids, residulal liquid and some solid food particles in the colon-5 yr recall due to prep    STOMACH SURGERY  01/20/2014    Dr. Stefany Friedman ( gastric sleeve)    UPPER GASTROINTESTINAL ENDOSCOPY  10/30/2012    Dr Tonny Srivastava esophagitis, gastritis, Iliana (-)    UPPER GASTROINTESTINAL ENDOSCOPY  01/23/2008    Dr Felicia Aase esophagitis    UPPER GASTROINTESTINAL ENDOSCOPY N/A 9/11/2020    Dr Caroline Fatima-Gastritis         CURRENT MEDICATIONS       Previous Medications    ALBUTEROL (PROVENTIL) (2.5 MG/3ML) 0.083% NEBULIZER SOLUTION    Take 2.5 mg by nebulization every 4 hours as needed for Wheezing     ALBUTEROL SULFATE (PROAIR HFA IN)    Inhale 2 puffs into the lungs 4 times daily    ALPRAZOLAM (XANAX) 1 MG TABLET    Take 1 mg by mouth nightly as needed for Sleep. B-D 3CC LUER-SHO SYR 25GX1\" 25G X 1\" 3 ML MISC        CALCIUM CARBONATE (OSCAL) 500 MG TABS TABLET    Take 500 mg by mouth daily    CHOLECALCIFEROL (VITAMIN D3) 50 MCG (2000 UT) CAPS    Take by mouth daily    CYANOCOBALAMIN 1000 MCG/ML INJECTION    Inject 1,000 mcg into the muscle every 7 days Thursday    DICLOFENAC SODIUM (VOLTAREN) 1 % GEL        FUROSEMIDE (LASIX) 40 MG TABLET    Take 40 mg by mouth as needed     HYDROCODONE-ACETAMINOPHEN (NORCO) 7.5-325 MG PER TABLET    Take 1 tablet by mouth every 6 hours as needed for Pain (Dr. Nimisha Damian (pcp)).      HYDROCORTISONE (ANUSOL-HC) 2.5 % RECTAL CREAM    Apply rectally 1-2 times Years since quitting: 10.6    Smokeless tobacco: Never Used   Vaping Use    Vaping Use: Never used   Substance and Sexual Activity    Alcohol use: No    Drug use: No    Sexual activity: Yes     Partners: Male   Other Topics Concern    None   Social History Narrative    None     Social Determinants of Health     Financial Resource Strain:     Difficulty of Paying Living Expenses: Not on file   Food Insecurity:     Worried About Running Out of Food in the Last Year: Not on file    Tiffanie of Food in the Last Year: Not on file   Transportation Needs:     Lack of Transportation (Medical): Not on file    Lack of Transportation (Non-Medical): Not on file   Physical Activity:     Days of Exercise per Week: Not on file    Minutes of Exercise per Session: Not on file   Stress:     Feeling of Stress : Not on file   Social Connections:     Frequency of Communication with Friends and Family: Not on file    Frequency of Social Gatherings with Friends and Family: Not on file    Attends Islam Services: Not on file    Active Member of [address] [place] [address] or Organizations: Not on file    Attends Club or Organization Meetings: Not on file    Marital Status: Not on file   Intimate Partner Violence:     Fear of Current or Ex-Partner: Not on file    Emotionally Abused: Not on file    Physically Abused: Not on file    Sexually Abused: Not on file   Housing Stability:     Unable to Pay for Housing in the Last Year: Not on file    Number of [place] in the Last Year: Not on file    Unstable Housing in the Last Year: Not on file       SCREENINGS                        PHYSICAL EXAM    (up to 7 for level 4, 8 or more for level 5)     ED Triage Vitals [12/09/21 1616]   BP Temp Temp Source Pulse Resp SpO2 Height Weight   128/76 99.1 °F (37.3 °C) Oral 111 20 (!) 88 % -- --       Physical Exam  Vitals reviewed. Constitutional:       General: She is in acute distress (purse lipped breathing, tachypneic). Appearance: She is well-developed. She is ill-appearing. She is not toxic-appearing or diaphoretic. HENT:      Head: Normocephalic and atraumatic. Mouth/Throat:      Mouth: Mucous membranes are moist.      Pharynx: Oropharynx is clear. Eyes:      Extraocular Movements: Extraocular movements intact. Pupils: Pupils are equal, round, and reactive to light. Cardiovascular:      Rate and Rhythm: Regular rhythm. Tachycardia present. Pulmonary:      Effort: Tachypnea and respiratory distress present. Breath sounds: Rales present. Chest:      Chest wall: No tenderness. Musculoskeletal:      Cervical back: Normal range of motion and neck supple. Right lower leg: No tenderness. No edema. Left lower leg: No tenderness. No edema. Skin:     General: Skin is warm and dry. Capillary Refill: Capillary refill takes less than 2 seconds. Neurological:      General: No focal deficit present. Mental Status: She is alert and oriented to person, place, and time. DIAGNOSTIC RESULTS     EKG: All EKG's are interpreted by the Emergency Department Physician who either signs or Co-signs this chart in the absence of a cardiologist.    Sinus tachycardia rate 107 no stemi, reviewed by Dr Promise Silva. RADIOLOGY:   Non-plain film images such as CT, Ultrasound and MRI are read by the radiologist. Plain radiographic images are visualized and preliminarily interpreted by the emergency physician with the below findings:      Interpretation per the Radiologist below, if available at the time of this note:    XR CHEST PORTABLE   Final Result   Bilateral patchy airspace opacity. Central vascular congestion. Differential includes pneumonia and edema.    Signed by Dr Gresham Morning            ED BEDSIDE ULTRASOUND:   Performed by ED Physician - none    LABS:  Labs Reviewed   CBC WITH AUTO DIFFERENTIAL - Abnormal; Notable for the following components:       Result Value    MCHC 32.7 (*)     Neutrophils % 82.9 (*)     Lymphocytes % 9.4 (*)     Lymphocytes Absolute 0.5 (*)     All other components within normal limits   COMPREHENSIVE METABOLIC PANEL - Abnormal; Notable for the following components:    Sodium 131 (*)     Chloride 95 (*)     Glucose 322 (*)     Calcium 8.5 (*)     ALT 46 (*)     AST 70 (*)     All other components within normal limits   TROPONIN   BRAIN NATRIURETIC PEPTIDE       All other labs were within normal range or not returned as of this dictation. EMERGENCY DEPARTMENT COURSE and DIFFERENTIAL DIAGNOSIS/MDM:   Vitals:    Vitals:    12/09/21 1616   BP: 128/76   Pulse: 111   Resp: 20   Temp: 99.1 °F (37.3 °C)   TempSrc: Oral   SpO2: (!) 88%       MDM      REASSESSMENT     ED Course as of 12/09/21 1706   Thu Dec 09, 2021   1702 Case d/w Dr Jayla Cornelius, accepts admission and plans to come to ER to see. Patient is requesting antibody infusion [BW]      ED Course User Index  [BW] PELON Gunter CNP         CRITICAL CARE TIME       CONSULTS:  None    PROCEDURES:  Unless otherwise noted below, none     Procedures         FINAL IMPRESSION      1. Hypoxia    2. COVID-19          DISPOSITION/PLAN   DISPOSITION Decision To Admit 12/09/2021 04:57:27 PM      PATIENT REFERRED TO:  No follow-up provider specified. DISCHARGE MEDICATIONS:  New Prescriptions    No medications on file     Controlled Substances Monitoring:     No flowsheet data found.     (Please note that portions of this note were completed with a voice recognition program.  Efforts were made to edit the dictations but occasionally words are mis-transcribed.)    PELON Gunter CNP (electronically signed)  Attending Emergency Physician         PELON Gunter CNP  12/09/21 Darius [id number], APRN - CNP  12/09/21 Darius [id number], APRN - CNP  12/09/21 1298

## 2021-12-10 LAB
ALBUMIN SERPL-MCNC: 3.4 G/DL (ref 3.5–5.2)
ALP BLD-CCNC: 88 U/L (ref 35–104)
ALT SERPL-CCNC: 42 U/L (ref 5–33)
ANION GAP SERPL CALCULATED.3IONS-SCNC: 14 MMOL/L (ref 7–19)
AST SERPL-CCNC: 63 U/L (ref 5–32)
BASOPHILS ABSOLUTE: 0 K/UL (ref 0–0.2)
BASOPHILS RELATIVE PERCENT: 0.2 % (ref 0–1)
BILIRUB SERPL-MCNC: 0.3 MG/DL (ref 0.2–1.2)
BUN BLDV-MCNC: 10 MG/DL (ref 6–20)
CALCIUM SERPL-MCNC: 8.5 MG/DL (ref 8.6–10)
CHLORIDE BLD-SCNC: 98 MMOL/L (ref 98–111)
CO2: 22 MMOL/L (ref 22–29)
CREAT SERPL-MCNC: 0.7 MG/DL (ref 0.5–0.9)
EKG P AXIS: 39 DEGREES
EKG P-R INTERVAL: 132 MS
EKG Q-T INTERVAL: 326 MS
EKG QRS DURATION: 102 MS
EKG QTC CALCULATION (BAZETT): 408 MS
EKG T AXIS: 29 DEGREES
EOSINOPHILS ABSOLUTE: 0 K/UL (ref 0–0.6)
EOSINOPHILS RELATIVE PERCENT: 0 % (ref 0–5)
GFR AFRICAN AMERICAN: >59
GFR NON-AFRICAN AMERICAN: >60
GLUCOSE BLD-MCNC: 252 MG/DL (ref 70–99)
GLUCOSE BLD-MCNC: 258 MG/DL (ref 70–99)
GLUCOSE BLD-MCNC: 288 MG/DL (ref 74–109)
GLUCOSE BLD-MCNC: 329 MG/DL (ref 70–99)
GLUCOSE BLD-MCNC: 370 MG/DL (ref 70–99)
HBA1C MFR BLD: 9 % (ref 4–6)
HCT VFR BLD CALC: 42.1 % (ref 37–47)
HEMOGLOBIN: 13.7 G/DL (ref 12–16)
IMMATURE GRANULOCYTES #: 0 K/UL
LYMPHOCYTES ABSOLUTE: 1.1 K/UL (ref 1.1–4.5)
LYMPHOCYTES RELATIVE PERCENT: 26 % (ref 20–40)
MCH RBC QN AUTO: 30.3 PG (ref 27–31)
MCHC RBC AUTO-ENTMCNC: 32.5 G/DL (ref 33–37)
MCV RBC AUTO: 93.1 FL (ref 81–99)
MONOCYTES ABSOLUTE: 0.5 K/UL (ref 0–0.9)
MONOCYTES RELATIVE PERCENT: 10.7 % (ref 0–10)
NEUTROPHILS ABSOLUTE: 2.7 K/UL (ref 1.5–7.5)
NEUTROPHILS RELATIVE PERCENT: 62.6 % (ref 50–65)
PDW BLD-RTO: 13.3 % (ref 11.5–14.5)
PERFORMED ON: ABNORMAL
PLATELET # BLD: 226 K/UL (ref 130–400)
PMV BLD AUTO: 10.6 FL (ref 9.4–12.3)
POTASSIUM REFLEX MAGNESIUM: 5.1 MMOL/L (ref 3.5–5)
RBC # BLD: 4.52 M/UL (ref 4.2–5.4)
SODIUM BLD-SCNC: 134 MMOL/L (ref 136–145)
TOTAL PROTEIN: 6.5 G/DL (ref 6.6–8.7)
WBC # BLD: 4.3 K/UL (ref 4.8–10.8)

## 2021-12-10 PROCEDURE — 93010 ELECTROCARDIOGRAM REPORT: CPT | Performed by: INTERNAL MEDICINE

## 2021-12-10 PROCEDURE — 82947 ASSAY GLUCOSE BLOOD QUANT: CPT

## 2021-12-10 PROCEDURE — 1210000000 HC MED SURG R&B

## 2021-12-10 PROCEDURE — 2700000000 HC OXYGEN THERAPY PER DAY

## 2021-12-10 PROCEDURE — 36415 COLL VENOUS BLD VENIPUNCTURE: CPT

## 2021-12-10 PROCEDURE — 6360000002 HC RX W HCPCS: Performed by: NURSE PRACTITIONER

## 2021-12-10 PROCEDURE — 80053 COMPREHEN METABOLIC PANEL: CPT

## 2021-12-10 PROCEDURE — 2580000003 HC RX 258: Performed by: NURSE PRACTITIONER

## 2021-12-10 PROCEDURE — 83036 HEMOGLOBIN GLYCOSYLATED A1C: CPT

## 2021-12-10 PROCEDURE — 85025 COMPLETE CBC W/AUTO DIFF WBC: CPT

## 2021-12-10 PROCEDURE — 6370000000 HC RX 637 (ALT 250 FOR IP): Performed by: NURSE PRACTITIONER

## 2021-12-10 RX ORDER — DEXAMETHASONE SODIUM PHOSPHATE 10 MG/ML
6 INJECTION, SOLUTION INTRAMUSCULAR; INTRAVENOUS EVERY 24 HOURS
Status: DISCONTINUED | OUTPATIENT
Start: 2021-12-10 | End: 2021-12-11

## 2021-12-10 RX ADMIN — ALBUTEROL SULFATE 2 PUFF: 90 AEROSOL, METERED RESPIRATORY (INHALATION) at 20:52

## 2021-12-10 RX ADMIN — Medication 1000 UNITS: at 09:15

## 2021-12-10 RX ADMIN — GUAIFENESIN SYRUP AND DEXTROMETHORPHAN 5 ML: 100; 10 SYRUP ORAL at 02:44

## 2021-12-10 RX ADMIN — GUAIFENESIN SYRUP AND DEXTROMETHORPHAN 5 ML: 100; 10 SYRUP ORAL at 13:07

## 2021-12-10 RX ADMIN — INSULIN LISPRO 12 UNITS: 100 INJECTION, SOLUTION INTRAVENOUS; SUBCUTANEOUS at 13:08

## 2021-12-10 RX ADMIN — BARICITINIB 4 MG: 2 TABLET, FILM COATED ORAL at 20:51

## 2021-12-10 RX ADMIN — POLYETHYLENE GLYCOL 3350 17 G: 17 POWDER, FOR SOLUTION ORAL at 09:17

## 2021-12-10 RX ADMIN — ALBUTEROL SULFATE 2 PUFF: 90 AEROSOL, METERED RESPIRATORY (INHALATION) at 09:27

## 2021-12-10 RX ADMIN — ENOXAPARIN SODIUM 30 MG: 100 INJECTION SUBCUTANEOUS at 09:17

## 2021-12-10 RX ADMIN — PRAVASTATIN SODIUM 40 MG: 20 TABLET ORAL at 09:14

## 2021-12-10 RX ADMIN — GUAIFENESIN SYRUP AND DEXTROMETHORPHAN 5 ML: 100; 10 SYRUP ORAL at 09:16

## 2021-12-10 RX ADMIN — Medication 5 MG: at 20:51

## 2021-12-10 RX ADMIN — ENOXAPARIN SODIUM 30 MG: 100 INJECTION SUBCUTANEOUS at 20:51

## 2021-12-10 RX ADMIN — Medication 1 TABLET: at 09:14

## 2021-12-10 RX ADMIN — DEXAMETHASONE SODIUM PHOSPHATE 6 MG: 10 INJECTION INTRAMUSCULAR; INTRAVENOUS at 09:16

## 2021-12-10 RX ADMIN — TIMOLOL MALEATE 1 DROP: 5 SOLUTION OPHTHALMIC at 20:52

## 2021-12-10 RX ADMIN — MECLIZINE HYDROCHLORIDE 25 MG: 25 TABLET ORAL at 03:01

## 2021-12-10 RX ADMIN — SODIUM CHLORIDE, PRESERVATIVE FREE 10 ML: 5 INJECTION INTRAVENOUS at 09:18

## 2021-12-10 RX ADMIN — ZINC SULFATE 220 MG (50 MG) CAPSULE 50 MG: CAPSULE at 09:14

## 2021-12-10 RX ADMIN — ACETAMINOPHEN 650 MG: 325 TABLET ORAL at 02:44

## 2021-12-10 RX ADMIN — ACETAMINOPHEN 650 MG: 325 TABLET ORAL at 09:15

## 2021-12-10 RX ADMIN — INSULIN LISPRO 15 UNITS: 100 INJECTION, SOLUTION INTRAVENOUS; SUBCUTANEOUS at 17:37

## 2021-12-10 RX ADMIN — LEVOTHYROXINE SODIUM 100 MCG: 100 TABLET ORAL at 05:51

## 2021-12-10 RX ADMIN — ONDANSETRON HYDROCHLORIDE 8 MG: 4 TABLET, FILM COATED ORAL at 17:51

## 2021-12-10 RX ADMIN — PANTOPRAZOLE SODIUM 40 MG: 40 TABLET, DELAYED RELEASE ORAL at 05:51

## 2021-12-10 RX ADMIN — ACETAMINOPHEN 650 MG: 325 TABLET ORAL at 21:01

## 2021-12-10 RX ADMIN — ALBUTEROL SULFATE 2 PUFF: 90 AEROSOL, METERED RESPIRATORY (INHALATION) at 13:08

## 2021-12-10 RX ADMIN — SODIUM CHLORIDE, PRESERVATIVE FREE 10 ML: 5 INJECTION INTRAVENOUS at 20:51

## 2021-12-10 RX ADMIN — AZITHROMYCIN MONOHYDRATE 500 MG: 500 INJECTION, POWDER, LYOPHILIZED, FOR SOLUTION INTRAVENOUS at 20:51

## 2021-12-10 RX ADMIN — CETIRIZINE HYDROCHLORIDE 10 MG: 10 TABLET, FILM COATED ORAL at 09:16

## 2021-12-10 RX ADMIN — GUAIFENESIN SYRUP AND DEXTROMETHORPHAN 5 ML: 100; 10 SYRUP ORAL at 17:46

## 2021-12-10 RX ADMIN — INSULIN LISPRO 5 UNITS: 100 INJECTION, SOLUTION INTRAVENOUS; SUBCUTANEOUS at 21:03

## 2021-12-10 RX ADMIN — OXYCODONE HYDROCHLORIDE AND ACETAMINOPHEN 1000 MG: 500 TABLET ORAL at 09:14

## 2021-12-10 RX ADMIN — TIMOLOL MALEATE 1 DROP: 5 SOLUTION OPHTHALMIC at 09:16

## 2021-12-10 ASSESSMENT — ENCOUNTER SYMPTOMS
COUGH: 1
SINUS PAIN: 0
CHEST TIGHTNESS: 1
APNEA: 1
TROUBLE SWALLOWING: 0
SORE THROAT: 0
WHEEZING: 1
NAUSEA: 1
BLOOD IN STOOL: 0
VOMITING: 1
SHORTNESS OF BREATH: 1
EYES NEGATIVE: 1
CONSTIPATION: 0
DIARRHEA: 1
ABDOMINAL PAIN: 0
ABDOMINAL DISTENTION: 0

## 2021-12-10 ASSESSMENT — PAIN SCALES - GENERAL
PAINLEVEL_OUTOF10: 0
PAINLEVEL_OUTOF10: 3
PAINLEVEL_OUTOF10: 2
PAINLEVEL_OUTOF10: 1
PAINLEVEL_OUTOF10: 5

## 2021-12-10 NOTE — PROGRESS NOTES
PHARMACY NOTE  Chris Saldana was ordered fish oil. Per the [address], this medication is non-formulary and not stocked by pharmacy. It has been discontinued. The medication can be reordered at discharge.      Electronically signed by JOHN Griffin [facility] on 12/9/2021 at 7:38 PM

## 2021-12-10 NOTE — PROGRESS NOTES
Physician Progress Note      Debbie Ruggiero  CSN #:                  [id number]  :                       1965  ADMIT DATE:       2021 4:03 PM  [address] [place] DATE:  RESPONDING  PROVIDER #:        Wenceslao Logan JR          QUERY TEXT:    Pt admitted with COVID-19 pneumonia. Pt noted to have hypoxia. If possible,   please document in the progress notes and discharge summary if you are   evaluating and/or treating any of the following: The medical record reflects the following:  Risk Factors: COVID-19 pneumonia  Clinical Indicators: patient presented to ED with shortness of breath and was   noted to have been \"in acute distress (purse lipped breathing, tachypneic)\"   with an O2 sat of 88% on RA; documentation reflected per ED impression-hypoxia  Treatment: O2 with titration to keep sat >90, Decadron IV,    Thank you,  Bianka Burkett RN, CCDS  [phone]  Options provided:  -- Acute respiratory failure with hypoxia  -- Hypoxia only  -- Other - I will add my own diagnosis  -- Disagree - Not applicable / Not valid  -- Disagree - Clinically unable to determine / Unknown  -- Refer to Clinical Documentation Reviewer    PROVIDER RESPONSE TEXT:    This patient is in acute respiratory failure with hypoxia.     Query created by: Mary Snider on 12/10/2021 9:36 AM      Electronically signed by:  Rahul Fatima 12/10/2021 1:00 PM

## 2021-12-10 NOTE — ACP (ADVANCE CARE PLANNING)
Advance Care Planning     Advance Care Planning Activator (Inpatient)  Conversation Note      Date of ACP Conversation: 12/10/2021     Conversation Conducted with: Patient with Decision Making Capacity    ACP Activator: [address] [address] Decision Maker:     Current Designated Health Care Decision Maker:     Primary Decision Maker: Beth Hodges - Child - [phone]    Secondary Decision Maker: Jihan Jiang - Brother/Sister - [phone]    Care Preferences    Ventilation: \"If you were in your present state of health and suddenly became very ill and were unable to breathe on your own, what would your preference be about the use of a ventilator (breathing machine) if it were available to you? \"      Would the patient desire the use of ventilator (breathing machine)?: yes    \"If your health worsens and it becomes clear that your chance of recovery is unlikely, what would your preference be about the use of a ventilator (breathing machine) if it were available to you? \"     Would the patient desire the use of ventilator (breathing machine)?: \"I will leave that to my daughter to decide. \"      Resuscitation  \"CPR works best to restart the heart when there is a sudden event, like a heart attack, in someone who is otherwise healthy. Unfortunately, CPR does not typically restart the heart for people who have serious health conditions or who are very sick. \"    \"In the event your heart stopped as a result of an underlying serious health condition, would you want attempts to be made to restart your heart (answer \"yes\" for attempt to resuscitate) or would you prefer a natural death (answer \"no\" for do not attempt to resuscitate)? \" yes       [] Yes   [x] No   Educated Patient / [place] regarding differences between Advance Directives and portable DNR orders.     Length of ACP Conversation in minutes:  30    Conversation Outcomes:  [x] ACP discussion completed    Follow-up plan: None    Electronically signed by IGNACIA Guerrero on 12/10/2021 at 1:48 PM

## 2021-12-10 NOTE — PROGRESS NOTES
[facility] [place], [address] [facility] MEDICINE    DAILY PROGRESS NOTE    Patient:  Jason Kern  YOB: 1965  Date of Service: 12/10/2021  MRN: [id number]   Acct: [de-identified]   Primary Care Physician: Kat Barriga MD  Advance Directive: Full Code  Admit Date: 12/9/2021       Hospital Day: 1  Portions of this note have been copied forward, however, changed to reflect the most current clinical status of this patient. CHIEF COMPLAINT Worsening SOA    SUBJECTIVE:  Patient reports still feeling short of breath today and loss of smell. She wants to know when that will return as it affects how she enjoys food. CUMULATIVE HOSPITAL COURSE:  History Of Present Illness: The patient is a 49 y.o. female with a hx of asthma, COPD, HTN, HLD, hypothyroidism and obesity who presented to [facility] ED complaining of worsening SOA in the setting of Covid. She was diagnosed with Covid on 11/30. She was seen at [facility] on 12/4/21. Per chart review, she did not meet the criteria for admission, however, she was offered monoclonal antibodies, but she declined at that time. She reports worsening fever, chills, HA, N/V and SOA with Cough.      Review of Systems:   Review of Systems   Constitutional: Positive for appetite change. HENT:        Loss of taste and smell   Eyes: Negative. Respiratory: Positive for apnea, cough, chest tightness, shortness of breath and wheezing. Cardiovascular: Negative. Negative for chest pain. Gastrointestinal: Positive for diarrhea, nausea and vomiting. Genitourinary: Negative. Musculoskeletal: Negative. Neurological: Negative. 14 point review of systems is negative except as specifically addressed above.       Objective:   VITALS:  BP (!) 119/59   Pulse 80   Temp 96.4 °F (35.8 °C) (Temporal)   Resp 20   SpO2 92%    Body Mass Index: 55.91 kg/m² Abnormal    5' 5\" (165.1 cm)  as of 4/26/2021   336 lb (152.4 kg)  as of 4/26/2021 Suspect much greater     24HR INTAKE/OUTPUT:    Intake/Output Summary (Last 24 hours) at 12/10/2021 1746  Last data filed at 12/9/2021 2309  Gross per 24 hour   Intake 1250 ml   Output --   Net 1250 ml       Physical Exam  Vitals and nursing note reviewed. Constitutional:       Appearance: She is obese. Comments: Morbidly obese   HENT:      Head: Normocephalic and atraumatic. Eyes:      Extraocular Movements: Extraocular movements intact. Conjunctiva/sclera: Conjunctivae normal.      Pupils: Pupils are equal, round, and reactive to light. Cardiovascular:      Rate and Rhythm: Normal rate and regular rhythm. Pulses: Normal pulses. Pulmonary:      Effort: Respiratory distress present. Breath sounds: Wheezing and rhonchi present. Musculoskeletal:      Right lower leg: Edema present. Left lower leg: Edema present. Skin:     General: Skin is warm and dry. Capillary Refill: Capillary refill takes 2 to 3 seconds. Neurological:      General: No focal deficit present. Mental Status: She is alert and oriented to person, place, and time.        Medications:      sodium chloride 25 mL (12/09/21 2042)    dextrose        dexamethasone  6 mg IntraVENous Q24H    albuterol sulfate HFA  2 puff Inhalation 4x Daily    calcium-cholecalciferol  1 tablet Oral Daily    Vitamin D  1,000 Units Oral Daily    levothyroxine  100 mcg Oral Daily    vitamin C  1,000 mg Oral Daily    timolol  1 drop Both Eyes BID    pravastatin  40 mg Oral Daily    polyethylene glycol  17 g Oral Daily    pantoprazole  40 mg Oral QAM AC    cetirizine  10 mg Oral Daily    sodium chloride flush  5-40 mL IntraVENous 2 times per day    enoxaparin  30 mg SubCUTAneous BID    zinc sulfate  50 mg Oral Daily    melatonin  5 mg Oral Nightly    azithromycin  500 mg IntraVENous Q24H    insulin lispro  0-18 Units SubCUTAneous TID     insulin lispro  0-9 Units SubCUTAneous Nightly    [Held by provider] lisinopril  20 mg Oral Daily    And    [Held by provider] hydroCHLOROthiazide  12.5 mg Oral Daily    baricitinib  4 mg Oral Daily     ALPRAZolam, furosemide, HYDROcodone-acetaminophen, ondansetron, meclizine, sodium chloride flush, sodium chloride, polyethylene glycol, acetaminophen **OR** acetaminophen, guaiFENesin-dextromethorphan, glucose, dextrose, glucagon (rDNA), dextrose  ADULT DIET; Regular; 4 carb choices (60 gm/meal)     Lab and other Data:     Recent Labs     12/09/21  1635 12/10/21  0446   WBC 5.3 4.3*   HGB 14.6 13.7    226     Recent Labs     12/09/21  1635 12/10/21  0446   * 134*   K 4.5 5.1*   CL 95* 98   CO2 23 22   BUN 8 10   CREATININE 0.8 0.7   GLUCOSE 322* 288*     Recent Labs     12/09/21  1635 12/10/21  0446   AST 70* 63*   ALT 46* 42*   BILITOT 0.3 0.3   ALKPHOS 90 88     Troponin T:   Recent Labs     12/09/21 1635   TROPONINI <0.01     Pro-BNP: No results for input(s): BNP in the last 72 hours. INR: No results for input(s): INR in the last 72 hours. UA:No results for input(s): NITRITE, COLORU, PHUR, LABCAST, WBCUA, RBCUA, MUCUS, TRICHOMONAS, YEAST, BACTERIA, CLARITYU, SPECGRAV, LEUKOCYTESUR, UROBILINOGEN, BILIRUBINUR, BLOODU, GLUCOSEU, AMORPHOUS in the last 72 hours. Invalid input(s): Tamir Landers  A1C:   Recent Labs     12/10/21  0446   LABA1C 9.0*     ABG:No results for input(s): PHART, WLZ4MCU, PO2ART, HEF5DHX, BEART, HGBAE, E9HWTHCT, CARBOXHGBART in the last 72 hours. RAD:   XR CHEST PORTABLE    Result Date: 12/9/2021  Bilateral patchy airspace opacity. Central vascular congestion. Differential includes pneumonia and edema.  Signed by Dr Imani Mann      Assessment/Plan     Principal Problem:    Pneumonia due to COVID-19 virus              -med surg with tele-Full code              -Droplet + isolation              -Titrate O2 to keep O2 sat greater than 90              -Dexamethasone 6mg po daily              -Consult pharmacy for Baricitinib dosing -Vitamin D,Vitamin C, Zinc, Melatonin              -Consult home inhalers              -Zithromax IV              -VTE prophylaxis              -Vitals per unit routine              -I&O's per unit routine              -Diabetic diet              -Labs in the AM              -Up as tolerated              -Continue to monitor for medical/supportive care     Active Problems:    Hypertension              -Noted              -Home BP meds held due to hypotensio       Hypothyroidism              -Noted              -Continue home Levothyroxine     VTE prophylaxis-Lovenox  GI prophylaxis-Pantoprazole     Electronically signed by     Lois Quintana MD, FAAFP  Hospitalist    on 12/10/2021 5:46 PM

## 2021-12-10 NOTE — CARE COORDINATION
Initial CM Assessment    Initial Assessment Completed at bedside wearing appropriate PPE with:    [x]   Patient  []   Family/Caregiver/Guardian   []   Other:      Patient Contact Information:  [address] [id number] (home)   Above information verified? [x]   Yes  []   No    ADLS:   Patient lives alone. She has a cane, walker, BSC, and Shower chair. Prior to admission, she only used the cane and walker PRN. Support System:      Plan to return to current housing:   [x]   Yes  []   No     D/C Plan if not returning home:    Transportation plan for Discharge:  Unsure of who will be available at time of discharge    Do you have any unmet social needs that would keep you from returning home safely:  []   Yes  [x]   No              Unmet Social Needs Notes:       Had [address] prior to admission:    []   Yes  [x]   No    Oxygen Company:   No preferred company if home oxygen is required. Has a pulse oximetry unit at home:   [x]   Yes  []   No    Currently ACTIVE with Home Health CARE:    []   Yes  []   No  [x]   Interested at discharge  [address] [place] [address]:      Current PCP:  Chuy Montalvo MD  PCP verified? [x]   Yes  []   No    Have you been vaccinated for COVID-19 (SARS-CoV-2)? []   Yes  [x]   No                   If so, when? Which :  []   Pfizer-NepheraNTWattbot  []   Moderna  []   Kingston Knapp  []   Other:       Pharmacy:    [address] [place] [address], [address] [place] [address] [address] [id number]  Phone: [phone] Fax: [phone]    Prefer to use Meds to Bed? []   Yes  [x]   No  Potential assistance purchasing medications?      []   Yes  [x]   No    Active with HD/PD prior to admission:           []   Yes  [x]   No  HD Center:       Financial:  Payor: T[employer]a [employer] / Plan: Annita Setting / Product Type: *No Product type* /     Pre-Cert required for SNF:   [x]   Yes  [] No    Patient Deficits:  []   Yes   [x]   No    If yes:  []   Confusion/Memory  []   Visual  []   Motor/Sensory         []   Right arm         []   Right leg         []   Left arm         []   Left leg  []   Language/Speech         []   Aphasia         []   Dysarthria         []   Swallow         Jak Coma Scale  Eye Opening: Spontaneous  Best Verbal Response: Oriented  Best Motor Response: Obeys commands  Bend Coma Scale Score: 15    Patient Deficit Notes: Additional CM/SW Notes:   Explained [address]  program.  Patient consented to participate in CHW program.  Information faxed to [facility]. Eliquis Free 30-day coupon placed in chart.     Dallas Sanchez and/or her family were provided with choice of provider:  [x]   Yes   []   No      Patient Admission Status:   Inpatient [101]    Parish Epps RN  [facility] Management  Electronically signed by Parish Epps RN on 12/10/2021 at 10:40 AM

## 2021-12-10 NOTE — H&P
[facility]      Hospitalist - History & Physical      PCP: Chad Ramirez MD    Date of Admission: 12/9/2021    Date of Service: 12/9/2021    Chief Complaint:  Worsening SOA    History Of Present Illness: The patient is a 49 y.o. female with a hx of asthma, COPD, HTN, HLD, hypothyroidism and obesity who presented to [address] [place] [address] ED complaining of worsening SOA in the setting of Covid. She was diagnosed with Covid on 11/30. She was seen at [facility] on 12/4/21. Per chart review, she did not meet the criteria for admission, however, she was offered monoclonal antibodies, but she declined at that time. She reports worsening fever, chills, HA, N/V and SOA with Cough. Further ED work-up did reveal her to be hypoxic at 88% on RA upon arrival. Na-131,K-4.5, BUN-8, creatinine-0.8 and BNP-22. Troponin was negative. WBC-5.3 H/H 14.6/44.6 and plt-206. Chest x-ray was consistent with COVID pneumonia. Hospitalist are being requested for admission and medical management for Covid pneumonia.      Past Medical History:        Diagnosis Date    Anemia     Asthma     Bronchitis, acute     COPD (chronic obstructive pulmonary disease) (HCC)     Glaucoma     Hyperlipidemia     Hypertension     Hyperthyroidism     Morbid obesity ([address]     Sleep apnea     uses c-pap       Past Surgical History:        Procedure Laterality Date    AXILLARY SURGERY Bilateral     excision and drainage of staph abscesses    BACK SURGERY  10/2004    Dr. Cindy Armstrong, [place] L4, L5    CARPAL TUNNEL RELEASE Right     CHOLECYSTECTOMY      COLONOSCOPY  03/20/2007    Dr Radha Joyner N/A 9/11/2020    Dr Jose C Lopez yr recall    DILATION AND CURETTAGE OF UTERUS N/A 1/29/2021    DILATATION AND CURETTAGE HYSTEROSCOPY NOVASURE ABLATION performed by eBth Leiva MD at [facility] FLX DX W/COLLJ [address] WHEN PFRMD N/A 2/8/2017    Dr Marielle Phillips, actively inflamed internal hemorrhoids, residulal liquid and some solid food particles in the colon-5 yr recall due to prep    STOMACH SURGERY  01/20/2014    Dr. Sada Clemons ( gastric sleeve)    UPPER GASTROINTESTINAL ENDOSCOPY  10/30/2012    Dr Graff Sham esophagitis, gastritis, Iliana (-)    UPPER GASTROINTESTINAL ENDOSCOPY  01/23/2008    Dr Anupama Palma esophagitis    UPPER GASTROINTESTINAL ENDOSCOPY N/A 9/11/2020    Dr Charlette Khoury Fatima-Gastritis       Home Medications:  Prior to Admission medications    Medication Sig Start Date End Date Taking? Authorizing Provider   Albuterol Sulfate (PROAIR HFA IN) Inhale 2 puffs into the lungs 4 times daily    Historical Provider, MD   promethazine (PHENERGAN) 6.25 MG/5ML syrup Take 2.5 mg/mL by mouth 4 times daily as needed for Nausea    Historical Provider, MD   calcium carbonate (OSCAL) 500 MG TABS tablet Take 500 mg by mouth daily    Historical Provider, MD   NONFORMULARY Indications: iv     Historical Provider, MD   Cholecalciferol (VITAMIN D3) 50 MCG (2000 UT) CAPS Take by mouth daily    Historical Provider, MD   Omega-3 Fatty Acids (FISH OIL) 1200 MG CPDR Take 1,200 mg by mouth daily     Historical Provider, MD   vitamin C (ASCORBIC ACID) 500 MG tablet Take 1,000 mg by mouth daily    Historical Provider, MD   lisinopril-hydroCHLOROthiazide (PRINZIDE;ZESTORETIC) 20-12.5 MG per tablet Take 1 tablet by mouth daily  8/21/20   Historical Provider, MD   meclizine (ANTIVERT) 25 MG tablet Take 25 mg by mouth 3 times daily as needed for Dizziness or Nausea    Historical Provider, MD   diclofenac sodium (VOLTAREN) 1 % GEL  8/20/20   Historical Provider, MD   hydrocortisone (ANUSOL-HC) 2.5 % rectal cream Apply rectally 1-2 times daily (after a BM) for 5 days during hemorrhoid flare.  3/8/17   PELON Paul   ondansetron (ZOFRAN) 8 MG tablet Take 8 mg by mouth every 8 hours as needed for Nausea or Vomiting    Historical Provider, MD   albuterol (PROVENTIL) (2.5 MG/3ML) 0.083% nebulizer solution Take 2.5 mg by nebulization every 4 hours as needed for Wheezing  4/9/15   Historical Provider, MD   ALPRAZolam Lili Riling) 1 MG tablet Take 1 mg by mouth nightly as needed for Sleep. 4/9/15   Historical Provider, MD   cyanocobalamin 1000 MCG/ML injection Inject 1,000 mcg into the muscle every 7 days Thursday 4/14/15   Historical Provider, MD   furosemide (LASIX) 40 MG tablet Take 40 mg by mouth as needed  4/9/15   Historical Provider, MD   HYDROcodone-acetaminophen (NORCO) 7.5-325 MG per tablet Take 1 tablet by mouth every 6 hours as needed for Pain (Dr. Alicia Joyce (pcp)). 4/9/15   Historical Provider, MD   levothyroxine (SYNTHROID) 100 MCG tablet Take 100 mcg by mouth Daily  4/9/15   Historical Provider, MD   loratadine (CLARITIN) 10 MG tablet Take 10 mg by mouth daily  4/9/15   Historical Provider, MD   omeprazole (PRILOSEC) 20 MG capsule Take 40 mg by mouth Daily  4/9/15   Historical Provider, MD   polyethylene glycol (GLYCOLAX) powder Take 17 g by mouth daily  4/9/15   Historical Provider, MD   pravastatin (PRAVACHOL) 40 MG tablet Take 40 mg by mouth daily  4/9/15   Historical Provider, MD MCCARTY-CHAUNCEY 3CC LUER-SHO SYR 25GX1\" 25G X 1\" 3 ML MISC  4/14/15   Historical Provider, MD   timolol (TIMOPTIC) 0.5 % ophthalmic solution Place into both eyes 2 times daily  4/9/15   Historical Provider, MD       Allergies:    Latex, Penicillins, Codeine, and Tape [adhesive tape]    Social History:    The patient currently lives at home  Tobacco:   reports that she quit smoking about 10 years ago. Her smoking use included cigarettes. She has a 78.00 pack-year smoking history. She has never used smokeless tobacco.  Alcohol:   reports no history of alcohol use.   Illicit Drugs: Denies     Family History:      Problem Relation Age of Onset    Lung Cancer Mother     Kidney Cancer Mother     Lung Cancer Maternal Grandmother     Colon Cancer Maternal Grandfather     Liver Cancer Neg Hx     Liver Disease Neg Hx     Stomach Cancer Neg Hx     Rectal Cancer Neg Hx     Esophageal Cancer Neg Hx  Colon Polyps Neg Hx        Review of Systems:   Review of Systems   Constitutional: Positive for appetite change and fatigue. Negative for chills, diaphoresis and fever. HENT: Negative for congestion, ear pain, sinus pain, sore throat and trouble swallowing. Eyes: Negative for visual disturbance. Respiratory: Positive for cough, shortness of breath and wheezing. Cardiovascular: Negative for chest pain, palpitations and leg swelling. Gastrointestinal: Positive for diarrhea, nausea and vomiting. Negative for abdominal distention, abdominal pain, blood in stool and constipation. Endocrine: Negative for cold intolerance and heat intolerance. Genitourinary: Negative for difficulty urinating, flank pain, frequency and urgency. Musculoskeletal: Negative for arthralgias and myalgias. Neurological: Negative for dizziness, syncope, weakness, light-headedness, numbness and headaches. Hematological: Does not bruise/bleed easily. Psychiatric/Behavioral: Negative for agitation, confusion and dysphoric mood. 14 point review of systems is negative except as specifically addressed above. Physical Examination:  /72   Pulse 117   Temp 96.4 °F (35.8 °C) (Temporal)   Resp 22   SpO2 91%   Physical Exam  Constitutional:       General: She is not in acute distress. Appearance: Normal appearance. She is obese. She is ill-appearing. She is not toxic-appearing or diaphoretic. HENT:      Head: Normocephalic and atraumatic. Right Ear: External ear normal.      Left Ear: External ear normal.      Nose: Nose normal. No congestion or rhinorrhea. Mouth/Throat:      Mouth: Mucous membranes are moist.      Pharynx: Oropharynx is clear. Eyes:      General: No scleral icterus. Extraocular Movements: Extraocular movements intact. Conjunctiva/sclera: Conjunctivae normal.   Cardiovascular:      Rate and Rhythm: Normal rate. Pulses: Normal pulses.    Pulmonary:      Effort: Tachypnea and accessory muscle usage present. Abdominal:      General: Abdomen is flat. Musculoskeletal:         General: No swelling. Normal range of motion. Cervical back: Normal range of motion and neck supple. Right lower leg: No edema. Left lower leg: No edema. Skin:     General: Skin is dry. Coloration: Skin is not jaundiced. Findings: No erythema, lesion or rash. Neurological:      General: No focal deficit present. Mental Status: She is alert and oriented to person, place, and time. Mental status is at baseline. Cranial Nerves: No cranial nerve deficit. Sensory: No sensory deficit. Motor: No weakness. Psychiatric:         Mood and Affect: Mood normal.         Behavior: Behavior normal.         Thought Content: Thought content normal.         Judgment: Judgment normal.          Diagnostic Data:  CBC:  Recent Labs     12/09/21  1635   WBC 5.3   HGB 14.6   HCT 44.6        BMP:  Recent Labs     12/09/21  1635   *   K 4.5   CL 95*   CO2 23   BUN 8   CREATININE 0.8   CALCIUM 8.5*     Recent Labs     12/09/21  1635   AST 70*   ALT 46*   BILITOT 0.3   ALKPHOS 90       Cardiac Enzymes:   Recent Labs     12/09/21  1635   TROPONINI <0.01       XR CHEST PORTABLE    Result Date: 12/9/2021  EXAM: XR CHEST PORTABLE INDICATION: Shortness of breath, COVID positive COMPARISON: None available. FINDINGS: Cardiac silhouette is normal in size. No pleural effusion or visible pneumothorax. Patchy bilateral airspace opacity is present. Central vascular congestion is noted. No acute osseous finding. Bilateral patchy airspace opacity. Central vascular congestion. Differential includes pneumonia and edema.  Signed by Dr Deep Bañuelos      Assessment/Plan:  Principal Problem:    Pneumonia due to COVID-19 virus   -Admit to med surg with tele-Full code   -Droplet + isolation   -Titrate O2 to keep O2 sat greater than 90   -Dexamethasone 6mg po daily   -Consult pharmacy for Baricitinib dosing   -Vitamin D,Vitamin C, Zinc, Melatonin   -Consult home inhalers   -Zithromax IV   -VTE prophylaxis   -Vitals per unit routine   -I&O's per unit routine   -Diabetic diet   -Labs in the AM   -Up as tolerated   -Continue to monitor for medical/supportive care    Active Problems:    Hypertension   -Noted   -Home BP meds held due to hypotensio      Hypothyroidism   -Noted   -Continue home Levothyroxine    VTE prophylaxis-Lovenox  GI prophylaxis-Pantoprazole       Signed:  PELON Moon, 12/9/2021 8:54 PM

## 2021-12-11 PROBLEM — J96.01 ACUTE HYPOXEMIC RESPIRATORY FAILURE DUE TO COVID-19 (HCC): Status: ACTIVE | Noted: 2021-12-11

## 2021-12-11 PROBLEM — U07.1 ACUTE HYPOXEMIC RESPIRATORY FAILURE DUE TO COVID-19 (HCC): Status: ACTIVE | Noted: 2021-12-11

## 2021-12-11 LAB
ALBUMIN SERPL-MCNC: 3.2 G/DL (ref 3.5–5.2)
ALP BLD-CCNC: 84 U/L (ref 35–104)
ALT SERPL-CCNC: 41 U/L (ref 5–33)
ANION GAP SERPL CALCULATED.3IONS-SCNC: 13 MMOL/L (ref 7–19)
AST SERPL-CCNC: 64 U/L (ref 5–32)
BASE EXCESS ARTERIAL: 4.9 MMOL/L (ref -2–2)
BASOPHILS ABSOLUTE: 0 K/UL (ref 0–0.2)
BASOPHILS RELATIVE PERCENT: 0.1 % (ref 0–1)
BILIRUB SERPL-MCNC: 0.3 MG/DL (ref 0.2–1.2)
BUN BLDV-MCNC: 13 MG/DL (ref 6–20)
C-REACTIVE PROTEIN: 15.01 MG/DL (ref 0–0.5)
CALCIUM SERPL-MCNC: 8.7 MG/DL (ref 8.6–10)
CARBOXYHEMOGLOBIN ARTERIAL: 0.4 % (ref 0–5)
CHLORIDE BLD-SCNC: 99 MMOL/L (ref 98–111)
CO2: 26 MMOL/L (ref 22–29)
CREAT SERPL-MCNC: 0.9 MG/DL (ref 0.5–0.9)
D DIMER: 0.85 UG/ML FEU (ref 0–0.48)
EOSINOPHILS ABSOLUTE: 0 K/UL (ref 0–0.6)
EOSINOPHILS RELATIVE PERCENT: 0 % (ref 0–5)
GFR AFRICAN AMERICAN: >59
GFR NON-AFRICAN AMERICAN: >60
GLUCOSE BLD-MCNC: 165 MG/DL (ref 70–99)
GLUCOSE BLD-MCNC: 169 MG/DL (ref 70–99)
GLUCOSE BLD-MCNC: 173 MG/DL (ref 70–99)
GLUCOSE BLD-MCNC: 195 MG/DL (ref 74–109)
GLUCOSE BLD-MCNC: 203 MG/DL (ref 70–99)
HCO3 ARTERIAL: 28.1 MMOL/L (ref 22–26)
HCT VFR BLD CALC: 42.2 % (ref 37–47)
HEMOGLOBIN, ART, EXTENDED: 14.7 G/DL (ref 12–16)
HEMOGLOBIN: 13.6 G/DL (ref 12–16)
IMMATURE GRANULOCYTES #: 0 K/UL
LYMPHOCYTES ABSOLUTE: 1.5 K/UL (ref 1.1–4.5)
LYMPHOCYTES RELATIVE PERCENT: 19 % (ref 20–40)
MCH RBC QN AUTO: 30.2 PG (ref 27–31)
MCHC RBC AUTO-ENTMCNC: 32.2 G/DL (ref 33–37)
MCV RBC AUTO: 93.8 FL (ref 81–99)
METHEMOGLOBIN ARTERIAL: 0.2 %
MONOCYTES ABSOLUTE: 0.4 K/UL (ref 0–0.9)
MONOCYTES RELATIVE PERCENT: 4.4 % (ref 0–10)
NEUTROPHILS ABSOLUTE: 6.1 K/UL (ref 1.5–7.5)
NEUTROPHILS RELATIVE PERCENT: 76 % (ref 50–65)
O2 CONTENT ARTERIAL: 18.8 ML/DL
O2 SAT, ARTERIAL: 91.2 %
O2 THERAPY: ABNORMAL
PCO2 ARTERIAL: 36 MMHG (ref 35–45)
PDW BLD-RTO: 13.3 % (ref 11.5–14.5)
PERFORMED ON: ABNORMAL
PH ARTERIAL: 7.5 (ref 7.35–7.45)
PLATELET # BLD: 297 K/UL (ref 130–400)
PMV BLD AUTO: 10.1 FL (ref 9.4–12.3)
PO2 ARTERIAL: 54 MMHG (ref 80–100)
POTASSIUM REFLEX MAGNESIUM: 4.6 MMOL/L (ref 3.5–5)
POTASSIUM, WHOLE BLOOD: 4
PROCALCITONIN: 0.13 NG/ML (ref 0–0.09)
RBC # BLD: 4.5 M/UL (ref 4.2–5.4)
SODIUM BLD-SCNC: 138 MMOL/L (ref 136–145)
TOTAL PROTEIN: 6.3 G/DL (ref 6.6–8.7)
WBC # BLD: 8.1 K/UL (ref 4.8–10.8)

## 2021-12-11 PROCEDURE — 85025 COMPLETE CBC W/AUTO DIFF WBC: CPT

## 2021-12-11 PROCEDURE — 6360000002 HC RX W HCPCS: Performed by: NURSE PRACTITIONER

## 2021-12-11 PROCEDURE — 6370000000 HC RX 637 (ALT 250 FOR IP): Performed by: NURSE PRACTITIONER

## 2021-12-11 PROCEDURE — 2580000003 HC RX 258: Performed by: NURSE PRACTITIONER

## 2021-12-11 PROCEDURE — 2100000000 HC CCU R&B

## 2021-12-11 PROCEDURE — 94669 MECHANICAL CHEST WALL OSCILL: CPT

## 2021-12-11 PROCEDURE — 82947 ASSAY GLUCOSE BLOOD QUANT: CPT

## 2021-12-11 PROCEDURE — 6370000000 HC RX 637 (ALT 250 FOR IP): Performed by: STUDENT IN AN ORGANIZED HEALTH CARE EDUCATION/TRAINING PROGRAM

## 2021-12-11 PROCEDURE — 86140 C-REACTIVE PROTEIN: CPT

## 2021-12-11 PROCEDURE — 80053 COMPREHEN METABOLIC PANEL: CPT

## 2021-12-11 PROCEDURE — 84132 ASSAY OF SERUM POTASSIUM: CPT

## 2021-12-11 PROCEDURE — 84145 PROCALCITONIN (PCT): CPT

## 2021-12-11 PROCEDURE — 85379 FIBRIN DEGRADATION QUANT: CPT

## 2021-12-11 PROCEDURE — 94761 N-INVAS EAR/PLS OXIMETRY MLT: CPT

## 2021-12-11 PROCEDURE — 36600 WITHDRAWAL OF ARTERIAL BLOOD: CPT

## 2021-12-11 PROCEDURE — 2500000003 HC RX 250 WO HCPCS: Performed by: STUDENT IN AN ORGANIZED HEALTH CARE EDUCATION/TRAINING PROGRAM

## 2021-12-11 PROCEDURE — 2580000003 HC RX 258: Performed by: STUDENT IN AN ORGANIZED HEALTH CARE EDUCATION/TRAINING PROGRAM

## 2021-12-11 PROCEDURE — 6360000002 HC RX W HCPCS: Performed by: STUDENT IN AN ORGANIZED HEALTH CARE EDUCATION/TRAINING PROGRAM

## 2021-12-11 PROCEDURE — 82803 BLOOD GASES ANY COMBINATION: CPT

## 2021-12-11 PROCEDURE — 36415 COLL VENOUS BLD VENIPUNCTURE: CPT

## 2021-12-11 PROCEDURE — 2700000000 HC OXYGEN THERAPY PER DAY

## 2021-12-11 RX ORDER — FLUVOXAMINE MALEATE 50 MG/1
50 TABLET, COATED ORAL 2 TIMES DAILY
Status: DISCONTINUED | OUTPATIENT
Start: 2021-12-11 | End: 2021-12-25

## 2021-12-11 RX ORDER — VITAMIN B COMPLEX
2000 TABLET ORAL DAILY
Status: DISCONTINUED | OUTPATIENT
Start: 2021-12-12 | End: 2022-01-04 | Stop reason: HOSPADM

## 2021-12-11 RX ORDER — ALPRAZOLAM 0.5 MG/1
1 TABLET ORAL NIGHTLY PRN
Status: DISCONTINUED | OUTPATIENT
Start: 2021-12-11 | End: 2021-12-22

## 2021-12-11 RX ORDER — LORAZEPAM 2 MG/ML
0.5 INJECTION INTRAMUSCULAR EVERY 6 HOURS PRN
Status: DISCONTINUED | OUTPATIENT
Start: 2021-12-11 | End: 2022-01-04 | Stop reason: HOSPADM

## 2021-12-11 RX ORDER — INSULIN GLARGINE 100 [IU]/ML
10 INJECTION, SOLUTION SUBCUTANEOUS NIGHTLY
Status: DISCONTINUED | OUTPATIENT
Start: 2021-12-11 | End: 2021-12-13

## 2021-12-11 RX ORDER — BUDESONIDE AND FORMOTEROL FUMARATE DIHYDRATE 160; 4.5 UG/1; UG/1
2 AEROSOL RESPIRATORY (INHALATION) 2 TIMES DAILY
Status: DISCONTINUED | OUTPATIENT
Start: 2021-12-11 | End: 2021-12-26

## 2021-12-11 RX ORDER — BUMETANIDE 0.25 MG/ML
1 INJECTION, SOLUTION INTRAMUSCULAR; INTRAVENOUS ONCE
Status: COMPLETED | OUTPATIENT
Start: 2021-12-11 | End: 2021-12-11

## 2021-12-11 RX ADMIN — ALBUTEROL SULFATE 2 PUFF: 90 AEROSOL, METERED RESPIRATORY (INHALATION) at 10:27

## 2021-12-11 RX ADMIN — OXYCODONE HYDROCHLORIDE AND ACETAMINOPHEN 1000 MG: 500 TABLET ORAL at 10:25

## 2021-12-11 RX ADMIN — BUMETANIDE 1 MG: 0.25 INJECTION INTRAMUSCULAR; INTRAVENOUS at 18:35

## 2021-12-11 RX ADMIN — PRAVASTATIN SODIUM 40 MG: 20 TABLET ORAL at 10:25

## 2021-12-11 RX ADMIN — BARICITINIB 4 MG: 2 TABLET, FILM COATED ORAL at 22:33

## 2021-12-11 RX ADMIN — LEVOTHYROXINE SODIUM 100 MCG: 100 TABLET ORAL at 06:29

## 2021-12-11 RX ADMIN — BUDESONIDE AND FORMOTEROL FUMARATE DIHYDRATE 2 PUFF: 160; 4.5 AEROSOL RESPIRATORY (INHALATION) at 20:00

## 2021-12-11 RX ADMIN — Medication 2000 UNITS: at 10:25

## 2021-12-11 RX ADMIN — ENOXAPARIN SODIUM 30 MG: 100 INJECTION SUBCUTANEOUS at 10:24

## 2021-12-11 RX ADMIN — FAMOTIDINE 40 MG: 10 INJECTION, SOLUTION INTRAVENOUS at 14:01

## 2021-12-11 RX ADMIN — CETIRIZINE HYDROCHLORIDE 10 MG: 10 TABLET, FILM COATED ORAL at 10:25

## 2021-12-11 RX ADMIN — ALPRAZOLAM 1 MG: 0.5 TABLET ORAL at 22:33

## 2021-12-11 RX ADMIN — Medication 1 TABLET: at 10:26

## 2021-12-11 RX ADMIN — ZINC SULFATE 220 MG (50 MG) CAPSULE 50 MG: CAPSULE at 10:25

## 2021-12-11 RX ADMIN — ENOXAPARIN SODIUM 30 MG: 100 INJECTION SUBCUTANEOUS at 22:33

## 2021-12-11 RX ADMIN — ACETAMINOPHEN 650 MG: 325 TABLET ORAL at 03:04

## 2021-12-11 RX ADMIN — SODIUM CHLORIDE, PRESERVATIVE FREE 10 ML: 5 INJECTION INTRAVENOUS at 22:34

## 2021-12-11 RX ADMIN — FLUVOXAMINE MALEATE 50 MG: 50 TABLET, COATED ORAL at 14:03

## 2021-12-11 RX ADMIN — ACETAMINOPHEN 650 MG: 325 TABLET ORAL at 18:35

## 2021-12-11 RX ADMIN — ALPRAZOLAM 0.5 MG: 0.5 TABLET ORAL at 01:28

## 2021-12-11 RX ADMIN — POLYETHYLENE GLYCOL 3350 17 G: 17 POWDER, FOR SOLUTION ORAL at 10:24

## 2021-12-11 RX ADMIN — LORAZEPAM 0.5 MG: 2 INJECTION INTRAMUSCULAR; INTRAVENOUS at 18:35

## 2021-12-11 RX ADMIN — GUAIFENESIN SYRUP AND DEXTROMETHORPHAN 5 ML: 100; 10 SYRUP ORAL at 00:34

## 2021-12-11 RX ADMIN — TIMOLOL MALEATE 1 DROP: 5 SOLUTION OPHTHALMIC at 10:30

## 2021-12-11 RX ADMIN — Medication 5 MG: at 22:33

## 2021-12-11 RX ADMIN — TIMOLOL MALEATE 1 DROP: 5 SOLUTION OPHTHALMIC at 20:00

## 2021-12-11 RX ADMIN — DEXAMETHASONE SODIUM PHOSPHATE 20 MG: 10 INJECTION, SOLUTION INTRAMUSCULAR; INTRAVENOUS at 14:03

## 2021-12-11 RX ADMIN — HYDROCODONE BITARTRATE AND ACETAMINOPHEN 1 TABLET: 7.5; 325 TABLET ORAL at 01:22

## 2021-12-11 RX ADMIN — ALBUTEROL SULFATE 2 PUFF: 90 AEROSOL, METERED RESPIRATORY (INHALATION) at 20:00

## 2021-12-11 RX ADMIN — INSULIN LISPRO 3 UNITS: 100 INJECTION, SOLUTION INTRAVENOUS; SUBCUTANEOUS at 22:33

## 2021-12-11 RX ADMIN — FLUVOXAMINE MALEATE 50 MG: 50 TABLET, COATED ORAL at 22:33

## 2021-12-11 RX ADMIN — ALBUTEROL SULFATE 2 PUFF: 90 AEROSOL, METERED RESPIRATORY (INHALATION) at 14:01

## 2021-12-11 RX ADMIN — INSULIN GLARGINE 10 UNITS: 100 INJECTION, SOLUTION SUBCUTANEOUS at 22:33

## 2021-12-11 RX ADMIN — PANTOPRAZOLE SODIUM 40 MG: 40 TABLET, DELAYED RELEASE ORAL at 06:29

## 2021-12-11 ASSESSMENT — PAIN SCALES - GENERAL
PAINLEVEL_OUTOF10: 2
PAINLEVEL_OUTOF10: 7
PAINLEVEL_OUTOF10: 8
PAINLEVEL_OUTOF10: 3
PAINLEVEL_OUTOF10: 0
PAINLEVEL_OUTOF10: 2

## 2021-12-11 NOTE — PROGRESS NOTES
[facility] [address] [facility] MEDICINE    DAILY PROGRESS NOTE    Patient:  Suhail Peck  YOB: 1965  Date of Service: 12/11/2021  MRN: [id number]   Acct: [de-identified]   Primary Care Physician: Michael Anderson MD  Advance Directive: Full Code  Admit Date: 12/9/2021       Hospital Day: 2            CHIEF COMPLAINT Worsening SOB            SUBJECTIVE:   Patient developed more shortness of breath with worsening hypoxia overnight. Significant increase in O2 requirement requiring high flow. No fevers or chills overnight. CUMULATIVE HOSPITAL COURSE:    The patient is a 49 y.o. female with a hx of asthma, COPD, HTN, HLD, hypothyroidism and obesity who presented to [address] [place] [address] ED complaining of worsening dyspnea in the setting of Covid. She was diagnosed with Covid on 11/30. She was seen at [facility] on 12/4/21. Per chart review, she did not meet the criteria for admission, however, she was offered monoclonal antibodies, but she declined at that time. She reported worsening fever, chills, HA, N/V and SOA with Cough. Patient admitted with pneumonia due to COVID-19 with acute hypoxemic respiratory failure with chest x-ray showing bilateral patchy infiltrates as well as some signs of vascular congestion/pulmonary edema with SPO2 down to 87% on room air. Initially requiring a few liters nasal cannula supplemental O2, however had steady increase and supplemental O2 requirements. Treated with dexamethasone, baricitinib, and bronchodilators. Remdesivir deferred due to lack of benefit given illness time course. Overnight on 12/11, developed significant hypoxia with large increase in O2 requirements requiring high flow nasal cannula.           ROS  14 point review of systems is negative except as specifically addressed above.       Objective:   VITALS:  /74   Pulse 101   Temp 97.3 °F (36.3 °C) (Temporal)   Resp 16   SpO2 93%    Body Mass Index: 55.91 kg/m² Abnormal    5' 5\" (165.1 cm)  as of 4/26/2021   336 lb (152.4 kg)  as of 4/26/2021  Suspect much greater     24HR INTAKE/OUTPUT:  No intake or output data in the 24 hours ending 12/11/21 1757      Physical exam    General: Ill-appearing, no distress  Psych: Anxious  HEENT: NC/AT, scleral icterus  Cardiovascular: Normal rate, regular rhythm  Respiratory: Tachypneic, some increased work of breathing noted, however no respiratory distress  Abdomen: Obese, nondistended  Neurologic: No apparent focal neurologic deficits  Musculoskeletal: No deformities  Extremities: No clubbing or cyanosis  Skin: No lesions or rashes appreciated          Medications:      sodium chloride 25 mL (12/09/21 2042)    dextrose        dexamethasone  20 mg IntraVENous Q24H    budesonide-formoterol  2 puff Inhalation BID    fluvoxaMINE  50 mg Oral BID    famotidine (PEPCID) injection  40 mg IntraVENous Daily    [START ON 12/12/2021] Vitamin D  2,000 Units Oral Daily    insulin glargine  10 Units SubCUTAneous Nightly    albuterol sulfate HFA  2 puff Inhalation 4x Daily    calcium-cholecalciferol  1 tablet Oral Daily    levothyroxine  100 mcg Oral Daily    vitamin C  1,000 mg Oral Daily    timolol  1 drop Both Eyes BID    pravastatin  40 mg Oral Daily    polyethylene glycol  17 g Oral Daily    pantoprazole  40 mg Oral QAM AC    cetirizine  10 mg Oral Daily    sodium chloride flush  5-40 mL IntraVENous 2 times per day    enoxaparin  30 mg SubCUTAneous BID    zinc sulfate  50 mg Oral Daily    melatonin  5 mg Oral Nightly    insulin lispro  0-18 Units SubCUTAneous TID WC    insulin lispro  0-9 Units SubCUTAneous Nightly    [Held by provider] lisinopril  20 mg Oral Daily    And    [Held by provider] hydroCHLOROthiazide  12.5 mg Oral Daily    baricitinib  4 mg Oral Daily     LORazepam, ALPRAZolam, furosemide, HYDROcodone-acetaminophen, ondansetron, meclizine, sodium chloride flush, sodium chloride, polyethylene glycol, acetaminophen **OR** acetaminophen, guaiFENesin-dextromethorphan, glucose, dextrose, glucagon (rDNA), dextrose  ADULT DIET; Regular; 4 carb choices (60 gm/meal)     Lab and other Data:     Recent Labs     12/09/21  1635 12/10/21  0446 12/11/21  0425   WBC 5.3 4.3* 8.1   HGB 14.6 13.7 13.6    226 297     Recent Labs     12/09/21  1635 12/10/21  0446 12/11/21  0425   * 134* 138   K 4.5 5.1* 4.6   CL 95* 98 99   CO2 23 22 26   BUN 8 10 13   CREATININE 0.8 0.7 0.9   GLUCOSE 322* 288* 195*     Recent Labs     12/09/21  1635 12/10/21  0446 12/11/21 0425   AST 70* 63* 64*   ALT 46* 42* 41*   BILITOT 0.3 0.3 0.3   ALKPHOS 90 88 84     Troponin T:   Recent Labs     12/09/21 1635   TROPONINI <0.01     Pro-BNP: No results for input(s): BNP in the last 72 hours. INR: No results for input(s): INR in the last 72 hours. UA:No results for input(s): NITRITE, COLORU, PHUR, LABCAST, WBCUA, RBCUA, MUCUS, TRICHOMONAS, YEAST, BACTERIA, CLARITYU, SPECGRAV, LEUKOCYTESUR, UROBILINOGEN, BILIRUBINUR, BLOODU, GLUCOSEU, AMORPHOUS in the last 72 hours. Invalid input(s): Abilio Canas  A1C:   Recent Labs     12/10/21  0446   LABA1C 9.0*     ABG:No results for input(s): PHART, YTA1FKQ, PO2ART, ZRQ4MKO, BEART, HGBAE, J0HEUDEF, CARBOXHGBART in the last 72 hours. RAD:   XR CHEST PORTABLE    Result Date: 12/9/2021  Bilateral patchy airspace opacity. Central vascular congestion. Differential includes pneumonia and edema. Signed by Dr Rudolph Busby      Assessment/Plan     Principal Problem:    Pneumonia due to COVID-19 virus  Active Problems:    Acute hypoxemic respiratory failure due to COVID-19 [facility])    Chronic diarrhea    Chronic heartburn    Hypertension    Hypothyroidism  Resolved Problems:    * No resolved hospital problems.  *      Acute hypoxemic respiratory failure due to COVID-19 pneumonia  -Requiring heated high flow supplemental O2 to maintain adequate gas exchange  Goal SPO2 88-90% with COPD  Encourage incentive spirometry, trial self proning if able  Continue dexamethasone  Continue baricitinib  Bronchodilators  Adjunctive therapy  Anticoagulation  Monitor CBC, CMP, CRP, D-dimer    Pulmonary vascular congestion/pulmonary edema on chest x-ray  -Trial of IV diuresis and monitor response, goal net negative fluid balance  Obtain echocardiogram     Supportive care    Has remained normotensive, hold BP meds, avoid hypotension      Hypothyroidism    Continue home Levothyroxine         VTE prophylaxis-Arnulfo Contreras MD    on 12/11/2021 5:57 PM

## 2021-12-12 PROBLEM — J80 ACUTE RESPIRATORY DISTRESS SYNDROME (ARDS) DUE TO COVID-19 VIRUS (HCC): Status: ACTIVE | Noted: 2021-12-12

## 2021-12-12 PROBLEM — U07.1 ACUTE RESPIRATORY DISTRESS SYNDROME (ARDS) DUE TO COVID-19 VIRUS (HCC): Status: ACTIVE | Noted: 2021-12-12

## 2021-12-12 LAB
ALBUMIN SERPL-MCNC: 3.1 G/DL (ref 3.5–5.2)
ALP BLD-CCNC: 85 U/L (ref 35–104)
ALT SERPL-CCNC: 36 U/L (ref 5–33)
ANION GAP SERPL CALCULATED.3IONS-SCNC: 12 MMOL/L (ref 7–19)
AST SERPL-CCNC: 55 U/L (ref 5–32)
BASOPHILS ABSOLUTE: 0 K/UL (ref 0–0.2)
BASOPHILS RELATIVE PERCENT: 0.1 % (ref 0–1)
BILIRUB SERPL-MCNC: 0.4 MG/DL (ref 0.2–1.2)
BUN BLDV-MCNC: 13 MG/DL (ref 6–20)
C-REACTIVE PROTEIN: 18.75 MG/DL (ref 0–0.5)
CALCIUM SERPL-MCNC: 8.4 MG/DL (ref 8.6–10)
CHLORIDE BLD-SCNC: 98 MMOL/L (ref 98–111)
CO2: 27 MMOL/L (ref 22–29)
CREAT SERPL-MCNC: 0.7 MG/DL (ref 0.5–0.9)
D DIMER: 0.83 UG/ML FEU (ref 0–0.48)
EOSINOPHILS ABSOLUTE: 0 K/UL (ref 0–0.6)
EOSINOPHILS RELATIVE PERCENT: 0 % (ref 0–5)
GFR AFRICAN AMERICAN: >59
GFR NON-AFRICAN AMERICAN: >60
GLUCOSE BLD-MCNC: 134 MG/DL (ref 70–99)
GLUCOSE BLD-MCNC: 151 MG/DL (ref 70–99)
GLUCOSE BLD-MCNC: 155 MG/DL (ref 74–109)
GLUCOSE BLD-MCNC: 170 MG/DL (ref 70–99)
GLUCOSE BLD-MCNC: 221 MG/DL (ref 70–99)
HCT VFR BLD CALC: 41.3 % (ref 37–47)
HEMOGLOBIN: 13.6 G/DL (ref 12–16)
IMMATURE GRANULOCYTES #: 0 K/UL
LYMPHOCYTES ABSOLUTE: 1.4 K/UL (ref 1.1–4.5)
LYMPHOCYTES RELATIVE PERCENT: 20.7 % (ref 20–40)
MCH RBC QN AUTO: 30.9 PG (ref 27–31)
MCHC RBC AUTO-ENTMCNC: 32.9 G/DL (ref 33–37)
MCV RBC AUTO: 93.9 FL (ref 81–99)
MONOCYTES ABSOLUTE: 0.4 K/UL (ref 0–0.9)
MONOCYTES RELATIVE PERCENT: 5.4 % (ref 0–10)
NEUTROPHILS ABSOLUTE: 4.9 K/UL (ref 1.5–7.5)
NEUTROPHILS RELATIVE PERCENT: 73.4 % (ref 50–65)
PDW BLD-RTO: 13.3 % (ref 11.5–14.5)
PERFORMED ON: ABNORMAL
PLATELET # BLD: 341 K/UL (ref 130–400)
PMV BLD AUTO: 10.1 FL (ref 9.4–12.3)
POTASSIUM REFLEX MAGNESIUM: 4.5 MMOL/L (ref 3.5–5)
RBC # BLD: 4.4 M/UL (ref 4.2–5.4)
SODIUM BLD-SCNC: 137 MMOL/L (ref 136–145)
TOTAL PROTEIN: 6.1 G/DL (ref 6.6–8.7)
WBC # BLD: 6.7 K/UL (ref 4.8–10.8)

## 2021-12-12 PROCEDURE — 6370000000 HC RX 637 (ALT 250 FOR IP): Performed by: STUDENT IN AN ORGANIZED HEALTH CARE EDUCATION/TRAINING PROGRAM

## 2021-12-12 PROCEDURE — 2580000003 HC RX 258: Performed by: STUDENT IN AN ORGANIZED HEALTH CARE EDUCATION/TRAINING PROGRAM

## 2021-12-12 PROCEDURE — 6370000000 HC RX 637 (ALT 250 FOR IP): Performed by: NURSE PRACTITIONER

## 2021-12-12 PROCEDURE — 85379 FIBRIN DEGRADATION QUANT: CPT

## 2021-12-12 PROCEDURE — 51702 INSERT TEMP BLADDER CATH: CPT

## 2021-12-12 PROCEDURE — 2700000000 HC OXYGEN THERAPY PER DAY

## 2021-12-12 PROCEDURE — 2500000003 HC RX 250 WO HCPCS: Performed by: STUDENT IN AN ORGANIZED HEALTH CARE EDUCATION/TRAINING PROGRAM

## 2021-12-12 PROCEDURE — 6360000002 HC RX W HCPCS: Performed by: NURSE PRACTITIONER

## 2021-12-12 PROCEDURE — 82947 ASSAY GLUCOSE BLOOD QUANT: CPT

## 2021-12-12 PROCEDURE — 6360000002 HC RX W HCPCS: Performed by: STUDENT IN AN ORGANIZED HEALTH CARE EDUCATION/TRAINING PROGRAM

## 2021-12-12 PROCEDURE — 2580000003 HC RX 258: Performed by: NURSE PRACTITIONER

## 2021-12-12 PROCEDURE — 2100000000 HC CCU R&B

## 2021-12-12 PROCEDURE — 80053 COMPREHEN METABOLIC PANEL: CPT

## 2021-12-12 PROCEDURE — 86140 C-REACTIVE PROTEIN: CPT

## 2021-12-12 PROCEDURE — 85025 COMPLETE CBC W/AUTO DIFF WBC: CPT

## 2021-12-12 PROCEDURE — 36415 COLL VENOUS BLD VENIPUNCTURE: CPT

## 2021-12-12 RX ORDER — BUMETANIDE 0.25 MG/ML
1 INJECTION, SOLUTION INTRAMUSCULAR; INTRAVENOUS ONCE
Status: COMPLETED | OUTPATIENT
Start: 2021-12-12 | End: 2021-12-12

## 2021-12-12 RX ORDER — DEXMEDETOMIDINE HYDROCHLORIDE 4 UG/ML
.1-1.4 INJECTION, SOLUTION INTRAVENOUS CONTINUOUS
Status: DISCONTINUED | OUTPATIENT
Start: 2021-12-12 | End: 2021-12-25

## 2021-12-12 RX ADMIN — PANTOPRAZOLE SODIUM 40 MG: 40 TABLET, DELAYED RELEASE ORAL at 06:28

## 2021-12-12 RX ADMIN — FAMOTIDINE 40 MG: 10 INJECTION, SOLUTION INTRAVENOUS at 08:24

## 2021-12-12 RX ADMIN — DEXAMETHASONE SODIUM PHOSPHATE 20 MG: 10 INJECTION, SOLUTION INTRAMUSCULAR; INTRAVENOUS at 12:19

## 2021-12-12 RX ADMIN — INSULIN LISPRO 3 UNITS: 100 INJECTION, SOLUTION INTRAVENOUS; SUBCUTANEOUS at 21:12

## 2021-12-12 RX ADMIN — HYDROCODONE BITARTRATE AND ACETAMINOPHEN 1 TABLET: 7.5; 325 TABLET ORAL at 00:51

## 2021-12-12 RX ADMIN — TIMOLOL MALEATE 1 DROP: 5 SOLUTION OPHTHALMIC at 22:00

## 2021-12-12 RX ADMIN — BUDESONIDE AND FORMOTEROL FUMARATE DIHYDRATE 2 PUFF: 160; 4.5 AEROSOL RESPIRATORY (INHALATION) at 08:24

## 2021-12-12 RX ADMIN — OXYCODONE HYDROCHLORIDE AND ACETAMINOPHEN 1000 MG: 500 TABLET ORAL at 08:25

## 2021-12-12 RX ADMIN — BUMETANIDE 1 MG: 0.25 INJECTION, SOLUTION INTRAMUSCULAR; INTRAVENOUS at 09:33

## 2021-12-12 RX ADMIN — Medication 1 TABLET: at 08:25

## 2021-12-12 RX ADMIN — SODIUM CHLORIDE, PRESERVATIVE FREE 10 ML: 5 INJECTION INTRAVENOUS at 08:26

## 2021-12-12 RX ADMIN — INSULIN LISPRO 3 UNITS: 100 INJECTION, SOLUTION INTRAVENOUS; SUBCUTANEOUS at 16:15

## 2021-12-12 RX ADMIN — ALBUTEROL SULFATE 2 PUFF: 90 AEROSOL, METERED RESPIRATORY (INHALATION) at 12:03

## 2021-12-12 RX ADMIN — ALBUTEROL SULFATE 2 PUFF: 90 AEROSOL, METERED RESPIRATORY (INHALATION) at 22:00

## 2021-12-12 RX ADMIN — BARICITINIB 4 MG: 2 TABLET, FILM COATED ORAL at 21:11

## 2021-12-12 RX ADMIN — POLYETHYLENE GLYCOL 3350 17 G: 17 POWDER, FOR SOLUTION ORAL at 08:26

## 2021-12-12 RX ADMIN — ENOXAPARIN SODIUM 30 MG: 100 INJECTION SUBCUTANEOUS at 08:25

## 2021-12-12 RX ADMIN — FLUVOXAMINE MALEATE 50 MG: 50 TABLET, COATED ORAL at 08:25

## 2021-12-12 RX ADMIN — TIMOLOL MALEATE 1 DROP: 5 SOLUTION OPHTHALMIC at 08:44

## 2021-12-12 RX ADMIN — PRAVASTATIN SODIUM 40 MG: 20 TABLET ORAL at 08:25

## 2021-12-12 RX ADMIN — Medication 5 MG: at 21:11

## 2021-12-12 RX ADMIN — BUDESONIDE AND FORMOTEROL FUMARATE DIHYDRATE 2 PUFF: 160; 4.5 AEROSOL RESPIRATORY (INHALATION) at 21:59

## 2021-12-12 RX ADMIN — CETIRIZINE HYDROCHLORIDE 10 MG: 10 TABLET, FILM COATED ORAL at 08:25

## 2021-12-12 RX ADMIN — ALBUTEROL SULFATE 2 PUFF: 90 AEROSOL, METERED RESPIRATORY (INHALATION) at 08:24

## 2021-12-12 RX ADMIN — LEVOTHYROXINE SODIUM 100 MCG: 100 TABLET ORAL at 06:28

## 2021-12-12 RX ADMIN — ALBUTEROL SULFATE 2 PUFF: 90 AEROSOL, METERED RESPIRATORY (INHALATION) at 16:12

## 2021-12-12 RX ADMIN — ZINC SULFATE 220 MG (50 MG) CAPSULE 50 MG: CAPSULE at 08:25

## 2021-12-12 RX ADMIN — SODIUM CHLORIDE, PRESERVATIVE FREE 10 ML: 5 INJECTION INTRAVENOUS at 21:12

## 2021-12-12 RX ADMIN — FLUVOXAMINE MALEATE 50 MG: 50 TABLET, COATED ORAL at 21:11

## 2021-12-12 RX ADMIN — INSULIN GLARGINE 10 UNITS: 100 INJECTION, SOLUTION SUBCUTANEOUS at 21:12

## 2021-12-12 RX ADMIN — Medication 2000 UNITS: at 08:25

## 2021-12-12 RX ADMIN — INSULIN LISPRO 3 UNITS: 100 INJECTION, SOLUTION INTRAVENOUS; SUBCUTANEOUS at 12:39

## 2021-12-12 RX ADMIN — DEXMEDETOMIDINE HYDROCHLORIDE 0.1 MCG/KG/HR: 4 INJECTION, SOLUTION INTRAVENOUS at 12:18

## 2021-12-12 RX ADMIN — ENOXAPARIN SODIUM 40 MG: 100 INJECTION SUBCUTANEOUS at 21:11

## 2021-12-12 RX ADMIN — LORAZEPAM 0.5 MG: 2 INJECTION INTRAMUSCULAR; INTRAVENOUS at 10:03

## 2021-12-12 ASSESSMENT — PAIN SCALES - GENERAL
PAINLEVEL_OUTOF10: 0
PAINLEVEL_OUTOF10: 1
PAINLEVEL_OUTOF10: 0
PAINLEVEL_OUTOF10: 4
PAINLEVEL_OUTOF10: 0
PAINLEVEL_OUTOF10: 0
PAINLEVEL_OUTOF10: 4

## 2021-12-12 ASSESSMENT — PAIN DESCRIPTION - PROGRESSION
CLINICAL_PROGRESSION: GRADUALLY WORSENING

## 2021-12-12 ASSESSMENT — PAIN - FUNCTIONAL ASSESSMENT: PAIN_FUNCTIONAL_ASSESSMENT: PREVENTS OR INTERFERES SOME ACTIVE ACTIVITIES AND ADLS

## 2021-12-12 ASSESSMENT — PAIN DESCRIPTION - ORIENTATION: ORIENTATION: LOWER;MID

## 2021-12-12 ASSESSMENT — PAIN DESCRIPTION - DESCRIPTORS: DESCRIPTORS: ACHING

## 2021-12-12 ASSESSMENT — PAIN DESCRIPTION - PAIN TYPE: TYPE: ACUTE PAIN

## 2021-12-12 ASSESSMENT — PAIN DESCRIPTION - FREQUENCY: FREQUENCY: CONTINUOUS

## 2021-12-12 ASSESSMENT — PAIN DESCRIPTION - LOCATION: LOCATION: BACK

## 2021-12-12 ASSESSMENT — PAIN DESCRIPTION - ONSET: ONSET: AWAKENED FROM SLEEP

## 2021-12-12 NOTE — PROGRESS NOTES
[facility] [address] [facility] MEDICINE    DAILY PROGRESS NOTE    Patient:  Dario Olszewski  YOB: 1965  Date of Service: 12/12/2021  MRN: [id number]   Acct: [de-identified]   Primary Care Physician: Chuy Montalvo MD  Advance Directive: Full Code  Admit Date: 12/9/2021       Hospital Day: 3      CHIEF COMPLAINT Worsening SOB        SUBJECTIVE:   Patient remains dyspneic with some increased work of breathing with hypoxia requiring heated high flow supplemental O2 via nasal cannula. Also with fatigue/generalized weakness, but no other complaints. CUMULATIVE HOSPITAL COURSE:    The patient is a 49 y.o. female with a hx of asthma, COPD, HTN, HLD, hypothyroidism and obesity who presented to [address] [place] [address] ED complaining of worsening dyspnea in the setting of Covid. She was diagnosed with Covid on 11/30. She was seen at [facility] on 12/4/21. Per chart review, she did not meet the criteria for admission, however, she was offered monoclonal antibodies, but she declined at that time. She reported worsening fever, chills, HA, N/V and SOA with Cough. Patient admitted with pneumonia due to COVID-19 with acute hypoxemic respiratory failure with chest x-ray showing bilateral patchy infiltrates as well as some signs of vascular congestion/pulmonary edema with SPO2 down to 87% on room air. Initially requiring a few liters nasal cannula supplemental O2, however had steady increase and supplemental O2 requirements. Treated with dexamethasone, baricitinib, and bronchodilators. Remdesivir deferred due to lack of benefit given illness time course.   Overnight on 12/11, developed significant hypoxia with large increase in O2 requirements requiring high flow nasal cannula, then subsequently transferred to critical care unit with increased work of breathing and continued increase in supplemental O2 requirements on heated high flow.           ROS  14 point review of systems is negative except as specifically addressed above.       Objective:   VITALS:  BP (!) 140/89   Pulse 97   Temp 98.4 °F (36.9 °C) (Temporal)   Resp (!) 51   Wt (!) 327 lb 4.8 oz (148.5 kg)   SpO2 91%   BMI 54.47 kg/m²    Body Mass Index: 55.91 kg/m² Abnormal    5' 5\" (165.1 cm)  as of 4/26/2021   336 lb (152.4 kg)  as of 4/26/2021  Suspect much greater     24HR INTAKE/OUTPUT:      Intake/Output Summary (Last 24 hours) at 12/12/2021 1540  Last data filed at 12/12/2021 1400  Gross per 24 hour   Intake 1327.15 ml   Output 1250 ml   Net 77.15 ml         Physical exam    General: Ill-appearing  Psych: Anxious  HEENT: NC/AT, scleral icterus  Cardiovascular: Normal rate, regular rhythm  Respiratory: Tachypneic, some increased work of breathing noted, however no respiratory distress  Abdomen: Obese, nondistended  Neurologic: No apparent focal neurologic deficits, alert, follows commands  Musculoskeletal: No deformities  Extremities: No clubbing or cyanosis  Skin: No lesions or rashes appreciated          Medications:      dexmedetomidine HCl in NaCl 0.3 mcg/kg/hr (12/12/21 1418)    sodium chloride Stopped (12/11/21 2130)    dextrose        dexamethasone  20 mg IntraVENous Q24H    budesonide-formoterol  2 puff Inhalation BID    fluvoxaMINE  50 mg Oral BID    famotidine (PEPCID) injection  40 mg IntraVENous Daily    Vitamin D  2,000 Units Oral Daily    insulin glargine  10 Units SubCUTAneous Nightly    albuterol sulfate HFA  2 puff Inhalation 4x Daily    calcium-cholecalciferol  1 tablet Oral Daily    levothyroxine  100 mcg Oral Daily    vitamin C  1,000 mg Oral Daily    timolol  1 drop Both Eyes BID    pravastatin  40 mg Oral Daily    polyethylene glycol  17 g Oral Daily    pantoprazole  40 mg Oral QAM AC    cetirizine  10 mg Oral Daily    sodium chloride flush  5-40 mL IntraVENous 2 times per day    enoxaparin  30 mg SubCUTAneous BID    zinc sulfate  50 mg Oral Daily    melatonin  5 mg Oral Nightly    insulin lispro  0-18 Units SubCUTAneous TID     insulin lispro  0-9 Units SubCUTAneous Nightly    [Held by provider] lisinopril  20 mg Oral Daily    And    [Held by provider] hydroCHLOROthiazide  12.5 mg Oral Daily    baricitinib  4 mg Oral Daily     LORazepam, ALPRAZolam, furosemide, HYDROcodone-acetaminophen, ondansetron, meclizine, sodium chloride flush, sodium chloride, polyethylene glycol, acetaminophen **OR** acetaminophen, guaiFENesin-dextromethorphan, glucose, dextrose, glucagon (rDNA), dextrose  ADULT DIET; Regular; 3 carb choices (45 gm/meal); Low Fat/Low Chol/High Fiber/2 gm Na     Lab and other Data:     Recent Labs     12/10/21  0446 12/11/21  0425 12/12/21  0312   WBC 4.3* 8.1 6.7   HGB 13.7 13.6 13.6    297 341     Recent Labs     12/10/21  0446 12/10/21  0446 12/11/21  0425 12/11/21  2029 12/12/21  0312   *  --  138  --  137   K 5.1*   < > 4.6 4.0 4.5   CL 98  --  99  --  98   CO2 22  --  26  --  27   BUN 10  --  13  --  13   CREATININE 0.7  --  0.9  --  0.7   GLUCOSE 288*  --  195*  --  155*    < > = values in this interval not displayed. Recent Labs     12/10/21  0446 12/11/21  0425 12/12/21  0312   AST 63* 64* 55*   ALT 42* 41* 36*   BILITOT 0.3 0.3 0.4   ALKPHOS 88 84 85     Troponin T:   Recent Labs     12/09/21  1635   TROPONINI <0.01     Pro-BNP: No results for input(s): BNP in the last 72 hours. INR: No results for input(s): INR in the last 72 hours. UA:No results for input(s): NITRITE, COLORU, PHUR, LABCAST, WBCUA, RBCUA, MUCUS, TRICHOMONAS, YEAST, BACTERIA, CLARITYU, SPECGRAV, LEUKOCYTESUR, UROBILINOGEN, BILIRUBINUR, BLOODU, GLUCOSEU, AMORPHOUS in the last 72 hours.     Invalid input(s): Chrystal Childs  A1C:   Recent Labs     12/10/21  0446   LABA1C 9.0*     ABG:  Recent Labs     12/11/21 2029   PHART 7.500*   RIL5UFU 36.0   PO2ART 54.0*   WZD8NAU 28.1*   BEART 4.9*   HGBAE 14.7   K1ZQSPHI 91.2   CARBOXHGBART 0.4       RAD:   XR CHEST PORTABLE    Result Date: 12/9/2021  Bilateral patchy airspace opacity. Central vascular congestion. Differential includes pneumonia and edema. Signed by Dr Lennox Brier      Assessment/Plan     Principal Problem:    Acute respiratory distress syndrome (ARDS) due to COVID-19 virus [facility])  Active Problems:    Pneumonia due to COVID-19 virus    [facility]ute hypox[facility])    Chronic diarrhea    Chronic heartburn    Hypertension    Hypothyroidism  Resolved Problems:    * No resolved hospital problems. *      Acute hypoxemic respiratory failure due to COVID-19 pneumonia  -Requiring heated high flow and NRB supplemental O2  -BiPAP to assist with work of breathing if patient able to tolerate  ---Tenuous respiratory status, if continues with increased work of breathing despite current modalities and BiPAP, will need intubation  Goal SPO2 88-92% with COPD  Patient unable to tolerate self proning  Continue dexamethasone  Continue baricitinib  Bronchodilators  Adjunctive therapy  Anticoagulation  Monitor CBC, CMP, CRP, D-dimer    Severe anxiety  -Precedex drip  Cautious use of low-dose Ativan, avoid respiratory depression    Pulmonary vascular congestion on chest x-ray  -Spot IV diuresis, monitor urine output, I's and O's  Follow-up echocardiogram    Hypertension  Monitor BP, resume lisinopril/HCTZ, further adjustment if needed    Hypothyroidism  Continue home Levothyroxine         VTE prophylaxis-Lovenox      Total critical care time of 40 minutes including patient evaluation, chart review, discussion with staff, management plan and assessment of response to treatment.        Yessica Massey MD    on 12/12/2021 3:40 PM

## 2021-12-12 NOTE — PROGRESS NOTES
16 Fr reina catheter inserted per order with Saad Paiz RN. Patient tolerated well with no complaints. 400 ml of arsalan colored urine returned.

## 2021-12-12 NOTE — PROGRESS NOTES
4 Eyes Skin Assessment    Mildred Merida is being assessed upon: Transfer to New Unit    I agree that Mai Mendosa RN, along with Karin Hernandez (either 2 RN's or 1 LPN and 1 RN) have performed a thorough Head to Toe Skin Assessment on the patient. ALL assessment sites listed below have been assessed. Areas assessed by both nurses:     [x]   Head, Face, and Ears   [x]   Shoulders, Back, and Chest  [x]   Arms, Elbows, and Hands   [x]   Coccyx, Sacrum, and Ischium  [x]   Legs, Feet, and Heels    Does the Patient have Skin Breakdown?  No    Hosea Prevention initiated: No  Wound Care Orders initiated: No    [facility] nurse consulted for Pressure Injury (Stage 3,4, Unstageable, DTI, NWPT, and Complex wounds) and New or Established Ostomies: No        Primary Nurse eSignature: Radha Griffin RN on 12/12/2021 at 12:27 AM      Co-Signer eSignature: Electronically signed by Angel Teixeira RN on 12/13/21 at 4:54 AM CST

## 2021-12-12 NOTE — PROGRESS NOTES
Physician Progress Note      PATIENTRanrhonda Denney  CSN #:                  [id number]  :                       1965  ADMIT DATE:       2021 4:03 PM  DISCH DATE:  RESPONDING  PROVIDER #:        Antonio De La Garza          QUERY TEXT:    Pt admitted with COVID-19 and noted to have SIRS  If possible, please document   in progress notes and discharge summary if you are evaluating and/or   treating: The medical record reflects the following:  Risk Factors: COVID-19 pneumonia  Clinical Indicators: T 96.4, P 117, RR 22, CRP 15.33, CXR-bilateral patchy   airspace opacity/central vascular congestion-differential includes pneumonia   and edema  Treatment: O2, LR 1000 ml IV bolus, Zithromax IV, Decadron IV    Thank you,  Debo Arechiga RN, CCDS  [phone]  Options provided:  -- Sepsis present on admission due to COVID-19 pneumonia  -- Covid-19 pneumonia without sepsis  -- Other - I will add my own diagnosis  -- Disagree - Not applicable / Not valid  -- Disagree - Clinically unable to determine / Unknown  -- Refer to Clinical Documentation Reviewer    PROVIDER RESPONSE TEXT:    This patient has Covid-19 pneumonia without sepsis.     Query created by: Bryan Treviño on 12/10/2021 9:28 AM      Electronically signed by:  Antonio De La Garza 2021 9:57 AM

## 2021-12-12 NOTE — PROGRESS NOTES
Patient has shown a notable decline in oxygenation today. She has progressed from a high flow nasal cannula 15L with NRB 15L. She was placed on HHF 60L 100% FiO2. Patient is starting to exhibit more labored breathing. Hospitalist notified. Transfer to CCU and ABGs ordered.    Electronically signed by Jayce Duke RN on 12/11/2021 at 7:39 PM

## 2021-12-12 NOTE — CONSULTS
**Physician Signature**  This document was electronically signed by: Marielle Duncan MD  2021   11:06 AM    **Consult Cover Page**  FROM: [address], [phone]  Call Back Number: [phone]  SUBJECT: Consult Recommendations  Date and Time of Report: 2021 11:06 AM CT  Items Contained in this Document: Critical Care [facility]    **Consult Information**  Member Facility: [address] [place] [address] MRN: [id number]  Visit/Encounter Number: [id number]  Consult ID: [id number]  Facility Time Zone: CT  Date and Time of Request: 2021 10:10 AM  CT  Requesting Clinician: Calderon Luevano MD  Patient Name: Ole Olson  Date of Birth:   Gender: Female  Patient identity was confirmed at the beginning of the consult with the   patient/family/staff using two personal identifiers: Patient name and       **Reason for Consult**  Reason for Consult: [facility]    **Admission**  Admission Date: 2021  Chief reason for ICU admission: COVID - 19  Secondary reasons for ICU admission: Respiratory Failure, Pneumonia    **Labs and Diagnostic Studies**  Labs: Suggest Precedex if anxiety contributing to tachypnea and increased   dead space ventilation    **Core Metrics**  General orienting sentence for patient: : 50 yo woman, morbid obesity,   sick since late November, COVID+ , declined monoclonal Ab, admitted   , tx to ICU . Now on decaron, olumiant, Luvox, Currently on high   flow nasal cannula 60L 100%. BIPAP being started. Very anxious. Apparently   unvaccinated. Refuses self-proning. Suggest Precedex for anxiety if ativan   ineffective.   Chief physiologic deterioration: Increase in FiO2 required by   30%  Is the patient on DVT prophylaxis?: Yes  Prophylaxis type: Pharmacological  DVT Prophylaxis Comments: Lovenox  Is the patient on GI prophylaxis?: Yes  Gi Prophylaxis Comments: Pepcid  Has this patient reached their nutritional goal?: Yes  Are there current issues with pain management in this patient?:   No  Are there issues with skin integrity?: No  Are there issues with delirium?: No  Has the patient been mobilized?: Yes  Is this patient currently intubated?: No  Are there ethical or care philosophy or family issues?: No  Do you recommend an in depth evaluation?: No  Disposition Recommendations: Continue ICU level of care    **Inserted/Removed Devices**  Device Name: Lamb Catheter  Site of insertion: Urethra  Date of insertion: 12-    **Physician Signature**  This document was electronically signed by: Theresa Beltran MD  12/12/2021   11:06 AM

## 2021-12-12 NOTE — PROGRESS NOTES
Blood Gas, Arterial     Status: Final result    Component Ref Range & Units 12/11/21 2029   pH, Arterial 7.350 - 7.450 7.500 High     pCO2, Arterial 35.0 - 45.0 mmHg 36.0    pO2, Arterial 80.0 - 100.0 mmHg 54. 0 Low     HCO3, Arterial 22.0 - 26.0 mmol/L 28.1 High     Base Excess, Arterial -2.0 - 2.0 mmol/L 4.9 High     Hemoglobin, Art, Extended 12.0 - 16.0 g/dL 14.7    O2 Sat, Arterial >92 % 91.2    Carboxyhgb, Arterial 0.0 - 5.0 % 0.4    Comment:      0.0-1.5   (Smokers 1.5-5.0)    Methemoglobin, Arterial <1.5 % 0.2    O2 Content, Arterial Not Established mL/dL 18.8    O2 Therapy  Unknown    Resulting Agency  [facility]        Specimen Collected: 12/11/21 2029 Last Resulted: 12/11/21 2030 View Other Order Details        Heated high flow nasal cannula 60 L/M and 100%  LR site choice

## 2021-12-13 LAB
ALBUMIN SERPL-MCNC: 3.1 G/DL (ref 3.5–5.2)
ALP BLD-CCNC: 92 U/L (ref 35–104)
ALT SERPL-CCNC: 36 U/L (ref 5–33)
ANION GAP SERPL CALCULATED.3IONS-SCNC: 18 MMOL/L (ref 7–19)
AST SERPL-CCNC: 49 U/L (ref 5–32)
BASE EXCESS ARTERIAL: -0.3 MMOL/L (ref -2–2)
BASOPHILS ABSOLUTE: 0 K/UL (ref 0–0.2)
BASOPHILS RELATIVE PERCENT: 0.2 % (ref 0–1)
BILIRUB SERPL-MCNC: 0.5 MG/DL (ref 0.2–1.2)
BUN BLDV-MCNC: 16 MG/DL (ref 6–20)
C-REACTIVE PROTEIN: 26.86 MG/DL (ref 0–0.5)
CALCIUM SERPL-MCNC: 8.4 MG/DL (ref 8.6–10)
CARBOXYHEMOGLOBIN ARTERIAL: 0.9 % (ref 0–5)
CHLORIDE BLD-SCNC: 94 MMOL/L (ref 98–111)
CO2: 22 MMOL/L (ref 22–29)
CREAT SERPL-MCNC: 0.7 MG/DL (ref 0.5–0.9)
D DIMER: 1.31 UG/ML FEU (ref 0–0.48)
EOSINOPHILS ABSOLUTE: 0 K/UL (ref 0–0.6)
EOSINOPHILS RELATIVE PERCENT: 0 % (ref 0–5)
GFR AFRICAN AMERICAN: >59
GFR NON-AFRICAN AMERICAN: >60
GLUCOSE BLD-MCNC: 232 MG/DL (ref 74–109)
GLUCOSE BLD-MCNC: 238 MG/DL (ref 70–99)
GLUCOSE BLD-MCNC: 239 MG/DL (ref 70–99)
GLUCOSE BLD-MCNC: 266 MG/DL (ref 70–99)
GLUCOSE BLD-MCNC: 282 MG/DL (ref 70–99)
HCO3 ARTERIAL: 24.1 MMOL/L (ref 22–26)
HCT VFR BLD CALC: 43.7 % (ref 37–47)
HEMOGLOBIN, ART, EXTENDED: 13.9 G/DL (ref 12–16)
HEMOGLOBIN: 13.8 G/DL (ref 12–16)
IMMATURE GRANULOCYTES #: 0.1 K/UL
LV EF: 55 %
LVEF MODALITY: NORMAL
LYMPHOCYTES ABSOLUTE: 0.5 K/UL (ref 1.1–4.5)
LYMPHOCYTES RELATIVE PERCENT: 10.2 % (ref 20–40)
MCH RBC QN AUTO: 29.6 PG (ref 27–31)
MCHC RBC AUTO-ENTMCNC: 31.6 G/DL (ref 33–37)
MCV RBC AUTO: 93.6 FL (ref 81–99)
METHEMOGLOBIN ARTERIAL: 0 %
MONOCYTES ABSOLUTE: 0.1 K/UL (ref 0–0.9)
MONOCYTES RELATIVE PERCENT: 2.1 % (ref 0–10)
NEUTROPHILS ABSOLUTE: 4.6 K/UL (ref 1.5–7.5)
NEUTROPHILS RELATIVE PERCENT: 86.4 % (ref 50–65)
O2 CONTENT ARTERIAL: 16.8 ML/DL
O2 SAT, ARTERIAL: 86.3 %
O2 THERAPY: ABNORMAL
PCO2 ARTERIAL: 38 MMHG (ref 35–45)
PDW BLD-RTO: 13.1 % (ref 11.5–14.5)
PERFORMED ON: ABNORMAL
PH ARTERIAL: 7.41 (ref 7.35–7.45)
PLATELET # BLD: 419 K/UL (ref 130–400)
PMV BLD AUTO: 9.6 FL (ref 9.4–12.3)
PO2 ARTERIAL: 49 MMHG (ref 80–100)
POTASSIUM REFLEX MAGNESIUM: 4.5 MMOL/L (ref 3.5–5)
POTASSIUM, WHOLE BLOOD: 4
RBC # BLD: 4.67 M/UL (ref 4.2–5.4)
SODIUM BLD-SCNC: 134 MMOL/L (ref 136–145)
TOTAL PROTEIN: 6.5 G/DL (ref 6.6–8.7)
WBC # BLD: 5.3 K/UL (ref 4.8–10.8)

## 2021-12-13 PROCEDURE — 94660 CPAP INITIATION&MGMT: CPT

## 2021-12-13 PROCEDURE — 6370000000 HC RX 637 (ALT 250 FOR IP): Performed by: STUDENT IN AN ORGANIZED HEALTH CARE EDUCATION/TRAINING PROGRAM

## 2021-12-13 PROCEDURE — 86140 C-REACTIVE PROTEIN: CPT

## 2021-12-13 PROCEDURE — 80053 COMPREHEN METABOLIC PANEL: CPT

## 2021-12-13 PROCEDURE — 85379 FIBRIN DEGRADATION QUANT: CPT

## 2021-12-13 PROCEDURE — 2580000003 HC RX 258: Performed by: NURSE PRACTITIONER

## 2021-12-13 PROCEDURE — C8929 TTE W OR WO FOL WCON,DOPPLER: HCPCS

## 2021-12-13 PROCEDURE — 82803 BLOOD GASES ANY COMBINATION: CPT

## 2021-12-13 PROCEDURE — 36415 COLL VENOUS BLD VENIPUNCTURE: CPT

## 2021-12-13 PROCEDURE — 2580000003 HC RX 258: Performed by: STUDENT IN AN ORGANIZED HEALTH CARE EDUCATION/TRAINING PROGRAM

## 2021-12-13 PROCEDURE — 6370000000 HC RX 637 (ALT 250 FOR IP): Performed by: NURSE PRACTITIONER

## 2021-12-13 PROCEDURE — 2700000000 HC OXYGEN THERAPY PER DAY

## 2021-12-13 PROCEDURE — 84132 ASSAY OF SERUM POTASSIUM: CPT

## 2021-12-13 PROCEDURE — 85025 COMPLETE CBC W/AUTO DIFF WBC: CPT

## 2021-12-13 PROCEDURE — 2500000003 HC RX 250 WO HCPCS: Performed by: STUDENT IN AN ORGANIZED HEALTH CARE EDUCATION/TRAINING PROGRAM

## 2021-12-13 PROCEDURE — 36600 WITHDRAWAL OF ARTERIAL BLOOD: CPT

## 2021-12-13 PROCEDURE — 6360000002 HC RX W HCPCS: Performed by: STUDENT IN AN ORGANIZED HEALTH CARE EDUCATION/TRAINING PROGRAM

## 2021-12-13 PROCEDURE — 2100000000 HC CCU R&B

## 2021-12-13 PROCEDURE — 82947 ASSAY GLUCOSE BLOOD QUANT: CPT

## 2021-12-13 RX ORDER — BUMETANIDE 0.25 MG/ML
1 INJECTION, SOLUTION INTRAMUSCULAR; INTRAVENOUS ONCE
Status: COMPLETED | OUTPATIENT
Start: 2021-12-13 | End: 2021-12-13

## 2021-12-13 RX ORDER — INSULIN GLARGINE 100 [IU]/ML
14 INJECTION, SOLUTION SUBCUTANEOUS NIGHTLY
Status: DISCONTINUED | OUTPATIENT
Start: 2021-12-13 | End: 2021-12-14

## 2021-12-13 RX ADMIN — Medication 2000 UNITS: at 07:27

## 2021-12-13 RX ADMIN — BUMETANIDE 1 MG: 0.25 INJECTION, SOLUTION INTRAMUSCULAR; INTRAVENOUS at 08:25

## 2021-12-13 RX ADMIN — OXYCODONE HYDROCHLORIDE AND ACETAMINOPHEN 1000 MG: 500 TABLET ORAL at 07:27

## 2021-12-13 RX ADMIN — LISINOPRIL 20 MG: 20 TABLET ORAL at 07:27

## 2021-12-13 RX ADMIN — Medication 5 MG: at 20:18

## 2021-12-13 RX ADMIN — POLYETHYLENE GLYCOL 3350 17 G: 17 POWDER, FOR SOLUTION ORAL at 07:26

## 2021-12-13 RX ADMIN — FAMOTIDINE 40 MG: 10 INJECTION, SOLUTION INTRAVENOUS at 07:27

## 2021-12-13 RX ADMIN — LEVOTHYROXINE SODIUM 100 MCG: 100 TABLET ORAL at 07:27

## 2021-12-13 RX ADMIN — ACETAMINOPHEN 650 MG: 325 TABLET ORAL at 17:27

## 2021-12-13 RX ADMIN — INSULIN LISPRO 6 UNITS: 100 INJECTION, SOLUTION INTRAVENOUS; SUBCUTANEOUS at 08:26

## 2021-12-13 RX ADMIN — ALBUTEROL SULFATE 2 PUFF: 90 AEROSOL, METERED RESPIRATORY (INHALATION) at 17:22

## 2021-12-13 RX ADMIN — FLUVOXAMINE MALEATE 50 MG: 50 TABLET, COATED ORAL at 20:18

## 2021-12-13 RX ADMIN — INSULIN LISPRO 5 UNITS: 100 INJECTION, SOLUTION INTRAVENOUS; SUBCUTANEOUS at 20:18

## 2021-12-13 RX ADMIN — ALBUTEROL SULFATE 2 PUFF: 90 AEROSOL, METERED RESPIRATORY (INHALATION) at 07:26

## 2021-12-13 RX ADMIN — DEXMEDETOMIDINE HYDROCHLORIDE 0.4 MCG/KG/HR: 4 INJECTION, SOLUTION INTRAVENOUS at 10:15

## 2021-12-13 RX ADMIN — HYDROCHLOROTHIAZIDE 12.5 MG: 12.5 CAPSULE ORAL at 07:27

## 2021-12-13 RX ADMIN — DEXAMETHASONE SODIUM PHOSPHATE 20 MG: 10 INJECTION, SOLUTION INTRAMUSCULAR; INTRAVENOUS at 12:34

## 2021-12-13 RX ADMIN — Medication 1 TABLET: at 07:27

## 2021-12-13 RX ADMIN — SODIUM CHLORIDE, PRESERVATIVE FREE 10 ML: 5 INJECTION INTRAVENOUS at 23:36

## 2021-12-13 RX ADMIN — ZINC SULFATE 220 MG (50 MG) CAPSULE 50 MG: CAPSULE at 07:27

## 2021-12-13 RX ADMIN — FLUVOXAMINE MALEATE 50 MG: 50 TABLET, COATED ORAL at 07:27

## 2021-12-13 RX ADMIN — INSULIN GLARGINE 14 UNITS: 100 INJECTION, SOLUTION SUBCUTANEOUS at 20:19

## 2021-12-13 RX ADMIN — BUDESONIDE AND FORMOTEROL FUMARATE DIHYDRATE 2 PUFF: 160; 4.5 AEROSOL RESPIRATORY (INHALATION) at 07:26

## 2021-12-13 RX ADMIN — SODIUM CHLORIDE, PRESERVATIVE FREE 10 ML: 5 INJECTION INTRAVENOUS at 07:29

## 2021-12-13 RX ADMIN — BARICITINIB 4 MG: 2 TABLET, FILM COATED ORAL at 20:18

## 2021-12-13 RX ADMIN — INSULIN LISPRO 6 UNITS: 100 INJECTION, SOLUTION INTRAVENOUS; SUBCUTANEOUS at 11:32

## 2021-12-13 RX ADMIN — LORAZEPAM 0.5 MG: 2 INJECTION INTRAMUSCULAR; INTRAVENOUS at 15:11

## 2021-12-13 RX ADMIN — CETIRIZINE HYDROCHLORIDE 10 MG: 10 TABLET, FILM COATED ORAL at 07:28

## 2021-12-13 RX ADMIN — INSULIN LISPRO 9 UNITS: 100 INJECTION, SOLUTION INTRAVENOUS; SUBCUTANEOUS at 17:26

## 2021-12-13 RX ADMIN — PRAVASTATIN SODIUM 40 MG: 20 TABLET ORAL at 07:27

## 2021-12-13 RX ADMIN — ENOXAPARIN SODIUM 40 MG: 100 INJECTION SUBCUTANEOUS at 20:18

## 2021-12-13 RX ADMIN — GUAIFENESIN SYRUP AND DEXTROMETHORPHAN 5 ML: 100; 10 SYRUP ORAL at 15:12

## 2021-12-13 RX ADMIN — TIMOLOL MALEATE 1 DROP: 5 SOLUTION OPHTHALMIC at 07:29

## 2021-12-13 RX ADMIN — ALBUTEROL SULFATE 2 PUFF: 90 AEROSOL, METERED RESPIRATORY (INHALATION) at 11:55

## 2021-12-13 RX ADMIN — TIMOLOL MALEATE 1 DROP: 5 SOLUTION OPHTHALMIC at 20:19

## 2021-12-13 RX ADMIN — SALINE NASAL SPRAY 1 SPRAY: 1.5 SOLUTION NASAL at 17:27

## 2021-12-13 RX ADMIN — ACETAMINOPHEN 650 MG: 325 TABLET ORAL at 23:36

## 2021-12-13 RX ADMIN — ALBUTEROL SULFATE 2 PUFF: 90 AEROSOL, METERED RESPIRATORY (INHALATION) at 21:00

## 2021-12-13 RX ADMIN — ENOXAPARIN SODIUM 40 MG: 100 INJECTION SUBCUTANEOUS at 07:27

## 2021-12-13 ASSESSMENT — PAIN DESCRIPTION - LOCATION
LOCATION: HEAD
LOCATION: HEAD

## 2021-12-13 ASSESSMENT — PAIN DESCRIPTION - DESCRIPTORS
DESCRIPTORS: ACHING
DESCRIPTORS: ACHING

## 2021-12-13 ASSESSMENT — PAIN DESCRIPTION - PAIN TYPE
TYPE: ACUTE PAIN
TYPE: ACUTE PAIN

## 2021-12-13 ASSESSMENT — PAIN DESCRIPTION - FREQUENCY: FREQUENCY: CONTINUOUS

## 2021-12-13 ASSESSMENT — PAIN SCALES - GENERAL
PAINLEVEL_OUTOF10: 0
PAINLEVEL_OUTOF10: 6
PAINLEVEL_OUTOF10: 0
PAINLEVEL_OUTOF10: 3
PAINLEVEL_OUTOF10: 1

## 2021-12-13 NOTE — PROGRESS NOTES
except as specifically addressed above.       Objective:   VITALS:  BP (!) 140/77   Pulse 86   Temp 98 °F (36.7 °C) (Temporal)   Resp (!) 31   Wt (!) 327 lb 4.8 oz (148.5 kg)   SpO2 (!) 88%   BMI 54.47 kg/m²    Body Mass Index: 55.91 kg/m² Abnormal    5' 5\" (165.1 cm)  as of 4/26/2021   336 lb (152.4 kg)  as of 4/26/2021  Suspect much greater     24HR INTAKE/OUTPUT:      Intake/Output Summary (Last 24 hours) at 12/13/2021 1728  Last data filed at 12/13/2021 1600  Gross per 24 hour   Intake 991.68 ml   Output 1140 ml   Net -148.32 ml         Physical exam    General: Ill-appearing  Psych: Anxious  HEENT: NC/AT, scleral icterus  Cardiovascular: Normal rate, regular rhythm  Respiratory: Tachypneic, less work of breathing on BiPAP  Abdomen: Obese, nondistended  Neurologic: No apparent focal neurologic deficits, alert, follows commands  Musculoskeletal: No deformities  Extremities: No clubbing or cyanosis  Skin: No lesions or rashes appreciated          Medications:      dexmedetomidine HCl in NaCl 0.4 mcg/kg/hr (12/13/21 1600)    sodium chloride Stopped (12/11/21 2130)    dextrose        insulin glargine  14 Units SubCUTAneous Nightly    enoxaparin  40 mg SubCUTAneous BID    dexamethasone  20 mg IntraVENous Q24H    budesonide-formoterol  2 puff Inhalation BID    fluvoxaMINE  50 mg Oral BID    famotidine (PEPCID) injection  40 mg IntraVENous Daily    Vitamin D  2,000 Units Oral Daily    albuterol sulfate HFA  2 puff Inhalation 4x Daily    calcium-cholecalciferol  1 tablet Oral Daily    levothyroxine  100 mcg Oral Daily    vitamin C  1,000 mg Oral Daily    timolol  1 drop Both Eyes BID    pravastatin  40 mg Oral Daily    polyethylene glycol  17 g Oral Daily    pantoprazole  40 mg Oral QAM AC    cetirizine  10 mg Oral Daily    sodium chloride flush  5-40 mL IntraVENous 2 times per day    zinc sulfate  50 mg Oral Daily    melatonin  5 mg Oral Nightly    insulin lispro  0-18 Units SubCUTAneous TID WC    insulin lispro  0-9 Units SubCUTAneous Nightly    lisinopril  20 mg Oral Daily    And    hydroCHLOROthiazide  12.5 mg Oral Daily    baricitinib  4 mg Oral Daily     sodium chloride, LORazepam, ALPRAZolam, furosemide, HYDROcodone-acetaminophen, ondansetron, meclizine, sodium chloride flush, sodium chloride, polyethylene glycol, acetaminophen **OR** acetaminophen, guaiFENesin-dextromethorphan, glucose, dextrose, glucagon (rDNA), dextrose  ADULT DIET; Regular; 3 carb choices (45 gm/meal); Low Fat/Low Chol/High Fiber/2 gm Na     Lab and other Data:     Recent Labs     12/11/21 0425 12/12/21 0312 12/13/21 0228   WBC 8.1 6.7 5.3   HGB 13.6 13.6 13.8    341 419*     Recent Labs     12/11/21 0425 12/11/21 2029 12/12/21 0312 12/13/21 0228 12/13/21  0603     --  137 134*  --    K 4.6   < > 4.5 4.5 4.0   CL 99  --  98 94*  --    CO2 26  --  27 22  --    BUN 13  --  13 16  --    CREATININE 0.9  --  0.7 0.7  --    GLUCOSE 195*  --  155* 232*  --     < > = values in this interval not displayed. Recent Labs     12/11/21 0425 12/12/21 0312 12/13/21 0228   AST 64* 55* 49*   ALT 41* 36* 36*   BILITOT 0.3 0.4 0.5   ALKPHOS 84 85 92     Troponin T:   No results for input(s): TROPONINI in the last 72 hours. Pro-BNP: No results for input(s): BNP in the last 72 hours. INR: No results for input(s): INR in the last 72 hours. UA:No results for input(s): NITRITE, COLORU, PHUR, LABCAST, WBCUA, RBCUA, MUCUS, TRICHOMONAS, YEAST, BACTERIA, CLARITYU, SPECGRAV, LEUKOCYTESUR, UROBILINOGEN, BILIRUBINUR, BLOODU, GLUCOSEU, AMORPHOUS in the last 72 hours. Invalid input(s): [place]la [place]  A1C:   No results for input(s): LABA1C in the last 72 hours. ABG:  Recent Labs     12/13/21  0603   PHART 7.410   GUO3OMY 38.0   PO2ART 49.0*   MRI2ACI 24.1   BEART -0.3   HGBAE 13.9   C1VVYHBJ 86.3*   CARBOXHGBART 0.9       RAD:   XR CHEST PORTABLE    Result Date: 12/9/2021  Bilateral patchy airspace opacity.  Central vascular congestion. Differential includes pneumonia and edema. Signed by Dr Haven Gonzalez      Assessment/Plan     Principal Problem:    Acute respiratory distress syndrome (ARDS) due to COVID-19 virus [facility])  Active Problems:    Pneumonia due to COVID-19 virus    [facility]ute [facility]emic respiratory [facility]    Chronic diarrhea    Chronic heartburn    Hypertension    Hypothyroidism  Resolved Problems:    * No resolved hospital problems. *      Acute hypoxemic respiratory failure due to COVID-19 pneumonia  ARDS  -Remains with tenuous respiratory status, BiPAP to assist work of breathing with high flow supplemental O2 to maintain adequate gas exchange  Goal SPO2 88-92% with COPD  Patient unable to tolerate self proning  Continue dexamethasone  Continue baricitinib  Bronchodilators  Adjunctive therapy  Anticoagulation  Monitor labs    Severe anxiety  -Precedex drip as needed  Cautious use of low-dose Ativan, avoid respiratory depression    Pulmonary vascular congestion on chest x-ray  -Spot IV diuresis, goal net negative fluid balance  Follow-up echocardiogram    Hypertension  Monitor BP, continue lisinopril/HCTZ, further adjustment if needed    Hypothyroidism  Continue home Levothyroxine       Had extensive discussion with patient and patient's daughter regarding clinical status, plan, and goals of care      VTE prophylaxis-Lovenox      Total critical care time of 45 minutes including patient evaluation, chart review, discussion with staff, management plan and assessment of response to treatment.        Miriam Stuart MD    on 12/13/2021 5:28 PM

## 2021-12-13 NOTE — CONSULTS
**Physician Signature**  This document was electronically signed by: Trevor Bruno MD  2021   11:20 AM    **Consult Cover Page**  FROM: [address], [phone]  Call Back Number: [phone]  SUBJECT: Consult Recommendations  Date and Time of Report: 2021 11:20 AM CT  Items Contained in this Document: Critical Care [facility]    **Consult Information**  Member Facility: [address] [place] [address] MRN: [id number]  Visit/Encounter Number: [id number]  Consult ID: [id number]  Facility Time Zone: CT  Date and Time of Request: 2021 06:07 AM  CT  Requesting Clinician: Bisi Mcdaniel MD  Patient Name: Giulia Haq  Date of Birth:   Gender: Female  Patient identity was confirmed at the beginning of the consult with the   patient/family/staff using two personal identifiers: Patient name and       **Reason for Consult**  Reason for Consult: [facility]    **Admission**  Admission Date: 2021  Chief reason for ICU admission: COVID - 19  Secondary reasons for ICU admission: Respiratory Failure, Pneumonia    **Core Metrics**  General orienting sentence for patient: : 50 yo woman, morbid obesity,   sick since late November, COVID+ , declined monoclonal Ab, admitted   , tx to ICU . Now on decaron, olumiant, Luvox, Currently on high   flow nasal cannula 60L 100%. BIPAP being started. Very anxious. Apparently   unvaccinated. Refuses self-proning. Suggest Precedex for anxiety if ativan   ineffective. is on Precedex. ECHO pending today.   Chief physiologic deterioration: Increase in FiO2 required by   30%  Is the patient on DVT prophylaxis?: Yes  Prophylaxis type: Pharmacological  DVT Prophylaxis Comments: Lovenox  Is the patient on GI prophylaxis?: Yes  Gi Prophylaxis Comments: Pepcid  Has this patient reached their nutritional goal?: Yes  Nutritional Goals Details: Becoming more SOB with eating  Are there current issues with pain management in this patient?:   No  Are there issues with skin integrity?: No  Are there issues with delirium?: No  Has the patient been mobilized?: No  Is this patient currently intubated?: No  Are there ethical or care philosophy or family issues?: No  Do you recommend an in depth evaluation?: No  Disposition Recommendations: Continue ICU level of care    **Inserted/Removed Devices**  Device Name: Lamb Catheter  Site of insertion: Urethra  Date of insertion: 12-    **Physician Signature**  This document was electronically signed by: Catarina Villanueva MD  12/13/2021   11:20 AM

## 2021-12-13 NOTE — PROGRESS NOTES
0 Result Notes     Ref Range & Units 12/13/21 0603   pH, Arterial 7.350 - 7.450 7.410    pCO2, Arterial 35.0 - 45.0 mmHg 38.0    pO2, Arterial 80.0 - 100.0 mmHg 49. 0 Low Panic     HCO3, Arterial 22.0 - 26.0 mmol/L 24.1    Base Excess, Arterial -2.0 - 2.0 mmol/L -0.3    Hemoglobin, Art, Extended 12.0 - 16.0 g/dL 13.9    O2 Sat, Arterial >92 % 86.3 Low Panic     Carboxyhgb, Arterial 0.0 - 5.0 % 0.9    Comment:      0.0-1.5   (Smokers 1.5-5.0)    Methemoglobin, Arterial <1.5 % 0.0    O2 Content, Arterial Not Established mL/dL 16.8         Pt on HHFNC at 100% Fio2 and 70 lpm O2 and a NRB, RR, AT+

## 2021-12-13 NOTE — PROGRESS NOTES
Pt's nurse asked this  to say a prayer with her. Pt was awakened by the nurse and this  provided spiritual care with sustaining presence, nurtured hope, and prayer. Pt expressed gratitude for spiritual care.     Electronically signed by Doug Parnell on 12/13/2021 at 4:11 PM

## 2021-12-14 LAB
ALBUMIN SERPL-MCNC: 3.1 G/DL (ref 3.5–5.2)
ALP BLD-CCNC: 94 U/L (ref 35–104)
ALT SERPL-CCNC: 43 U/L (ref 5–33)
ANION GAP SERPL CALCULATED.3IONS-SCNC: 19 MMOL/L (ref 7–19)
AST SERPL-CCNC: 48 U/L (ref 5–32)
BASE EXCESS ARTERIAL: 4.4 MMOL/L (ref -2–2)
BASOPHILS ABSOLUTE: 0 K/UL (ref 0–0.2)
BASOPHILS RELATIVE PERCENT: 0.2 % (ref 0–1)
BILIRUB SERPL-MCNC: 0.4 MG/DL (ref 0.2–1.2)
BUN BLDV-MCNC: 30 MG/DL (ref 6–20)
C-REACTIVE PROTEIN: 13.1 MG/DL (ref 0–0.5)
CALCIUM SERPL-MCNC: 9 MG/DL (ref 8.6–10)
CARBOXYHEMOGLOBIN ARTERIAL: 0.9 % (ref 0–5)
CHLORIDE BLD-SCNC: 96 MMOL/L (ref 98–111)
CO2: 23 MMOL/L (ref 22–29)
CREAT SERPL-MCNC: 0.9 MG/DL (ref 0.5–0.9)
D DIMER: 1 UG/ML FEU (ref 0–0.48)
EOSINOPHILS ABSOLUTE: 0 K/UL (ref 0–0.6)
EOSINOPHILS RELATIVE PERCENT: 0 % (ref 0–5)
GFR AFRICAN AMERICAN: >59
GFR NON-AFRICAN AMERICAN: >60
GLUCOSE BLD-MCNC: 289 MG/DL (ref 70–99)
GLUCOSE BLD-MCNC: 317 MG/DL (ref 74–109)
GLUCOSE BLD-MCNC: 330 MG/DL (ref 70–99)
GLUCOSE BLD-MCNC: 337 MG/DL (ref 70–99)
GLUCOSE BLD-MCNC: 343 MG/DL (ref 70–99)
HCO3 ARTERIAL: 29.2 MMOL/L (ref 22–26)
HCT VFR BLD CALC: 43.4 % (ref 37–47)
HEMOGLOBIN, ART, EXTENDED: 14.4 G/DL (ref 12–16)
HEMOGLOBIN: 14.1 G/DL (ref 12–16)
IMMATURE GRANULOCYTES #: 0.1 K/UL
LYMPHOCYTES ABSOLUTE: 0.6 K/UL (ref 1.1–4.5)
LYMPHOCYTES RELATIVE PERCENT: 6.1 % (ref 20–40)
MCH RBC QN AUTO: 29.7 PG (ref 27–31)
MCHC RBC AUTO-ENTMCNC: 32.5 G/DL (ref 33–37)
MCV RBC AUTO: 91.4 FL (ref 81–99)
METHEMOGLOBIN ARTERIAL: 0.1 %
MONOCYTES ABSOLUTE: 0.6 K/UL (ref 0–0.9)
MONOCYTES RELATIVE PERCENT: 5.7 % (ref 0–10)
NEUTROPHILS ABSOLUTE: 8.6 K/UL (ref 1.5–7.5)
NEUTROPHILS RELATIVE PERCENT: 86.8 % (ref 50–65)
O2 CONTENT ARTERIAL: 17.9 ML/DL
O2 SAT, ARTERIAL: 88.7 %
O2 THERAPY: ABNORMAL
PCO2 ARTERIAL: 43 MMHG (ref 35–45)
PDW BLD-RTO: 12.6 % (ref 11.5–14.5)
PERFORMED ON: ABNORMAL
PH ARTERIAL: 7.44 (ref 7.35–7.45)
PLATELET # BLD: 538 K/UL (ref 130–400)
PMV BLD AUTO: 9.6 FL (ref 9.4–12.3)
PO2 ARTERIAL: 51 MMHG (ref 80–100)
POTASSIUM REFLEX MAGNESIUM: 4.7 MMOL/L (ref 3.5–5)
POTASSIUM, WHOLE BLOOD: 4
PROCALCITONIN: 0.08 NG/ML (ref 0–0.09)
RBC # BLD: 4.75 M/UL (ref 4.2–5.4)
SODIUM BLD-SCNC: 138 MMOL/L (ref 136–145)
TOTAL PROTEIN: 6.7 G/DL (ref 6.6–8.7)
WBC # BLD: 9.9 K/UL (ref 4.8–10.8)

## 2021-12-14 PROCEDURE — 84145 PROCALCITONIN (PCT): CPT

## 2021-12-14 PROCEDURE — 86140 C-REACTIVE PROTEIN: CPT

## 2021-12-14 PROCEDURE — 82803 BLOOD GASES ANY COMBINATION: CPT

## 2021-12-14 PROCEDURE — 82947 ASSAY GLUCOSE BLOOD QUANT: CPT

## 2021-12-14 PROCEDURE — 2100000000 HC CCU R&B

## 2021-12-14 PROCEDURE — 2700000000 HC OXYGEN THERAPY PER DAY

## 2021-12-14 PROCEDURE — 97530 THERAPEUTIC ACTIVITIES: CPT

## 2021-12-14 PROCEDURE — 2500000003 HC RX 250 WO HCPCS: Performed by: STUDENT IN AN ORGANIZED HEALTH CARE EDUCATION/TRAINING PROGRAM

## 2021-12-14 PROCEDURE — 97165 OT EVAL LOW COMPLEX 30 MIN: CPT

## 2021-12-14 PROCEDURE — 36600 WITHDRAWAL OF ARTERIAL BLOOD: CPT

## 2021-12-14 PROCEDURE — 6360000002 HC RX W HCPCS: Performed by: STUDENT IN AN ORGANIZED HEALTH CARE EDUCATION/TRAINING PROGRAM

## 2021-12-14 PROCEDURE — 85379 FIBRIN DEGRADATION QUANT: CPT

## 2021-12-14 PROCEDURE — 6370000000 HC RX 637 (ALT 250 FOR IP): Performed by: STUDENT IN AN ORGANIZED HEALTH CARE EDUCATION/TRAINING PROGRAM

## 2021-12-14 PROCEDURE — 36415 COLL VENOUS BLD VENIPUNCTURE: CPT

## 2021-12-14 PROCEDURE — 84132 ASSAY OF SERUM POTASSIUM: CPT

## 2021-12-14 PROCEDURE — 97161 PT EVAL LOW COMPLEX 20 MIN: CPT

## 2021-12-14 PROCEDURE — 80053 COMPREHEN METABOLIC PANEL: CPT

## 2021-12-14 PROCEDURE — 85025 COMPLETE CBC W/AUTO DIFF WBC: CPT

## 2021-12-14 PROCEDURE — 2580000003 HC RX 258: Performed by: NURSE PRACTITIONER

## 2021-12-14 PROCEDURE — 6370000000 HC RX 637 (ALT 250 FOR IP): Performed by: NURSE PRACTITIONER

## 2021-12-14 PROCEDURE — 2580000003 HC RX 258: Performed by: STUDENT IN AN ORGANIZED HEALTH CARE EDUCATION/TRAINING PROGRAM

## 2021-12-14 RX ORDER — INSULIN GLARGINE 100 [IU]/ML
20 INJECTION, SOLUTION SUBCUTANEOUS NIGHTLY
Status: DISCONTINUED | OUTPATIENT
Start: 2021-12-14 | End: 2021-12-22

## 2021-12-14 RX ORDER — BUMETANIDE 0.25 MG/ML
0.5 INJECTION, SOLUTION INTRAMUSCULAR; INTRAVENOUS ONCE
Status: COMPLETED | OUTPATIENT
Start: 2021-12-14 | End: 2021-12-14

## 2021-12-14 RX ORDER — MECOBALAMIN 5000 MCG
10 TABLET,DISINTEGRATING ORAL NIGHTLY
Status: DISCONTINUED | OUTPATIENT
Start: 2021-12-14 | End: 2022-01-04 | Stop reason: HOSPADM

## 2021-12-14 RX ADMIN — INSULIN LISPRO 12 UNITS: 100 INJECTION, SOLUTION INTRAVENOUS; SUBCUTANEOUS at 14:45

## 2021-12-14 RX ADMIN — CETIRIZINE HYDROCHLORIDE 10 MG: 10 TABLET, FILM COATED ORAL at 08:33

## 2021-12-14 RX ADMIN — DEXMEDETOMIDINE HYDROCHLORIDE 0.4 MCG/KG/HR: 4 INJECTION, SOLUTION INTRAVENOUS at 05:19

## 2021-12-14 RX ADMIN — ACETAMINOPHEN 650 MG: 325 TABLET ORAL at 14:40

## 2021-12-14 RX ADMIN — FAMOTIDINE 40 MG: 10 INJECTION, SOLUTION INTRAVENOUS at 08:33

## 2021-12-14 RX ADMIN — BUDESONIDE AND FORMOTEROL FUMARATE DIHYDRATE 2 PUFF: 160; 4.5 AEROSOL RESPIRATORY (INHALATION) at 08:39

## 2021-12-14 RX ADMIN — ALBUTEROL SULFATE 2 PUFF: 90 AEROSOL, METERED RESPIRATORY (INHALATION) at 19:45

## 2021-12-14 RX ADMIN — POLYETHYLENE GLYCOL 3350 17 G: 17 POWDER, FOR SOLUTION ORAL at 08:33

## 2021-12-14 RX ADMIN — FLUVOXAMINE MALEATE 50 MG: 50 TABLET, COATED ORAL at 08:33

## 2021-12-14 RX ADMIN — SODIUM CHLORIDE, PRESERVATIVE FREE 10 ML: 5 INJECTION INTRAVENOUS at 08:34

## 2021-12-14 RX ADMIN — Medication 1 LOZENGE: at 19:41

## 2021-12-14 RX ADMIN — SODIUM CHLORIDE, PRESERVATIVE FREE 10 ML: 5 INJECTION INTRAVENOUS at 19:45

## 2021-12-14 RX ADMIN — BUDESONIDE AND FORMOTEROL FUMARATE DIHYDRATE 2 PUFF: 160; 4.5 AEROSOL RESPIRATORY (INHALATION) at 19:57

## 2021-12-14 RX ADMIN — OXYCODONE HYDROCHLORIDE AND ACETAMINOPHEN 1000 MG: 500 TABLET ORAL at 08:33

## 2021-12-14 RX ADMIN — INSULIN LISPRO 12 UNITS: 100 INJECTION, SOLUTION INTRAVENOUS; SUBCUTANEOUS at 19:43

## 2021-12-14 RX ADMIN — TIMOLOL MALEATE 1 DROP: 5 SOLUTION OPHTHALMIC at 08:38

## 2021-12-14 RX ADMIN — ENOXAPARIN SODIUM 40 MG: 100 INJECTION SUBCUTANEOUS at 19:41

## 2021-12-14 RX ADMIN — FLUVOXAMINE MALEATE 50 MG: 50 TABLET, COATED ORAL at 19:42

## 2021-12-14 RX ADMIN — ALBUTEROL SULFATE 2 PUFF: 90 AEROSOL, METERED RESPIRATORY (INHALATION) at 08:35

## 2021-12-14 RX ADMIN — Medication 2000 UNITS: at 08:33

## 2021-12-14 RX ADMIN — Medication 10 MG: at 19:42

## 2021-12-14 RX ADMIN — Medication 1 TABLET: at 08:33

## 2021-12-14 RX ADMIN — DEXAMETHASONE SODIUM PHOSPHATE 20 MG: 10 INJECTION, SOLUTION INTRAMUSCULAR; INTRAVENOUS at 13:11

## 2021-12-14 RX ADMIN — ZINC SULFATE 220 MG (50 MG) CAPSULE 50 MG: CAPSULE at 08:33

## 2021-12-14 RX ADMIN — BARICITINIB 4 MG: 2 TABLET, FILM COATED ORAL at 19:42

## 2021-12-14 RX ADMIN — PRAVASTATIN SODIUM 40 MG: 20 TABLET ORAL at 08:33

## 2021-12-14 RX ADMIN — ALBUTEROL SULFATE 2 PUFF: 90 AEROSOL, METERED RESPIRATORY (INHALATION) at 17:35

## 2021-12-14 RX ADMIN — LEVOTHYROXINE SODIUM 100 MCG: 100 TABLET ORAL at 08:37

## 2021-12-14 RX ADMIN — PANTOPRAZOLE SODIUM 40 MG: 40 TABLET, DELAYED RELEASE ORAL at 08:37

## 2021-12-14 RX ADMIN — LISINOPRIL 20 MG: 20 TABLET ORAL at 08:33

## 2021-12-14 RX ADMIN — INSULIN HUMAN 10 UNITS: 100 INJECTION, SUSPENSION SUBCUTANEOUS at 12:48

## 2021-12-14 RX ADMIN — ALBUTEROL SULFATE 2 PUFF: 90 AEROSOL, METERED RESPIRATORY (INHALATION) at 12:50

## 2021-12-14 RX ADMIN — HYDROCHLOROTHIAZIDE 12.5 MG: 12.5 CAPSULE ORAL at 08:32

## 2021-12-14 RX ADMIN — TIMOLOL MALEATE 1 DROP: 5 SOLUTION OPHTHALMIC at 19:57

## 2021-12-14 RX ADMIN — ENOXAPARIN SODIUM 40 MG: 100 INJECTION SUBCUTANEOUS at 08:34

## 2021-12-14 RX ADMIN — BUMETANIDE 0.5 MG: 0.25 INJECTION INTRAMUSCULAR; INTRAVENOUS at 08:33

## 2021-12-14 RX ADMIN — INSULIN GLARGINE 20 UNITS: 100 INJECTION, SOLUTION SUBCUTANEOUS at 19:53

## 2021-12-14 ASSESSMENT — PAIN SCALES - GENERAL
PAINLEVEL_OUTOF10: 3
PAINLEVEL_OUTOF10: 0

## 2021-12-14 ASSESSMENT — PAIN DESCRIPTION - PAIN TYPE: TYPE: ACUTE PAIN

## 2021-12-14 ASSESSMENT — PAIN DESCRIPTION - LOCATION: LOCATION: HEAD

## 2021-12-14 ASSESSMENT — PAIN DESCRIPTION - DESCRIPTORS: DESCRIPTORS: ACHING

## 2021-12-14 NOTE — CONSULTS
Becoming more SOB with eating  Are there current issues with pain management in this patient?:   No  Are there issues with skin integrity?: No  Are there issues with delirium?: No  Has the patient been mobilized?: No  Is this patient currently intubated?: No  Are there ethical or care philosophy or family issues?: No  Do you recommend an in depth evaluation?: No  Disposition Recommendations: Continue ICU level of care    **Inserted/Removed Devices**  Device Name: Lamb Catheter  Site of insertion: Urethra  Date of insertion: 12-    **Physician Signature**  This document was electronically signed by: Sudha Pereyra MD  12/14/2021   11:31 AM

## 2021-12-14 NOTE — PROGRESS NOTES
Physical Therapy    Facility/Department: [facility] CORONARY CARE UNIT  Initial Assessment    NAME: Sam Seen  : 1965  MRN: [id number]    Date of Service: 2021    Discharge Recommendations:  Continue to assess pending progress   PT Equipment Recommendations  Other: ASSESSING NEEDS    Assessment   Body structures, Functions, Activity limitations: Decreased functional mobility ; Decreased endurance  Assessment: pt WOULD BENEFIT FROM SKILLED PT TO ADDRESS Pt'S ENDURANCE/MOBILITY DEFICITS  Prognosis: Good  Decision Making: Low Complexity  PT Education: PT Role; Plan of Care; Gait Training; General Safety; Transfer Training; Functional Mobility Training  Activity Tolerance  Activity Tolerance: Patient Tolerated treatment well       Patient Diagnosis(es): The primary encounter diagnosis was Hypoxia. A diagnosis of COVID-19 was also pertinent to this visit. has a past medical history of Anemia, Asthma, Bronchitis, acute, COPD (chronic obstructive pulmonary disease) ([facility] [address] Glaucoma, Hyperlipidemia, Hypertension, Hyperthyroidism, Morbid obesity ([facility] [address] and Sleep apnea. has a past surgical history that includes Stomach surgery (2014); back surgery (10/2004); Colonoscopy (2007); Upper gastrointestinal endoscopy (10/30/2012); Upper gastrointestinal endoscopy (2008); Axillary Surgery (Bilateral); Carpal tunnel release (Right); pr colonoscopy flx dx w/collj spec when pfrmd (N/A, 2017); Cholecystectomy; Upper gastrointestinal endoscopy (N/A, 2020); Colonoscopy (N/A, 2020); and Dilation and curettage of uterus (N/A, 2021). Restrictions  Restrictions/Precautions  Restrictions/Precautions: Fall Risk, Isolation  Position Activity Restriction  Other position/activity restrictions: droplet plus precautions  Vision/Hearing        Subjective  General  Diagnosis: COVID  Follows Commands: Within Functional Limits  Subjective  Subjective: RN STATES OK TO SEE pt.  Pt DECLINED SITTING UP IN RECLINER THIS DATE  Pain Screening  Patient Currently in Pain: No  Vital Signs  Patient Currently in Pain: No       Orientation  Orientation  Overall Orientation Status: Within Functional Limits  Social/Functional History  Social/Functional History  ADL Assistance: Independent  Ambulation Assistance: Independent  Transfer Assistance: Independent  Cognition        Objective          AROM RLE (degrees)  RLE AROM: WFL  AROM LLE (degrees)  LLE AROM : WFL  Strength RLE  Strength RLE: WFL  Strength LLE  Strength LLE: WFL        Bed mobility  Supine to Sit: Stand by assistance  Sit to Supine: Stand by assistance  Transfers  Sit to Stand: Contact guard assistance  Stand to sit: Contact guard assistance  Comment: pt ABLE TO STAND FROM EOB AND TAKE STEPS TOWARD HOB        Balance  Sitting - Static: Good  Sitting - Dynamic: Good  Standing - Static: Fair  Standing - Dynamic: [address] [facility]  Times per week: 5-7  Plan weeks: 2  Current Treatment Recommendations: Strengthening, Functional Mobility Training, Transfer Training, Gait Training, Endurance Training, Safety Education & Training  Plan Comment: PROGRESS AS TOLERATED  Safety Devices  Type of devices: Call light within reach, Left in bed         Goals  Short term goals  Time Frame for Short term goals: 2 WKS  Short term goal 1:  FT WITH SBA       Therapy Time   Individual Concurrent Group Co-treatment   Time In           Time Out           Minutes                   Maria C Valentin PT     Electronically signed by Maria C Valentin PT on 12/14/2021 at 4:10 PM

## 2021-12-14 NOTE — PROGRESS NOTES
Ref Range & Units 12/14/21 0859   pH, Arterial 7.350 - 7.450 7.440    pCO2, Arterial 35.0 - 45.0 mmHg 43.0    pO2, Arterial 80.0 - 100.0 mmHg 51.0 Low     HCO3, Arterial 22.0 - 26.0 mmol/L 29.2 High     Base Excess, Arterial -2.0 - 2.0 mmol/L 4.4 High     Hemoglobin, Art, Extended 12.0 - 16.0 g/dL 14.4    O2 Sat, Arterial >92 % 88.7 Low     Carboxyhgb, Arterial 0.0 - 5.0 % 0.9    Comment:      0.0-1.5   (Smokers 1.5-5.0)    Methemoglobin, Arterial <1.5 % 0.1    O2 Content, Arterial Not Established mL/dL 17.9    O2 Therapy  Unknown    [facility]              Specimen Collected: 12/14/21 08:59 Last Resulted: 12/14/21 09:00        Lab Flowsheet     Order Details     View Encounter     Lab and Collection Details     Routing     Result History             Result Care Coordination      Patient Communication    Not Released Not seen Back to Top             Result Information    Flag: Abnormal Abnormal   Status: Final result (Collected: 12/14/2021 08:59) Provider Status: Ordered     Blood Gas, Arterial: Patient Communication    Not Released Not seen   Click to Print Result      View SmartLink Info    Blood Gas, Arterial (Order #[id number]) on 12/14/21     Order Report    Order Details       Ref Range & Units 12/14/21 0859   pH, Arterial 7.350 - 7.450 7.440    pCO2, Arterial 35.0 - 45.0 mmHg 43.0    pO2, Arterial 80.0 - 100.0 mmHg 51.0 Low     HCO3, Arterial 22.0 - 26.0 mmol/L 29.2 High     Base Excess, Arterial -2.0 - 2.0 mmol/L 4.4 High     Hemoglobin, Art, Extended 12.0 - 16.0 g/dL 14.4    O2 Sat, Arterial >92 % 88.7 Low     Carboxyhgb, Arterial 0.0 - 5.0 % 0.9    Comment:      0.0-1.5   (Smokers 1.5-5.0)    Methemoglobin, Arterial <1.5 % 0.1    O2 Content, Arterial Not Established mL/dL 17.9    O2 Therapy  Unknown    Resulting [facility]              Specimen Collected: 12/14/21 08:59 Last Resulted: 12/14/21 09:00        Lab Flowsheet     Order Details     View Encounter     Lab and Collection Details     Routing     Result History             Result Care Coordination      Patient Communication    Not Released Not seen Back to Top             Result Information    Flag: Abnormal Abnormal   Status: Final result (Collected: 12/14/2021 08:59) Provider Status: Ordered     Blood Gas, Arterial: Patient Communication    Not Released Not seen   Click to Print Result      View SmartLink Info    Blood Gas, Arterial (Order #[id number]) on 12/14/21     Order Report    Order Details      HEATED HIGH FLOW % 70 LPM  AND NRB  RR

## 2021-12-14 NOTE — PROGRESS NOTES
[facility] Todd, [facility] MEDICINE    DAILY PROGRESS NOTE    Patient:  Pa Cárdenas  YOB: 1965  Date of Service: 12/14/2021  MRN: [id number]   Acct: [de-identified]   Primary Care Physician: Esme Lagunas MD  Advance Directive: Full Code  Admit Date: 12/9/2021       Hospital Day: 5      CHIEF COMPLAINT Worsening SOB        SUBJECTIVE:   Remains dyspneic but work of breathing improved on BiPAP overnight, now on heated high flow nasal cannula with nonrebreather. No fevers or chills overnight. CUMULATIVE HOSPITAL COURSE:    The patient is a 49 y.o. female with a hx of asthma, COPD, HTN, HLD, hypothyroidism and obesity who presented to [address] [place] [address] ED complaining of worsening dyspnea in the setting of Covid. She was diagnosed with Covid on 11/30. She was seen at [facility] on 12/4/21. Per chart review, she did not meet the criteria for admission, however, she was offered monoclonal antibodies, but she declined at that time. She reported worsening fever, chills, HA, N/V and SOA with Cough. Patient admitted with pneumonia due to COVID-19 with acute hypoxemic respiratory failure with chest x-ray showing bilateral patchy infiltrates as well as some signs of vascular congestion/pulmonary edema with SPO2 down to 87% on room air. Initially requiring a few liters nasal cannula supplemental O2, however had steady increase and supplemental O2 requirements. Treated with dexamethasone, baricitinib, and bronchodilators. Remdesivir deferred due to lack of benefit given illness time course. Overnight on 12/11, developed significant hypoxia with large increase in O2 requirements requiring high flow nasal cannula, then subsequently transferred to critical care unit with increased work of breathing and continued increase in supplemental O2 requirements on heated high flow.   Trialed on Precedex for severe anxiety and work of breathing improved some on BiPAP.           ROS  14 point review of systems is negative except as specifically addressed above.       Objective:   VITALS:  /80   Pulse 68   Temp 98 °F (36.7 °C) (Temporal)   Resp 24   Wt (!) 327 lb 4.8 oz (148.5 kg)   SpO2 (!) 86%   BMI 54.47 kg/m²    Body Mass Index: 55.91 kg/m² Abnormal    5' 5\" (165.1 cm)  as of 4/26/2021   336 lb (152.4 kg)  as of 4/26/2021  Suspect much greater     24HR INTAKE/OUTPUT:      Intake/Output Summary (Last 24 hours) at 12/14/2021 1634  Last data filed at 12/13/2021 2025  Gross per 24 hour   Intake 153.84 ml   Output 80 ml   Net 73.84 ml         Physical exam    General: Ill-appearing  Psych: Anxious  HEENT: NC/AT, scleral icterus  Cardiovascular: Normal rate, regular rhythm  Respiratory: Tachypneic, no use of accessory muscles, no distress  Abdomen: Obese, nondistended  Neurologic: No apparent focal neurologic deficits, alert, follows commands  Musculoskeletal: No deformities  Extremities: No clubbing or cyanosis  Skin: No lesions or rashes appreciated          Medications:      dexmedetomidine HCl in NaCl 0.4 mcg/kg/hr (12/14/21 0519)    sodium chloride Stopped (12/11/21 2130)    dextrose        [START ON 12/16/2021] dexamethasone  12 mg IntraVENous Q24H    insulin glargine  20 Units SubCUTAneous Nightly    insulin lispro  0-18 Units SubCUTAneous Q6H    melatonin  10 mg Oral Nightly    enoxaparin  40 mg SubCUTAneous BID    dexamethasone  20 mg IntraVENous Q24H    budesonide-formoterol  2 puff Inhalation BID    fluvoxaMINE  50 mg Oral BID    famotidine (PEPCID) injection  40 mg IntraVENous Daily    Vitamin D  2,000 Units Oral Daily    albuterol sulfate HFA  2 puff Inhalation 4x Daily    calcium-cholecalciferol  1 tablet Oral Daily    levothyroxine  100 mcg Oral Daily    vitamin C  1,000 mg Oral Daily    timolol  1 drop Both Eyes BID    pravastatin  40 mg Oral Daily    polyethylene glycol  17 g Oral Daily    pantoprazole  40 mg Oral QAM AC   

## 2021-12-14 NOTE — PROGRESS NOTES
Occupational Therapy Initial Assessment  Date: 2021   Patient Name: Ruben Rodriguez  MRN: [id number]     : 1965    Date of Service: 2021    Discharge Recommendations:  Continue to assess pending progress       Assessment   Assessment: Evaluation completed and tx initiated. The patient  would benefit from follow up therapy  Treatment Diagnosis: Acute respiratory distress syndrome due to Covid 19  REQUIRES OT FOLLOW UP: Yes  Activity Tolerance  Activity Tolerance: Appears to have good underlying skills. Limited primarily by respiratory status, medical lines  Safety Devices  Safety Devices in place: Not Applicable (Left with PT)           Patient Diagnosis(es): The primary encounter diagnosis was Hypoxia. A diagnosis of COVID-19 was also pertinent to this visit. has a past medical history of Anemia, Asthma, Bronchitis, acute, COPD (chronic obstructive pulmonary disease) ([facility] [address] Glaucoma, Hyperlipidemia, Hypertension, Hyperthyroidism, Morbid obesity ([facility] [address] and Sleep apnea. has a past surgical history that includes Stomach surgery (2014); back surgery (10/2004); Colonoscopy (2007); Upper gastrointestinal endoscopy (10/30/2012); Upper gastrointestinal endoscopy (2008); Axillary Surgery (Bilateral); Carpal tunnel release (Right); pr colonoscopy flx dx w/collj spec when pfrmd (N/A, 2017); Cholecystectomy; Upper gastrointestinal endoscopy (N/A, 2020); Colonoscopy (N/A, 2020); and Dilation and curettage of uterus (N/A, 2021).     Treatment Diagnosis: Acute respiratory distress syndrome due to Covid 19      Restrictions  Restrictions/Precautions  Restrictions/Precautions: Fall Risk, Isolation  Position Activity Restriction  Other position/activity restrictions: droplet plus precautions    Subjective   General  Family / Caregiver Present: No  Patient Currently in Pain: No  Pain Assessment  Pain Level: 3  Vital Signs  Resp: 24  Patient Currently in Pain: No  Oxygen

## 2021-12-15 LAB
ALBUMIN SERPL-MCNC: 3.2 G/DL (ref 3.5–5.2)
ALP BLD-CCNC: 86 U/L (ref 35–104)
ALT SERPL-CCNC: 35 U/L (ref 5–33)
ANION GAP SERPL CALCULATED.3IONS-SCNC: 13 MMOL/L (ref 7–19)
AST SERPL-CCNC: 37 U/L (ref 5–32)
BASOPHILS ABSOLUTE: 0 K/UL (ref 0–0.2)
BASOPHILS RELATIVE PERCENT: 0.1 % (ref 0–1)
BILIRUB SERPL-MCNC: 0.4 MG/DL (ref 0.2–1.2)
BUN BLDV-MCNC: 40 MG/DL (ref 6–20)
C-REACTIVE PROTEIN: 5.69 MG/DL (ref 0–0.5)
CALCIUM SERPL-MCNC: 8.9 MG/DL (ref 8.6–10)
CHLORIDE BLD-SCNC: 96 MMOL/L (ref 98–111)
CO2: 30 MMOL/L (ref 22–29)
CREAT SERPL-MCNC: 1.1 MG/DL (ref 0.5–0.9)
D DIMER: 1.21 UG/ML FEU (ref 0–0.48)
EOSINOPHILS ABSOLUTE: 0 K/UL (ref 0–0.6)
EOSINOPHILS RELATIVE PERCENT: 0.1 % (ref 0–5)
GFR AFRICAN AMERICAN: >59
GFR NON-AFRICAN AMERICAN: 52
GLUCOSE BLD-MCNC: 116 MG/DL (ref 74–109)
GLUCOSE BLD-MCNC: 122 MG/DL (ref 70–99)
GLUCOSE BLD-MCNC: 134 MG/DL (ref 70–99)
GLUCOSE BLD-MCNC: 156 MG/DL (ref 70–99)
GLUCOSE BLD-MCNC: 234 MG/DL (ref 70–99)
GLUCOSE BLD-MCNC: 253 MG/DL (ref 70–99)
GLUCOSE BLD-MCNC: 256 MG/DL (ref 70–99)
HCT VFR BLD CALC: 44 % (ref 37–47)
HEMOGLOBIN: 14 G/DL (ref 12–16)
IMMATURE GRANULOCYTES #: 0.2 K/UL
LYMPHOCYTES ABSOLUTE: 1 K/UL (ref 1.1–4.5)
LYMPHOCYTES RELATIVE PERCENT: 11.1 % (ref 20–40)
MCH RBC QN AUTO: 29.9 PG (ref 27–31)
MCHC RBC AUTO-ENTMCNC: 31.8 G/DL (ref 33–37)
MCV RBC AUTO: 93.8 FL (ref 81–99)
MONOCYTES ABSOLUTE: 0.6 K/UL (ref 0–0.9)
MONOCYTES RELATIVE PERCENT: 7.5 % (ref 0–10)
NEUTROPHILS ABSOLUTE: 6.7 K/UL (ref 1.5–7.5)
NEUTROPHILS RELATIVE PERCENT: 79 % (ref 50–65)
PDW BLD-RTO: 12.8 % (ref 11.5–14.5)
PERFORMED ON: ABNORMAL
PLATELET # BLD: 624 K/UL (ref 130–400)
PMV BLD AUTO: 10 FL (ref 9.4–12.3)
POTASSIUM REFLEX MAGNESIUM: 4.2 MMOL/L (ref 3.5–5)
RBC # BLD: 4.69 M/UL (ref 4.2–5.4)
SODIUM BLD-SCNC: 139 MMOL/L (ref 136–145)
TOTAL PROTEIN: 6.3 G/DL (ref 6.6–8.7)
WBC # BLD: 8.5 K/UL (ref 4.8–10.8)

## 2021-12-15 PROCEDURE — 2100000000 HC CCU R&B

## 2021-12-15 PROCEDURE — 6360000002 HC RX W HCPCS: Performed by: STUDENT IN AN ORGANIZED HEALTH CARE EDUCATION/TRAINING PROGRAM

## 2021-12-15 PROCEDURE — 6370000000 HC RX 637 (ALT 250 FOR IP): Performed by: STUDENT IN AN ORGANIZED HEALTH CARE EDUCATION/TRAINING PROGRAM

## 2021-12-15 PROCEDURE — 85025 COMPLETE CBC W/AUTO DIFF WBC: CPT

## 2021-12-15 PROCEDURE — 2700000000 HC OXYGEN THERAPY PER DAY

## 2021-12-15 PROCEDURE — 2500000003 HC RX 250 WO HCPCS: Performed by: STUDENT IN AN ORGANIZED HEALTH CARE EDUCATION/TRAINING PROGRAM

## 2021-12-15 PROCEDURE — 6370000000 HC RX 637 (ALT 250 FOR IP): Performed by: NURSE PRACTITIONER

## 2021-12-15 PROCEDURE — 6360000002 HC RX W HCPCS: Performed by: INTERNAL MEDICINE

## 2021-12-15 PROCEDURE — 80053 COMPREHEN METABOLIC PANEL: CPT

## 2021-12-15 PROCEDURE — 2580000003 HC RX 258: Performed by: STUDENT IN AN ORGANIZED HEALTH CARE EDUCATION/TRAINING PROGRAM

## 2021-12-15 PROCEDURE — 6370000000 HC RX 637 (ALT 250 FOR IP): Performed by: INTERNAL MEDICINE

## 2021-12-15 PROCEDURE — 82947 ASSAY GLUCOSE BLOOD QUANT: CPT

## 2021-12-15 PROCEDURE — 2580000003 HC RX 258: Performed by: NURSE PRACTITIONER

## 2021-12-15 PROCEDURE — 86140 C-REACTIVE PROTEIN: CPT

## 2021-12-15 PROCEDURE — 85379 FIBRIN DEGRADATION QUANT: CPT

## 2021-12-15 RX ORDER — GUAIFENESIN AND CODEINE PHOSPHATE 100; 10 MG/5ML; MG/5ML
10 SOLUTION ORAL EVERY 4 HOURS PRN
Status: DISCONTINUED | OUTPATIENT
Start: 2021-12-15 | End: 2021-12-15 | Stop reason: ALTCHOICE

## 2021-12-15 RX ORDER — PROMETHAZINE HYDROCHLORIDE 25 MG/ML
6.25 INJECTION, SOLUTION INTRAMUSCULAR; INTRAVENOUS EVERY 6 HOURS PRN
Status: DISCONTINUED | OUTPATIENT
Start: 2021-12-15 | End: 2021-12-20

## 2021-12-15 RX ORDER — DEXTROMETHORPHAN HYDROBROMIDE AND PROMETHAZINE HYDROCHLORIDE 15; 6.25 MG/5ML; MG/5ML
SYRUP ORAL 4 TIMES DAILY PRN
COMMUNITY

## 2021-12-15 RX ORDER — DEXTROMETHORPHAN POLISTIREX 30 MG/5ML
30 SUSPENSION ORAL 2 TIMES DAILY PRN
Status: DISCONTINUED | OUTPATIENT
Start: 2021-12-15 | End: 2021-12-30

## 2021-12-15 RX ADMIN — LORAZEPAM 0.5 MG: 2 INJECTION INTRAMUSCULAR; INTRAVENOUS at 13:38

## 2021-12-15 RX ADMIN — FLUVOXAMINE MALEATE 50 MG: 50 TABLET, COATED ORAL at 20:43

## 2021-12-15 RX ADMIN — Medication 2000 UNITS: at 08:02

## 2021-12-15 RX ADMIN — DEXMEDETOMIDINE HYDROCHLORIDE 0.7 MCG/KG/HR: 4 INJECTION, SOLUTION INTRAVENOUS at 16:54

## 2021-12-15 RX ADMIN — Medication 30 MG: at 21:02

## 2021-12-15 RX ADMIN — BUDESONIDE AND FORMOTEROL FUMARATE DIHYDRATE 2 PUFF: 160; 4.5 AEROSOL RESPIRATORY (INHALATION) at 20:44

## 2021-12-15 RX ADMIN — HYDROCODONE BITARTRATE AND ACETAMINOPHEN 1 TABLET: 7.5; 325 TABLET ORAL at 02:20

## 2021-12-15 RX ADMIN — INSULIN GLARGINE 20 UNITS: 100 INJECTION, SOLUTION SUBCUTANEOUS at 20:44

## 2021-12-15 RX ADMIN — POLYETHYLENE GLYCOL 3350 17 G: 17 POWDER, FOR SOLUTION ORAL at 08:03

## 2021-12-15 RX ADMIN — GUAIFENESIN SYRUP AND DEXTROMETHORPHAN 5 ML: 100; 10 SYRUP ORAL at 02:20

## 2021-12-15 RX ADMIN — PRAVASTATIN SODIUM 40 MG: 20 TABLET ORAL at 08:03

## 2021-12-15 RX ADMIN — DEXMEDETOMIDINE HYDROCHLORIDE 0.6 MCG/KG/HR: 4 INJECTION, SOLUTION INTRAVENOUS at 03:16

## 2021-12-15 RX ADMIN — ZINC SULFATE 220 MG (50 MG) CAPSULE 50 MG: CAPSULE at 08:02

## 2021-12-15 RX ADMIN — PANTOPRAZOLE SODIUM 40 MG: 40 TABLET, DELAYED RELEASE ORAL at 07:13

## 2021-12-15 RX ADMIN — DEXAMETHASONE SODIUM PHOSPHATE 20 MG: 10 INJECTION, SOLUTION INTRAMUSCULAR; INTRAVENOUS at 11:30

## 2021-12-15 RX ADMIN — SODIUM CHLORIDE, PRESERVATIVE FREE 10 ML: 5 INJECTION INTRAVENOUS at 20:44

## 2021-12-15 RX ADMIN — ALBUTEROL SULFATE 2 PUFF: 90 AEROSOL, METERED RESPIRATORY (INHALATION) at 07:22

## 2021-12-15 RX ADMIN — CETIRIZINE HYDROCHLORIDE 10 MG: 10 TABLET, FILM COATED ORAL at 08:02

## 2021-12-15 RX ADMIN — Medication 10 MG: at 20:43

## 2021-12-15 RX ADMIN — HYDROCHLOROTHIAZIDE 12.5 MG: 12.5 CAPSULE ORAL at 08:02

## 2021-12-15 RX ADMIN — INSULIN LISPRO 9 UNITS: 100 INJECTION, SOLUTION INTRAVENOUS; SUBCUTANEOUS at 20:44

## 2021-12-15 RX ADMIN — ALBUTEROL SULFATE 2 PUFF: 90 AEROSOL, METERED RESPIRATORY (INHALATION) at 20:44

## 2021-12-15 RX ADMIN — ENOXAPARIN SODIUM 40 MG: 100 INJECTION SUBCUTANEOUS at 08:03

## 2021-12-15 RX ADMIN — ALBUTEROL SULFATE 2 PUFF: 90 AEROSOL, METERED RESPIRATORY (INHALATION) at 12:37

## 2021-12-15 RX ADMIN — BARICITINIB 4 MG: 2 TABLET, FILM COATED ORAL at 20:43

## 2021-12-15 RX ADMIN — LEVOTHYROXINE SODIUM 100 MCG: 100 TABLET ORAL at 07:13

## 2021-12-15 RX ADMIN — Medication 1 TABLET: at 08:02

## 2021-12-15 RX ADMIN — SODIUM CHLORIDE, PRESERVATIVE FREE 10 ML: 5 INJECTION INTRAVENOUS at 08:04

## 2021-12-15 RX ADMIN — ACETAMINOPHEN 650 MG: 325 TABLET ORAL at 12:04

## 2021-12-15 RX ADMIN — BUDESONIDE AND FORMOTEROL FUMARATE DIHYDRATE 2 PUFF: 160; 4.5 AEROSOL RESPIRATORY (INHALATION) at 07:22

## 2021-12-15 RX ADMIN — FAMOTIDINE 40 MG: 10 INJECTION, SOLUTION INTRAVENOUS at 08:02

## 2021-12-15 RX ADMIN — INSULIN LISPRO 6 UNITS: 100 INJECTION, SOLUTION INTRAVENOUS; SUBCUTANEOUS at 13:43

## 2021-12-15 RX ADMIN — OXYCODONE HYDROCHLORIDE AND ACETAMINOPHEN 1000 MG: 500 TABLET ORAL at 08:02

## 2021-12-15 RX ADMIN — Medication 30 MG: at 11:54

## 2021-12-15 RX ADMIN — PROMETHAZINE HYDROCHLORIDE 6.25 MG: 25 INJECTION INTRAMUSCULAR; INTRAVENOUS at 11:54

## 2021-12-15 RX ADMIN — ENOXAPARIN SODIUM 40 MG: 100 INJECTION SUBCUTANEOUS at 20:43

## 2021-12-15 RX ADMIN — FLUVOXAMINE MALEATE 50 MG: 50 TABLET, COATED ORAL at 08:02

## 2021-12-15 RX ADMIN — INSULIN LISPRO 3 UNITS: 100 INJECTION, SOLUTION INTRAVENOUS; SUBCUTANEOUS at 08:03

## 2021-12-15 RX ADMIN — TIMOLOL MALEATE 1 DROP: 5 SOLUTION OPHTHALMIC at 08:51

## 2021-12-15 RX ADMIN — LISINOPRIL 20 MG: 20 TABLET ORAL at 08:03

## 2021-12-15 RX ADMIN — SODIUM CHLORIDE, PRESERVATIVE FREE 10 ML: 5 INJECTION INTRAVENOUS at 11:55

## 2021-12-15 RX ADMIN — PROMETHAZINE HYDROCHLORIDE 6.25 MG: 25 INJECTION INTRAMUSCULAR; INTRAVENOUS at 20:58

## 2021-12-15 ASSESSMENT — ENCOUNTER SYMPTOMS
COUGH: 1
SHORTNESS OF BREATH: 1
VOMITING: 0
DIARRHEA: 0
VOICE CHANGE: 0
BACK PAIN: 0
COLOR CHANGE: 0
CONSTIPATION: 0
RHINORRHEA: 0
NAUSEA: 0

## 2021-12-15 ASSESSMENT — PAIN DESCRIPTION - ONSET: ONSET: AWAKENED FROM SLEEP

## 2021-12-15 ASSESSMENT — PAIN SCALES - GENERAL
PAINLEVEL_OUTOF10: 0
PAINLEVEL_OUTOF10: 3
PAINLEVEL_OUTOF10: 0
PAINLEVEL_OUTOF10: 3
PAINLEVEL_OUTOF10: 0

## 2021-12-15 ASSESSMENT — PAIN DESCRIPTION - LOCATION: LOCATION: HEAD

## 2021-12-15 ASSESSMENT — PAIN DESCRIPTION - FREQUENCY: FREQUENCY: INTERMITTENT

## 2021-12-15 ASSESSMENT — PAIN DESCRIPTION - PAIN TYPE: TYPE: ACUTE PAIN

## 2021-12-15 ASSESSMENT — PAIN - FUNCTIONAL ASSESSMENT: PAIN_FUNCTIONAL_ASSESSMENT: PREVENTS OR INTERFERES SOME ACTIVE ACTIVITIES AND ADLS

## 2021-12-15 ASSESSMENT — PAIN DESCRIPTION - PROGRESSION: CLINICAL_PROGRESSION: GRADUALLY WORSENING

## 2021-12-15 ASSESSMENT — PAIN DESCRIPTION - DESCRIPTORS: DESCRIPTORS: DULL;HEADACHE

## 2021-12-15 NOTE — PROGRESS NOTES
Comprehensive Nutrition Assessment    Type and Reason for Visit:  Initial, RD Nutrition Re-Screen/LOS    Nutrition Recommendations/Plan: continue current interventions    Nutrition Assessment:  Patient is well nourished AEB fat and muscle mass. However pt is at risk for nutritional risk d/t dx, decreased po intake and elevated accuchek's    Malnutrition Assessment:  Malnutrition Status: At risk for malnutrition (Comment)    Context:  Acute Illness     Findings of the 6 clinical characteristics of malnutrition:  Energy Intake:  7 - 50% or less of estimated energy requirements for 5 or more days  Weight Loss:  No significant weight loss     Body Fat Loss:  No significant body fat loss     Muscle Mass Loss:  No significant muscle mass loss    Fluid Accumulation:  1 - Mild Generalized   Strength:  Not Performed    Estimated Daily Nutrient Needs:  Energy (kcal):  5789-1038 kcals (20-25 kcals/IBW kg); Weight Used for Energy Requirements:  Current     Protein (g):  142g; Weight Used for Protein Requirements:  Ideal        Fluid (ml/day):  1485- 3712 ml (10-25ml/kg); Method Used for Fluid Requirements:  1 ml/kcal      Nutrition Related Findings:  morbidly obese    HHFNC with nonrebreather      Wounds:  None       Current Nutrition Therapies:    ADULT DIET; Regular; 3 carb choices (45 gm/meal);  Low Fat/Low Chol/High Fiber/2 gm Na  ADULT ORAL NUTRITION SUPPLEMENT; Breakfast, Lunch, Dinner; Diabetic Oral Supplement    Anthropometric Measures:  · Height: 5' 5\" (165.1 cm)  · Current Body Weight: 327 lb 4.8 oz (148.5 kg)   · Admission Body Weight:  (NA)    · Usual Body Weight: 336 lb (152.4 kg)     · Ideal Body Weight: 125 lbs; % Ideal Body Weight 261.8 %   · BMI: 54.5  · Adjusted Body Weight:  ; No Adjustment   · BMI Categories: Obese Class 3 (BMI 40.0 or greater)       Nutrition Diagnosis:   · Inadequate oral intake related to acute injury/trauma, impaired respiratory function as evidenced by intake 0-25%, poor intake prior to admission      Nutrition Interventions:   Food and/or Nutrient Delivery:  Continue Current Diet, Continue Oral Nutrition Supplement  Nutrition Education/Counseling:  No recommendation at this time   Coordination of Nutrition Care:  Continue to monitor while inpatient    Goals:  po intake 50% or greater. Weight stable or decrease 1-5# per week.   Accuchek's under 200       Nutrition Monitoring and Evaluation:   Behavioral-Environmental Outcomes:  None Identified   Food/Nutrient Intake Outcomes:  Food and Nutrient Intake, Supplement Intake  Physical Signs/Symptoms Outcomes:  Biochemical Data, Weight, Skin, Nutrition Focused Physical Findings, Fluid Status or Edema     Discharge Planning:    Continue current diet     Electronically signed by Suman Santos MS, RD, LD on 12/15/21 at 1:47 PM CST    Contact: [phone]

## 2021-12-15 NOTE — PROGRESS NOTES
Occupational Therapy  Nursing states patient has been awake all night and morning. Pt just got back to bed this afternoon after lunch.  Nursing prefers if patient is not seen at this time Electronically signed by RAJESH Manzano on 12/15/2021 at 4:22 PM

## 2021-12-15 NOTE — CONSULTS
**Physician Signature**  This document was electronically signed by: Brook Solomon MD  12/15/2021   10:52 AM    **Consult Cover Page**  FROM: [address], [phone]  Call Back Number: [phone]  SUBJECT: Consult Recommendations  Date and Time of Report: 12/15/2021 10:52 AM CT  Items Contained in this Document: Critical Care [facility]    **Consult Information**  Member Facility: [address] [place] [address] MRN: [id number]  Visit/Encounter Number: [id number]  Consult ID: [id number]  Facility Time Zone: CT  Date and Time of Request: 12/15/2021 05:05 AM  CT  Requesting Clinician: New Gonzalez MD  Patient Name: Meli Conner  Date of Birth:   Gender: Female  Patient identity was confirmed at the beginning of the consult with the   patient/family/staff using two personal identifiers: Patient name and       **Reason for Consult**  Reason for Consult: [facility]    **Admission**  Admission Date: 2021  Chief reason for ICU admission: COVID - 19  Secondary reasons for ICU admission: Respiratory Failure, Pneumonia    **Core Metrics**  General orienting sentence for patient: : 50 yo woman, morbid obesity,   sick since late November, COVID+ , declined monoclonal Ab, admitted   , tx to ICU . Now on decaron, olumiant, Luvox, Currently on high   flow nasal cannula 60L 100%. BIPAP being started. Very anxious. Apparently   unvaccinated. Refuses self-proning. Suggest Precedex for anxiety if ativan   ineffective. is on Precedex. ECHO pending today. high flow + NRB but less   labored by RN report. Bedrest. Continues to NOT   self-prone. Nighttime BIPAP be intubated. Still on High flow + NRB. Able to   get up to chair with SPO2   and maintain SPO2. No overnight BIPAP b/o discomfort.  Prefers HHF &   NRB  Chief physiologic deterioration: Increase in FiO2 required by   30%  Is the patient on DVT prophylaxis?: Yes  Prophylaxis type: Pharmacological  DVT Prophylaxis Comments: Lovenox  Is the patient on GI prophylaxis?: Yes  Gi Prophylaxis Comments: Pepcid  Has this patient reached their nutritional goal?: Yes  Nutritional Goals Details: Becoming more SOB with eating  Are there current issues with pain management in this patient?:   No  Are there issues with skin integrity?: No  Are there issues with delirium?: No  Has the patient been mobilized?: Yes  Is this patient currently intubated?: No  Are there ethical or care philosophy or family issues?: No  Do you recommend an in depth evaluation?: No  Disposition Recommendations: Continue ICU level of care    **Inserted/Removed Devices**  Device Name: Lamb Catheter  Site of insertion: Urethra  Date of insertion: 12-    **Physician Signature**  This document was electronically signed by: Demetrio Hope MD  12/15/2021   10:52 AM

## 2021-12-15 NOTE — CARE COORDINATION
Met with patient wearing appropriate PPE to discuss option of sending a referral to [facility]. Oxygen requirements are too high for patient to be accepted at their facility at this time. Answered all questions appropriately. Patient states she will consider sending a referral.  This CM will continue to follow.   Electronically signed by Alison Aguillon RN on 12/15/2021 at 3:00 PM

## 2021-12-15 NOTE — PLAN OF CARE
Nutrition Problem #1: Inadequate oral intake  Intervention: Food and/or Nutrient Delivery: Continue Current Diet, Continue Oral Nutrition Supplement  Nutritional Goals: po intake 50% or greater. Weight stable or decrease 1-5# per week.   Accuchek's under 200

## 2021-12-16 ENCOUNTER — APPOINTMENT (OUTPATIENT)
Dept: GENERAL RADIOLOGY | Age: 56
DRG: 177 | End: 2021-12-16
Payer: MEDICARE

## 2021-12-16 LAB
ALBUMIN SERPL-MCNC: 3.3 G/DL (ref 3.5–5.2)
ALP BLD-CCNC: 97 U/L (ref 35–104)
ALT SERPL-CCNC: 32 U/L (ref 5–33)
ANION GAP SERPL CALCULATED.3IONS-SCNC: 11 MMOL/L (ref 7–19)
AST SERPL-CCNC: 28 U/L (ref 5–32)
BASOPHILS ABSOLUTE: 0 K/UL (ref 0–0.2)
BASOPHILS RELATIVE PERCENT: 0.2 % (ref 0–1)
BILIRUB SERPL-MCNC: 0.3 MG/DL (ref 0.2–1.2)
BUN BLDV-MCNC: 30 MG/DL (ref 6–20)
C-REACTIVE PROTEIN: 9.16 MG/DL (ref 0–0.5)
CALCIUM SERPL-MCNC: 8.9 MG/DL (ref 8.6–10)
CHLORIDE BLD-SCNC: 98 MMOL/L (ref 98–111)
CO2: 28 MMOL/L (ref 22–29)
CREAT SERPL-MCNC: 0.7 MG/DL (ref 0.5–0.9)
D DIMER: 1.68 UG/ML FEU (ref 0–0.48)
EOSINOPHILS ABSOLUTE: 0 K/UL (ref 0–0.6)
EOSINOPHILS RELATIVE PERCENT: 0 % (ref 0–5)
GFR AFRICAN AMERICAN: >59
GFR NON-AFRICAN AMERICAN: >60
GLUCOSE BLD-MCNC: 209 MG/DL (ref 70–99)
GLUCOSE BLD-MCNC: 219 MG/DL (ref 70–99)
GLUCOSE BLD-MCNC: 232 MG/DL (ref 70–99)
GLUCOSE BLD-MCNC: 246 MG/DL (ref 70–99)
GLUCOSE BLD-MCNC: 249 MG/DL (ref 70–99)
GLUCOSE BLD-MCNC: 270 MG/DL (ref 74–109)
GLUCOSE BLD-MCNC: 289 MG/DL (ref 70–99)
HCT VFR BLD CALC: 45.5 % (ref 37–47)
HEMOGLOBIN: 14.3 G/DL (ref 12–16)
IMMATURE GRANULOCYTES #: 0.2 K/UL
LYMPHOCYTES ABSOLUTE: 0.4 K/UL (ref 1.1–4.5)
LYMPHOCYTES RELATIVE PERCENT: 6.7 % (ref 20–40)
MCH RBC QN AUTO: 29.7 PG (ref 27–31)
MCHC RBC AUTO-ENTMCNC: 31.4 G/DL (ref 33–37)
MCV RBC AUTO: 94.6 FL (ref 81–99)
MONOCYTES ABSOLUTE: 0.4 K/UL (ref 0–0.9)
MONOCYTES RELATIVE PERCENT: 5.8 % (ref 0–10)
NEUTROPHILS ABSOLUTE: 5.5 K/UL (ref 1.5–7.5)
NEUTROPHILS RELATIVE PERCENT: 84 % (ref 50–65)
PDW BLD-RTO: 12.4 % (ref 11.5–14.5)
PERFORMED ON: ABNORMAL
PLATELET # BLD: 574 K/UL (ref 130–400)
PMV BLD AUTO: 9.5 FL (ref 9.4–12.3)
POTASSIUM REFLEX MAGNESIUM: 5 MMOL/L (ref 3.5–5)
RBC # BLD: 4.81 M/UL (ref 4.2–5.4)
SODIUM BLD-SCNC: 137 MMOL/L (ref 136–145)
TOTAL PROTEIN: 6.6 G/DL (ref 6.6–8.7)
WBC # BLD: 6.6 K/UL (ref 4.8–10.8)

## 2021-12-16 PROCEDURE — 2580000003 HC RX 258: Performed by: NURSE PRACTITIONER

## 2021-12-16 PROCEDURE — 6370000000 HC RX 637 (ALT 250 FOR IP): Performed by: NURSE PRACTITIONER

## 2021-12-16 PROCEDURE — 82947 ASSAY GLUCOSE BLOOD QUANT: CPT

## 2021-12-16 PROCEDURE — 2700000000 HC OXYGEN THERAPY PER DAY

## 2021-12-16 PROCEDURE — 6360000002 HC RX W HCPCS: Performed by: INTERNAL MEDICINE

## 2021-12-16 PROCEDURE — 85025 COMPLETE CBC W/AUTO DIFF WBC: CPT

## 2021-12-16 PROCEDURE — 71045 X-RAY EXAM CHEST 1 VIEW: CPT

## 2021-12-16 PROCEDURE — 6370000000 HC RX 637 (ALT 250 FOR IP): Performed by: INTERNAL MEDICINE

## 2021-12-16 PROCEDURE — 2500000003 HC RX 250 WO HCPCS: Performed by: STUDENT IN AN ORGANIZED HEALTH CARE EDUCATION/TRAINING PROGRAM

## 2021-12-16 PROCEDURE — 36415 COLL VENOUS BLD VENIPUNCTURE: CPT

## 2021-12-16 PROCEDURE — 2100000000 HC CCU R&B

## 2021-12-16 PROCEDURE — 85379 FIBRIN DEGRADATION QUANT: CPT

## 2021-12-16 PROCEDURE — 2580000003 HC RX 258: Performed by: STUDENT IN AN ORGANIZED HEALTH CARE EDUCATION/TRAINING PROGRAM

## 2021-12-16 PROCEDURE — 80053 COMPREHEN METABOLIC PANEL: CPT

## 2021-12-16 PROCEDURE — 86140 C-REACTIVE PROTEIN: CPT

## 2021-12-16 PROCEDURE — 6360000002 HC RX W HCPCS: Performed by: STUDENT IN AN ORGANIZED HEALTH CARE EDUCATION/TRAINING PROGRAM

## 2021-12-16 PROCEDURE — 6370000000 HC RX 637 (ALT 250 FOR IP): Performed by: STUDENT IN AN ORGANIZED HEALTH CARE EDUCATION/TRAINING PROGRAM

## 2021-12-16 RX ADMIN — ALBUTEROL SULFATE 2 PUFF: 90 AEROSOL, METERED RESPIRATORY (INHALATION) at 17:43

## 2021-12-16 RX ADMIN — DEXMEDETOMIDINE HYDROCHLORIDE 0.6 MCG/KG/HR: 4 INJECTION, SOLUTION INTRAVENOUS at 07:01

## 2021-12-16 RX ADMIN — SODIUM CHLORIDE, PRESERVATIVE FREE 10 ML: 5 INJECTION INTRAVENOUS at 07:52

## 2021-12-16 RX ADMIN — Medication 1 TABLET: at 07:49

## 2021-12-16 RX ADMIN — FLUVOXAMINE MALEATE 50 MG: 50 TABLET, COATED ORAL at 21:52

## 2021-12-16 RX ADMIN — POLYETHYLENE GLYCOL 3350 17 G: 17 POWDER, FOR SOLUTION ORAL at 07:48

## 2021-12-16 RX ADMIN — SODIUM CHLORIDE, PRESERVATIVE FREE 10 ML: 5 INJECTION INTRAVENOUS at 21:49

## 2021-12-16 RX ADMIN — Medication 10 MG: at 21:52

## 2021-12-16 RX ADMIN — INSULIN LISPRO 9 UNITS: 100 INJECTION, SOLUTION INTRAVENOUS; SUBCUTANEOUS at 20:20

## 2021-12-16 RX ADMIN — PROMETHAZINE HYDROCHLORIDE 6.25 MG: 25 INJECTION INTRAMUSCULAR; INTRAVENOUS at 10:43

## 2021-12-16 RX ADMIN — PRAVASTATIN SODIUM 40 MG: 20 TABLET ORAL at 07:49

## 2021-12-16 RX ADMIN — ALBUTEROL SULFATE 2 PUFF: 90 AEROSOL, METERED RESPIRATORY (INHALATION) at 13:09

## 2021-12-16 RX ADMIN — INSULIN GLARGINE 20 UNITS: 100 INJECTION, SOLUTION SUBCUTANEOUS at 20:20

## 2021-12-16 RX ADMIN — BUDESONIDE AND FORMOTEROL FUMARATE DIHYDRATE 2 PUFF: 160; 4.5 AEROSOL RESPIRATORY (INHALATION) at 07:56

## 2021-12-16 RX ADMIN — ENOXAPARIN SODIUM 40 MG: 100 INJECTION SUBCUTANEOUS at 07:48

## 2021-12-16 RX ADMIN — LEVOTHYROXINE SODIUM 100 MCG: 100 TABLET ORAL at 07:49

## 2021-12-16 RX ADMIN — ALBUTEROL SULFATE 2 PUFF: 90 AEROSOL, METERED RESPIRATORY (INHALATION) at 07:56

## 2021-12-16 RX ADMIN — CETIRIZINE HYDROCHLORIDE 10 MG: 10 TABLET, FILM COATED ORAL at 07:49

## 2021-12-16 RX ADMIN — INSULIN LISPRO 6 UNITS: 100 INJECTION, SOLUTION INTRAVENOUS; SUBCUTANEOUS at 13:10

## 2021-12-16 RX ADMIN — ZINC SULFATE 220 MG (50 MG) CAPSULE 50 MG: CAPSULE at 07:49

## 2021-12-16 RX ADMIN — OXYCODONE HYDROCHLORIDE AND ACETAMINOPHEN 1000 MG: 500 TABLET ORAL at 07:49

## 2021-12-16 RX ADMIN — HYDROCODONE BITARTRATE AND ACETAMINOPHEN 1 TABLET: 7.5; 325 TABLET ORAL at 07:49

## 2021-12-16 RX ADMIN — LORAZEPAM 0.5 MG: 2 INJECTION INTRAMUSCULAR; INTRAVENOUS at 11:18

## 2021-12-16 RX ADMIN — ALPRAZOLAM 1 MG: 0.5 TABLET ORAL at 21:51

## 2021-12-16 RX ADMIN — TIMOLOL MALEATE 1 DROP: 5 SOLUTION OPHTHALMIC at 21:50

## 2021-12-16 RX ADMIN — INSULIN LISPRO 6 UNITS: 100 INJECTION, SOLUTION INTRAVENOUS; SUBCUTANEOUS at 07:51

## 2021-12-16 RX ADMIN — BARICITINIB 4 MG: 2 TABLET, FILM COATED ORAL at 21:52

## 2021-12-16 RX ADMIN — Medication 30 MG: at 21:51

## 2021-12-16 RX ADMIN — ALBUTEROL SULFATE 2 PUFF: 90 AEROSOL, METERED RESPIRATORY (INHALATION) at 21:51

## 2021-12-16 RX ADMIN — BUDESONIDE AND FORMOTEROL FUMARATE DIHYDRATE 2 PUFF: 160; 4.5 AEROSOL RESPIRATORY (INHALATION) at 21:50

## 2021-12-16 RX ADMIN — PROMETHAZINE HYDROCHLORIDE 6.25 MG: 25 INJECTION INTRAMUSCULAR; INTRAVENOUS at 21:51

## 2021-12-16 RX ADMIN — ENOXAPARIN SODIUM 40 MG: 100 INJECTION SUBCUTANEOUS at 21:52

## 2021-12-16 RX ADMIN — Medication 2000 UNITS: at 07:49

## 2021-12-16 RX ADMIN — FAMOTIDINE 40 MG: 10 INJECTION, SOLUTION INTRAVENOUS at 07:49

## 2021-12-16 RX ADMIN — DEXMEDETOMIDINE HYDROCHLORIDE 0.25 MCG/KG/HR: 4 INJECTION, SOLUTION INTRAVENOUS at 22:08

## 2021-12-16 RX ADMIN — FLUVOXAMINE MALEATE 50 MG: 50 TABLET, COATED ORAL at 07:49

## 2021-12-16 RX ADMIN — DEXAMETHASONE SODIUM PHOSPHATE 12 MG: 10 INJECTION, SOLUTION INTRAMUSCULAR; INTRAVENOUS at 09:38

## 2021-12-16 RX ADMIN — TIMOLOL MALEATE 1 DROP: 5 SOLUTION OPHTHALMIC at 09:42

## 2021-12-16 RX ADMIN — INSULIN LISPRO 9 UNITS: 100 INJECTION, SOLUTION INTRAVENOUS; SUBCUTANEOUS at 02:00

## 2021-12-16 ASSESSMENT — PAIN DESCRIPTION - ORIENTATION: ORIENTATION: LOWER;MID

## 2021-12-16 ASSESSMENT — ENCOUNTER SYMPTOMS
VOICE CHANGE: 0
BACK PAIN: 0
COUGH: 1
RHINORRHEA: 0
COLOR CHANGE: 0
DIARRHEA: 0
VOMITING: 0
NAUSEA: 0
CONSTIPATION: 0
SHORTNESS OF BREATH: 1

## 2021-12-16 ASSESSMENT — PAIN SCALES - GENERAL
PAINLEVEL_OUTOF10: 0
PAINLEVEL_OUTOF10: 6
PAINLEVEL_OUTOF10: 0
PAINLEVEL_OUTOF10: 2
PAINLEVEL_OUTOF10: 0

## 2021-12-16 ASSESSMENT — PAIN DESCRIPTION - PAIN TYPE: TYPE: ACUTE PAIN

## 2021-12-16 ASSESSMENT — PAIN DESCRIPTION - LOCATION: LOCATION: BACK;NECK

## 2021-12-16 ASSESSMENT — PAIN DESCRIPTION - DESCRIPTORS: DESCRIPTORS: ACHING;DISCOMFORT

## 2021-12-16 NOTE — PROGRESS NOTES
Hospitalist Progress Note    Patient:  Suhail Peck  YOB: 1965  Date of Service: 12/15/2021  MRN: [id number]   Acct: [de-identified]   Primary Care Physician: Michael Anderson MD  Advance Directive: [facility]  Admit Date: 12/9/2021       Hospital Day: 6  Referring Provider: Lunette Litten, DO    Patient Seen, Chart, Consults, Notes, Labs, Radiology studies reviewed. Subjective:  Suhail Peck is a 49 y.o. female  whom we are following for COVID-19 pneumonia, hypoxemic respiratory failure. She feels about the same. We discussed intubation. She does not wish to pursue intubation if she deteriorates from a pulmonary standpoint.     Allergies:  Latex, Penicillins, Codeine, and Tape [adhesive tape]    Medicines:  Current Facility-Administered Medications   Medication Dose Route Frequency Provider Last Rate Last Admin    promethazine (PHENERGAN) injection 6.25 mg  6.25 mg IntraVENous Q6H PRN Lunette Litten, DO   6.25 mg at 12/15/21 1154    dextromethorphan (DELSYM) 30 MG/5ML extended release liquid 30 mg  30 mg Oral BID PRN Lunette Litten, DO   30 mg at 12/15/21 1154    [START ON 12/16/2021] dexamethasone (DECADRON) 12 mg in sodium chloride 0.9 % IVPB  12 mg IntraVENous Q24H Xin Garcia MD        insulin glargine (LANTUS) injection vial 20 Units  20 Units SubCUTAneous Nightly Xin Garcia MD   20 Units at 12/14/21 1953    insulin lispro (HUMALOG) injection vial 0-18 Units  0-18 Units SubCUTAneous Q6H Xin Garcia MD   6 Units at 12/15/21 1343    melatonin disintegrating tablet 10 mg  10 mg Oral Nightly Xin Garcia MD   10 mg at 12/14/21 1942    Benzocaine-Menthol (CEPACOL) 1 lozenge  1 lozenge Oral Q2H PRN Xin Garcia MD   1 lozenge at 12/14/21 1941    sodium chloride (OCEAN, BABY AYR) 0.65 % nasal spray 1 spray  1 spray Each Nostril PRN Xin Garcia MD   1 spray at 12/13/21 1727    dexmedetomidine (PRECEDEX) 400 mcg in sodium chloride 0.9 % 100 mL infusion  0.1-1.4 mcg/kg/hr IntraVENous Continuous Miriam Stuart MD 9.5 mL/hr at 12/15/21 1700 0.7 mcg/kg/hr at 12/15/21 1700    enoxaparin (LOVENOX) injection 40 mg  40 mg SubCUTAneous BID Miriam Stuart MD   40 mg at 12/15/21 0803    budesonide-formoterol (SYMBICORT) 160-4.5 MCG/ACT inhaler 2 puff  2 puff Inhalation BID Miriam Stuart MD   2 puff at 12/15/21 9998    fluvoxaMINE (LUVOX) tablet 50 mg  50 mg Oral BID Miriam Stuart MD   50 mg at 12/15/21 0802    famotidine (PEPCID) injection 40 mg  40 mg IntraVENous Daily Miriam Stuart MD   40 mg at 12/15/21 0802    Vitamin D (CHOLECALCIFEROL) tablet 2,000 Units  2,000 Units Oral Daily Miriam Stuart MD   2,000 Units at 12/15/21 0802    LORazepam (ATIVAN) injection 0.5 mg  0.5 mg IntraVENous Q6H PRN Miriam Stuart MD   0.5 mg at 12/15/21 1338    ALPRAZolam (XANAX) tablet 1 mg  1 mg Oral Nightly PRN Miriam Stuart MD   1 mg at 12/11/21 2233    albuterol sulfate  (90 Base) MCG/ACT inhaler 2 puff  2 puff Inhalation 4x Daily Viola Glez APRN   2 puff at 12/15/21 1237    calcium-cholecalciferol 500-200 MG-UNIT per tablet 1 tablet  1 tablet Oral Daily Viola Bromichelaoli, APRN   1 tablet at 12/15/21 0802    HYDROcodone-acetaminophen ([address]) 7.5-325 MG per tablet 1 tablet  1 tablet Oral Q6H PRN Viola Glez, APRN   1 tablet at 12/15/21 0220    levothyroxine (SYNTHROID) tablet 100 mcg  100 mcg Oral Daily Viola Bromichelaoli, APRN   100 mcg at 12/15/21 2354    ascorbic acid (VITAMIN C) tablet 1,000 mg  1,000 mg Oral Daily Viola Bromichelaoli, APRN   1,000 mg at 12/15/21 0802    timolol (TIMOPTIC) 0.5 % ophthalmic solution 1 drop  1 drop Both Eyes BID Viola Glez APRN   1 drop at 12/15/21 0851    pravastatin (PRAVACHOL) tablet 40 mg  40 mg Oral Daily Viola Glez APRN   40 mg at 12/15/21 0803    polyethylene glycol (GLYCOLAX) packet 17 g  17 g Oral Daily Viola Glez APRN   17 g at 12/15/21 8873    ondansetron (ZOFRAN) tablet 8 mg  8 mg Oral Q8H PRN Ashely Jeff, APRN   8 mg at 12/10/21 1751    meclizine (ANTIVERT) tablet 25 mg  25 mg Oral TID PRN Ashely Jeff, APRN   25 mg at 12/10/21 0301    cetirizine (ZYRTEC) tablet 10 mg  10 mg Oral Daily Ashely Jeff, APRN   10 mg at 12/15/21 0802    sodium chloride flush 0.9 % injection 5-40 mL  5-40 mL IntraVENous 2 times per day Ashely Jeff, APRN   10 mL at 12/15/21 0804    sodium chloride flush 0.9 % injection 5-40 mL  5-40 mL IntraVENous PRN Ashely Jeff, APRN   10 mL at 12/15/21 1155    0.9 % sodium chloride infusion  25 mL IntraVENous PRN Ashely Jeff, APRN   Stopped at 12/11/21 2130    polyethylene glycol (GLYCOLAX) packet 17 g  17 g Oral Daily PRN Ashely Jeff, APRN        acetaminophen (TYLENOL) tablet 650 mg  650 mg Oral Q6H PRN Ashely Jeff, APRN   650 mg at 12/15/21 1204    Or    acetaminophen (TYLENOL) suppository 650 mg  650 mg Rectal Q6H PRN Ashely Jeff, APRN        zinc sulfate (ZINCATE) capsule 50 mg  50 mg Oral Daily Ashely Jeff, APRN   50 mg at 12/15/21 0802    glucose (GLUTOSE) 40 % oral gel 15 g  15 g Oral PRN Ahsely Jeff, APRN        dextrose 50 % IV solution  12.5 g IntraVENous PRN Ashely Jeff, APRN        glucagon (rDNA) injection 1 mg  1 mg IntraMUSCular PRN Ashely Jeff, APRN        dextrose 5 % solution  100 mL/hr IntraVENous PRN Ashely Jeff, APRN        baricitinib [place]) tablet 4 mg  4 mg Oral Daily Ashely Jeff, APRN   4 mg at 12/14/21 1942       Past Medical History:  Past Medical History:   Diagnosis Date    Anemia     Asthma     Bronchitis, acute     [facility]     Diabetes [facility]     Glaucoma     Hyperlipidemia     Hypertension     Hyperthyroidism     [facility]     Sleep apnea     uses c-pap       Past Surgical History:  Past Surgical History:   Procedure Laterality Date    AXILLARY SURGERY Bilateral     excision and drainage of staph abscesses    BACK SURGERY  10/2004    Dr. Oma Brice, [place] L4, L5    CARPAL TUNNEL RELEASE Right     CHOLECYSTECTOMY      COLONOSCOPY  03/20/2007    Dr Lexy Alanis 9/11/2020    Dr Lan Botello yr recall    DILATION AND CURETTAGE OF UTERUS N/A 1/29/2021    DILATATION AND CURETTAGE HYSTEROSCOPY [facility] performed by Mahnaz Angeles MD at [address] [place] [address] FLX DX W/COLLJ [address] WHEN PFRMD N/A 2/8/2017    Dr Jennifer Hills, actively inflamed internal hemorrhoids, residulal liquid and some solid food particles in the colon-5 yr recall due to prep    STOMACH SURGERY  01/20/2014    Dr. Luis Felipe Zhang ( gastric sleeve)    UPPER GASTROINTESTINAL ENDOSCOPY  10/30/2012    Dr Ralph Garcia esophagitis, gastritis, Iliana (-)    UPPER GASTROINTESTINAL ENDOSCOPY  01/23/2008    Dr Carmela Mendoza esophagitis    UPPER GASTROINTESTINAL ENDOSCOPY N/A 9/11/2020    Dr Kaitlyn Grajeda       Family History  Family History   Problem Relation Age of Onset    Lung Cancer Mother     Kidney Cancer Mother     Lung Cancer Maternal Grandmother     Colon Cancer Maternal Grandfather     Liver Cancer Neg Hx     Liver Disease Neg Hx     Stomach Cancer Neg Hx     Rectal Cancer Neg Hx     Esophageal Cancer Neg Hx     Colon Polyps Neg Hx        Social History  Social History     Socioeconomic History    Marital status:      Spouse name: Not on file    Number of children: Not on file    Years of education: Not on file    Highest education level: Not on file   Occupational History    Not on file   Tobacco Use    Smoking status: Former Smoker     Packs/day: 3.00     Years: 26.00     Pack years: 78.00     Types: Cigarettes     Quit date: 5/6/2011     Years since quitting: 10.6    Smokeless tobacco: Never Used   Vaping Use    Vaping Use: Never used   Substance and Sexual Activity    Alcohol use: No    Drug use: No    Sexual activity: Yes Partners: Male   Other Topics Concern    Not on file   Social History Narrative    Not on file     Social Determinants of Health     Financial Resource Strain:     Difficulty of Paying Living Expenses: Not on file   Food Insecurity:     Worried About Running Out of Food in the Last Year: Not on file    Tiffanie of Food in the Last Year: Not on file   Transportation Needs:     Lack of Transportation (Medical): Not on file    Lack of Transportation (Non-Medical): Not on file   Physical Activity:     Days of Exercise per Week: Not on file    Minutes of Exercise per Session: Not on file   Stress:     Feeling of Stress : Not on file   Social Connections:     Frequency of Communication with Friends and Family: Not on file    Frequency of Social Gatherings with Friends and Family: Not on file    Attends Gnosticism Services: Not on file    Active Member of [address] [place] [address] or Organizations: Not on file    Attends Club or Organization Meetings: Not on file    Marital Status: Not on file   Intimate Partner Violence:     Fear of Current or Ex-Partner: Not on file    Emotionally Abused: Not on file    Physically Abused: Not on file    Sexually Abused: Not on file   Housing Stability:     Unable to Pay for Housing in the Last Year: Not on file    Number of [place] in the Last Year: Not on file    Unstable Housing in the Last Year: Not on file         Review of Systems:    Review of Systems   Constitutional: Positive for activity change and fatigue. HENT: Negative for rhinorrhea and voice change. Eyes: Negative for visual disturbance. Respiratory: Positive for cough and shortness of breath. Cardiovascular: Negative for chest pain and leg swelling. Gastrointestinal: Negative for constipation, diarrhea, nausea and vomiting. Endocrine: Negative for polyuria. Genitourinary: Negative for difficulty urinating and dysuria. Musculoskeletal: Positive for gait problem.  Negative for arthralgias and back pain.   Skin: Negative for color change. Allergic/Immunologic: Negative for immunocompromised state. Neurological: Positive for weakness. Negative for dizziness and headaches. Psychiatric/Behavioral: Negative for confusion. Objective:  Blood pressure 125/73, pulse 59, temperature 97 °F (36.1 °C), temperature source Temporal, resp. rate 22, height 5' 5\" (1.651 m), weight (!) 327 lb 4.8 oz (148.5 kg), SpO2 90 %, not currently breastfeeding. Intake/Output Summary (Last 24 hours) at 12/15/2021 1821  Last data filed at 12/15/2021 1800  Gross per 24 hour   Intake 1403.46 ml   Output 1050 ml   Net 353.46 ml       Physical Exam  Vitals and nursing note reviewed. Constitutional:       Appearance: She is ill-appearing. HENT:      Head: Normocephalic and atraumatic. Right Ear: External ear normal.      Left Ear: External ear normal.      Nose: Nose normal.      Mouth/Throat:      Mouth: Mucous membranes are moist.   Eyes:      Conjunctiva/sclera: Conjunctivae normal.      Pupils: Pupils are equal, round, and reactive to light. Cardiovascular:      Rate and Rhythm: Normal rate and regular rhythm. Heart sounds: Normal heart sounds. Pulmonary:      Effort: Respiratory distress present. Breath sounds: Normal breath sounds. Abdominal:      General: Abdomen is flat. Palpations: Abdomen is soft. Musculoskeletal:         General: Normal range of motion. Cervical back: Neck supple. No rigidity. No muscular tenderness. Skin:     General: Skin is warm and dry. Neurological:      Mental Status: She is alert and oriented to person, place, and time.    Psychiatric:         Mood and Affect: Mood normal.         Labs:  BMP:   Recent Labs     12/13/21  0228 12/13/21  0603 12/14/21  0313 12/14/21  0859 12/15/21  0508   *  --  138  --  139   K 4.5   < > 4.7 4.0 4.2   CL 94*  --  96*  --  96*   CO2 22  --  23  --  30*   BUN 16  --  30*  --  40*   CREATININE 0.7  --  0.9  --  1.1* CALCIUM 8.4*  --  9.0  --  8.9    < > = values in this interval not displayed. CBC:   Recent Labs     12/13/21  0228 12/14/21  0313 12/15/21  0508   WBC 5.3 9.9 8.5   HGB 13.8 14.1 14.0   HCT 43.7 43.4 44.0   MCV 93.6 91.4 93.8   * 538* 624*     LIVER PROFILE:   Recent Labs     12/13/21  0228 12/14/21  0313 12/15/21  0508   AST 49* 48* 37*   ALT 36* 43* 35*   BILITOT 0.5 0.4 0.4   ALKPHOS 92 94 86     PT/INR: No results for input(s): PROTIME, INR in the last 72 hours. APTT: No results for input(s): APTT in the last 72 hours. BNP:  No results for input(s): BNP in the last 72 hours. Ionized Calcium:No results for input(s): IONCA in the last 72 hours. Magnesium:No results for input(s): MG in the last 72 hours. Phosphorus:No results for input(s): PHOS in the last 72 hours. HgbA1C: No results for input(s): LABA1C in the last 72 hours. Hepatic:   Recent Labs     12/13/21  0228 12/14/21  0313 12/15/21  0508   ALKPHOS 92 94 86   ALT 36* 43* 35*   AST 49* 48* 37*   PROT 6.5* 6.7 6.3*   BILITOT 0.5 0.4 0.4   LABALBU 3.1* 3.1* 3.2*     Lactic Acid: No results for input(s): LACTA in the last 72 hours. Troponin: No results for input(s): CKTOTAL, CKMB, TROPONINT in the last 72 hours. ABGs: No results for input(s): PH, PCO2, PO2, HCO3, O2SAT in the last 72 hours. CRP:    Recent Labs     12/13/21  0228 12/14/21  0313 12/15/21  0508   CRP 26.86* 13.10* 5.69*     Sed Rate:  No results for input(s): SEDRATE in the last 72 hours. Cultures:   No results for input(s): CULTURE in the last 72 hours. No results for input(s): BC, Lonn Yi in the last 72 hours. No results for input(s): CXSURG in the last 72 hours. Radiology reports as per the Radiologist  Radiology: XR CHEST PORTABLE    Result Date: 12/9/2021  EXAM: XR CHEST PORTABLE INDICATION: Shortness of breath, COVID positive COMPARISON: None available. FINDINGS: Cardiac silhouette is normal in size. No pleural effusion or visible pneumothorax.  Patchy bilateral airspace opacity is present. Central vascular congestion is noted. No acute osseous finding. Bilateral patchy airspace opacity. Central vascular congestion. Differential includes pneumonia and edema. Signed by Dr Susie Graf     Acute respiratory distress syndrome (ARDS) due to COVID-19 virus. Continue ongoing medical management. Acute hypoxemic respiratory failure due to COVID-19. Continue supportive care. Please document 42 minutes of critical care time for patient assessment, chart review, discussion with staff, .        Andre Harmon, DO

## 2021-12-16 NOTE — CONSULTS
**Physician Signature**  This document was electronically signed by: Joe Saldana MD  2021   10:57 AM    **Consult Cover Page**  FROM: Y[address], [phone]  Call Back Number: [phone]  SUBJECT: Consult Recommendations  Date and Time of Report: 2021 10:57 AM CT  Items Contained in this Document: Critical Care [facility]    **Consult Information**  Member Facility: [address] [place] [address] MRN: [id number]  Visit/Encounter Number: [id number]  Consult ID: [id number]  Facility Time Zone: CT  Date and Time of Request: 2021 04:55 AM  CT  Requesting Clinician: Sue Lazo MD  Patient Name: Selvin Connell  YOB: 1965  Gender: Female  Patient identity was confirmed at the beginning of the consult with the   patient/family/staff using two personal identifiers: Patient name and       **Reason for Consult**  Reason for Consult: [facility]    **Admission**  Admission Date: 2021  Chief reason for ICU admission: COVID - 19  Secondary reasons for ICU admission: Respiratory Failure, Pneumonia    **Core Metrics**  General orienting sentence for patient: : 48 yo woman, morbid obesity,   sick since late November, COVID+ , declined monoclonal Ab, admitted   , tx to ICU . Now on decaron, olumiant, Luvox, Currently on high   flow nasal cannula 60L 100%. BIPAP being started. Very anxious. Apparently   unvaccinated. Refuses self-proning. Suggest Precedex for anxiety if ativan   ineffective. is on Precedex. ECHO pending today. high flow + NRB but less   labored by RN report. Bedrest. Continues to NOT   self-prone. Nighttime BIPAP be intubated. Still on High flow + NRB. Able to   get up to chair with SPO2   and maintain SPO2. No overnight BIPAP b/o discomfort.  Prefers HHF &   NRBChief physiologic deterioration: Increase in FiO2 required by   30%  Is the patient on DVT prophylaxis?: Yes  Prophylaxis type: Pharmacological  DVT Prophylaxis Comments: Lovenox  Is the patient on GI prophylaxis?: Yes  Gi Prophylaxis Comments: Pepcid  Has this patient reached their nutritional goal?: Yes  Nutritional Goals Details: Becoming more SOB with eating  Are there current issues with pain management in this patient?:   No  Are there issues with skin integrity?: No  Are there issues with delirium?: No  Has the patient been mobilized?: Yes  Is this patient currently intubated?: No  Are there ethical or care philosophy or family issues?: No  Do you recommend an in depth evaluation?: No  Disposition Recommendations: Continue ICU level of care    **Inserted/Removed Devices**  Device Name: Lamb Catheter  Site of insertion: Urethra  Date of insertion: 12-    **Physician Signature**  This document was electronically signed by: Patsy Yeh MD  12/16/2021   10:57 AM

## 2021-12-17 LAB
ALBUMIN SERPL-MCNC: 2.9 G/DL (ref 3.5–5.2)
ALP BLD-CCNC: 99 U/L (ref 35–104)
ALT SERPL-CCNC: 31 U/L (ref 5–33)
ANION GAP SERPL CALCULATED.3IONS-SCNC: 12 MMOL/L (ref 7–19)
AST SERPL-CCNC: 25 U/L (ref 5–32)
BASOPHILS ABSOLUTE: 0.1 K/UL (ref 0–0.2)
BASOPHILS RELATIVE PERCENT: 0.4 % (ref 0–1)
BILIRUB SERPL-MCNC: 0.3 MG/DL (ref 0.2–1.2)
BUN BLDV-MCNC: 25 MG/DL (ref 6–20)
CALCIUM SERPL-MCNC: 9.4 MG/DL (ref 8.6–10)
CHLORIDE BLD-SCNC: 101 MMOL/L (ref 98–111)
CO2: 28 MMOL/L (ref 22–29)
CREAT SERPL-MCNC: 0.7 MG/DL (ref 0.5–0.9)
EOSINOPHILS ABSOLUTE: 0 K/UL (ref 0–0.6)
EOSINOPHILS RELATIVE PERCENT: 0.2 % (ref 0–5)
GFR AFRICAN AMERICAN: >59
GFR NON-AFRICAN AMERICAN: >60
GLUCOSE BLD-MCNC: 160 MG/DL (ref 70–99)
GLUCOSE BLD-MCNC: 181 MG/DL (ref 74–109)
GLUCOSE BLD-MCNC: 195 MG/DL (ref 70–99)
GLUCOSE BLD-MCNC: 212 MG/DL (ref 70–99)
GLUCOSE BLD-MCNC: 224 MG/DL (ref 70–99)
GLUCOSE BLD-MCNC: 272 MG/DL (ref 70–99)
GLUCOSE BLD-MCNC: 274 MG/DL (ref 70–99)
HCT VFR BLD CALC: 48 % (ref 37–47)
HEMOGLOBIN: 14.6 G/DL (ref 12–16)
IMMATURE GRANULOCYTES #: 0.4 K/UL
LYMPHOCYTES ABSOLUTE: 0.6 K/UL (ref 1.1–4.5)
LYMPHOCYTES RELATIVE PERCENT: 5.4 % (ref 20–40)
MCH RBC QN AUTO: 29.4 PG (ref 27–31)
MCHC RBC AUTO-ENTMCNC: 30.4 G/DL (ref 33–37)
MCV RBC AUTO: 96.8 FL (ref 81–99)
MONOCYTES ABSOLUTE: 0.6 K/UL (ref 0–0.9)
MONOCYTES RELATIVE PERCENT: 5.5 % (ref 0–10)
NEUTROPHILS ABSOLUTE: 9.7 K/UL (ref 1.5–7.5)
NEUTROPHILS RELATIVE PERCENT: 85 % (ref 50–65)
PDW BLD-RTO: 12.4 % (ref 11.5–14.5)
PERFORMED ON: ABNORMAL
PLATELET # BLD: 612 K/UL (ref 130–400)
PMV BLD AUTO: 9.7 FL (ref 9.4–12.3)
POTASSIUM REFLEX MAGNESIUM: 4.7 MMOL/L (ref 3.5–5)
RBC # BLD: 4.96 M/UL (ref 4.2–5.4)
SODIUM BLD-SCNC: 141 MMOL/L (ref 136–145)
TOTAL PROTEIN: 7 G/DL (ref 6.6–8.7)
WBC # BLD: 11.4 K/UL (ref 4.8–10.8)

## 2021-12-17 PROCEDURE — 97530 THERAPEUTIC ACTIVITIES: CPT

## 2021-12-17 PROCEDURE — 82947 ASSAY GLUCOSE BLOOD QUANT: CPT

## 2021-12-17 PROCEDURE — 6360000002 HC RX W HCPCS: Performed by: STUDENT IN AN ORGANIZED HEALTH CARE EDUCATION/TRAINING PROGRAM

## 2021-12-17 PROCEDURE — 85025 COMPLETE CBC W/AUTO DIFF WBC: CPT

## 2021-12-17 PROCEDURE — 2100000000 HC CCU R&B

## 2021-12-17 PROCEDURE — 97116 GAIT TRAINING THERAPY: CPT

## 2021-12-17 PROCEDURE — 6360000002 HC RX W HCPCS: Performed by: INTERNAL MEDICINE

## 2021-12-17 PROCEDURE — 2580000003 HC RX 258: Performed by: NURSE PRACTITIONER

## 2021-12-17 PROCEDURE — 2500000003 HC RX 250 WO HCPCS: Performed by: STUDENT IN AN ORGANIZED HEALTH CARE EDUCATION/TRAINING PROGRAM

## 2021-12-17 PROCEDURE — 94660 CPAP INITIATION&MGMT: CPT

## 2021-12-17 PROCEDURE — 2700000000 HC OXYGEN THERAPY PER DAY

## 2021-12-17 PROCEDURE — 6370000000 HC RX 637 (ALT 250 FOR IP): Performed by: NURSE PRACTITIONER

## 2021-12-17 PROCEDURE — 6370000000 HC RX 637 (ALT 250 FOR IP): Performed by: INTERNAL MEDICINE

## 2021-12-17 PROCEDURE — 6370000000 HC RX 637 (ALT 250 FOR IP): Performed by: STUDENT IN AN ORGANIZED HEALTH CARE EDUCATION/TRAINING PROGRAM

## 2021-12-17 PROCEDURE — 80053 COMPREHEN METABOLIC PANEL: CPT

## 2021-12-17 PROCEDURE — 2580000003 HC RX 258: Performed by: STUDENT IN AN ORGANIZED HEALTH CARE EDUCATION/TRAINING PROGRAM

## 2021-12-17 RX ORDER — ATROPA BELLADONNA AND OPIUM 16.2; 6 MG/1; MG/1
60 SUPPOSITORY RECTAL EVERY 8 HOURS PRN
Status: DISCONTINUED | OUTPATIENT
Start: 2021-12-17 | End: 2022-01-04 | Stop reason: HOSPADM

## 2021-12-17 RX ORDER — GUAIFENESIN 600 MG/1
600 TABLET, EXTENDED RELEASE ORAL 2 TIMES DAILY
Status: DISCONTINUED | OUTPATIENT
Start: 2021-12-17 | End: 2021-12-26

## 2021-12-17 RX ADMIN — Medication 30 MG: at 11:25

## 2021-12-17 RX ADMIN — ENOXAPARIN SODIUM 40 MG: 100 INJECTION SUBCUTANEOUS at 22:02

## 2021-12-17 RX ADMIN — HYDROCODONE BITARTRATE AND ACETAMINOPHEN 1 TABLET: 7.5; 325 TABLET ORAL at 09:03

## 2021-12-17 RX ADMIN — ALBUTEROL SULFATE 2 PUFF: 90 AEROSOL, METERED RESPIRATORY (INHALATION) at 16:42

## 2021-12-17 RX ADMIN — GUAIFENESIN 600 MG: 600 TABLET, EXTENDED RELEASE ORAL at 22:02

## 2021-12-17 RX ADMIN — FAMOTIDINE 40 MG: 10 INJECTION, SOLUTION INTRAVENOUS at 09:04

## 2021-12-17 RX ADMIN — INSULIN LISPRO 6 UNITS: 100 INJECTION, SOLUTION INTRAVENOUS; SUBCUTANEOUS at 01:48

## 2021-12-17 RX ADMIN — LEVOTHYROXINE SODIUM 100 MCG: 100 TABLET ORAL at 05:34

## 2021-12-17 RX ADMIN — INSULIN LISPRO 6 UNITS: 100 INJECTION, SOLUTION INTRAVENOUS; SUBCUTANEOUS at 13:46

## 2021-12-17 RX ADMIN — OXYCODONE HYDROCHLORIDE AND ACETAMINOPHEN 1000 MG: 500 TABLET ORAL at 09:04

## 2021-12-17 RX ADMIN — SODIUM CHLORIDE, PRESERVATIVE FREE 10 ML: 5 INJECTION INTRAVENOUS at 22:04

## 2021-12-17 RX ADMIN — Medication 30 MG: at 22:02

## 2021-12-17 RX ADMIN — INSULIN GLARGINE 20 UNITS: 100 INJECTION, SOLUTION SUBCUTANEOUS at 21:12

## 2021-12-17 RX ADMIN — BARICITINIB 4 MG: 2 TABLET, FILM COATED ORAL at 22:02

## 2021-12-17 RX ADMIN — PROMETHAZINE HYDROCHLORIDE 6.25 MG: 25 INJECTION INTRAMUSCULAR; INTRAVENOUS at 04:29

## 2021-12-17 RX ADMIN — DEXMEDETOMIDINE HYDROCHLORIDE 0.3 MCG/KG/HR: 4 INJECTION, SOLUTION INTRAVENOUS at 10:09

## 2021-12-17 RX ADMIN — FLUVOXAMINE MALEATE 50 MG: 50 TABLET, COATED ORAL at 09:10

## 2021-12-17 RX ADMIN — INSULIN LISPRO 9 UNITS: 100 INJECTION, SOLUTION INTRAVENOUS; SUBCUTANEOUS at 21:12

## 2021-12-17 RX ADMIN — PROMETHAZINE HYDROCHLORIDE 6.25 MG: 25 INJECTION INTRAMUSCULAR; INTRAVENOUS at 22:02

## 2021-12-17 RX ADMIN — DEXAMETHASONE SODIUM PHOSPHATE 12 MG: 10 INJECTION, SOLUTION INTRAMUSCULAR; INTRAVENOUS at 09:02

## 2021-12-17 RX ADMIN — ALBUTEROL SULFATE 2 PUFF: 90 AEROSOL, METERED RESPIRATORY (INHALATION) at 09:03

## 2021-12-17 RX ADMIN — PROMETHAZINE HYDROCHLORIDE 6.25 MG: 25 INJECTION INTRAMUSCULAR; INTRAVENOUS at 11:25

## 2021-12-17 RX ADMIN — BUDESONIDE AND FORMOTEROL FUMARATE DIHYDRATE 2 PUFF: 160; 4.5 AEROSOL RESPIRATORY (INHALATION) at 22:03

## 2021-12-17 RX ADMIN — ZINC SULFATE 220 MG (50 MG) CAPSULE 50 MG: CAPSULE at 09:03

## 2021-12-17 RX ADMIN — PRAVASTATIN SODIUM 40 MG: 20 TABLET ORAL at 09:04

## 2021-12-17 RX ADMIN — BUDESONIDE AND FORMOTEROL FUMARATE DIHYDRATE 2 PUFF: 160; 4.5 AEROSOL RESPIRATORY (INHALATION) at 09:02

## 2021-12-17 RX ADMIN — ALPRAZOLAM 1 MG: 0.5 TABLET ORAL at 22:02

## 2021-12-17 RX ADMIN — TIMOLOL MALEATE 1 DROP: 5 SOLUTION OPHTHALMIC at 09:27

## 2021-12-17 RX ADMIN — CETIRIZINE HYDROCHLORIDE 10 MG: 10 TABLET, FILM COATED ORAL at 09:03

## 2021-12-17 RX ADMIN — TIMOLOL MALEATE 1 DROP: 5 SOLUTION OPHTHALMIC at 22:03

## 2021-12-17 RX ADMIN — FLUVOXAMINE MALEATE 50 MG: 50 TABLET, COATED ORAL at 22:02

## 2021-12-17 RX ADMIN — ALBUTEROL SULFATE 2 PUFF: 90 AEROSOL, METERED RESPIRATORY (INHALATION) at 12:38

## 2021-12-17 RX ADMIN — Medication 1 TABLET: at 09:03

## 2021-12-17 RX ADMIN — SODIUM CHLORIDE, PRESERVATIVE FREE 10 ML: 5 INJECTION INTRAVENOUS at 09:18

## 2021-12-17 RX ADMIN — DEXMEDETOMIDINE HYDROCHLORIDE 0.3 MCG/KG/HR: 4 INJECTION, SOLUTION INTRAVENOUS at 21:38

## 2021-12-17 RX ADMIN — Medication 10 MG: at 22:02

## 2021-12-17 RX ADMIN — ENOXAPARIN SODIUM 40 MG: 100 INJECTION SUBCUTANEOUS at 09:03

## 2021-12-17 RX ADMIN — ACETAMINOPHEN 650 MG: 325 TABLET ORAL at 13:46

## 2021-12-17 RX ADMIN — ALBUTEROL SULFATE 2 PUFF: 90 AEROSOL, METERED RESPIRATORY (INHALATION) at 22:03

## 2021-12-17 RX ADMIN — GUAIFENESIN 600 MG: 600 TABLET, EXTENDED RELEASE ORAL at 12:38

## 2021-12-17 RX ADMIN — Medication 2000 UNITS: at 09:03

## 2021-12-17 RX ADMIN — INSULIN LISPRO 3 UNITS: 100 INJECTION, SOLUTION INTRAVENOUS; SUBCUTANEOUS at 09:17

## 2021-12-17 RX ADMIN — POLYETHYLENE GLYCOL 3350 17 G: 17 POWDER, FOR SOLUTION ORAL at 21:12

## 2021-12-17 ASSESSMENT — PAIN SCALES - GENERAL
PAINLEVEL_OUTOF10: 0
PAINLEVEL_OUTOF10: 3
PAINLEVEL_OUTOF10: 0
PAINLEVEL_OUTOF10: 2
PAINLEVEL_OUTOF10: 0
PAINLEVEL_OUTOF10: 6
PAINLEVEL_OUTOF10: 0

## 2021-12-17 ASSESSMENT — ENCOUNTER SYMPTOMS
NAUSEA: 0
BACK PAIN: 0
COUGH: 1
COLOR CHANGE: 0
VOMITING: 0
RHINORRHEA: 0
CONSTIPATION: 0
VOICE CHANGE: 0
DIARRHEA: 0
SHORTNESS OF BREATH: 1

## 2021-12-17 ASSESSMENT — PAIN DESCRIPTION - LOCATION
LOCATION: BACK;NECK
LOCATION: BACK;NECK

## 2021-12-17 ASSESSMENT — PAIN DESCRIPTION - PAIN TYPE
TYPE: CHRONIC PAIN
TYPE: CHRONIC PAIN

## 2021-12-17 ASSESSMENT — PAIN DESCRIPTION - DESCRIPTORS
DESCRIPTORS: ACHING;DISCOMFORT
DESCRIPTORS: ACHING;DISCOMFORT

## 2021-12-17 ASSESSMENT — PAIN DESCRIPTION - PROGRESSION: CLINICAL_PROGRESSION: GRADUALLY WORSENING

## 2021-12-17 ASSESSMENT — PAIN DESCRIPTION - ORIENTATION: ORIENTATION: LOWER

## 2021-12-17 NOTE — PROGRESS NOTES
Physical Therapy  Jason Kern  [id number]     12/17/21 1503   Subjective   Subjective Agrees to work with therapy. Bed Mobility   Supine to Sit Contact guard assistance   Transfers   Sit to Stand Contact guard assistance   Stand to sit Contact guard assistance   Ambulation   Ambulation? Yes   Ambulation 1   Surface level tile   Device No Device   Other Apparatus   (reina, IV, monitor lines)   Assistance Contact guard assistance   Distance 5'   Other Activities   Comment Patient did well with bed mobility and taking steps from bed to chair. Patient agreed to sit up in recliner, positioned for comfort with all needs in reach.    Short term goals   Time Frame for Short term goals 2 WKS   Short term goal 1  FT WITH SBA   Activity Tolerance   Activity Tolerance Patient Tolerated treatment well   Electronically signed by Divina Tam PTA on 12/17/2021 at 3:06 PM

## 2021-12-17 NOTE — PLAN OF CARE
Problem: Nutrition  Goal: Optimal nutrition therapy  12/16/2021 1830 by Pablo Boyce RN  Outcome: Ongoing  12/16/2021 1826 by Pablo Boyce RN  Outcome: Ongoing     Problem: Airway Clearance - Ineffective  Goal: Achieve or maintain patent airway  12/16/2021 1830 by Pablo Boyce RN  Outcome: Ongoing  12/16/2021 1826 by Pablo Boyce RN  Outcome: Ongoing     Problem: Gas Exchange - Impaired  Goal: Absence of hypoxia  12/16/2021 1830 by Pablo Boyce RN  Outcome: Ongoing  12/16/2021 1826 by Pablo Boyce RN  Outcome: Ongoing  Goal: Promote optimal lung function  12/16/2021 1830 by Pablo Boyce RN  Outcome: Ongoing  12/16/2021 1826 by Pablo Boyce RN  Outcome: Ongoing     Problem: Breathing Pattern - Ineffective  Goal: Ability to achieve and maintain a regular respiratory rate  12/16/2021 1830 by Pablo Boyce RN  Outcome: Ongoing  12/16/2021 1826 by Pablo Boyce RN  Outcome: Ongoing     Problem:  Body Temperature -  Risk of, Imbalanced  Goal: Ability to maintain a body temperature within defined limits  12/16/2021 1830 by Pablo Boyce RN  Outcome: Met This Shift  12/16/2021 1826 by Pablo Boyce RN  Outcome: Met This Shift  Goal: Will regain or maintain usual level of consciousness  12/16/2021 1830 by Pablo Boyce RN  Outcome: Ongoing  12/16/2021 1826 by Pablo Boyce RN  Outcome: Ongoing  Goal: Complications related to the disease process, condition or treatment will be avoided or minimized  12/16/2021 1830 by Pablo Boyce RN  Outcome: Met This Shift  12/16/2021 1826 by Pablo Boyce RN  Outcome: Ongoing     Problem: Isolation Precautions - Risk of Spread of Infection  Goal: Prevent transmission of infection  12/16/2021 1830 by Pablo Boyce RN  Outcome: Ongoing  12/16/2021 1826 by Pablo Boyce RN  Outcome: Ongoing     Problem: Nutrition Deficits  Goal: Optimize nutritional status  12/16/2021 1830 by Pablo Boyce RN  Outcome: Ongoing  12/16/2021 1826 by Veda Merlin, RN  Outcome: Ongoing     Problem: Risk for Fluid Volume Deficit  Goal: Maintain normal heart rhythm  12/16/2021 1830 by Veda Merlin, RN  Outcome: Met This Shift  12/16/2021 1826 by Veda Merlin, RN  Outcome: Met This Shift  Goal: Maintain absence of muscle cramping  12/16/2021 1830 by Veda Merlin, RN  Outcome: Met This Shift  12/16/2021 1826 by Veda Merlin, RN  Outcome: Met This Shift  Goal: Maintain normal serum potassium, sodium, calcium, phosphorus, and pH  12/16/2021 1830 by Veda Merlin, RN  Outcome: Ongoing  12/16/2021 1826 by Veda Merlin, RN  Outcome: Ongoing     Problem: Loneliness or Risk for Loneliness  Goal: Demonstrate positive use of time alone when socialization is not possible  12/16/2021 1830 by Veda Merlin, RN  Outcome: Ongoing  12/16/2021 1826 by Veda Merlin, RN  Outcome: Ongoing     Problem: Fatigue  Goal: Verbalize increase energy and improved vitality  12/16/2021 1830 by Veda Merlin, RN  Outcome: Not Met This Shift  12/16/2021 1826 by Veda Merlin, RN  Outcome: Not Met This Shift     Problem: Patient Education: Go to Patient Education Activity  Goal: Patient/Family Education  12/16/2021 1830 by Veda Merlin, RN  Outcome: Ongoing  12/16/2021 1826 by Veda Merlin, RN  Outcome: Ongoing     Problem: Falls - Risk of:  Goal: Will remain free from falls  Description: Will remain free from falls  12/16/2021 1830 by Veda Merlin, RN  Outcome: Met This Shift  12/16/2021 1826 by Veda Merlin, RN  Outcome: Met This Shift  Goal: Absence of physical injury  Description: Absence of physical injury  12/16/2021 1830 by Veda Merlin, RN  Outcome: Met This Shift  12/16/2021 1826 by Veda Merlin, RN  Outcome: Met This Shift     Problem: Pain:  Goal: Pain level will decrease  Description: Pain level will decrease  12/16/2021 1830 by Veda Merlin, RN  Outcome:  Met This Shift  12/16/2021 1826 by Pallavi Henderson RN  Outcome: Met This Shift  Goal: Control of acute pain  Description: Control of acute pain  12/16/2021 1830 by Pallavi Henderson RN  Outcome: Ongoing  12/16/2021 1826 by Pallavi Henderson RN  Outcome: Ongoing  Goal: Control of chronic pain  Description: Control of chronic pain  12/16/2021 1830 by Pallavi Henderson RN  Outcome: Ongoing  12/16/2021 1826 by Pallavi Henderson RN  Outcome: Ongoing     Problem: Nutrition  Goal: Optimal nutrition therapy  12/16/2021 1830 by Pallavi Henderson RN  Outcome: Ongoing  12/16/2021 1826 by Pallavi Henderson RN  Outcome: Ongoing     Problem: Skin Integrity:  Goal: Will show no infection signs and symptoms  Description: Will show no infection signs and symptoms  12/16/2021 1830 by Pallavi Henderson RN  Outcome: Ongoing  12/16/2021 1826 by Pallavi Henderson RN  Outcome: Ongoing  Goal: Absence of new skin breakdown  Description: Absence of new skin breakdown  12/16/2021 1830 by Pallavi Henderson RN  Outcome: Met This Shift  12/16/2021 1826 by Pallavi Henderson RN  Outcome: Met This Shift

## 2021-12-17 NOTE — CONSULTS
**Physician Signature**  This document was electronically signed by: Theresa Beltran MD  2021   11:04 AM    **Consult Cover Page**  FROM: Y[address], [phone]  Call Back Number: [phone]  SUBJECT: Consult Recommendations  Date and Time of Report: 2021 11:04 AM CT  Items Contained in this Document: Critical Care [facility]    **Consult Information**  Member Facility: [address] [place] [address] MRN: [id number]  Visit/Encounter Number: [id number]  Consult ID: [id number]  Facility Time Zone: CT  Date and Time of Request: 2021 05:29 AM  CT  Requesting Clinician: Maciel Huff MD  Patient Name: Shiv Ivory  Date of Birth:   Gender: Female  Patient identity was confirmed at the beginning of the consult with the   patient/family/staff using two personal identifiers: Patient name and       **Reason for Consult**  Reason for Consult: [facility]    **Admission**  Admission Date: 2021  Chief reason for ICU admission: COVID - 19  Secondary reasons for ICU admission: Respiratory Failure, Pneumonia    **Core Metrics**  General orienting sentence for patient: : 48 yo woman, morbid obesity,   sick since late November, COVID+ , declined monoclonal Ab, admitted   , tx to ICU . Now on decaron, olumiant, Luvox, Currently on high   flow nasal cannula 60L 100%. BIPAP being started. Very anxious. Apparently   unvaccinated. Refuses self-proning. Suggest Precedex for anxiety if ativan   ineffective. is on Precedex. ECHO pending today. high flow + NRB but less   labored by RN report. Bedrest. Continues to NOT   self-prone. Nighttime BIPAP be intubated. Still on High flow + NRB. Able to   get up to chair with SPO2   and maintain SPO2. No overnight BIPAP b/o discomfort. Prefers HHF &   NRB up to chair. CXR no improvement. No distress.  Morbid obesity, BMI   47  Chief physiologic deterioration: Increase in FiO2 required by   30%  Is the patient on DVT prophylaxis?: Yes  Prophylaxis type: Pharmacological  DVT Prophylaxis Comments: Lovenox  Is the patient on GI prophylaxis?: Yes  Gi Prophylaxis Comments: Pepcid  Has this patient reached their nutritional goal?: Yes  Nutritional Goals Details: Improved appetite  Are there current issues with pain management in this patient?:   No  Are there issues with skin integrity?: No  Are there issues with delirium?: No  Has the patient been mobilized?: Yes  Is this patient currently intubated?: No  Are there ethical or care philosophy or family issues?: No  Do you recommend an in depth evaluation?: No  Disposition Recommendations: Continue ICU level of care    **Inserted/Removed Devices**  Device Name: Lamb Catheter  Site of insertion: Urethra  Date of insertion: 12-    **Physician Signature**  This document was electronically signed by: Jessie Manzano MD  12/17/2021   11:04 AM

## 2021-12-17 NOTE — PROGRESS NOTES
Hospitalist Progress Note    Patient:  Dario Olszewski  YOB: 1965  Date of Service: 12/16/2021  MRN: [id number]   Acct: [de-identified]   Primary Care Physician: Chuy Montalvo MD  Advance Directive: [facility]  Admit Date: 12/9/2021       Hospital Day: 7  Referring Provider: Isabelle Snyder DO    Patient Seen, Chart, Consults, Notes, Labs, Radiology studies reviewed. Subjective:  Dario Olszewski is a 49 y.o. female  whom we are following for COVID-19 pneumonia, hypoxemic respiratory failure. She is clinically about the same. No further increase in O2 demand. Hemodynamics are stable.     Allergies:  Latex, Penicillins, Codeine, and Tape [adhesive tape]    Medicines:  Current Facility-Administered Medications   Medication Dose Route Frequency Provider Last Rate Last Admin    promethazine (PHENERGAN) injection 6.25 mg  6.25 mg IntraVENous Q6H PRN Isabelle Snyder DO   6.25 mg at 12/16/21 1043    dextromethorphan (DELSYM) 30 MG/5ML extended release liquid 30 mg  30 mg Oral BID PRN Isabelle Snyder DO   30 mg at 12/15/21 2102    dexamethasone (DECADRON) 12 mg in sodium chloride 0.9 % IVPB  12 mg IntraVENous Q24H Laurie Guevara MD   Stopped at 12/16/21 1008    insulin glargine (LANTUS) injection vial 20 Units  20 Units SubCUTAneous Nightly Laurie Guevara MD   20 Units at 12/15/21 2044    insulin lispro (HUMALOG) injection vial 0-18 Units  0-18 Units SubCUTAneous Q6H Laurie Guevara MD   6 Units at 12/16/21 1310    melatonin disintegrating tablet 10 mg  10 mg Oral Nightly Laurie Guevara MD   10 mg at 12/15/21 2043    Benzocaine-Menthol (CEPACOL) 1 lozenge  1 lozenge Oral Q2H PRN Laurie Guevara MD   1 lozenge at 12/14/21 1941    sodium chloride (OCEAN, BABY AYR) 0.65 % nasal spray 1 spray  1 spray Each Nostril PRN Laurie Guevara MD   1 spray at 12/13/21 1727    dexmedetomidine (PRECEDEX) 400 mcg in sodium chloride 0.9 % 100 mL infusion  0.1-1.4 mcg/kg/hr IntraVENous Continuous Jay Linares MD 9.5 mL/hr at 12/16/21 1600 0.7 mcg/kg/hr at 12/16/21 1600    enoxaparin (LOVENOX) injection 40 mg  40 mg SubCUTAneous BID Jay Linares MD   40 mg at 12/16/21 0748    budesonide-formoterol (SYMBICORT) 160-4.5 MCG/ACT inhaler 2 puff  2 puff Inhalation BID Jay Linares MD   2 puff at 12/16/21 0756    fluvoxaMINE (LUVOX) tablet 50 mg  50 mg Oral BID Jay Linares MD   50 mg at 12/16/21 0749    famotidine (PEPCID) injection 40 mg  40 mg IntraVENous Daily Jay Linares MD   40 mg at 12/16/21 0749    Vitamin D (CHOLECALCIFEROL) tablet 2,000 Units  2,000 Units Oral Daily Jay Linares MD   2,000 Units at 12/16/21 0749    LORazepam (ATIVAN) injection 0.5 mg  0.5 mg IntraVENous Q6H PRN Jay Linares MD   0.5 mg at 12/16/21 1118    ALPRAZolam (XANAX) tablet 1 mg  1 mg Oral Nightly PRN Jay Linares MD   1 mg at 12/11/21 2233    albuterol sulfate  (90 Base) MCG/ACT inhaler 2 puff  2 puff Inhalation 4x Daily PELON Tovar   2 puff at 12/16/21 1743    calcium-cholecalciferol 500-200 MG-UNIT per tablet 1 tablet  1 tablet Oral Daily PELON Tovar   1 tablet at 12/16/21 0749    HYDROcodone-acetaminophen (Sally Schillings) 7.5-325 MG per tablet 1 tablet  1 tablet Oral Q6H PRN Chris Armenta APRN   1 tablet at 12/16/21 0749    levothyroxine (SYNTHROID) tablet 100 mcg  100 mcg Oral Daily Chris Armenta APRN   100 mcg at 12/16/21 0749    ascorbic acid (VITAMIN C) tablet 1,000 mg  1,000 mg Oral Daily Chris Armenta APRN   1,000 mg at 12/16/21 0749    timolol (TIMOPTIC) 0.5 % ophthalmic solution 1 drop  1 drop Both Eyes BID Chris Armenta, PELON   1 drop at 12/16/21 0471    pravastatin (PRAVACHOL) tablet 40 mg  40 mg Oral Daily Harrstuart Salguerole, APRN   40 mg at 12/16/21 0749    polyethylene glycol (GLYCOLAX) packet 17 g  17 g Oral Daily Chris Salguerole, APRN   17 g at 12/16/21 0748    ondansetron (ZOFRAN) tablet 8 mg  8 mg Oral Q8H PRN Anish Fu, APRN   8 mg at 12/10/21 1751    meclizine (ANTIVERT) tablet 25 mg  25 mg Oral TID PRN Anish Fu, APRN   25 mg at 12/10/21 0301    cetirizine (ZYRTEC) tablet 10 mg  10 mg Oral Daily Anish Fu, APRN   10 mg at 12/16/21 0749    sodium chloride flush 0.9 % injection 5-40 mL  5-40 mL IntraVENous 2 times per day Anish Fu, APRN   10 mL at 12/16/21 0752    sodium chloride flush 0.9 % injection 5-40 mL  5-40 mL IntraVENous PRN Anish Fu, APRN   10 mL at 12/15/21 1155    0.9 % sodium chloride infusion  25 mL IntraVENous PRN Anish Fu, APRN   Stopped at 12/11/21 2130    polyethylene glycol (GLYCOLAX) packet 17 g  17 g Oral Daily PRN Anish Fu, APRN        acetaminophen (TYLENOL) tablet 650 mg  650 mg Oral Q6H PRN Anish Fu, APRN   650 mg at 12/15/21 1204    Or    acetaminophen (TYLENOL) suppository 650 mg  650 mg Rectal Q6H PRN Anish Fu, APRN        zinc sulfate (ZINCATE) capsule 50 mg  50 mg Oral Daily Anish Fu, APRN   50 mg at 12/16/21 0749    glucose (GLUTOSE) 40 % oral gel 15 g  15 g Oral PRN Anish Fu, APRN        dextrose 50 % IV solution  12.5 g IntraVENous PRN Anish Fu, APRN        glucagon (rDNA) injection 1 mg  1 mg IntraMUSCular PRN Anish Fu, APRN        dextrose 5 % solution  100 mL/hr IntraVENous PRN Anish Fu, APRN        baricitinib Amparo Dimas) tablet 4 mg  4 mg Oral Daily Anish Fu, APRN   4 mg at 12/15/21 2043       Past Medical History:  Past Medical History:   Diagnosis Date    Anemia     Asthma     Bronchitis, acute     [facility]     [facility]     Glaucoma     Hyperlipidemia     Hypertension     Hyperthyroidism     Morbid obesity [facility]     Sleep apnea     uses c-pap       Past Surgical History:  Past Surgical History:   Procedure Laterality Date    AXILLARY SURGERY Bilateral     excision and drainage of staph abscesses    BACK SURGERY  10/2004    Dr. Skylar Alejandro, [place] L4, L5    CARPAL TUNNEL RELEASE Right     CHOLECYSTECTOMY      COLONOSCOPY  03/20/2007    Dr Tawny Tanner 9/11/2020    Dr Gabriel Rojas yr recall    DILATION AND CURETTAGE OF UTERUS N/A 1/29/2021    DILATATION AND CURETTAGE HYSTEROSCOPY Keon Marks performed by Telly Lawler MD at [address] [place] [address] FLX DX W/COLLJ [address] WHEN PFRMD N/A 2/8/2017    Dr Oly Reidd, actively inflamed internal hemorrhoids, residulal liquid and some solid food particles in the colon-5 yr recall due to prep    STOMACH SURGERY  01/20/2014    Dr. Vivienne Lovell ( gastric sleeve)    UPPER GASTROINTESTINAL ENDOSCOPY  10/30/2012    Dr Hugh Crockett esophagitis, gastritis, Iliana (-)    UPPER GASTROINTESTINAL ENDOSCOPY  01/23/2008    Dr Ya Temple esophagitis    UPPER GASTROINTESTINAL ENDOSCOPY N/A 9/11/2020    Dr Tracey Higgins       Family History  Family History   Problem Relation Age of Onset    Lung Cancer Mother     Kidney Cancer Mother     Lung Cancer Maternal Grandmother     Colon Cancer Maternal Grandfather     Liver Cancer Neg Hx     Liver Disease Neg Hx     Stomach Cancer Neg Hx     Rectal Cancer Neg Hx     Esophageal Cancer Neg Hx     Colon Polyps Neg Hx        Social History  Social History     Socioeconomic History    Marital status:      Spouse name: Not on file    Number of children: Not on file    Years of education: Not on file    Highest education level: Not on file   Occupational History    Not on file   Tobacco Use    Smoking status: Former Smoker     Packs/day: 3.00     Years: 26.00     Pack years: 78.00     Types: Cigarettes     Quit date: 5/6/2011     Years since quitting: 10.6    Smokeless tobacco: Never Used   Vaping Use    Vaping Use: Never used   Substance and Sexual Activity    Alcohol use: No    Drug use: No    Sexual activity: Yes     Partners: Male   Other Topics Concern    Not on file Social History Narrative    Not on file     Social Determinants of Health     Financial Resource Strain:     Difficulty of Paying Living Expenses: Not on file   Food Insecurity:     Worried About Running Out of Food in the Last Year: Not on file    Tiffanie of Food in the Last Year: Not on file   Transportation Needs:     Lack of Transportation (Medical): Not on file    Lack of Transportation (Non-Medical): Not on file   Physical Activity:     Days of Exercise per Week: Not on file    Minutes of Exercise per Session: Not on file   Stress:     Feeling of Stress : Not on file   Social Connections:     Frequency of Communication with Friends and Family: Not on file    Frequency of Social Gatherings with Friends and Family: Not on file    Attends Mandaeism Services: Not on file    Active Member of [address] [place] [address] [employer] or Organizations: Not on file    Attends Club or Organization Meetings: Not on file    Marital Status: Not on file   Intimate Partner Violence:     Fear of Current or Ex-Partner: Not on file    Emotionally Abused: Not on file    Physically Abused: Not on file    Sexually Abused: Not on file   Housing Stability:     Unable to Pay for Housing in the Last Year: Not on file    Number of [place] in the Last Year: Not on file    Unstable Housing in the Last Year: Not on file         Review of Systems:    Review of Systems   Constitutional: Positive for activity change, appetite change and fatigue. HENT: Negative for rhinorrhea and voice change. Eyes: Negative for visual disturbance. Respiratory: Positive for cough and shortness of breath. Cardiovascular: Negative for chest pain and leg swelling. Gastrointestinal: Negative for constipation, diarrhea, nausea and vomiting. Endocrine: Negative for polyuria. Genitourinary: Negative for difficulty urinating and dysuria. Musculoskeletal: Positive for gait problem. Negative for arthralgias and back pain. Skin: Negative for color change. Allergic/Immunologic: Negative for immunocompromised state. Neurological: Positive for weakness. Negative for dizziness and headaches. Psychiatric/Behavioral: Negative for confusion. Objective:  Blood pressure 107/61, pulse 81, temperature 97.2 °F (36.2 °C), temperature source Temporal, resp. rate 22, height 5' 5\" (1.651 m), weight (!) 327 lb 4.8 oz (148.5 kg), SpO2 93 %, not currently breastfeeding. Intake/Output Summary (Last 24 hours) at 12/16/2021 1902  Last data filed at 12/16/2021 1800  Gross per 24 hour   Intake 1648.38 ml   Output 1250 ml   Net 398.38 ml       Physical Exam  Vitals and nursing note reviewed. Constitutional:       Appearance: She is ill-appearing. HENT:      Head: Normocephalic and atraumatic. Right Ear: External ear normal.      Left Ear: External ear normal.      Nose: Nose normal.      Mouth/Throat:      Mouth: Mucous membranes are moist.   Eyes:      Conjunctiva/sclera: Conjunctivae normal.      Pupils: Pupils are equal, round, and reactive to light. Cardiovascular:      Rate and Rhythm: Normal rate and regular rhythm. Heart sounds: Normal heart sounds. Pulmonary:      Effort: Respiratory distress present. Breath sounds: Normal breath sounds. Abdominal:      General: Abdomen is flat. Palpations: Abdomen is soft. Musculoskeletal:         General: Normal range of motion. Cervical back: Neck supple. No rigidity. No muscular tenderness. Skin:     General: Skin is warm and dry. Neurological:      Mental Status: She is alert and oriented to person, place, and time.    Psychiatric:         Mood and Affect: Mood normal.         Labs:  BMP:   Recent Labs     12/14/21  0313 12/14/21  0859 12/15/21  0508 12/16/21  0415     --  139 137   K 4.7 4.0 4.2 5.0   CL 96*  --  96* 98   CO2 23  --  30* 28   BUN 30*  --  40* 30*   CREATININE 0.9  --  1.1* 0.7   CALCIUM 9.0  --  8.9 8.9     CBC:   Recent Labs     12/14/21  0313 12/15/21  5026 12/16/21 0415   WBC 9.9 8.5 6.6   HGB 14.1 14.0 14.3   HCT 43.4 44.0 45.5   MCV 91.4 93.8 94.6   * 624* 574*     LIVER PROFILE:   Recent Labs     12/14/21  0313 12/15/21  0508 12/16/21  0415   AST 48* 37* 28   ALT 43* 35* 32   BILITOT 0.4 0.4 0.3   ALKPHOS 94 86 97     PT/INR: No results for input(s): PROTIME, INR in the last 72 hours. APTT: No results for input(s): APTT in the last 72 hours. BNP:  No results for input(s): BNP in the last 72 hours. Ionized Calcium:No results for input(s): IONCA in the last 72 hours. Magnesium:No results for input(s): MG in the last 72 hours. Phosphorus:No results for input(s): PHOS in the last 72 hours. HgbA1C: No results for input(s): LABA1C in the last 72 hours. Hepatic:   Recent Labs     12/14/21  0313 12/15/21  0508 12/16/21  0415   ALKPHOS 94 86 97   ALT 43* 35* 32   AST 48* 37* 28   PROT 6.7 6.3* 6.6   BILITOT 0.4 0.4 0.3   LABALBU 3.1* 3.2* 3.3*     Lactic Acid: No results for input(s): LACTA in the last 72 hours. Troponin: No results for input(s): CKTOTAL, CKMB, TROPONINT in the last 72 hours. ABGs: No results for input(s): PH, PCO2, PO2, HCO3, O2SAT in the last 72 hours. CRP:    Recent Labs     12/14/21  0313 12/15/21  0508 12/16/21  0415   CRP 13.10* 5.69* 9.16*     Sed Rate:  No results for input(s): SEDRATE in the last 72 hours. Cultures:   No results for input(s): CULTURE in the last 72 hours. No results for input(s): BC, Lanis Amie in the last 72 hours. No results for input(s): CXSURG in the last 72 hours. Radiology reports as per the Radiologist  Radiology: XR CHEST PORTABLE    Result Date: 12/9/2021  EXAM: XR CHEST PORTABLE INDICATION: Shortness of breath, COVID positive COMPARISON: None available. FINDINGS: Cardiac silhouette is normal in size. No pleural effusion or visible pneumothorax. Patchy bilateral airspace opacity is present. Central vascular congestion is noted. No acute osseous finding. Bilateral patchy airspace opacity. Central vascular congestion. Differential includes pneumonia and edema. Signed by Dr Xiang Cruz     Acute respiratory distress syndrome (ARDS) due to COVID-19 virus. Continue ongoing medical management.     Acute hypoxemic respiratory failure due to COVID-19. Continue supportive care.     Please document 43 minutes of critical care time for patient assessment, chart review, discussion with staff, .       Jeanie Andujar,

## 2021-12-17 NOTE — PLAN OF CARE
Problem: Airway Clearance - Ineffective  Goal: Achieve or maintain patent airway  Outcome: Ongoing     Problem: Gas Exchange - Impaired  Goal: Absence of hypoxia  Outcome: Ongoing  Goal: Promote optimal lung function  Outcome: Ongoing     Problem: Breathing Pattern - Ineffective  Goal: Ability to achieve and maintain a regular respiratory rate  Outcome: Ongoing     Problem:  Body Temperature -  Risk of, Imbalanced  Goal: Ability to maintain a body temperature within defined limits  Outcome: Met This Shift  Goal: Will regain or maintain usual level of consciousness  Outcome: Ongoing  Goal: Complications related to the disease process, condition or treatment will be avoided or minimized  Outcome: Ongoing     Problem: Isolation Precautions - Risk of Spread of Infection  Goal: Prevent transmission of infection  Outcome: Ongoing     Problem: Nutrition Deficits  Goal: Optimize nutritional status  Outcome: Ongoing     Problem: Risk for Fluid Volume Deficit  Goal: Maintain normal heart rhythm  Outcome: Met This Shift  Goal: Maintain absence of muscle cramping  Outcome: Met This Shift  Goal: Maintain normal serum potassium, sodium, calcium, phosphorus, and pH  Outcome: Ongoing     Problem: Loneliness or Risk for Loneliness  Goal: Demonstrate positive use of time alone when socialization is not possible  Outcome: Ongoing     Problem: Fatigue  Goal: Verbalize increase energy and improved vitality  Outcome: Not Met This Shift     Problem: Patient Education: Go to Patient Education Activity  Goal: Patient/Family Education  Outcome: Ongoing     Problem: Falls - Risk of:  Goal: Will remain free from falls  Description: Will remain free from falls  Outcome: Met This Shift  Goal: Absence of physical injury  Description: Absence of physical injury  Outcome: Met This Shift     Problem: Pain:  Goal: Pain level will decrease  Description: Pain level will decrease  Outcome: Met This Shift  Goal: Control of acute pain  Description: Control of acute pain  Outcome: Ongoing  Goal: Control of chronic pain  Description: Control of chronic pain  Outcome: Ongoing     Problem: Nutrition  Goal: Optimal nutrition therapy  Outcome: Ongoing     Problem: Skin Integrity:  Goal: Will show no infection signs and symptoms  Description: Will show no infection signs and symptoms  Outcome: Ongoing  Goal: Absence of new skin breakdown  Description: Absence of new skin breakdown  Outcome: Met This Shift

## 2021-12-18 LAB
ALBUMIN SERPL-MCNC: 3.1 G/DL (ref 3.5–5.2)
ALP BLD-CCNC: 105 U/L (ref 35–104)
ALT SERPL-CCNC: 32 U/L (ref 5–33)
ANION GAP SERPL CALCULATED.3IONS-SCNC: 11 MMOL/L (ref 7–19)
AST SERPL-CCNC: 27 U/L (ref 5–32)
BASOPHILS ABSOLUTE: 0 K/UL (ref 0–0.2)
BASOPHILS RELATIVE PERCENT: 0.2 % (ref 0–1)
BILIRUB SERPL-MCNC: 0.3 MG/DL (ref 0.2–1.2)
BUN BLDV-MCNC: 25 MG/DL (ref 6–20)
CALCIUM SERPL-MCNC: 9 MG/DL (ref 8.6–10)
CHLORIDE BLD-SCNC: 99 MMOL/L (ref 98–111)
CO2: 27 MMOL/L (ref 22–29)
CREAT SERPL-MCNC: 0.8 MG/DL (ref 0.5–0.9)
EOSINOPHILS ABSOLUTE: 0 K/UL (ref 0–0.6)
EOSINOPHILS RELATIVE PERCENT: 0.3 % (ref 0–5)
GFR AFRICAN AMERICAN: >59
GFR NON-AFRICAN AMERICAN: >60
GLUCOSE BLD-MCNC: 166 MG/DL (ref 70–99)
GLUCOSE BLD-MCNC: 167 MG/DL (ref 70–99)
GLUCOSE BLD-MCNC: 249 MG/DL (ref 70–99)
GLUCOSE BLD-MCNC: 281 MG/DL (ref 70–99)
GLUCOSE BLD-MCNC: 294 MG/DL (ref 74–109)
HCT VFR BLD CALC: 45.6 % (ref 37–47)
HEMOGLOBIN: 13.9 G/DL (ref 12–16)
IMMATURE GRANULOCYTES #: 0.4 K/UL
LYMPHOCYTES ABSOLUTE: 0.8 K/UL (ref 1.1–4.5)
LYMPHOCYTES RELATIVE PERCENT: 5.8 % (ref 20–40)
MCH RBC QN AUTO: 29.2 PG (ref 27–31)
MCHC RBC AUTO-ENTMCNC: 30.5 G/DL (ref 33–37)
MCV RBC AUTO: 95.8 FL (ref 81–99)
MONOCYTES ABSOLUTE: 0.8 K/UL (ref 0–0.9)
MONOCYTES RELATIVE PERCENT: 5.3 % (ref 0–10)
NEUTROPHILS ABSOLUTE: 12.2 K/UL (ref 1.5–7.5)
NEUTROPHILS RELATIVE PERCENT: 85.5 % (ref 50–65)
PDW BLD-RTO: 12.4 % (ref 11.5–14.5)
PERFORMED ON: ABNORMAL
PLATELET # BLD: 644 K/UL (ref 130–400)
PMV BLD AUTO: 9.7 FL (ref 9.4–12.3)
POTASSIUM REFLEX MAGNESIUM: 5.2 MMOL/L (ref 3.5–5)
RBC # BLD: 4.76 M/UL (ref 4.2–5.4)
SODIUM BLD-SCNC: 137 MMOL/L (ref 136–145)
TOTAL PROTEIN: 6 G/DL (ref 6.6–8.7)
WBC # BLD: 14.3 K/UL (ref 4.8–10.8)

## 2021-12-18 PROCEDURE — 6370000000 HC RX 637 (ALT 250 FOR IP): Performed by: STUDENT IN AN ORGANIZED HEALTH CARE EDUCATION/TRAINING PROGRAM

## 2021-12-18 PROCEDURE — 80053 COMPREHEN METABOLIC PANEL: CPT

## 2021-12-18 PROCEDURE — 6370000000 HC RX 637 (ALT 250 FOR IP): Performed by: NURSE PRACTITIONER

## 2021-12-18 PROCEDURE — 6370000000 HC RX 637 (ALT 250 FOR IP): Performed by: INTERNAL MEDICINE

## 2021-12-18 PROCEDURE — 2580000003 HC RX 258: Performed by: NURSE PRACTITIONER

## 2021-12-18 PROCEDURE — 6360000002 HC RX W HCPCS: Performed by: STUDENT IN AN ORGANIZED HEALTH CARE EDUCATION/TRAINING PROGRAM

## 2021-12-18 PROCEDURE — 85025 COMPLETE CBC W/AUTO DIFF WBC: CPT

## 2021-12-18 PROCEDURE — 2580000003 HC RX 258: Performed by: STUDENT IN AN ORGANIZED HEALTH CARE EDUCATION/TRAINING PROGRAM

## 2021-12-18 PROCEDURE — 2100000000 HC CCU R&B

## 2021-12-18 PROCEDURE — 82947 ASSAY GLUCOSE BLOOD QUANT: CPT

## 2021-12-18 PROCEDURE — 2500000003 HC RX 250 WO HCPCS: Performed by: STUDENT IN AN ORGANIZED HEALTH CARE EDUCATION/TRAINING PROGRAM

## 2021-12-18 PROCEDURE — 6360000002 HC RX W HCPCS: Performed by: INTERNAL MEDICINE

## 2021-12-18 PROCEDURE — 2700000000 HC OXYGEN THERAPY PER DAY

## 2021-12-18 RX ORDER — BENZONATATE 100 MG/1
100 CAPSULE ORAL 3 TIMES DAILY PRN
Status: DISCONTINUED | OUTPATIENT
Start: 2021-12-18 | End: 2022-01-04 | Stop reason: HOSPADM

## 2021-12-18 RX ADMIN — INSULIN LISPRO 6 UNITS: 100 INJECTION, SOLUTION INTRAVENOUS; SUBCUTANEOUS at 14:27

## 2021-12-18 RX ADMIN — DEXAMETHASONE SODIUM PHOSPHATE 12 MG: 10 INJECTION, SOLUTION INTRAMUSCULAR; INTRAVENOUS at 10:30

## 2021-12-18 RX ADMIN — OXYCODONE HYDROCHLORIDE AND ACETAMINOPHEN 1000 MG: 500 TABLET ORAL at 08:12

## 2021-12-18 RX ADMIN — FAMOTIDINE 40 MG: 10 INJECTION, SOLUTION INTRAVENOUS at 08:11

## 2021-12-18 RX ADMIN — LEVOTHYROXINE SODIUM 100 MCG: 100 TABLET ORAL at 05:16

## 2021-12-18 RX ADMIN — BENZONATATE 100 MG: 100 CAPSULE ORAL at 11:29

## 2021-12-18 RX ADMIN — INSULIN LISPRO 9 UNITS: 100 INJECTION, SOLUTION INTRAVENOUS; SUBCUTANEOUS at 21:55

## 2021-12-18 RX ADMIN — TIMOLOL MALEATE 1 DROP: 5 SOLUTION OPHTHALMIC at 22:00

## 2021-12-18 RX ADMIN — ENOXAPARIN SODIUM 40 MG: 100 INJECTION SUBCUTANEOUS at 08:10

## 2021-12-18 RX ADMIN — TIMOLOL MALEATE 1 DROP: 5 SOLUTION OPHTHALMIC at 08:23

## 2021-12-18 RX ADMIN — FLUVOXAMINE MALEATE 50 MG: 50 TABLET, COATED ORAL at 08:12

## 2021-12-18 RX ADMIN — BARICITINIB 4 MG: 2 TABLET, FILM COATED ORAL at 21:56

## 2021-12-18 RX ADMIN — Medication 30 MG: at 04:36

## 2021-12-18 RX ADMIN — DEXMEDETOMIDINE HYDROCHLORIDE 0.2 MCG/KG/HR: 4 INJECTION, SOLUTION INTRAVENOUS at 16:16

## 2021-12-18 RX ADMIN — Medication 2000 UNITS: at 08:12

## 2021-12-18 RX ADMIN — ALBUTEROL SULFATE 2 PUFF: 90 AEROSOL, METERED RESPIRATORY (INHALATION) at 22:00

## 2021-12-18 RX ADMIN — PRAVASTATIN SODIUM 40 MG: 20 TABLET ORAL at 08:13

## 2021-12-18 RX ADMIN — FLUVOXAMINE MALEATE 50 MG: 50 TABLET, COATED ORAL at 21:56

## 2021-12-18 RX ADMIN — Medication 1 TABLET: at 08:13

## 2021-12-18 RX ADMIN — GUAIFENESIN 600 MG: 600 TABLET, EXTENDED RELEASE ORAL at 22:00

## 2021-12-18 RX ADMIN — ALBUTEROL SULFATE 2 PUFF: 90 AEROSOL, METERED RESPIRATORY (INHALATION) at 17:48

## 2021-12-18 RX ADMIN — INSULIN LISPRO 3 UNITS: 100 INJECTION, SOLUTION INTRAVENOUS; SUBCUTANEOUS at 08:11

## 2021-12-18 RX ADMIN — ENOXAPARIN SODIUM 40 MG: 100 INJECTION SUBCUTANEOUS at 21:56

## 2021-12-18 RX ADMIN — BUDESONIDE AND FORMOTEROL FUMARATE DIHYDRATE 2 PUFF: 160; 4.5 AEROSOL RESPIRATORY (INHALATION) at 21:55

## 2021-12-18 RX ADMIN — ALBUTEROL SULFATE 2 PUFF: 90 AEROSOL, METERED RESPIRATORY (INHALATION) at 14:24

## 2021-12-18 RX ADMIN — ALBUTEROL SULFATE 2 PUFF: 90 AEROSOL, METERED RESPIRATORY (INHALATION) at 08:23

## 2021-12-18 RX ADMIN — DEXMEDETOMIDINE HYDROCHLORIDE 0.1 MCG/KG/HR: 4 INJECTION, SOLUTION INTRAVENOUS at 10:59

## 2021-12-18 RX ADMIN — SODIUM CHLORIDE, PRESERVATIVE FREE 10 ML: 5 INJECTION INTRAVENOUS at 08:10

## 2021-12-18 RX ADMIN — INSULIN LISPRO 3 UNITS: 100 INJECTION, SOLUTION INTRAVENOUS; SUBCUTANEOUS at 01:45

## 2021-12-18 RX ADMIN — PROMETHAZINE HYDROCHLORIDE 6.25 MG: 25 INJECTION INTRAMUSCULAR; INTRAVENOUS at 04:36

## 2021-12-18 RX ADMIN — Medication 10 MG: at 21:56

## 2021-12-18 RX ADMIN — BUDESONIDE AND FORMOTEROL FUMARATE DIHYDRATE 2 PUFF: 160; 4.5 AEROSOL RESPIRATORY (INHALATION) at 08:23

## 2021-12-18 RX ADMIN — HYDROCODONE BITARTRATE AND ACETAMINOPHEN 1 TABLET: 7.5; 325 TABLET ORAL at 21:56

## 2021-12-18 RX ADMIN — ACETAMINOPHEN 650 MG: 325 TABLET ORAL at 15:53

## 2021-12-18 RX ADMIN — ACETAMINOPHEN 650 MG: 325 TABLET ORAL at 20:50

## 2021-12-18 RX ADMIN — INSULIN GLARGINE 20 UNITS: 100 INJECTION, SOLUTION SUBCUTANEOUS at 21:55

## 2021-12-18 RX ADMIN — ZINC SULFATE 220 MG (50 MG) CAPSULE 50 MG: CAPSULE at 08:12

## 2021-12-18 RX ADMIN — PROMETHAZINE HYDROCHLORIDE 6.25 MG: 25 INJECTION INTRAMUSCULAR; INTRAVENOUS at 21:56

## 2021-12-18 RX ADMIN — GUAIFENESIN 600 MG: 600 TABLET, EXTENDED RELEASE ORAL at 08:13

## 2021-12-18 RX ADMIN — CETIRIZINE HYDROCHLORIDE 10 MG: 10 TABLET, FILM COATED ORAL at 08:12

## 2021-12-18 ASSESSMENT — ENCOUNTER SYMPTOMS
COLOR CHANGE: 0
DIARRHEA: 0
VOICE CHANGE: 0
VOMITING: 0
RHINORRHEA: 0
BACK PAIN: 0
SHORTNESS OF BREATH: 0
COUGH: 0
NAUSEA: 0
CONSTIPATION: 0

## 2021-12-18 ASSESSMENT — PAIN SCALES - GENERAL
PAINLEVEL_OUTOF10: 0
PAINLEVEL_OUTOF10: 0
PAINLEVEL_OUTOF10: 5
PAINLEVEL_OUTOF10: 3
PAINLEVEL_OUTOF10: 0
PAINLEVEL_OUTOF10: 0
PAINLEVEL_OUTOF10: 7
PAINLEVEL_OUTOF10: 5
PAINLEVEL_OUTOF10: 0

## 2021-12-18 ASSESSMENT — PAIN DESCRIPTION - LOCATION
LOCATION: BACK;NECK;HEAD
LOCATION: HEAD

## 2021-12-18 ASSESSMENT — PAIN DESCRIPTION - FREQUENCY
FREQUENCY: CONTINUOUS
FREQUENCY: CONTINUOUS

## 2021-12-18 ASSESSMENT — PAIN DESCRIPTION - ORIENTATION: ORIENTATION: LOWER

## 2021-12-18 ASSESSMENT — PAIN DESCRIPTION - PAIN TYPE
TYPE: ACUTE PAIN
TYPE: ACUTE PAIN;CHRONIC PAIN

## 2021-12-18 ASSESSMENT — PAIN DESCRIPTION - DESCRIPTORS
DESCRIPTORS: ACHING
DESCRIPTORS: ACHING

## 2021-12-18 NOTE — PROGRESS NOTES
Comprehensive Nutrition Assessment    Type and Reason for Visit:  Reassess    Nutrition Recommendations/Plan: continue current interventions    Nutrition Assessment:  Remains well nourished. PO intake is slwoly improving with intake now ranging 25-75%. is taking ONS very well -- usually %. No new wt available    Malnutrition Assessment:  Malnutrition Status: At risk for malnutrition (Comment)    Context:  Acute Illness     Findings of the 6 clinical characteristics of malnutrition:  Energy Intake:  7 - 50% or less of estimated energy requirements for 5 or more days  Weight Loss:  No significant weight loss     Body Fat Loss:  No significant body fat loss     Muscle Mass Loss:  No significant muscle mass loss    Fluid Accumulation:  No significant fluid accumulation Generalized   Strength:  Not Performed    Estimated Daily Nutrient Needs:  Energy (kcal):  1779-2042 kcals (20-25 kcals/IBW kg); Weight Used for Energy Requirements:  Current     Protein (g):  142g; Weight Used for Protein Requirements:  Ideal        Fluid (ml/day):  1485- 3712 ml (10-25ml/kg); Method Used for Fluid Requirements:  1 ml/kcal      Nutrition Related Findings:  morbidly obese    HHFNC with nonrebreather      Wounds:  None       Current Nutrition Therapies:    ADULT ORAL NUTRITION SUPPLEMENT; Breakfast, Lunch, Dinner; Diabetic Oral Supplement  ADULT DIET; Regular; 3 carb choices (45 gm/meal);  Low Fat/Low Chol/High Fiber/2 gm Na; dislikes: pancakes, Macedonian toast, and salad    Anthropometric Measures:  · Height: 5' 5\" (165.1 cm)  · Current Body Weight: 327 lb 4.8 oz (148.5 kg)   · Admission Body Weight:  (NA)    · Usual Body Weight: 336 lb (152.4 kg)     · Ideal Body Weight: 125 lbs; % Ideal Body Weight 261.8 %   · BMI: 54.5  · Adjusted Body Weight:  ; No Adjustment   · BMI Categories: Obese Class 3 (BMI 40.0 or greater)       Nutrition Diagnosis:   · Inadequate oral intake related to acute injury/trauma,impaired respiratory function as evidenced by intake 26-50%,intake 51-75%,poor intake prior to admission      Nutrition Interventions:   Food and/or Nutrient Delivery:  Continue Current Diet,Continue Oral Nutrition Supplement  Nutrition Education/Counseling:  No recommendation at this time   Coordination of Nutrition Care:  Continue to monitor while inpatient    Goals:  po intake 50% or greater. Weight stable or decrease 1-5# per week.   Accuchek's under 200       Nutrition Monitoring and Evaluation:   Behavioral-Environmental Outcomes:  None Identified   Food/Nutrient Intake Outcomes:  Food and Nutrient Intake,Supplement Intake  Physical Signs/Symptoms Outcomes:  Biochemical Data,Weight,Skin,Nutrition Focused Physical Findings,Fluid Status or Edema     Discharge Planning:    Continue current diet     Electronically signed by Suman Santos MS, RD, LD on 12/18/21 at 12:35 PM CST    Contact: [phone]

## 2021-12-18 NOTE — PLAN OF CARE
Problem: Airway Clearance - Ineffective  Goal: Achieve or maintain patent airway  12/18/2021 0954 by Dat Callejas RN  Outcome: Ongoing  12/17/2021 2346 by Mark Walton RN  Outcome: Ongoing     Problem: Gas Exchange - Impaired  Goal: Absence of hypoxia  12/18/2021 0954 by Dat Callejas RN  Outcome: Ongoing  12/17/2021 2346 by Mark Walton RN  Outcome: Ongoing  Goal: Promote optimal lung function  12/18/2021 0954 by Dat Callejas RN  Outcome: Ongoing  12/17/2021 2346 by Mark Walton RN  Outcome: Ongoing     Problem: Breathing Pattern - Ineffective  Goal: Ability to achieve and maintain a regular respiratory rate  12/18/2021 0954 by Dat Callejas RN  Outcome: Ongoing  12/17/2021 2346 by Mark Walton RN  Outcome: Ongoing     Problem:  Body Temperature -  Risk of, Imbalanced  Goal: Ability to maintain a body temperature within defined limits  12/18/2021 0954 by Dat Callejas RN  Outcome: Ongoing  12/17/2021 2346 by Mark Walton RN  Outcome: Ongoing  Goal: Will regain or maintain usual level of consciousness  12/18/2021 0954 by Dat Callejas RN  Outcome: Ongoing  12/17/2021 2346 by Mark Walton RN  Outcome: Ongoing  Goal: Complications related to the disease process, condition or treatment will be avoided or minimized  12/18/2021 0954 by Dat Callejas RN  Outcome: Ongoing  12/17/2021 2346 by Mark Walton RN  Outcome: Ongoing     Problem: Isolation Precautions - Risk of Spread of Infection  Goal: Prevent transmission of infection  12/18/2021 0954 by Dat Callejas RN  Outcome: Ongoing  12/17/2021 2346 by Mark Walton RN  Outcome: Ongoing     Problem: Nutrition Deficits  Goal: Optimize nutritional status  12/18/2021 0954 by Dat Callejas RN  Outcome: Ongoing  12/17/2021 2346 by Mark Walton RN  Outcome: Ongoing     Problem: Risk for Fluid Volume Deficit  Goal: Maintain normal heart rhythm  12/18/2021 0954 by Dat Callejas RN  Outcome: Ongoing  12/17/2021 2346 by Iqra Ortega Demario Quiles RN  Outcome: Ongoing  Goal: Maintain absence of muscle cramping  12/18/2021 0954 by Eriberto Bains RN  Outcome: Ongoing  12/17/2021 2346 by Ramirez Sellers RN  Outcome: Ongoing  Goal: Maintain normal serum potassium, sodium, calcium, phosphorus, and pH  12/18/2021 0954 by Eriberto Bains RN  Outcome: Ongoing  12/17/2021 2346 by Ramirez Sellers RN  Outcome: Ongoing     Problem: Loneliness or Risk for Loneliness  Goal: Demonstrate positive use of time alone when socialization is not possible  12/18/2021 0954 by Eriberto Bains RN  Outcome: Ongoing  12/17/2021 2346 by Ramirez Sellers RN  Outcome: Ongoing     Problem: Fatigue  Goal: Verbalize increase energy and improved vitality  12/18/2021 0954 by Eriberto Bains RN  Outcome: Ongoing  12/17/2021 2346 by Ramirez Sellers RN  Outcome: Ongoing     Problem: Patient Education: Go to Patient Education Activity  Goal: Patient/Family Education  12/18/2021 0954 by Eriberto Bains RN  Outcome: Ongoing  12/17/2021 2346 by Ramirez Sellers RN  Outcome: Ongoing     Problem: Falls - Risk of:  Goal: Will remain free from falls  Description: Will remain free from falls  12/18/2021 0954 by Eriberto Bains RN  Outcome: Ongoing  12/17/2021 2346 by Ramirez Sellers RN  Outcome: Ongoing  Goal: Absence of physical injury  Description: Absence of physical injury  12/18/2021 0954 by Eriberto Bains RN  Outcome: Ongoing  12/17/2021 2346 by Ramirez Sellers RN  Outcome: Ongoing     Problem: Pain:  Goal: Pain level will decrease  Description: Pain level will decrease  12/18/2021 0954 by Eriberto Bains RN  Outcome: Ongoing  12/17/2021 2346 by Ramirez Sellers RN  Outcome: Ongoing  Goal: Control of acute pain  Description: Control of acute pain  12/18/2021 0954 by Eriberto Bains RN  Outcome: Ongoing  12/17/2021 2346 by Ramirez Sellers RN  Outcome: Ongoing  Goal: Control of chronic pain  Description: Control of chronic pain  12/18/2021 0954 by Eriberto Bains RN  Outcome: Ongoing  12/17/2021 2346 by Margarita Lopez RN  Outcome: Ongoing     Problem: Nutrition  Goal: Optimal nutrition therapy  12/18/2021 0954 by Nathanel Shone, RN  Outcome: Ongoing  12/17/2021 2346 by Margarita Lopez RN  Outcome: Ongoing     Problem: Skin Integrity:  Goal: Will show no infection signs and symptoms  Description: Will show no infection signs and symptoms  12/18/2021 0954 by Nathanel Shone, RN  Outcome: Ongoing  12/17/2021 2346 by Margarita Lopez RN  Outcome: Ongoing  Goal: Absence of new skin breakdown  Description: Absence of new skin breakdown  12/18/2021 0954 by Nathanel Shone, RN  Outcome: Ongoing  12/17/2021 2346 by Margarita Lopez RN  Outcome: Ongoing

## 2021-12-18 NOTE — PLAN OF CARE
Nutrition Problem #1: Inadequate oral intake  Intervention: Food and/or Nutrient Delivery: Continue Current Diet,Continue Oral Nutrition Supplement  Nutritional Goals: po intake 50% or greater. Weight stable or decrease 1-5# per week.   Accuchek's under 200

## 2021-12-18 NOTE — CONSULTS
**Physician Signature**  This document was electronically signed by: Logan Pedraza DO  2021 11:23   AM    **Consult Cover Page**  FROM: [address], [phone]  Call Back Number: [phone]  SUBJECT: Consult Recommendations  Date and Time of Report: 2021 11:23 AM CT  Items Contained in this Document: Critical Care [facility]    **Consult Information**  Member Facility: [address] [place] [address] MRN: [id number]  Visit/Encounter Number: [id number]  Consult ID: [id number]  Facility Time Zone: CT  Date and Time of Request: 2021 10:03 AM  CT  Requesting Clinician: Andrea Tolbetr MD  Patient Name: Darrell Mendes  YOB: 1965  Gender: Female  Patient identity was confirmed at the beginning of the consult with the   patient/family/staff using two personal identifiers: Patient name and       **Reason for Consult**  Reason for Consult: [facility]    **Admission**  Admission Date: 2021  Chief reason for ICU admission: COVID - 19  Secondary reasons for ICU admission: Respiratory Failure, Pneumonia    **Core Metrics**  General orienting sentence for patient: : 50 yo woman, morbid obesity,   sick since late November, COVID+ , declined monoclonal Ab, admitted   , tx to ICU . Now on decaron, olumiant, Luvox, Currently on high   flow nasal cannula 60L 100%. BIPAP being started. Very anxious. Apparently   unvaccinated. Refuses self-proning. Suggest Precedex for anxiety if ativan   ineffective. is on Precedex. ECHO pending today. high flow + NRB but less   labored by RN report. Bedrest. Continues to NOT   self-prone. Nighttime BIPAP be intubated. Still on High flow + NRB. Able to   get up to chair with SPO2   and maintain SPO2. No overnight BIPAP b/o discomfort. Prefers HHF &   NRB up to chair. CXR no improvement. No distress. Morbid obesity, BMI 54 HFNC   60 LPM at 60%. No changes.   Chief physiologic deterioration: Increase in FiO2 required by   30%  Is the patient on DVT prophylaxis?: Yes  Prophylaxis type: Pharmacological  DVT Prophylaxis Comments: Lovenox  Is the patient on GI prophylaxis?: Yes  Gi Prophylaxis Comments: Pepcid  Has this patient reached their nutritional goal?: Yes  Nutritional Goals Details: Improved appetite  Are there current issues with pain management in this patient?:   No  Are there issues with skin integrity?: No  Are there issues with delirium?: No  Has the patient been mobilized?: Yes  Is this patient currently intubated?: No  Are there ethical or care philosophy or family issues?: No  Do you recommend an in depth evaluation?: No  Disposition Recommendations: Continue ICU level of care    **Inserted/Removed Devices**  Device Name: Lamb Catheter  Site of insertion: Urethra  Date of insertion: 12-    **Physician Signature**  This document was electronically signed by: Laney Garcia DO  12/18/2021 11:23   AM

## 2021-12-18 NOTE — PROGRESS NOTES
Hospitalist Progress Note    Patient:  Taniya Zavala  YOB: 1965  Date of Service: 12/17/2021  MRN: [id number]   Acct: [de-identified]   Primary Care Physician: Amado Rust MD  Advance Directive: [facility]  Admit Date: 12/9/2021       Hospital Day: 8  Referring Provider: Frank Styles DO    Patient Seen, Chart, Consults, Notes, Labs, Radiology studies reviewed. Subjective:  Taniya Zavala is a 49 y.o. female  whom we are following for COVID-19 pneumonia, hypoxemic respiratory failure. She is clinically about the same. She is able to maintain her oxygenation at her current level. Hemodynamics are stable.     Allergies:  Latex, Penicillins, Codeine, and Tape [adhesive tape]    Medicines:  Current Facility-Administered Medications   Medication Dose Route Frequency Provider Last Rate Last Admin    guaiFENesin (MUCINEX) extended release tablet 600 mg  600 mg Oral BID Frank Styles DO   600 mg at 12/17/21 1238    promethazine (PHENERGAN) injection 6.25 mg  6.25 mg IntraVENous Q6H PRN Frank Styles DO   6.25 mg at 12/17/21 1125    dextromethorphan (DELSYM) 30 MG/5ML extended release liquid 30 mg  30 mg Oral BID PRN Frank Styles DO   30 mg at 12/17/21 1125    dexamethasone (DECADRON) 12 mg in sodium chloride 0.9 % IVPB  12 mg IntraVENous Q24H Miriam Stuart MD   Stopped at 12/17/21 0944    insulin glargine (LANTUS) injection vial 20 Units  20 Units SubCUTAneous Nightly Miriam Stuart MD   20 Units at 12/16/21 2020    insulin lispro (HUMALOG) injection vial 0-18 Units  0-18 Units SubCUTAneous Q6H Miriam Stuart MD   6 Units at 12/17/21 1346    melatonin disintegrating tablet 10 mg  10 mg Oral Nightly Miriam Stuart MD   10 mg at 12/16/21 2152    Benzocaine-Menthol (CEPACOL) 1 lozenge  1 lozenge Oral Q2H PRN Miriam Stuart MD   1 lozenge at 12/14/21 1941    sodium chloride (OCEAN, BABY AYR) 0.65 % nasal spray 1 spray  1 spray Each Nostril PRN Radha Steward MD Eduardo   1 spray at 12/13/21 1727    dexmedetomidine (PRECEDEX) 400 mcg in sodium chloride 0.9 % 100 mL infusion  0.1-1.4 mcg/kg/hr IntraVENous Continuous Cassandra Vital MD 11.1 mL/hr at 12/17/21 1009 0.3 mcg/kg/hr at 12/17/21 1009    enoxaparin (LOVENOX) injection 40 mg  40 mg SubCUTAneous BID Cassandra Vital MD   40 mg at 12/17/21 4755    budesonide-formoterol (SYMBICORT) 160-4.5 MCG/ACT inhaler 2 puff  2 puff Inhalation BID Cassandra Vital MD   2 puff at 12/17/21 0902    fluvoxaMINE (LUVOX) tablet 50 mg  50 mg Oral BID Cassandra Vital MD   50 mg at 12/17/21 0910    famotidine (PEPCID) injection 40 mg  40 mg IntraVENous Daily Cassandra Vital MD   40 mg at 12/17/21 9810    Vitamin D (CHOLECALCIFEROL) tablet 2,000 Units  2,000 Units Oral Daily Cassandra Vital MD   2,000 Units at 12/17/21 7101    LORazepam (ATIVAN) injection 0.5 mg  0.5 mg IntraVENous Q6H PRN Cassandra Vital MD   0.5 mg at 12/16/21 1118    ALPRAZolam (XANAX) tablet 1 mg  1 mg Oral Nightly PRN Cassandra Vital MD   1 mg at 12/16/21 2151    albuterol sulfate  (90 Base) MCG/ACT inhaler 2 puff  2 puff Inhalation 4x Daily [place] Hawk, APRN   2 puff at 12/17/21 1642    calcium-cholecalciferol 500-200 MG-UNIT per tablet 1 tablet  1 tablet Oral Daily [place] Hawk, APRN   1 tablet at 12/17/21 0903    HYDROcodone-acetaminophen (NORCO) 7.5-325 MG per tablet 1 tablet  1 tablet Oral Q6H PRN [place] Hawk, APRN   1 tablet at 12/17/21 0903    levothyroxine (SYNTHROID) tablet 100 mcg  100 mcg Oral Daily Herber Hawk, APRN   100 mcg at 12/17/21 0534    ascorbic acid (VITAMIN C) tablet 1,000 mg  1,000 mg Oral Daily Herber Hawk, APRN   1,000 mg at 12/17/21 0904    timolol (TIMOPTIC) 0.5 % ophthalmic solution 1 drop  1 drop Both Eyes BID Herber Hawk, APRN   1 drop at 12/17/21 2952    pravastatin (PRAVACHOL) tablet 40 mg  40 mg Oral Daily [place] Hawk, APRN   40 mg at 12/17/21 0904    polyethylene glycol (GLYCOLAX) packet 17 g  17 g Oral Daily Madalyn Olive, APRN   17 g at 12/16/21 0748    ondansetron (ZOFRAN) tablet 8 mg  8 mg Oral Q8H PRN Madalyn Olive, APRN   8 mg at 12/10/21 1751    meclizine (ANTIVERT) tablet 25 mg  25 mg Oral TID PRN Madalyn Olive, APRN   25 mg at 12/10/21 0301    cetirizine (ZYRTEC) tablet 10 mg  10 mg Oral Daily Madalyn Olive, APRN   10 mg at 12/17/21 4946    sodium chloride flush 0.9 % injection 5-40 mL  5-40 mL IntraVENous 2 times per day Madalyn Olive, APRN   10 mL at 12/17/21 0918    sodium chloride flush 0.9 % injection 5-40 mL  5-40 mL IntraVENous PRN Madalyn Olive, APRN   10 mL at 12/15/21 1155    0.9 % sodium chloride infusion  25 mL IntraVENous PRN Madalyn Olive, APRN   Stopped at 12/11/21 2130    polyethylene glycol (GLYCOLAX) packet 17 g  17 g Oral Daily PRN Madalyn Olive, APRN        acetaminophen (TYLENOL) tablet 650 mg  650 mg Oral Q6H PRN Madalyn Olive, APRN   650 mg at 12/17/21 1346    Or    acetaminophen (TYLENOL) suppository 650 mg  650 mg Rectal Q6H PRN Madalyn Olive, APRN        zinc sulfate (ZINCATE) capsule 50 mg  50 mg Oral Daily Madalyn Olive, APRN   50 mg at 12/17/21 0903    glucose (GLUTOSE) 40 % oral gel 15 g  15 g Oral PRN Madalyn Olive, APRN        dextrose 50 % IV solution  12.5 g IntraVENous PRN Madalyn Olive, APRN        glucagon (rDNA) injection 1 mg  1 mg IntraMUSCular PRN Madalyn Olive, APRN        dextrose 5 % solution  100 mL/hr IntraVENous PRN Madalyn Olive, APRN        baricitinib Norm Font) tablet 4 mg  4 mg Oral Daily Madalyn Olive, APRN   4 mg at 12/16/21 2152       Past Medical History:  Past Medical History:   Diagnosis Date    Anemia     Asthma     Bronchitis, acute     [facility]     [facility]     Glaucoma     Hyperlipidemia     Hypertension     Hyperthyroidism     [facility]bid obesity [facility]     Sleep apnea uses c-pap       Past Surgical History:  Past Surgical History:   Procedure Laterality Date    AXILLARY SURGERY Bilateral     excision and drainage of staph abscesses    BACK SURGERY  10/2004    Dr. Abran Kocher, [place] L4, L5    CARPAL TUNNEL RELEASE Right     CHOLECYSTECTOMY      COLONOSCOPY  03/20/2007    Dr Vera Singh 9/11/2020    Dr [address] yr recall    DILATION AND CURETTAGE OF UTERUS N/A 1/29/2021    DILATATION AND CURETTAGE HYSTEROSCOPY Delvin Corporal performed by Devin Berkowitz MD at [address] [place] [address] FLX DX W/COLLJ [address] WHEN PFRMD N/A 2/8/2017    Dr Marbin Inman, actively inflamed internal hemorrhoids, residulal liquid and some solid food particles in the colon-5 yr recall due to prep    STOMACH SURGERY  01/20/2014    Dr. Brian Newton ( gastric sleeve)    UPPER GASTROINTESTINAL ENDOSCOPY  10/30/2012    Dr David Fernandez esophagitis, gastritis, Iliana (-)    UPPER GASTROINTESTINAL ENDOSCOPY  01/23/2008    Dr [place] Patricia esophagitis    UPPER GASTROINTESTINAL ENDOSCOPY N/A 9/11/2020    Dr Otilio Garcia       Family History  Family History   Problem Relation Age of Onset    Lung Cancer Mother     Kidney Cancer Mother     Lung Cancer Maternal Grandmother     Colon Cancer Maternal Grandfather     Liver Cancer Neg Hx     Liver Disease Neg Hx     Stomach Cancer Neg Hx     Rectal Cancer Neg Hx     Esophageal Cancer Neg Hx     Colon Polyps Neg Hx        Social History  Social History     Socioeconomic History    Marital status:      Spouse name: Not on file    Number of children: Not on file    Years of education: Not on file    Highest education level: Not on file   Occupational History    Not on file   Tobacco Use    Smoking status: Former Smoker     Packs/day: 3.00     Years: 26.00     Pack years: 78.00     Types: Cigarettes     Quit date: 5/6/2011     Years since quitting: 10.6    Smokeless tobacco: Never Used   Vaping Use    Vaping Use: Never used Substance and Sexual Activity    Alcohol use: No    Drug use: No    Sexual activity: Yes     Partners: Male   Other Topics Concern    Not on file   Social History Narrative    Not on file     Social Determinants of Health     Financial Resource Strain:     Difficulty of Paying Living Expenses: Not on file   Food Insecurity:     Worried About Running Out of Food in the Last Year: Not on file    Tiffanie of Food in the Last Year: Not on file   Transportation Needs:     Lack of Transportation (Medical): Not on file    Lack of Transportation (Non-Medical): Not on file   Physical Activity:     Days of Exercise per Week: Not on file    Minutes of Exercise per Session: Not on file   Stress:     Feeling of Stress : Not on file   Social Connections:     Frequency of Communication with Friends and Family: Not on file    Frequency of Social Gatherings with Friends and Family: Not on file    Attends Qu[employer] Services: Not on file    Active Member of [address] [place] [address] or Organizations: Not on file    Attends Club or Organization Meetings: Not on file    Marital Status: Not on file   Intimate Partner Violence:     Fear of Current or Ex-Partner: Not on file    Emotionally Abused: Not on file    Physically Abused: Not on file    Sexually Abused: Not on file   Housing Stability:     Unable to Pay for Housing in the Last Year: Not on file    Number of [place] in the Last Year: Not on file    Unstable Housing in the Last Year: Not on file         Review of Systems:    Review of Systems   Constitutional: Positive for activity change and appetite change. Negative for fatigue. HENT: Negative for rhinorrhea and voice change. Eyes: Negative for visual disturbance. Respiratory: Positive for cough and shortness of breath. Cardiovascular: Negative for chest pain and leg swelling. Gastrointestinal: Negative for constipation, diarrhea, nausea and vomiting. Endocrine: Negative for polyuria.    Genitourinary: Negative for difficulty urinating and dysuria. Musculoskeletal: Positive for gait problem. Negative for arthralgias and back pain. Skin: Negative for color change. Allergic/Immunologic: Negative for immunocompromised state. Neurological: Positive for weakness. Negative for dizziness and headaches. Psychiatric/Behavioral: Negative for confusion. Objective:  Blood pressure 121/79, pulse 72, temperature 97.9 °F (36.6 °C), temperature source Temporal, resp. rate 24, height 5' 5\" (1.651 m), weight (!) 327 lb 4.8 oz (148.5 kg), SpO2 91 %, not currently breastfeeding. Intake/Output Summary (Last 24 hours) at 12/17/2021 1855  Last data filed at 12/17/2021 1700  Gross per 24 hour   Intake 1400.63 ml   Output 1050 ml   Net 350.63 ml       Physical Exam  Vitals and nursing note reviewed. Constitutional:       Appearance: She is ill-appearing. HENT:      Head: Normocephalic and atraumatic. Right Ear: External ear normal.      Left Ear: External ear normal.      Nose: Nose normal.      Mouth/Throat:      Mouth: Mucous membranes are moist.   Eyes:      Conjunctiva/sclera: Conjunctivae normal.      Pupils: Pupils are equal, round, and reactive to light. Cardiovascular:      Rate and Rhythm: Normal rate and regular rhythm. Heart sounds: Normal heart sounds. Pulmonary:      Effort: Respiratory distress present. Breath sounds: Normal breath sounds. Abdominal:      General: Abdomen is flat. Palpations: Abdomen is soft. Musculoskeletal:         General: Normal range of motion. Cervical back: Neck supple. No rigidity. No muscular tenderness. Skin:     General: Skin is warm and dry. Neurological:      Mental Status: She is alert and oriented to person, place, and time.    Psychiatric:         Mood and Affect: Mood normal.         Labs:  BMP:   Recent Labs     12/15/21  0508 12/16/21  0415 12/17/21  0345    137 141   K 4.2 5.0 4.7   CL 96* 98 101   CO2 30* 28 28   BUN 40* 30* 25*   CREATININE 1.1* 0.7 0.7   CALCIUM 8.9 8.9 9.4     CBC:   Recent Labs     12/15/21  0508 12/16/21  0415 12/17/21  0345   WBC 8.5 6.6 11.4*   HGB 14.0 14.3 14.6   HCT 44.0 45.5 48.0*   MCV 93.8 94.6 96.8   * 574* 612*     LIVER PROFILE:   Recent Labs     12/15/21  0508 12/16/21  0415 12/17/21  0345   AST 37* 28 25   ALT 35* 32 31   BILITOT 0.4 0.3 0.3   ALKPHOS 86 97 99     PT/INR: No results for input(s): PROTIME, INR in the last 72 hours. APTT: No results for input(s): APTT in the last 72 hours. BNP:  No results for input(s): BNP in the last 72 hours. Ionized Calcium:No results for input(s): IONCA in the last 72 hours. Magnesium:No results for input(s): MG in the last 72 hours. Phosphorus:No results for input(s): PHOS in the last 72 hours. HgbA1C: No results for input(s): LABA1C in the last 72 hours. Hepatic:   Recent Labs     12/15/21  0508 12/16/21 0415 12/17/21 0345   ALKPHOS 86 97 99   ALT 35* 32 31   AST 37* 28 25   PROT 6.3* 6.6 7.0   BILITOT 0.4 0.3 0.3   LABALBU 3.2* 3.3* 2.9*     Lactic Acid: No results for input(s): LACTA in the last 72 hours. Troponin: No results for input(s): CKTOTAL, CKMB, TROPONINT in the last 72 hours. ABGs: No results for input(s): PH, PCO2, PO2, HCO3, O2SAT in the last 72 hours. CRP:    Recent Labs     12/15/21  0508 12/16/21 0415   CRP 5.69* 9.16*     Sed Rate:  No results for input(s): SEDRATE in the last 72 hours. Cultures:   No results for input(s): CULTURE in the last 72 hours. No results for input(s): Jeff LEA Ek in the last 72 hours. No results for input(s): CXSURG in the last 72 hours. Radiology reports as per the Radiologist  Radiology: XR CHEST PORTABLE    Result Date: 12/9/2021  EXAM: XR CHEST PORTABLE INDICATION: Shortness of breath, COVID positive COMPARISON: None available. FINDINGS: Cardiac silhouette is normal in size. No pleural effusion or visible pneumothorax. Patchy bilateral airspace opacity is present.  Central vascular congestion is noted. No acute osseous finding. Bilateral patchy airspace opacity. Central vascular congestion. Differential includes pneumonia and edema. Signed by Dr Lopes Prior     Acute respiratory distress syndrome (ARDS) due to COVID-19 virus. Continue ongoing medical management.     Acute hypoxemic respiratory failure due to COVID-19.   Continue supportive care.     Please document 40 minutes of critical care time for patient assessment, chart review, discussion with staff, .  [address],

## 2021-12-19 LAB
ALBUMIN SERPL-MCNC: 3.1 G/DL (ref 3.5–5.2)
ALP BLD-CCNC: 103 U/L (ref 35–104)
ALT SERPL-CCNC: 32 U/L (ref 5–33)
ANION GAP SERPL CALCULATED.3IONS-SCNC: 9 MMOL/L (ref 7–19)
AST SERPL-CCNC: 25 U/L (ref 5–32)
BASOPHILS ABSOLUTE: 0 K/UL (ref 0–0.2)
BASOPHILS RELATIVE PERCENT: 0.2 % (ref 0–1)
BILIRUB SERPL-MCNC: <0.2 MG/DL (ref 0.2–1.2)
BUN BLDV-MCNC: 24 MG/DL (ref 6–20)
CALCIUM SERPL-MCNC: 9 MG/DL (ref 8.6–10)
CHLORIDE BLD-SCNC: 100 MMOL/L (ref 98–111)
CO2: 29 MMOL/L (ref 22–29)
CREAT SERPL-MCNC: 0.7 MG/DL (ref 0.5–0.9)
EOSINOPHILS ABSOLUTE: 0 K/UL (ref 0–0.6)
EOSINOPHILS RELATIVE PERCENT: 0.2 % (ref 0–5)
GFR AFRICAN AMERICAN: >59
GFR NON-AFRICAN AMERICAN: >60
GLUCOSE BLD-MCNC: 172 MG/DL (ref 70–99)
GLUCOSE BLD-MCNC: 177 MG/DL (ref 70–99)
GLUCOSE BLD-MCNC: 266 MG/DL (ref 70–99)
GLUCOSE BLD-MCNC: 270 MG/DL (ref 74–109)
GLUCOSE BLD-MCNC: 296 MG/DL (ref 70–99)
HCT VFR BLD CALC: 44.1 % (ref 37–47)
HEMOGLOBIN: 14 G/DL (ref 12–16)
IMMATURE GRANULOCYTES #: 0.3 K/UL
LYMPHOCYTES ABSOLUTE: 0.9 K/UL (ref 1.1–4.5)
LYMPHOCYTES RELATIVE PERCENT: 7.1 % (ref 20–40)
MCH RBC QN AUTO: 30.2 PG (ref 27–31)
MCHC RBC AUTO-ENTMCNC: 31.7 G/DL (ref 33–37)
MCV RBC AUTO: 95 FL (ref 81–99)
MONOCYTES ABSOLUTE: 0.7 K/UL (ref 0–0.9)
MONOCYTES RELATIVE PERCENT: 5.5 % (ref 0–10)
NEUTROPHILS ABSOLUTE: 10.8 K/UL (ref 1.5–7.5)
NEUTROPHILS RELATIVE PERCENT: 84.3 % (ref 50–65)
PDW BLD-RTO: 12.4 % (ref 11.5–14.5)
PERFORMED ON: ABNORMAL
PLATELET # BLD: 645 K/UL (ref 130–400)
PMV BLD AUTO: 10 FL (ref 9.4–12.3)
POTASSIUM REFLEX MAGNESIUM: 4.9 MMOL/L (ref 3.5–5)
RBC # BLD: 4.64 M/UL (ref 4.2–5.4)
SODIUM BLD-SCNC: 138 MMOL/L (ref 136–145)
TOTAL PROTEIN: 6.5 G/DL (ref 6.6–8.7)
WBC # BLD: 12.7 K/UL (ref 4.8–10.8)

## 2021-12-19 PROCEDURE — 6370000000 HC RX 637 (ALT 250 FOR IP): Performed by: STUDENT IN AN ORGANIZED HEALTH CARE EDUCATION/TRAINING PROGRAM

## 2021-12-19 PROCEDURE — 6370000000 HC RX 637 (ALT 250 FOR IP): Performed by: INTERNAL MEDICINE

## 2021-12-19 PROCEDURE — 2100000000 HC CCU R&B

## 2021-12-19 PROCEDURE — 6360000002 HC RX W HCPCS: Performed by: INTERNAL MEDICINE

## 2021-12-19 PROCEDURE — 2700000000 HC OXYGEN THERAPY PER DAY

## 2021-12-19 PROCEDURE — 80053 COMPREHEN METABOLIC PANEL: CPT

## 2021-12-19 PROCEDURE — 82947 ASSAY GLUCOSE BLOOD QUANT: CPT

## 2021-12-19 PROCEDURE — 6360000002 HC RX W HCPCS: Performed by: STUDENT IN AN ORGANIZED HEALTH CARE EDUCATION/TRAINING PROGRAM

## 2021-12-19 PROCEDURE — 2580000003 HC RX 258: Performed by: NURSE PRACTITIONER

## 2021-12-19 PROCEDURE — 2580000003 HC RX 258: Performed by: INTERNAL MEDICINE

## 2021-12-19 PROCEDURE — 6370000000 HC RX 637 (ALT 250 FOR IP): Performed by: NURSE PRACTITIONER

## 2021-12-19 PROCEDURE — 94660 CPAP INITIATION&MGMT: CPT

## 2021-12-19 PROCEDURE — 85025 COMPLETE CBC W/AUTO DIFF WBC: CPT

## 2021-12-19 PROCEDURE — 2500000003 HC RX 250 WO HCPCS: Performed by: STUDENT IN AN ORGANIZED HEALTH CARE EDUCATION/TRAINING PROGRAM

## 2021-12-19 RX ORDER — LISINOPRIL 10 MG/1
10 TABLET ORAL 2 TIMES DAILY
Status: DISCONTINUED | OUTPATIENT
Start: 2021-12-19 | End: 2021-12-22

## 2021-12-19 RX ADMIN — SODIUM CHLORIDE, PRESERVATIVE FREE 10 ML: 5 INJECTION INTRAVENOUS at 09:05

## 2021-12-19 RX ADMIN — CETIRIZINE HYDROCHLORIDE 10 MG: 10 TABLET, FILM COATED ORAL at 07:58

## 2021-12-19 RX ADMIN — ENOXAPARIN SODIUM 40 MG: 100 INJECTION SUBCUTANEOUS at 07:57

## 2021-12-19 RX ADMIN — DEXAMETHASONE SODIUM PHOSPHATE 12 MG: 10 INJECTION, SOLUTION INTRAMUSCULAR; INTRAVENOUS at 10:03

## 2021-12-19 RX ADMIN — TIMOLOL MALEATE 1 DROP: 5 SOLUTION OPHTHALMIC at 21:22

## 2021-12-19 RX ADMIN — DEXMEDETOMIDINE HYDROCHLORIDE 0.2 MCG/KG/HR: 4 INJECTION, SOLUTION INTRAVENOUS at 16:11

## 2021-12-19 RX ADMIN — GUAIFENESIN 600 MG: 600 TABLET, EXTENDED RELEASE ORAL at 21:20

## 2021-12-19 RX ADMIN — POLYETHYLENE GLYCOL 3350 17 G: 17 POWDER, FOR SOLUTION ORAL at 08:38

## 2021-12-19 RX ADMIN — FLUVOXAMINE MALEATE 50 MG: 50 TABLET, COATED ORAL at 07:57

## 2021-12-19 RX ADMIN — BENZONATATE 100 MG: 100 CAPSULE ORAL at 17:56

## 2021-12-19 RX ADMIN — PROMETHAZINE HYDROCHLORIDE 6.25 MG: 25 INJECTION INTRAMUSCULAR; INTRAVENOUS at 21:21

## 2021-12-19 RX ADMIN — BUDESONIDE AND FORMOTEROL FUMARATE DIHYDRATE 2 PUFF: 160; 4.5 AEROSOL RESPIRATORY (INHALATION) at 21:23

## 2021-12-19 RX ADMIN — INSULIN LISPRO 3 UNITS: 100 INJECTION, SOLUTION INTRAVENOUS; SUBCUTANEOUS at 02:01

## 2021-12-19 RX ADMIN — GUAIFENESIN 600 MG: 600 TABLET, EXTENDED RELEASE ORAL at 07:57

## 2021-12-19 RX ADMIN — BARICITINIB 4 MG: 2 TABLET, FILM COATED ORAL at 21:20

## 2021-12-19 RX ADMIN — INSULIN LISPRO 3 UNITS: 100 INJECTION, SOLUTION INTRAVENOUS; SUBCUTANEOUS at 07:56

## 2021-12-19 RX ADMIN — Medication 10 MG: at 21:20

## 2021-12-19 RX ADMIN — BUDESONIDE AND FORMOTEROL FUMARATE DIHYDRATE 2 PUFF: 160; 4.5 AEROSOL RESPIRATORY (INHALATION) at 08:04

## 2021-12-19 RX ADMIN — SODIUM CHLORIDE, PRESERVATIVE FREE 10 ML: 5 INJECTION INTRAVENOUS at 21:21

## 2021-12-19 RX ADMIN — OXYCODONE HYDROCHLORIDE AND ACETAMINOPHEN 1000 MG: 500 TABLET ORAL at 07:57

## 2021-12-19 RX ADMIN — ALBUTEROL SULFATE 2 PUFF: 90 AEROSOL, METERED RESPIRATORY (INHALATION) at 21:22

## 2021-12-19 RX ADMIN — PRAVASTATIN SODIUM 40 MG: 20 TABLET ORAL at 07:57

## 2021-12-19 RX ADMIN — ALBUTEROL SULFATE 2 PUFF: 90 AEROSOL, METERED RESPIRATORY (INHALATION) at 09:04

## 2021-12-19 RX ADMIN — Medication 2000 UNITS: at 07:57

## 2021-12-19 RX ADMIN — FAMOTIDINE 40 MG: 10 INJECTION, SOLUTION INTRAVENOUS at 07:58

## 2021-12-19 RX ADMIN — LISINOPRIL 10 MG: 10 TABLET ORAL at 21:23

## 2021-12-19 RX ADMIN — HYDROCODONE BITARTRATE AND ACETAMINOPHEN 1 TABLET: 7.5; 325 TABLET ORAL at 21:20

## 2021-12-19 RX ADMIN — INSULIN LISPRO 9 UNITS: 100 INJECTION, SOLUTION INTRAVENOUS; SUBCUTANEOUS at 21:21

## 2021-12-19 RX ADMIN — PROMETHAZINE HYDROCHLORIDE 6.25 MG: 25 INJECTION INTRAMUSCULAR; INTRAVENOUS at 03:20

## 2021-12-19 RX ADMIN — ZINC SULFATE 220 MG (50 MG) CAPSULE 50 MG: CAPSULE at 07:58

## 2021-12-19 RX ADMIN — ALBUTEROL SULFATE 2 PUFF: 90 AEROSOL, METERED RESPIRATORY (INHALATION) at 16:12

## 2021-12-19 RX ADMIN — HYDROCODONE BITARTRATE AND ACETAMINOPHEN 1 TABLET: 7.5; 325 TABLET ORAL at 03:20

## 2021-12-19 RX ADMIN — Medication 1 TABLET: at 07:57

## 2021-12-19 RX ADMIN — ENOXAPARIN SODIUM 40 MG: 100 INJECTION SUBCUTANEOUS at 21:21

## 2021-12-19 RX ADMIN — HYDROCODONE BITARTRATE AND ACETAMINOPHEN 1 TABLET: 7.5; 325 TABLET ORAL at 14:24

## 2021-12-19 RX ADMIN — INSULIN LISPRO 6 UNITS: 100 INJECTION, SOLUTION INTRAVENOUS; SUBCUTANEOUS at 14:28

## 2021-12-19 RX ADMIN — FLUVOXAMINE MALEATE 50 MG: 50 TABLET, COATED ORAL at 21:20

## 2021-12-19 RX ADMIN — INSULIN GLARGINE 20 UNITS: 100 INJECTION, SOLUTION SUBCUTANEOUS at 21:21

## 2021-12-19 RX ADMIN — TIMOLOL MALEATE 1 DROP: 5 SOLUTION OPHTHALMIC at 09:04

## 2021-12-19 RX ADMIN — DEXMEDETOMIDINE HYDROCHLORIDE 0.2 MCG/KG/HR: 4 INJECTION, SOLUTION INTRAVENOUS at 05:15

## 2021-12-19 RX ADMIN — LEVOTHYROXINE SODIUM 100 MCG: 100 TABLET ORAL at 07:57

## 2021-12-19 ASSESSMENT — PAIN SCALES - GENERAL
PAINLEVEL_OUTOF10: 0
PAINLEVEL_OUTOF10: 6
PAINLEVEL_OUTOF10: 2
PAINLEVEL_OUTOF10: 5
PAINLEVEL_OUTOF10: 0
PAINLEVEL_OUTOF10: 0
PAINLEVEL_OUTOF10: 7
PAINLEVEL_OUTOF10: 0
PAINLEVEL_OUTOF10: 5
PAINLEVEL_OUTOF10: 0
PAINLEVEL_OUTOF10: 0

## 2021-12-19 ASSESSMENT — ENCOUNTER SYMPTOMS
COLOR CHANGE: 0
DIARRHEA: 0
VOMITING: 0
BACK PAIN: 0
VOICE CHANGE: 0
CONSTIPATION: 0
SHORTNESS OF BREATH: 1
RHINORRHEA: 0
NAUSEA: 0
COUGH: 1

## 2021-12-19 ASSESSMENT — PAIN DESCRIPTION - PAIN TYPE
TYPE: CHRONIC PAIN
TYPE: ACUTE PAIN;CHRONIC PAIN
TYPE: CHRONIC PAIN
TYPE: ACUTE PAIN;CHRONIC PAIN

## 2021-12-19 ASSESSMENT — PAIN DESCRIPTION - DESCRIPTORS
DESCRIPTORS: THROBBING
DESCRIPTORS: ACHING;SORE
DESCRIPTORS: ACHING;THROBBING
DESCRIPTORS: THROBBING

## 2021-12-19 ASSESSMENT — PAIN DESCRIPTION - LOCATION
LOCATION: BACK;NECK
LOCATION: BACK;NECK
LOCATION: BACK;LEG;NECK
LOCATION: BACK;LEG;NECK

## 2021-12-19 ASSESSMENT — PAIN DESCRIPTION - FREQUENCY
FREQUENCY: CONTINUOUS

## 2021-12-19 ASSESSMENT — PAIN DESCRIPTION - ORIENTATION
ORIENTATION: LOWER

## 2021-12-19 NOTE — PROGRESS NOTES
Hospitalist Progress Note    Patient:  Ruben Rodriguez  YOB: 1965  Date of Service: 12/19/2021  MRN: [id number]   Acct: [de-identified]   Primary Care Physician: Mei Bergeron MD  Advance Directive: [facility]  Admit Date: 12/9/2021       Hospital Day: 10  Referring Provider: Jaspreet Hills DO    Patient Seen, Chart, Consults, Notes, Labs, Radiology studies reviewed. Subjective:  Ruben Rodriguez is a 49 y.o. female  whom we are following for COVID-19 pneumonia, hypoxemic respiratory failure. Her O2 requirements have decreased. She is making forward progress.     Allergies:  Latex, Penicillins, Codeine, and Tape [adhesive tape]    Medicines:  Current Facility-Administered Medications   Medication Dose Route Frequency Provider Last Rate Last Admin    lisinopril (PRINIVIL;ZESTRIL) tablet 10 mg  10 mg Oral BID Jaspreet Hills DO        benzonatate (TESSALON) capsule 100 mg  100 mg Oral TID PRN Jaspreet Hills DO   100 mg at 12/18/21 1129    guaiFENesin [facility]) extended release tablet 600 mg  600 mg Oral BID Jaspreet Hills DO   600 mg at 12/19/21 0757    opium-belladonna (B&O SUPPRETTES) 16.2-60 MG suppository 60 mg  60 mg Rectal Q8H PRN Jaspreet Hills DO        promethazine (PHENERGAN) injection 6.25 mg  6.25 mg IntraVENous Q6H PRN Jaspreet Hills DO   6.25 mg at 12/19/21 0320    dextromethorphan (DELSYM) 30 MG/5ML extended release liquid 30 mg  30 mg Oral BID PRN Jaspreet Hills DO   30 mg at 12/18/21 0436    dexamethasone (DECADRON) 12 mg in sodium chloride 0.9 % IVPB  12 mg IntraVENous Q24H Jaspreet Hills DO   Stopped at 12/19/21 1033    insulin glargine (LANTUS) injection vial 20 Units  20 Units SubCUTAneous Nightly Finesse Perkins MD   20 Units at 12/18/21 2155    insulin lispro (HUMALOG) injection vial 0-18 Units  0-18 Units SubCUTAneous Q6H Finesse Perkins MD   6 Units at 12/19/21 1428    melatonin disintegrating tablet 10 mg  10 mg Oral Nightly Mayito Barron MD   10 mg at 12/18/21 2156    Benzocaine-Menthol (CEPACOL) 1 lozenge  1 lozenge Oral Q2H PRN Mayito Barron MD   1 lozenge at 12/14/21 1941    sodium chloride (OCEAN, BABY AYR) 0.65 % nasal spray 1 spray  1 spray Each Nostril PRN Mayito Barron MD   1 spray at 12/13/21 1727    dexmedetomidine (PRECEDEX) 400 mcg in sodium chloride 0.9 % 100 mL infusion  0.1-1.4 mcg/kg/hr IntraVENous Continuous Mayito Barron MD 7.4 mL/hr at 12/19/21 1611 0.2 mcg/kg/hr at 12/19/21 1611    enoxaparin (LOVENOX) injection 40 mg  40 mg SubCUTAneous BID Mayito Barron MD   40 mg at 12/19/21 0757    budesonide-formoterol (SYMBICORT) 160-4.5 MCG/ACT inhaler 2 puff  2 puff Inhalation BID Mayito Barron MD   2 puff at 12/19/21 0804    fluvoxaMINE (LUVOX) tablet 50 mg  50 mg Oral BID Mayito Barron MD   50 mg at 12/19/21 0757    famotidine (PEPCID) injection 40 mg  40 mg IntraVENous Daily Mayito Barron MD   40 mg at 12/19/21 0758    Vitamin D (CHOLECALCIFEROL) tablet 2,000 Units  2,000 Units Oral Daily Mayito Barron MD   2,000 Units at 12/19/21 0757    LORazepam (ATIVAN) injection 0.5 mg  0.5 mg IntraVENous Q6H PRN Mayito Barron MD   0.5 mg at 12/16/21 1118    ALPRAZolam (XANAX) tablet 1 mg  1 mg Oral Nightly PRN Mayito Barron MD   1 mg at 12/17/21 2202    albuterol sulfate  (90 Base) MCG/ACT inhaler 2 puff  2 puff Inhalation 4x Daily Madalyn Jessica, APRN   2 puff at 12/19/21 1612    calcium-cholecalciferol 500-200 MG-UNIT per tablet 1 tablet  1 tablet Oral Daily Madalyn Olive, APRN   1 tablet at 12/19/21 0757    HYDROcodone-acetaminophen ([facility]) 7.5-325 MG per tablet 1 tablet  1 tablet Oral Q6H PRN Madalyn Olive, APRN   1 tablet at 12/19/21 1424    levothyroxine (SYNTHROID) tablet 100 mcg  100 mcg Oral Daily Madalyn Olive, APRN   100 mcg at 12/19/21 0757    ascorbic acid (VITAMIN C) tablet 1,000 mg  1,000 mg Oral Daily Madalyn Olive, APRN   1,000 mg at 12/19/21 0757    timolol (TIMOPTIC) 0.5 % ophthalmic solution 1 drop  1 drop Both Eyes BID Sherol Bounds, APRN   1 drop at 12/19/21 0904    pravastatin (PRAVACHOL) tablet 40 mg  40 mg Oral Daily Sherol Bounds, APRN   40 mg at 12/19/21 0757    polyethylene glycol (GLYCOLAX) packet 17 g  17 g Oral Daily Sherol Bounds, APRN   17 g at 12/19/21 0838    ondansetron (ZOFRAN) tablet 8 mg  8 mg Oral Q8H PRN Sherol Bounds, APRN   8 mg at 12/10/21 1751    meclizine (ANTIVERT) tablet 25 mg  25 mg Oral TID PRN Sherol Bounds, APRN   25 mg at 12/10/21 0301    cetirizine (ZYRTEC) tablet 10 mg  10 mg Oral Daily Sherol Bounds, APRN   10 mg at 12/19/21 0758    sodium chloride flush 0.9 % injection 5-40 mL  5-40 mL IntraVENous 2 times per day Sherol Bounds, APRN   10 mL at 12/19/21 0905    sodium chloride flush 0.9 % injection 5-40 mL  5-40 mL IntraVENous PRN Sherol Bounds, APRN   10 mL at 12/15/21 1155    0.9 % sodium chloride infusion  25 mL IntraVENous PRN Sherol Bounds, APRN   Stopped at 12/11/21 2130    polyethylene glycol (GLYCOLAX) packet 17 g  17 g Oral Daily PRN Sherol Bounds, APRN   17 g at 12/17/21 2112    acetaminophen (TYLENOL) tablet 650 mg  650 mg Oral Q6H PRN Sherol Bounds, APRN   650 mg at 12/18/21 2050    Or    acetaminophen (TYLENOL) suppository 650 mg  650 mg Rectal Q6H PRN Sherol Bounds, APRN        zinc sulfate (ZINCATE) capsule 50 mg  50 mg Oral Daily Sherol Bounds, APRN   50 mg at 12/19/21 0758    glucose (GLUTOSE) 40 % oral gel 15 g  15 g Oral PRN Sherol Bounds, APRN        dextrose 50 % IV solution  12.5 g IntraVENous PRN Sherol Bounds, APRN        glucagon (rDNA) injection 1 mg  1 mg IntraMUSCular PRN Sherol Bounds, APRN        dextrose 5 % solution  100 mL/hr IntraVENous PRN Sherol Bounds, APRN        baricitinib Niseha Gola) tablet 4 mg  4 mg Oral Daily PELON Conley   4 mg at 12/18/21 9227       Past Medical History:  Past Medical History:   Diagnosis Date    Anemia     Asthma     Bronchitis, acute     COPD (chronic obstructive pulmonary disease) (HCC)     Diabetes ([facility] [address]     Glaucoma     Hyperlipidemia     Hypertension     Hyperthyroidism     Morbid obesity ([facility] [address]     Sleep apnea     uses c-pap       Past Surgical History:  Past Surgical History:   Procedure Laterality Date    AXILLARY SURGERY Bilateral     excision and drainage of staph abscesses    BACK SURGERY  10/2004    Dr. May Jaquez, [place] L4, L5    CARPAL TUNNEL RELEASE Right     CHOLECYSTECTOMY      COLONOSCOPY  03/20/2007    Dr Ely Edmonds N/A 9/11/2020    Dr Swain [place] yr recall    DILATION AND CURETTAGE OF UTERUS N/A 1/29/2021    DILATATION AND CURETTAGE HYSTEROSCOPY NOVASURE ABLATION performed by Georgia Litten, MD at [address] COLONOSCOPY FLX DX W/CO[address]J [address] WHEN PFRMD N/A 2/8/2017    Dr Mohit Prieto, actively inflamed internal hemorrhoids, residulal liquid and some solid food particles in the colon-5 yr recall due to prep    STOMACH SURGERY  01/20/2014    Dr. Ronny Senior ( gastric sleeve)    UPPER GASTROINTESTINAL ENDOSCOPY  10/30/2012    Dr Roland Huber esophagitis, gastritis, [place] (-)    UPPER GASTROINTESTINAL ENDOSCOPY  01/23/2008    Dr Modesto Mcgovern esophagitis    UPPER GASTROINTESTINAL ENDOSCOPY N/A 9/11/2020    Dr Vera Cope       Family History  Family History   Problem Relation Age of Onset    Lung Cancer Mother     Kidney Cancer Mother     Lung Cancer Maternal Grandmother     Colon Cancer Maternal Grandfather     Liver Cancer Neg Hx     Liver Disease Neg Hx     Stomach Cancer Neg Hx     Rectal Cancer Neg Hx     Esophageal Cancer Neg Hx     Colon Polyps Neg Hx        Social History  Social History     Socioeconomic History    Marital status:      Spouse name: Not on file    Number of children: Not on file    Years of education: Not on file    Highest education level: Not on file Occupational History    Not on file   Tobacco Use    Smoking status: Former Smoker     Packs/day: 3.00     Years: 26.00     Pack years: 78.00     Types: Cigarettes     Quit date: 5/6/2011     Years since quitting: 10.6    Smokeless tobacco: Never Used   Vaping Use    Vaping Use: Never used   Substance and Sexual Activity    Alcohol use: No    Drug use: No    Sexual activity: Yes     Partners: Male   Other Topics Concern    Not on file   Social History Narrative    Not on file     Social Determinants of Health     Financial Resource Strain:     Difficulty of Paying Living Expenses: Not on file   Food Insecurity:     Worried About Running Out of Food in the Last Year: Not on file    Tiffanie of Food in the Last Year: Not on file   Transportation Needs:     Lack of Transportation (Medical): Not on file    Lack of Transportation (Non-Medical): Not on file   Physical Activity:     Days of Exercise per Week: Not on file    Minutes of Exercise per Session: Not on file   Stress:     Feeling of Stress : Not on file   Social Connections:     Frequency of Communication with Friends and Family: Not on file    Frequency of Social Gatherings with Friends and Family: Not on file    Attends [employer] Services: Not on file    Active Member of [address] [place] [address] or Organizations: Not on file    Attends Club or Organization Meetings: Not on file    Marital Status: Not on file   Intimate Partner Violence:     Fear of Current or Ex-Partner: Not on file    Emotionally Abused: Not on file    Physically Abused: Not on file    Sexually Abused: Not on file   Housing Stability:     Unable to Pay for Housing in the Last Year: Not on file    Number of [place] in the Last Year: Not on file    Unstable Housing in the Last Year: Not on file         Review of Systems:    Review of Systems   Constitutional: Positive for fatigue. Negative for activity change. HENT: Negative for rhinorrhea and voice change.     Eyes: Negative for visual disturbance. Respiratory: Positive for cough and shortness of breath. Cardiovascular: Negative for chest pain and leg swelling. Gastrointestinal: Negative for constipation, diarrhea, nausea and vomiting. Endocrine: Negative for polyuria. Genitourinary: Negative for difficulty urinating and dysuria. Musculoskeletal: Negative for arthralgias and back pain. Skin: Negative for color change. Allergic/Immunologic: Negative for immunocompromised state. Neurological: Positive for weakness. Negative for dizziness and headaches. Psychiatric/Behavioral: Negative for confusion. Objective:  Blood pressure 124/79, pulse 66, temperature 98 °F (36.7 °C), temperature source Temporal, resp. rate 28, height 5' 5\" (1.651 m), weight (!) 330 lb (149.7 kg), SpO2 92 %, not currently breastfeeding. Intake/Output Summary (Last 24 hours) at 12/19/2021 1731  Last data filed at 12/19/2021 1200  Gross per 24 hour   Intake 1898.19 ml   Output 1900 ml   Net -1.81 ml       Physical Exam  Vitals and nursing note reviewed. HENT:      Head: Normocephalic and atraumatic. Right Ear: External ear normal.      Left Ear: External ear normal.      Nose: Nose normal.      Mouth/Throat:      Mouth: Mucous membranes are moist.   Eyes:      Conjunctiva/sclera: Conjunctivae normal.      Pupils: Pupils are equal, round, and reactive to light. Cardiovascular:      Rate and Rhythm: Normal rate and regular rhythm. Heart sounds: Normal heart sounds. Pulmonary:      Effort: Pulmonary effort is normal.      Breath sounds: Rhonchi present. Abdominal:      General: Abdomen is flat. Palpations: Abdomen is soft. Musculoskeletal:         General: Normal range of motion. Cervical back: Neck supple. No rigidity. No muscular tenderness. Skin:     General: Skin is warm and dry. Neurological:      Mental Status: She is alert and oriented to person, place, and time.    Psychiatric:         Mood and Affect: Mood normal.         Labs:  BMP:   Recent Labs     12/17/21 0345 12/18/21 0342 12/19/21  0406    137 138   K 4.7 5.2* 4.9    99 100   CO2 28 27 29   BUN 25* 25* 24*   CREATININE 0.7 0.8 0.7   CALCIUM 9.4 9.0 9.0     CBC:   Recent Labs     12/17/21 0345 12/18/21 0342 12/19/21  0406   WBC 11.4* 14.3* 12.7*   HGB 14.6 13.9 14.0   HCT 48.0* 45.6 44.1   MCV 96.8 95.8 95.0   * 644* 645*     LIVER PROFILE:   Recent Labs     12/17/21 0345 12/18/21 0342 12/19/21  0406   AST 25 27 25   ALT 31 32 32   BILITOT 0.3 0.3 <0.2   ALKPHOS 99 105* 103     PT/INR: No results for input(s): PROTIME, INR in the last 72 hours. APTT: No results for input(s): APTT in the last 72 hours. BNP:  No results for input(s): BNP in the last 72 hours. Ionized Calcium:No results for input(s): IONCA in the last 72 hours. Magnesium:No results for input(s): MG in the last 72 hours. Phosphorus:No results for input(s): PHOS in the last 72 hours. HgbA1C: No results for input(s): LABA1C in the last 72 hours. Hepatic:   Recent Labs     12/17/21 0345 12/18/21 0342 12/19/21  0406   ALKPHOS 99 105* 103   ALT 31 32 32   AST 25 27 25   PROT 7.0 6.0* 6.5*   BILITOT 0.3 0.3 <0.2   LABALBU 2.9* 3.1* 3.1*     Lactic Acid: No results for input(s): LACTA in the last 72 hours. Troponin: No results for input(s): CKTOTAL, CKMB, TROPONINT in the last 72 hours. ABGs: No results for input(s): PH, PCO2, PO2, HCO3, O2SAT in the last 72 hours. CRP:  No results for input(s): CRP in the last 72 hours. Sed Rate:  No results for input(s): SEDRATE in the last 72 hours. Cultures:   No results for input(s): CULTURE in the last 72 hours. No results for input(s): BC, Lonn Irish in the last 72 hours. No results for input(s): CXSURG in the last 72 hours.     Radiology reports as per the Radiologist  Radiology: XR CHEST PORTABLE    Result Date: 12/9/2021  EXAM: XR CHEST PORTABLE INDICATION: Shortness of breath, COVID positive COMPARISON: None available. FINDINGS: Cardiac silhouette is normal in size. No pleural effusion or visible pneumothorax. Patchy bilateral airspace opacity is present. Central vascular congestion is noted. No acute osseous finding. Bilateral patchy airspace opacity. Central vascular congestion. Differential includes pneumonia and edema. Signed by Dr Kala Kuo     Acute respiratory distress syndrome (ARDS) due to COVID-19 virus. Continue ongoing medical management.     Acute hypoxemic respiratory failure due to COVID-19. Continue supportive care.     Please document 38 minutes of critical care time for patient assessment, chart review, discussion with staff, .       Anna Guillaume DO

## 2021-12-19 NOTE — CONSULTS
by   30%  Is the patient on DVT prophylaxis?: Yes  Prophylaxis type: Pharmacological  DVT Prophylaxis Comments: Lovenox  Is the patient on GI prophylaxis?: Yes  Gi Prophylaxis Comments: Pepcid  Has this patient reached their nutritional goal?: Yes  Nutritional Goals Details: Improved appetite  Are there current issues with pain management in this patient?:   No  Are there issues with skin integrity?: No  Are there issues with delirium?: No  Has the patient been mobilized?: Yes  Is this patient currently intubated?: No  Are there ethical or care philosophy or family issues?: No  Do you recommend an in depth evaluation?: No  Disposition Recommendations: Continue ICU level of care    **Inserted/Removed Devices**  Device Name: Lamb Catheter  Site of insertion: Urethra  Date of insertion: 12-    **Physician Signature**  This document was electronically signed by: Laney Garcia DO  12/19/2021 11:24   AM

## 2021-12-20 LAB
ALBUMIN SERPL-MCNC: 3.1 G/DL (ref 3.5–5.2)
ALP BLD-CCNC: 106 U/L (ref 35–104)
ALT SERPL-CCNC: 34 U/L (ref 5–33)
ANION GAP SERPL CALCULATED.3IONS-SCNC: 12 MMOL/L (ref 7–19)
AST SERPL-CCNC: 23 U/L (ref 5–32)
BASOPHILS ABSOLUTE: 0 K/UL (ref 0–0.2)
BASOPHILS RELATIVE PERCENT: 0.2 % (ref 0–1)
BILIRUB SERPL-MCNC: 0.3 MG/DL (ref 0.2–1.2)
BUN BLDV-MCNC: 22 MG/DL (ref 6–20)
CALCIUM SERPL-MCNC: 9.4 MG/DL (ref 8.6–10)
CHLORIDE BLD-SCNC: 96 MMOL/L (ref 98–111)
CO2: 28 MMOL/L (ref 22–29)
CREAT SERPL-MCNC: 0.8 MG/DL (ref 0.5–0.9)
EOSINOPHILS ABSOLUTE: 0 K/UL (ref 0–0.6)
EOSINOPHILS RELATIVE PERCENT: 0.1 % (ref 0–5)
GFR AFRICAN AMERICAN: >59
GFR NON-AFRICAN AMERICAN: >60
GLUCOSE BLD-MCNC: 147 MG/DL (ref 70–99)
GLUCOSE BLD-MCNC: 157 MG/DL (ref 70–99)
GLUCOSE BLD-MCNC: 170 MG/DL (ref 70–99)
GLUCOSE BLD-MCNC: 228 MG/DL (ref 74–109)
GLUCOSE BLD-MCNC: 284 MG/DL (ref 70–99)
HCT VFR BLD CALC: 44.3 % (ref 37–47)
HEMOGLOBIN: 14.1 G/DL (ref 12–16)
IMMATURE GRANULOCYTES #: 0.4 K/UL
LYMPHOCYTES ABSOLUTE: 0.9 K/UL (ref 1.1–4.5)
LYMPHOCYTES RELATIVE PERCENT: 6.4 % (ref 20–40)
MCH RBC QN AUTO: 30.1 PG (ref 27–31)
MCHC RBC AUTO-ENTMCNC: 31.8 G/DL (ref 33–37)
MCV RBC AUTO: 94.5 FL (ref 81–99)
MONOCYTES ABSOLUTE: 0.9 K/UL (ref 0–0.9)
MONOCYTES RELATIVE PERCENT: 6.3 % (ref 0–10)
NEUTROPHILS ABSOLUTE: 11.5 K/UL (ref 1.5–7.5)
NEUTROPHILS RELATIVE PERCENT: 84.4 % (ref 50–65)
PDW BLD-RTO: 12.2 % (ref 11.5–14.5)
PERFORMED ON: ABNORMAL
PLATELET # BLD: 670 K/UL (ref 130–400)
PMV BLD AUTO: 9.8 FL (ref 9.4–12.3)
POTASSIUM REFLEX MAGNESIUM: 5.5 MMOL/L (ref 3.5–5)
RBC # BLD: 4.69 M/UL (ref 4.2–5.4)
SODIUM BLD-SCNC: 136 MMOL/L (ref 136–145)
TOTAL PROTEIN: 5.9 G/DL (ref 6.6–8.7)
WBC # BLD: 13.7 K/UL (ref 4.8–10.8)

## 2021-12-20 PROCEDURE — 2700000000 HC OXYGEN THERAPY PER DAY

## 2021-12-20 PROCEDURE — 2580000003 HC RX 258: Performed by: NURSE PRACTITIONER

## 2021-12-20 PROCEDURE — 6360000002 HC RX W HCPCS: Performed by: HOSPITALIST

## 2021-12-20 PROCEDURE — 6360000002 HC RX W HCPCS: Performed by: STUDENT IN AN ORGANIZED HEALTH CARE EDUCATION/TRAINING PROGRAM

## 2021-12-20 PROCEDURE — 6370000000 HC RX 637 (ALT 250 FOR IP): Performed by: INTERNAL MEDICINE

## 2021-12-20 PROCEDURE — 6370000000 HC RX 637 (ALT 250 FOR IP): Performed by: HOSPITALIST

## 2021-12-20 PROCEDURE — 2580000003 HC RX 258: Performed by: INTERNAL MEDICINE

## 2021-12-20 PROCEDURE — 85025 COMPLETE CBC W/AUTO DIFF WBC: CPT

## 2021-12-20 PROCEDURE — 6360000002 HC RX W HCPCS: Performed by: INTERNAL MEDICINE

## 2021-12-20 PROCEDURE — 36415 COLL VENOUS BLD VENIPUNCTURE: CPT

## 2021-12-20 PROCEDURE — 6370000000 HC RX 637 (ALT 250 FOR IP): Performed by: STUDENT IN AN ORGANIZED HEALTH CARE EDUCATION/TRAINING PROGRAM

## 2021-12-20 PROCEDURE — 2500000003 HC RX 250 WO HCPCS: Performed by: STUDENT IN AN ORGANIZED HEALTH CARE EDUCATION/TRAINING PROGRAM

## 2021-12-20 PROCEDURE — 6370000000 HC RX 637 (ALT 250 FOR IP): Performed by: NURSE PRACTITIONER

## 2021-12-20 PROCEDURE — 80053 COMPREHEN METABOLIC PANEL: CPT

## 2021-12-20 PROCEDURE — 2100000000 HC CCU R&B

## 2021-12-20 PROCEDURE — 82947 ASSAY GLUCOSE BLOOD QUANT: CPT

## 2021-12-20 RX ORDER — PROMETHAZINE HYDROCHLORIDE 25 MG/ML
6.25 INJECTION, SOLUTION INTRAMUSCULAR; INTRAVENOUS EVERY 6 HOURS PRN
Status: DISCONTINUED | OUTPATIENT
Start: 2021-12-20 | End: 2022-01-04 | Stop reason: HOSPADM

## 2021-12-20 RX ADMIN — INSULIN GLARGINE 20 UNITS: 100 INJECTION, SOLUTION SUBCUTANEOUS at 19:51

## 2021-12-20 RX ADMIN — ACETAMINOPHEN 650 MG: 325 TABLET ORAL at 16:43

## 2021-12-20 RX ADMIN — Medication 1 TABLET: at 07:49

## 2021-12-20 RX ADMIN — Medication 1 LOZENGE: at 03:44

## 2021-12-20 RX ADMIN — TIMOLOL MALEATE 1 DROP: 5 SOLUTION OPHTHALMIC at 08:40

## 2021-12-20 RX ADMIN — LISINOPRIL 10 MG: 10 TABLET ORAL at 07:49

## 2021-12-20 RX ADMIN — LEVOTHYROXINE SODIUM 100 MCG: 100 TABLET ORAL at 07:49

## 2021-12-20 RX ADMIN — BUDESONIDE AND FORMOTEROL FUMARATE DIHYDRATE 2 PUFF: 160; 4.5 AEROSOL RESPIRATORY (INHALATION) at 07:50

## 2021-12-20 RX ADMIN — SODIUM CHLORIDE, PRESERVATIVE FREE 10 ML: 5 INJECTION INTRAVENOUS at 20:07

## 2021-12-20 RX ADMIN — PROMETHAZINE HYDROCHLORIDE 6.25 MG: 25 INJECTION INTRAMUSCULAR; INTRAVENOUS at 04:46

## 2021-12-20 RX ADMIN — INSULIN LISPRO 3 UNITS: 100 INJECTION, SOLUTION INTRAVENOUS; SUBCUTANEOUS at 02:06

## 2021-12-20 RX ADMIN — Medication 30 MG: at 20:53

## 2021-12-20 RX ADMIN — ALPRAZOLAM 1 MG: 0.5 TABLET ORAL at 03:55

## 2021-12-20 RX ADMIN — POLYETHYLENE GLYCOL 3350 17 G: 17 POWDER, FOR SOLUTION ORAL at 07:49

## 2021-12-20 RX ADMIN — ALBUTEROL SULFATE 2 PUFF: 90 AEROSOL, METERED RESPIRATORY (INHALATION) at 13:12

## 2021-12-20 RX ADMIN — INSULIN LISPRO 9 UNITS: 100 INJECTION, SOLUTION INTRAVENOUS; SUBCUTANEOUS at 19:51

## 2021-12-20 RX ADMIN — INSULIN LISPRO 3 UNITS: 100 INJECTION, SOLUTION INTRAVENOUS; SUBCUTANEOUS at 08:39

## 2021-12-20 RX ADMIN — PRAVASTATIN SODIUM 40 MG: 20 TABLET ORAL at 07:49

## 2021-12-20 RX ADMIN — ALBUTEROL SULFATE 2 PUFF: 90 AEROSOL, METERED RESPIRATORY (INHALATION) at 16:10

## 2021-12-20 RX ADMIN — BUDESONIDE AND FORMOTEROL FUMARATE DIHYDRATE 2 PUFF: 160; 4.5 AEROSOL RESPIRATORY (INHALATION) at 20:53

## 2021-12-20 RX ADMIN — ZINC SULFATE 220 MG (50 MG) CAPSULE 50 MG: CAPSULE at 07:49

## 2021-12-20 RX ADMIN — FAMOTIDINE 40 MG: 10 INJECTION, SOLUTION INTRAVENOUS at 09:21

## 2021-12-20 RX ADMIN — SODIUM ZIRCONIUM CYCLOSILICATE 5 G: 5 POWDER, FOR SUSPENSION ORAL at 07:48

## 2021-12-20 RX ADMIN — GUAIFENESIN 600 MG: 600 TABLET, EXTENDED RELEASE ORAL at 07:48

## 2021-12-20 RX ADMIN — BARICITINIB 4 MG: 2 TABLET, FILM COATED ORAL at 19:53

## 2021-12-20 RX ADMIN — ENOXAPARIN SODIUM 40 MG: 100 INJECTION SUBCUTANEOUS at 07:49

## 2021-12-20 RX ADMIN — TIMOLOL MALEATE 1 DROP: 5 SOLUTION OPHTHALMIC at 20:10

## 2021-12-20 RX ADMIN — ALBUTEROL SULFATE 2 PUFF: 90 AEROSOL, METERED RESPIRATORY (INHALATION) at 07:50

## 2021-12-20 RX ADMIN — SODIUM ZIRCONIUM CYCLOSILICATE 5 G: 5 POWDER, FOR SUSPENSION ORAL at 19:53

## 2021-12-20 RX ADMIN — ALBUTEROL SULFATE 2 PUFF: 90 AEROSOL, METERED RESPIRATORY (INHALATION) at 20:09

## 2021-12-20 RX ADMIN — ALPRAZOLAM 1 MG: 0.5 TABLET ORAL at 19:52

## 2021-12-20 RX ADMIN — LISINOPRIL 10 MG: 10 TABLET ORAL at 19:53

## 2021-12-20 RX ADMIN — FLUVOXAMINE MALEATE 50 MG: 50 TABLET, COATED ORAL at 19:53

## 2021-12-20 RX ADMIN — DEXAMETHASONE SODIUM PHOSPHATE 12 MG: 10 INJECTION, SOLUTION INTRAMUSCULAR; INTRAVENOUS at 09:22

## 2021-12-20 RX ADMIN — FLUVOXAMINE MALEATE 50 MG: 50 TABLET, COATED ORAL at 07:48

## 2021-12-20 RX ADMIN — Medication 10 MG: at 19:53

## 2021-12-20 RX ADMIN — HYDROCODONE BITARTRATE AND ACETAMINOPHEN 1 TABLET: 7.5; 325 TABLET ORAL at 03:51

## 2021-12-20 RX ADMIN — Medication 2000 UNITS: at 07:48

## 2021-12-20 RX ADMIN — ENOXAPARIN SODIUM 40 MG: 100 INJECTION SUBCUTANEOUS at 19:53

## 2021-12-20 RX ADMIN — Medication 30 MG: at 05:43

## 2021-12-20 RX ADMIN — GUAIFENESIN 600 MG: 600 TABLET, EXTENDED RELEASE ORAL at 19:53

## 2021-12-20 RX ADMIN — PROMETHAZINE HYDROCHLORIDE 6.25 MG: 25 INJECTION INTRAMUSCULAR; INTRAVENOUS at 18:53

## 2021-12-20 RX ADMIN — CETIRIZINE HYDROCHLORIDE 10 MG: 10 TABLET, FILM COATED ORAL at 07:49

## 2021-12-20 RX ADMIN — HYDROCODONE BITARTRATE AND ACETAMINOPHEN 1 TABLET: 7.5; 325 TABLET ORAL at 19:53

## 2021-12-20 RX ADMIN — OXYCODONE HYDROCHLORIDE AND ACETAMINOPHEN 1000 MG: 500 TABLET ORAL at 07:49

## 2021-12-20 ASSESSMENT — PAIN SCALES - GENERAL
PAINLEVEL_OUTOF10: 8
PAINLEVEL_OUTOF10: 0
PAINLEVEL_OUTOF10: 2
PAINLEVEL_OUTOF10: 0
PAINLEVEL_OUTOF10: 7

## 2021-12-20 ASSESSMENT — PAIN - FUNCTIONAL ASSESSMENT: PAIN_FUNCTIONAL_ASSESSMENT: PREVENTS OR INTERFERES SOME ACTIVE ACTIVITIES AND ADLS

## 2021-12-20 ASSESSMENT — PAIN DESCRIPTION - FREQUENCY
FREQUENCY: CONTINUOUS
FREQUENCY: CONTINUOUS

## 2021-12-20 ASSESSMENT — PAIN DESCRIPTION - LOCATION
LOCATION: BACK;LEG;SHOULDER
LOCATION: BACK;THROAT

## 2021-12-20 ASSESSMENT — PAIN DESCRIPTION - ORIENTATION: ORIENTATION: LOWER

## 2021-12-20 ASSESSMENT — PAIN DESCRIPTION - PAIN TYPE
TYPE: CHRONIC PAIN
TYPE: CHRONIC PAIN

## 2021-12-20 ASSESSMENT — PAIN DESCRIPTION - DESCRIPTORS
DESCRIPTORS: SHARP;THROBBING
DESCRIPTORS: SHARP;THROBBING

## 2021-12-20 ASSESSMENT — PAIN DESCRIPTION - PROGRESSION: CLINICAL_PROGRESSION: NOT CHANGED

## 2021-12-20 ASSESSMENT — PAIN DESCRIPTION - ONSET: ONSET: ON-GOING

## 2021-12-20 NOTE — PROGRESS NOTES
Patient complaint of soreness at IV site. Requested IV be removed. Advised ability to place IV is very difficult, and has required many attempts by many nurses, and that a potential solution would be for PICC line placement. Will advise oncoming nurse of situation. IV removed without difficulty, cathlon intact. Minimal bleeding at sight, and bandage placed. Precedex was running minimally at 0.2 mcg/kg/hr, and NS KVO. EMAR updated to reflect stop time.

## 2021-12-20 NOTE — CONSULTS
**Physician Signature**  This document was electronically signed by: Amirah Frazier MD  2021   10:37 AM    **Consult Cover Page**  FROM: [address], [phone]  Call Back Number: [phone]  SUBJECT: Consult Recommendations  Date and Time of Report: 2021 10:37 AM CT  Items Contained in this Document: Critical Care [facility]    **Consult Information**  Member Facility: [address] [place] [address] MRN: [id number]  Visit/Encounter Number: [id number]  Consult ID: [id number]  Facility Time Zone: CT  Date and Time of Request: 2021 04:57 AM  CT  Requesting Clinician: Sue Lazo MD  Patient Name: Selvin Connell  Date of Birth:   Gender: Female  Patient identity was confirmed at the beginning of the consult with the   patient/family/staff using two personal identifiers: Patient name and       **Reason for Consult**  Reason for Consult: [facility]    **Admission**  Admission Date: 2021  Chief reason for ICU admission: COVID - 19  Secondary reasons for ICU admission: Respiratory Failure, Pneumonia    **Core Metrics**  General orienting sentence for patient: : 48 yo woman, morbid obesity,   sick since late November, COVID+ , declined monoclonal Ab, admitted   , tx to ICU . Now on decaron, olumiant, Luvox, Currently on high   flow nasal cannula 60L 100%. BIPAP being started. Very anxious. Apparently   unvaccinated. Refuses self-proning. Suggest Precedex for anxiety if ativan   ineffective. is on Precedex. ECHO pending today. high flow + NRB but less   labored by RN report. Bedrest. Continues to NOT   self-prone. Nighttime BIPAP be intubated. Still on High flow + NRB. Able to   get up to chair with SPO2   and maintain SPO2. No overnight BIPAP b/o discomfort. Prefers HHF &   NRB up to chair. CXR no improvement. No distress. Morbid obesity, BMI 54 HFNC   60 LPM at 60%. No changes. feels better.   [facility] physiologic   deterioration: Increase in FiO2 required by   30%  Is the patient on DVT prophylaxis?: Yes  Prophylaxis type: Pharmacological  DVT Prophylaxis Comments: Lovenox  Is the patient on GI prophylaxis?: Yes  Gi Prophylaxis Comments: Pepcid  Has this patient reached their nutritional goal?: Yes  Nutritional Goals Details: Improved appetite  Are there current issues with pain management in this patient?:   No  Are there issues with skin integrity?: No  Are there issues with delirium?: No  Has the patient been mobilized?: Yes  Is this patient currently intubated?: No  Are there ethical or care philosophy or family issues?: No  Do you recommend an in depth evaluation?: No  Disposition Recommendations: Continue ICU level of care    **Inserted/Removed Devices**  Device Name: Lamb Catheter  Site of insertion: Urethra  Date of insertion: 12-  Comment: recommend removal    **Physician Signature**  This document was electronically signed by:  Leeanne Kramer MD  12/20/2021   10:37 AM

## 2021-12-20 NOTE — PROGRESS NOTES
Physical Therapy  Name: Alexis Snow  MRN:  [id number]  Date of service:  12/20/2021 12/20/21 1527   Subjective   Subjective Attempt: RN requests therapy come back at later time, pt currently trying to have BM.          Electronically signed by Samantha Lopez PTA on 12/20/2021 at 3:28 PM

## 2021-12-21 LAB
ALBUMIN SERPL-MCNC: 3.6 G/DL (ref 3.5–5.2)
ALP BLD-CCNC: 106 U/L (ref 35–104)
ALT SERPL-CCNC: 45 U/L (ref 5–33)
ANION GAP SERPL CALCULATED.3IONS-SCNC: 10 MMOL/L (ref 7–19)
AST SERPL-CCNC: 28 U/L (ref 5–32)
BASOPHILS ABSOLUTE: 0 K/UL (ref 0–0.2)
BASOPHILS RELATIVE PERCENT: 0.1 % (ref 0–1)
BILIRUB SERPL-MCNC: 0.4 MG/DL (ref 0.2–1.2)
BUN BLDV-MCNC: 24 MG/DL (ref 6–20)
CALCIUM SERPL-MCNC: 9.6 MG/DL (ref 8.6–10)
CHLORIDE BLD-SCNC: 96 MMOL/L (ref 98–111)
CO2: 30 MMOL/L (ref 22–29)
CREAT SERPL-MCNC: 0.8 MG/DL (ref 0.5–0.9)
EOSINOPHILS ABSOLUTE: 0 K/UL (ref 0–0.6)
EOSINOPHILS RELATIVE PERCENT: 0 % (ref 0–5)
GFR AFRICAN AMERICAN: >59
GFR NON-AFRICAN AMERICAN: >60
GLUCOSE BLD-MCNC: 132 MG/DL (ref 70–99)
GLUCOSE BLD-MCNC: 145 MG/DL (ref 70–99)
GLUCOSE BLD-MCNC: 162 MG/DL (ref 74–109)
GLUCOSE BLD-MCNC: 218 MG/DL (ref 70–99)
GLUCOSE BLD-MCNC: 266 MG/DL (ref 70–99)
GLUCOSE BLD-MCNC: 296 MG/DL (ref 70–99)
HCT VFR BLD CALC: 46.8 % (ref 37–47)
HEMOGLOBIN: 14.9 G/DL (ref 12–16)
IMMATURE GRANULOCYTES #: 0.3 K/UL
LYMPHOCYTES ABSOLUTE: 1.1 K/UL (ref 1.1–4.5)
LYMPHOCYTES RELATIVE PERCENT: 7.7 % (ref 20–40)
MCH RBC QN AUTO: 30 PG (ref 27–31)
MCHC RBC AUTO-ENTMCNC: 31.8 G/DL (ref 33–37)
MCV RBC AUTO: 94.4 FL (ref 81–99)
MONOCYTES ABSOLUTE: 0.8 K/UL (ref 0–0.9)
MONOCYTES RELATIVE PERCENT: 5.6 % (ref 0–10)
NEUTROPHILS ABSOLUTE: 12.5 K/UL (ref 1.5–7.5)
NEUTROPHILS RELATIVE PERCENT: 84.6 % (ref 50–65)
PDW BLD-RTO: 12.4 % (ref 11.5–14.5)
PERFORMED ON: ABNORMAL
PLATELET # BLD: 796 K/UL (ref 130–400)
PMV BLD AUTO: 9.8 FL (ref 9.4–12.3)
POTASSIUM REFLEX MAGNESIUM: 5.5 MMOL/L (ref 3.5–5)
RBC # BLD: 4.96 M/UL (ref 4.2–5.4)
SODIUM BLD-SCNC: 136 MMOL/L (ref 136–145)
TOTAL PROTEIN: 6.6 G/DL (ref 6.6–8.7)
WBC # BLD: 14.7 K/UL (ref 4.8–10.8)

## 2021-12-21 PROCEDURE — 2580000003 HC RX 258: Performed by: NURSE PRACTITIONER

## 2021-12-21 PROCEDURE — 82947 ASSAY GLUCOSE BLOOD QUANT: CPT

## 2021-12-21 PROCEDURE — 6370000000 HC RX 637 (ALT 250 FOR IP): Performed by: NURSE PRACTITIONER

## 2021-12-21 PROCEDURE — 6360000002 HC RX W HCPCS: Performed by: HOSPITALIST

## 2021-12-21 PROCEDURE — 6370000000 HC RX 637 (ALT 250 FOR IP): Performed by: INTERNAL MEDICINE

## 2021-12-21 PROCEDURE — 97116 GAIT TRAINING THERAPY: CPT

## 2021-12-21 PROCEDURE — 36415 COLL VENOUS BLD VENIPUNCTURE: CPT

## 2021-12-21 PROCEDURE — 6360000002 HC RX W HCPCS: Performed by: STUDENT IN AN ORGANIZED HEALTH CARE EDUCATION/TRAINING PROGRAM

## 2021-12-21 PROCEDURE — 2500000003 HC RX 250 WO HCPCS: Performed by: STUDENT IN AN ORGANIZED HEALTH CARE EDUCATION/TRAINING PROGRAM

## 2021-12-21 PROCEDURE — 85025 COMPLETE CBC W/AUTO DIFF WBC: CPT

## 2021-12-21 PROCEDURE — 94660 CPAP INITIATION&MGMT: CPT

## 2021-12-21 PROCEDURE — 97530 THERAPEUTIC ACTIVITIES: CPT

## 2021-12-21 PROCEDURE — 2700000000 HC OXYGEN THERAPY PER DAY

## 2021-12-21 PROCEDURE — 6370000000 HC RX 637 (ALT 250 FOR IP): Performed by: HOSPITALIST

## 2021-12-21 PROCEDURE — 1210000000 HC MED SURG R&B

## 2021-12-21 PROCEDURE — 6370000000 HC RX 637 (ALT 250 FOR IP): Performed by: STUDENT IN AN ORGANIZED HEALTH CARE EDUCATION/TRAINING PROGRAM

## 2021-12-21 PROCEDURE — 80053 COMPREHEN METABOLIC PANEL: CPT

## 2021-12-21 PROCEDURE — 6360000002 HC RX W HCPCS: Performed by: INTERNAL MEDICINE

## 2021-12-21 PROCEDURE — 2580000003 HC RX 258: Performed by: INTERNAL MEDICINE

## 2021-12-21 RX ADMIN — TIMOLOL MALEATE 1 DROP: 5 SOLUTION OPHTHALMIC at 23:12

## 2021-12-21 RX ADMIN — INSULIN LISPRO 9 UNITS: 100 INJECTION, SOLUTION INTRAVENOUS; SUBCUTANEOUS at 22:57

## 2021-12-21 RX ADMIN — ENOXAPARIN SODIUM 40 MG: 100 INJECTION SUBCUTANEOUS at 22:49

## 2021-12-21 RX ADMIN — CETIRIZINE HYDROCHLORIDE 10 MG: 10 TABLET, FILM COATED ORAL at 08:41

## 2021-12-21 RX ADMIN — OXYCODONE HYDROCHLORIDE AND ACETAMINOPHEN 1000 MG: 500 TABLET ORAL at 08:41

## 2021-12-21 RX ADMIN — PROMETHAZINE HYDROCHLORIDE 6.25 MG: 25 INJECTION INTRAMUSCULAR; INTRAVENOUS at 23:37

## 2021-12-21 RX ADMIN — BARICITINIB 4 MG: 2 TABLET, FILM COATED ORAL at 22:50

## 2021-12-21 RX ADMIN — INSULIN LISPRO 6 UNITS: 100 INJECTION, SOLUTION INTRAVENOUS; SUBCUTANEOUS at 14:00

## 2021-12-21 RX ADMIN — INSULIN GLARGINE 20 UNITS: 100 INJECTION, SOLUTION SUBCUTANEOUS at 22:57

## 2021-12-21 RX ADMIN — Medication 10 MG: at 22:50

## 2021-12-21 RX ADMIN — SODIUM CHLORIDE, PRESERVATIVE FREE 10 ML: 5 INJECTION INTRAVENOUS at 08:43

## 2021-12-21 RX ADMIN — ACETAMINOPHEN 650 MG: 325 TABLET ORAL at 11:22

## 2021-12-21 RX ADMIN — ALBUTEROL SULFATE 2 PUFF: 90 AEROSOL, METERED RESPIRATORY (INHALATION) at 23:12

## 2021-12-21 RX ADMIN — DEXAMETHASONE SODIUM PHOSPHATE 12 MG: 10 INJECTION, SOLUTION INTRAMUSCULAR; INTRAVENOUS at 08:47

## 2021-12-21 RX ADMIN — SODIUM CHLORIDE, PRESERVATIVE FREE 10 ML: 5 INJECTION INTRAVENOUS at 22:51

## 2021-12-21 RX ADMIN — ACETAMINOPHEN 650 MG: 325 TABLET ORAL at 05:00

## 2021-12-21 RX ADMIN — BUDESONIDE AND FORMOTEROL FUMARATE DIHYDRATE 2 PUFF: 160; 4.5 AEROSOL RESPIRATORY (INHALATION) at 23:12

## 2021-12-21 RX ADMIN — HYDROCODONE BITARTRATE AND ACETAMINOPHEN 1 TABLET: 7.5; 325 TABLET ORAL at 22:52

## 2021-12-21 RX ADMIN — BUDESONIDE AND FORMOTEROL FUMARATE DIHYDRATE 2 PUFF: 160; 4.5 AEROSOL RESPIRATORY (INHALATION) at 08:44

## 2021-12-21 RX ADMIN — ALBUTEROL SULFATE 2 PUFF: 90 AEROSOL, METERED RESPIRATORY (INHALATION) at 14:01

## 2021-12-21 RX ADMIN — FAMOTIDINE 40 MG: 10 INJECTION, SOLUTION INTRAVENOUS at 08:40

## 2021-12-21 RX ADMIN — GUAIFENESIN 600 MG: 600 TABLET, EXTENDED RELEASE ORAL at 22:50

## 2021-12-21 RX ADMIN — ZINC SULFATE 220 MG (50 MG) CAPSULE 50 MG: CAPSULE at 08:42

## 2021-12-21 RX ADMIN — ACETAMINOPHEN 650 MG: 325 TABLET ORAL at 15:59

## 2021-12-21 RX ADMIN — PROMETHAZINE HYDROCHLORIDE 6.25 MG: 25 INJECTION INTRAMUSCULAR; INTRAVENOUS at 02:23

## 2021-12-21 RX ADMIN — Medication 1 TABLET: at 08:42

## 2021-12-21 RX ADMIN — ALPRAZOLAM 1 MG: 0.5 TABLET ORAL at 22:51

## 2021-12-21 RX ADMIN — LISINOPRIL 10 MG: 10 TABLET ORAL at 08:42

## 2021-12-21 RX ADMIN — ALBUTEROL SULFATE 2 PUFF: 90 AEROSOL, METERED RESPIRATORY (INHALATION) at 08:43

## 2021-12-21 RX ADMIN — GUAIFENESIN 600 MG: 600 TABLET, EXTENDED RELEASE ORAL at 08:42

## 2021-12-21 RX ADMIN — SODIUM ZIRCONIUM CYCLOSILICATE 5 G: 5 POWDER, FOR SUSPENSION ORAL at 08:40

## 2021-12-21 RX ADMIN — PRAVASTATIN SODIUM 40 MG: 20 TABLET ORAL at 08:40

## 2021-12-21 RX ADMIN — ENOXAPARIN SODIUM 40 MG: 100 INJECTION SUBCUTANEOUS at 08:40

## 2021-12-21 RX ADMIN — TIMOLOL MALEATE 1 DROP: 5 SOLUTION OPHTHALMIC at 08:43

## 2021-12-21 RX ADMIN — LISINOPRIL 10 MG: 10 TABLET ORAL at 22:49

## 2021-12-21 RX ADMIN — INSULIN LISPRO 3 UNITS: 100 INJECTION, SOLUTION INTRAVENOUS; SUBCUTANEOUS at 02:23

## 2021-12-21 RX ADMIN — ALBUTEROL SULFATE 2 PUFF: 90 AEROSOL, METERED RESPIRATORY (INHALATION) at 17:15

## 2021-12-21 RX ADMIN — HYDROCODONE BITARTRATE AND ACETAMINOPHEN 1 TABLET: 7.5; 325 TABLET ORAL at 01:50

## 2021-12-21 RX ADMIN — Medication 2000 UNITS: at 08:42

## 2021-12-21 RX ADMIN — LEVOTHYROXINE SODIUM 100 MCG: 100 TABLET ORAL at 05:43

## 2021-12-21 RX ADMIN — FLUVOXAMINE MALEATE 50 MG: 50 TABLET, COATED ORAL at 08:40

## 2021-12-21 RX ADMIN — SODIUM ZIRCONIUM CYCLOSILICATE 5 G: 5 POWDER, FOR SUSPENSION ORAL at 22:56

## 2021-12-21 RX ADMIN — FLUVOXAMINE MALEATE 50 MG: 50 TABLET, COATED ORAL at 22:50

## 2021-12-21 ASSESSMENT — PAIN DESCRIPTION - DESCRIPTORS
DESCRIPTORS: ACHING
DESCRIPTORS: ACHING

## 2021-12-21 ASSESSMENT — ENCOUNTER SYMPTOMS
VOMITING: 0
VOICE CHANGE: 0
BACK PAIN: 0
NAUSEA: 0
RHINORRHEA: 0
CONSTIPATION: 0
SHORTNESS OF BREATH: 1
COUGH: 1
DIARRHEA: 0
COLOR CHANGE: 0

## 2021-12-21 ASSESSMENT — PAIN - FUNCTIONAL ASSESSMENT: PAIN_FUNCTIONAL_ASSESSMENT: PREVENTS OR INTERFERES SOME ACTIVE ACTIVITIES AND ADLS

## 2021-12-21 ASSESSMENT — PAIN DESCRIPTION - PAIN TYPE
TYPE: ACUTE PAIN
TYPE: CHRONIC PAIN

## 2021-12-21 ASSESSMENT — PAIN DESCRIPTION - ONSET
ONSET: ON-GOING
ONSET: ON-GOING

## 2021-12-21 ASSESSMENT — PAIN DESCRIPTION - FREQUENCY
FREQUENCY: CONTINUOUS
FREQUENCY: CONTINUOUS

## 2021-12-21 ASSESSMENT — PAIN SCALES - GENERAL
PAINLEVEL_OUTOF10: 0
PAINLEVEL_OUTOF10: 1
PAINLEVEL_OUTOF10: 9
PAINLEVEL_OUTOF10: 0
PAINLEVEL_OUTOF10: 8
PAINLEVEL_OUTOF10: 0
PAINLEVEL_OUTOF10: 4
PAINLEVEL_OUTOF10: 3
PAINLEVEL_OUTOF10: 7
PAINLEVEL_OUTOF10: 0

## 2021-12-21 ASSESSMENT — PAIN DESCRIPTION - LOCATION
LOCATION: HEAD
LOCATION: BACK;KNEE;LEG

## 2021-12-21 ASSESSMENT — PAIN DESCRIPTION - ORIENTATION: ORIENTATION: LOWER

## 2021-12-21 ASSESSMENT — PAIN DESCRIPTION - PROGRESSION: CLINICAL_PROGRESSION: NOT CHANGED

## 2021-12-21 NOTE — PLAN OF CARE

## 2021-12-22 LAB
ALBUMIN SERPL-MCNC: 3.6 G/DL (ref 3.5–5.2)
ALP BLD-CCNC: 108 U/L (ref 35–104)
ALT SERPL-CCNC: 39 U/L (ref 5–33)
ANION GAP SERPL CALCULATED.3IONS-SCNC: 13 MMOL/L (ref 7–19)
AST SERPL-CCNC: 18 U/L (ref 5–32)
BASOPHILS ABSOLUTE: 0 K/UL (ref 0–0.2)
BASOPHILS RELATIVE PERCENT: 0.2 % (ref 0–1)
BILIRUB SERPL-MCNC: 0.3 MG/DL (ref 0.2–1.2)
BUN BLDV-MCNC: 35 MG/DL (ref 6–20)
CALCIUM SERPL-MCNC: 10.1 MG/DL (ref 8.6–10)
CHLORIDE BLD-SCNC: 97 MMOL/L (ref 98–111)
CO2: 26 MMOL/L (ref 22–29)
CREAT SERPL-MCNC: 0.8 MG/DL (ref 0.5–0.9)
EOSINOPHILS ABSOLUTE: 0 K/UL (ref 0–0.6)
EOSINOPHILS RELATIVE PERCENT: 0.1 % (ref 0–5)
GFR AFRICAN AMERICAN: >59
GFR NON-AFRICAN AMERICAN: >60
GLUCOSE BLD-MCNC: 170 MG/DL (ref 70–99)
GLUCOSE BLD-MCNC: 177 MG/DL (ref 70–99)
GLUCOSE BLD-MCNC: 201 MG/DL (ref 70–99)
GLUCOSE BLD-MCNC: 266 MG/DL (ref 74–109)
GLUCOSE BLD-MCNC: 333 MG/DL (ref 70–99)
HCT VFR BLD CALC: 46.5 % (ref 37–47)
HEMOGLOBIN: 14.4 G/DL (ref 12–16)
IMMATURE GRANULOCYTES #: 0.3 K/UL
LYMPHOCYTES ABSOLUTE: 1.3 K/UL (ref 1.1–4.5)
LYMPHOCYTES RELATIVE PERCENT: 7.4 % (ref 20–40)
MCH RBC QN AUTO: 29.4 PG (ref 27–31)
MCHC RBC AUTO-ENTMCNC: 31 G/DL (ref 33–37)
MCV RBC AUTO: 95.1 FL (ref 81–99)
MONOCYTES ABSOLUTE: 1.3 K/UL (ref 0–0.9)
MONOCYTES RELATIVE PERCENT: 7 % (ref 0–10)
NEUTROPHILS ABSOLUTE: 14.8 K/UL (ref 1.5–7.5)
NEUTROPHILS RELATIVE PERCENT: 83.6 % (ref 50–65)
PDW BLD-RTO: 12.4 % (ref 11.5–14.5)
PERFORMED ON: ABNORMAL
PLATELET # BLD: 767 K/UL (ref 130–400)
PMV BLD AUTO: 10.1 FL (ref 9.4–12.3)
POTASSIUM REFLEX MAGNESIUM: 5.9 MMOL/L (ref 3.5–5)
RBC # BLD: 4.89 M/UL (ref 4.2–5.4)
SODIUM BLD-SCNC: 136 MMOL/L (ref 136–145)
TOTAL PROTEIN: 6.2 G/DL (ref 6.6–8.7)
WBC # BLD: 17.7 K/UL (ref 4.8–10.8)

## 2021-12-22 PROCEDURE — 6370000000 HC RX 637 (ALT 250 FOR IP): Performed by: NURSE PRACTITIONER

## 2021-12-22 PROCEDURE — 6370000000 HC RX 637 (ALT 250 FOR IP): Performed by: INTERNAL MEDICINE

## 2021-12-22 PROCEDURE — 1210000000 HC MED SURG R&B

## 2021-12-22 PROCEDURE — 80053 COMPREHEN METABOLIC PANEL: CPT

## 2021-12-22 PROCEDURE — 2700000000 HC OXYGEN THERAPY PER DAY

## 2021-12-22 PROCEDURE — 6360000002 HC RX W HCPCS: Performed by: HOSPITALIST

## 2021-12-22 PROCEDURE — 6360000002 HC RX W HCPCS: Performed by: STUDENT IN AN ORGANIZED HEALTH CARE EDUCATION/TRAINING PROGRAM

## 2021-12-22 PROCEDURE — 6370000000 HC RX 637 (ALT 250 FOR IP): Performed by: HOSPITALIST

## 2021-12-22 PROCEDURE — 85025 COMPLETE CBC W/AUTO DIFF WBC: CPT

## 2021-12-22 PROCEDURE — 2580000003 HC RX 258: Performed by: NURSE PRACTITIONER

## 2021-12-22 PROCEDURE — 94761 N-INVAS EAR/PLS OXIMETRY MLT: CPT

## 2021-12-22 PROCEDURE — 2500000003 HC RX 250 WO HCPCS: Performed by: STUDENT IN AN ORGANIZED HEALTH CARE EDUCATION/TRAINING PROGRAM

## 2021-12-22 PROCEDURE — 82947 ASSAY GLUCOSE BLOOD QUANT: CPT

## 2021-12-22 PROCEDURE — 36415 COLL VENOUS BLD VENIPUNCTURE: CPT

## 2021-12-22 PROCEDURE — 6370000000 HC RX 637 (ALT 250 FOR IP): Performed by: STUDENT IN AN ORGANIZED HEALTH CARE EDUCATION/TRAINING PROGRAM

## 2021-12-22 PROCEDURE — 2500000003 HC RX 250 WO HCPCS: Performed by: HOSPITALIST

## 2021-12-22 RX ORDER — INSULIN GLARGINE 100 [IU]/ML
20 INJECTION, SOLUTION SUBCUTANEOUS 2 TIMES DAILY
Status: DISCONTINUED | OUTPATIENT
Start: 2021-12-22 | End: 2022-01-04 | Stop reason: HOSPADM

## 2021-12-22 RX ORDER — ALPRAZOLAM 0.5 MG/1
0.5 TABLET ORAL NIGHTLY PRN
Status: DISCONTINUED | OUTPATIENT
Start: 2021-12-22 | End: 2021-12-22

## 2021-12-22 RX ORDER — ALPRAZOLAM 0.5 MG/1
0.5 TABLET ORAL EVERY 6 HOURS PRN
Status: DISCONTINUED | OUTPATIENT
Start: 2021-12-22 | End: 2022-01-04 | Stop reason: HOSPADM

## 2021-12-22 RX ORDER — BUMETANIDE 0.25 MG/ML
0.5 INJECTION, SOLUTION INTRAMUSCULAR; INTRAVENOUS ONCE
Status: COMPLETED | OUTPATIENT
Start: 2021-12-22 | End: 2021-12-22

## 2021-12-22 RX ORDER — SODIUM POLYSTYRENE SULFONATE 15 G/60ML
15 SUSPENSION ORAL; RECTAL ONCE
Status: COMPLETED | OUTPATIENT
Start: 2021-12-22 | End: 2021-12-22

## 2021-12-22 RX ADMIN — ZINC SULFATE 220 MG (50 MG) CAPSULE 50 MG: CAPSULE at 10:33

## 2021-12-22 RX ADMIN — ALPRAZOLAM 0.5 MG: 0.5 TABLET ORAL at 05:32

## 2021-12-22 RX ADMIN — Medication 2000 UNITS: at 10:34

## 2021-12-22 RX ADMIN — ALBUTEROL SULFATE 2 PUFF: 90 AEROSOL, METERED RESPIRATORY (INHALATION) at 22:30

## 2021-12-22 RX ADMIN — ALPRAZOLAM 0.5 MG: 0.5 TABLET ORAL at 22:28

## 2021-12-22 RX ADMIN — SODIUM CHLORIDE, PRESERVATIVE FREE 10 ML: 5 INJECTION INTRAVENOUS at 22:29

## 2021-12-22 RX ADMIN — ENOXAPARIN SODIUM 40 MG: 100 INJECTION SUBCUTANEOUS at 22:28

## 2021-12-22 RX ADMIN — GUAIFENESIN 600 MG: 600 TABLET, EXTENDED RELEASE ORAL at 22:28

## 2021-12-22 RX ADMIN — INSULIN GLARGINE 20 UNITS: 100 INJECTION, SOLUTION SUBCUTANEOUS at 22:36

## 2021-12-22 RX ADMIN — ALBUTEROL SULFATE 2 PUFF: 90 AEROSOL, METERED RESPIRATORY (INHALATION) at 14:05

## 2021-12-22 RX ADMIN — SODIUM ZIRCONIUM CYCLOSILICATE 10 G: 10 POWDER, FOR SUSPENSION ORAL at 15:57

## 2021-12-22 RX ADMIN — FAMOTIDINE 40 MG: 10 INJECTION, SOLUTION INTRAVENOUS at 10:35

## 2021-12-22 RX ADMIN — BARICITINIB 4 MG: 2 TABLET, FILM COATED ORAL at 22:28

## 2021-12-22 RX ADMIN — INSULIN LISPRO 3 UNITS: 100 INJECTION, SOLUTION INTRAVENOUS; SUBCUTANEOUS at 22:36

## 2021-12-22 RX ADMIN — SODIUM CHLORIDE, PRESERVATIVE FREE 10 ML: 5 INJECTION INTRAVENOUS at 10:36

## 2021-12-22 RX ADMIN — OXYCODONE HYDROCHLORIDE AND ACETAMINOPHEN 1000 MG: 500 TABLET ORAL at 10:34

## 2021-12-22 RX ADMIN — HYDROCODONE BITARTRATE AND ACETAMINOPHEN 1 TABLET: 7.5; 325 TABLET ORAL at 22:28

## 2021-12-22 RX ADMIN — FLUVOXAMINE MALEATE 50 MG: 50 TABLET, COATED ORAL at 22:28

## 2021-12-22 RX ADMIN — SODIUM POLYSTYRENE SULFONATE 15 G: 15 SUSPENSION ORAL; RECTAL at 05:28

## 2021-12-22 RX ADMIN — GUAIFENESIN 600 MG: 600 TABLET, EXTENDED RELEASE ORAL at 10:34

## 2021-12-22 RX ADMIN — BUDESONIDE AND FORMOTEROL FUMARATE DIHYDRATE 2 PUFF: 160; 4.5 AEROSOL RESPIRATORY (INHALATION) at 22:29

## 2021-12-22 RX ADMIN — CETIRIZINE HYDROCHLORIDE 10 MG: 10 TABLET, FILM COATED ORAL at 10:34

## 2021-12-22 RX ADMIN — PROMETHAZINE HYDROCHLORIDE 6.25 MG: 25 INJECTION INTRAMUSCULAR; INTRAVENOUS at 23:59

## 2021-12-22 RX ADMIN — BUMETANIDE 0.5 MG: 0.25 INJECTION INTRAMUSCULAR; INTRAVENOUS at 05:28

## 2021-12-22 RX ADMIN — PRAVASTATIN SODIUM 40 MG: 20 TABLET ORAL at 10:34

## 2021-12-22 RX ADMIN — HYDROCODONE BITARTRATE AND ACETAMINOPHEN 1 TABLET: 7.5; 325 TABLET ORAL at 14:04

## 2021-12-22 RX ADMIN — LEVOTHYROXINE SODIUM 100 MCG: 100 TABLET ORAL at 05:27

## 2021-12-22 RX ADMIN — TIMOLOL MALEATE 1 DROP: 5 SOLUTION OPHTHALMIC at 22:30

## 2021-12-22 RX ADMIN — BENZONATATE 100 MG: 100 CAPSULE ORAL at 14:15

## 2021-12-22 RX ADMIN — ENOXAPARIN SODIUM 40 MG: 100 INJECTION SUBCUTANEOUS at 10:34

## 2021-12-22 RX ADMIN — HYDROCODONE BITARTRATE AND ACETAMINOPHEN 1 TABLET: 7.5; 325 TABLET ORAL at 05:42

## 2021-12-22 RX ADMIN — Medication 10 MG: at 22:28

## 2021-12-22 RX ADMIN — Medication 1 TABLET: at 10:33

## 2021-12-22 RX ADMIN — FLUVOXAMINE MALEATE 50 MG: 50 TABLET, COATED ORAL at 10:34

## 2021-12-22 ASSESSMENT — PAIN SCALES - GENERAL
PAINLEVEL_OUTOF10: 9
PAINLEVEL_OUTOF10: 9
PAINLEVEL_OUTOF10: 8

## 2021-12-22 ASSESSMENT — ENCOUNTER SYMPTOMS
SHORTNESS OF BREATH: 1
COLOR CHANGE: 0
VOICE CHANGE: 0
COUGH: 1
RHINORRHEA: 0
BACK PAIN: 0
VOMITING: 0
NAUSEA: 0
CONSTIPATION: 0
DIARRHEA: 0

## 2021-12-22 ASSESSMENT — PAIN DESCRIPTION - ONSET: ONSET: ON-GOING

## 2021-12-22 ASSESSMENT — PAIN DESCRIPTION - PAIN TYPE
TYPE: CHRONIC PAIN
TYPE: CHRONIC PAIN

## 2021-12-22 ASSESSMENT — PAIN DESCRIPTION - LOCATION
LOCATION: BACK;KNEE;LEG
LOCATION: BACK

## 2021-12-22 ASSESSMENT — PAIN DESCRIPTION - FREQUENCY: FREQUENCY: CONTINUOUS

## 2021-12-22 ASSESSMENT — PAIN DESCRIPTION - DESCRIPTORS: DESCRIPTORS: ACHING

## 2021-12-22 NOTE — PROGRESS NOTES
12/22/21 1711   Subjective   Subjective I don't feel like doing anything. I am nauseated  and I have had diarhea .   I have been up to the  [facility] about 5 times   Physical Therapy    Electronically signed by Selvin Brock PTA on 12/22/2021 at 5:16 PM

## 2021-12-22 NOTE — PROGRESS NOTES
Hospitalist Progress Note    Patient:  Nilam Siegel  YOB: 1965  Date of Service: 12/22/2021  MRN: [id number]   Acct: [de-identified]   Primary Care Physician: Carla Hauser MD  Advance Directive: [facility]  Admit Date: 12/9/2021       Hospital Day: 15  Referring Provider: Wendy Singleton DO    Patient Seen, Chart, Consults, Notes, Labs, Radiology studies reviewed. Subjective:  Nilam Siegel is a 49 y.o. female  whom we are following for COVID-19 pneumonia, hypoxemic respiratory failure. She continues to improve. Her O2 demand is decreasing. She is now on nasal cannula with high flow. We may be able to try to get her home by the end of the week.     Allergies:  Latex, Penicillins, Codeine, and Tape [adhesive tape]    Medicines:  Current Facility-Administered Medications   Medication Dose Route Frequency Provider Last Rate Last Admin    ALPRAZolam [place]) tablet 0.5 mg  0.5 mg Oral Q6H PRN Rosamaria Newby MD   0.5 mg at 12/22/21 0532    sodium zirconium cyclosilicate (LOKELMA) oral suspension 10 g  10 g Oral TID Wendy Half, DO   10 g at 12/22/21 1557    promethazine (PHENERGAN) injection 6.25 mg  6.25 mg IntraMUSCular Q6H PRN Rosamarai Newby MD   6.25 mg at 12/21/21 2337    benzonatate (TESSALON) capsule 100 mg  100 mg Oral TID PRN Wendy Half, DO   100 mg at 12/22/21 1415    guaiFENesin [facility]) extended release tablet 600 mg  600 mg Oral BID Wendy Half, DO   600 mg at 12/22/21 1034    opium-belladonna (B&O SUPPRETTES) 16.2-60 MG suppository 60 mg  60 mg Rectal Q8H PRN Wendy Half, DO        dextromethorphan [facility]) 30 MG/5ML extended release liquid 30 mg  30 mg Oral BID PRN Wendy Half, DO   30 mg at 12/20/21 2053    insulin glargine (LANTUS) injection vial 20 Units  20 Units SubCUTAneous Nightly Che Salinas MD   20 Units at 12/21/21 7981    insulin lispro (HUMALOG) injection vial 0-18 Units  0-18 Units SubCUTAneous Q6H Finesse Perkins MD   9 Units at 12/21/21 2257    melatonin disintegrating tablet 10 mg  10 mg Oral Nightly Finesse Perkins MD   10 mg at 12/21/21 2250    Benzocaine-Menthol (CEPACOL) 1 lozenge  1 lozenge Oral Q2H PRN Finesse Perkins MD   1 lozenge at 12/20/21 0344    sodium chloride (OCEAN, BABY AYR) 0.65 % nasal spray 1 spray  1 spray Each Nostril PRN Finesse Perkins MD   1 spray at 12/13/21 1727    dexmedetomidine (PRECEDEX) 400 mcg in sodium chloride 0.9 % 100 mL infusion  0.1-1.4 mcg/kg/hr IntraVENous Continuous Finesse Perkins MD   Stopped at 12/20/21 0332    enoxaparin (LOVENOX) injection 40 mg  40 mg SubCUTAneous BID Finesse Perkins MD   40 mg at 12/22/21 1034    budesonide-formoterol (SYMBICORT) 160-4.5 MCG/ACT inhaler 2 puff  2 puff Inhalation BID Finesse Perkins MD   2 puff at 12/21/21 2312    fluvoxaMINE (LUVOX) tablet 50 mg  50 mg Oral BID Finesse Perkins MD   50 mg at 12/22/21 1034    famotidine (PEPCID) injection 40 mg  40 mg IntraVENous Daily Finesse Perkins MD   40 mg at 12/22/21 1035    Vitamin D (CHOLECALCIFEROL) tablet 2,000 Units  2,000 Units Oral Daily Finesse Perkins MD   2,000 Units at 12/22/21 1034    LORazepam (ATIVAN) injection 0.5 mg  0.5 mg IntraVENous Q6H PRN Finesse Perkins MD   0.5 mg at 12/16/21 1118    albuterol sulfate  (90 Base) MCG/ACT inhaler 2 puff  2 puff Inhalation 4x Daily Speedy Failing, APRN   2 puff at 12/22/21 1405    calcium-cholecalciferol 500-200 MG-UNIT per tablet 1 tablet  1 tablet Oral Daily Speedy Failing, APRN   1 tablet at 12/22/21 1033    HYDROcodone-acetaminophen ([address]) 7.5-325 MG per tablet 1 tablet  1 tablet Oral Q6H PRN Speedy Failing, APRN   1 tablet at 12/22/21 1404    levothyroxine (SYNTHROID) tablet 100 mcg  100 mcg Oral Daily Speedy Failing, APRN   100 mcg at 12/22/21 3187    ascorbic acid (VITAMIN C) tablet 1,000 mg  1,000 mg Oral Daily Speedy Failing, APRN   1,000 mg at 12/22/21 1034  timolol (TIMOPTIC) 0.5 % ophthalmic solution 1 drop  1 drop Both Eyes BID Madalyn Olive, APRN   1 drop at 12/21/21 2312    pravastatin (PRAVACHOL) tablet 40 mg  40 mg Oral Daily Madalyn Olive, APRN   40 mg at 12/22/21 1034    polyethylene glycol (GLYCOLAX) packet 17 g  17 g Oral Daily Madalyn Olive, APRN   17 g at 12/20/21 0749    ondansetron (ZOFRAN) tablet 8 mg  8 mg Oral Q8H PRN Madalyn Olive, APRN   8 mg at 12/10/21 1751    meclizine (ANTIVERT) tablet 25 mg  25 mg Oral TID PRN Madalyn Olive, APRN   25 mg at 12/10/21 0301    cetirizine (ZYRTEC) tablet 10 mg  10 mg Oral Daily Madalyn Olive, APRN   10 mg at 12/22/21 1034    sodium chloride flush 0.9 % injection 5-40 mL  5-40 mL IntraVENous 2 times per day Madalyn Olive, APRN   10 mL at 12/22/21 1036    sodium chloride flush 0.9 % injection 5-40 mL  5-40 mL IntraVENous PRN Madalyn Olive, APRN   10 mL at 12/15/21 1155    0.9 % sodium chloride infusion  25 mL IntraVENous PRN Madalyn Olive, APRN   Stopped at 12/11/21 2130    polyethylene glycol (GLYCOLAX) packet 17 g  17 g Oral Daily PRN Madalyn Olive, APRN   17 g at 12/17/21 2112    acetaminophen (TYLENOL) tablet 650 mg  650 mg Oral Q6H PRN Madalyn Olive, APRN   650 mg at 12/21/21 1559    Or    acetaminophen (TYLENOL) suppository 650 mg  650 mg Rectal Q6H PRN Madalyn Olive, APRN        zinc sulfate (ZINCATE) capsule 50 mg  50 mg Oral Daily Madalyn Olive, APRN   50 mg at 12/22/21 1033    glucose (GLUTOSE) 40 % oral gel 15 g  15 g Oral PRN Madalyn Olive, APRN        dextrose 50 % IV solution  12.5 g IntraVENous PRN Madalyn Olive, APRN        glucagon (rDNA) injection 1 mg  1 mg IntraMUSCular PRN Madalyn Olive, APRN        dextrose 5 % solution  100 mL/hr IntraVENous PRN Madalyn Olive, APRN        baricitinib Norm Font) tablet 4 mg  4 mg Oral Daily Madalyn Olive, APRN   4 mg at 12/21/21 4118       Past Medical History:  Past Medical History:   Diagnosis Date    Anemia     Asthma     Bronchitis, acute     COPD (chronic obstructive pulmonary disease) (HCC)     Diabetes [facility] [address]     Glaucoma     Hyperlipidemia     Hypertension     Hyperthyroidism     Morbid obesity ([facility] [address] [facility]     Sleep apnea     uses c-pap       Past Surgical History:  Past Surgical History:   Procedure Laterality Date    AXILLARY SURGERY Bilateral     excision and drainage of staph abscesses    BACK SURGERY  10/2004    Dr. Janice [place], [place] L4, L5    CARPAL TUNNEL RELEASE Right     CHOLECYSTECTOMY      COLONOSCOPY  03/20/2007    Dr Marta Tineo N/A 9/11/2020    Dr Christina Schuster yr recall    DILATION AND CURETTAGE OF UTERUS N/A 1/29/2021    DILATATION AND CURETTAGE HYSTEROSCOPY NOVASURE ABLATION performed by Jasvir Pruett MD at [address] COLONOSCOPY FLX DX W/COLLJ [address] WHEN PFRMD N/A 2/8/2017    Dr Chad Mackenzie, actively inflamed internal hemorrhoids, residulal liquid and some solid food particles in the colon-5 yr recall due to prep    STOMACH SURGERY  01/20/2014    Dr. Irma Colorado ( gastric sleeve)    UPPER GASTROINTESTINAL ENDOSCOPY  10/30/2012    Dr Kamlesh Brandt esophagitis, gastritis, [place]iana (-)    UPPER GASTROINTESTINAL ENDOSCOPY  01/23/2008    Dr Radha Santos esophagitis    UPPER GASTROINTESTINAL ENDOSCOPY N/A 9/11/2020    Dr Fabi Jaime       Family History  Family History   Problem Relation Age of Onset    Lung Cancer Mother     Kidney Cancer Mother     Lung Cancer Maternal Grandmother     Colon Cancer Maternal Grandfather     Liver Cancer Neg Hx     Liver Disease Neg Hx     Stomach Cancer Neg Hx     Rectal Cancer Neg Hx     Esophageal Cancer Neg Hx     Colon Polyps Neg Hx        Social History  Social History     Socioeconomic History    Marital status:      Spouse name: Not on file    Number of children: Not on file    Years of education: Not on file    Highest education level: Not on file   Occupational History  Not on file   Tobacco Use    Smoking status: Former Smoker     Packs/day: 3.00     Years: 26.00     Pack years: 78.00     Types: Cigarettes     Quit date: 5/6/2011     Years since quitting: 10.6    Smokeless tobacco: Never Used   Vaping Use    Vaping Use: Never used   Substance and Sexual Activity    Alcohol use: No    Drug use: No    Sexual activity: Yes     Partners: Male   Other Topics Concern    Not on file   Social History Narrative    Not on file     Social Determinants of Health     Financial Resource Strain:     Difficulty of Paying Living Expenses: Not on file   Food Insecurity:     Worried About Running Out of Food in the Last Year: Not on file    Tiffanie of Food in the Last Year: Not on file   Transportation Needs:     Lack of Transportation (Medical): Not on file    Lack of Transportation (Non-Medical): Not on file   Physical Activity:     Days of Exercise per Week: Not on file    Minutes of Exercise per Session: Not on file   Stress:     Feeling of Stress : Not on file   Social Connections:     Frequency of Communication with Friends and Family: Not on file    Frequency of Social Gatherings with Friends and Family: Not on file    Attends Anab[employer]ist Services: Not on file    Active Member of [address] [place] [address] or Organizations: Not on file    Attends Club or Organization Meetings: Not on file    Marital Status: Not on file   Intimate Partner Violence:     Fear of Current or Ex-Partner: Not on file    Emotionally Abused: Not on file    Physically Abused: Not on file    Sexually Abused: Not on file   Housing Stability:     Unable to Pay for Housing in the Last Year: Not on file    Number of [place] in the Last Year: Not on file    Unstable Housing in the Last Year: Not on file         Review of Systems:    Review of Systems   Constitutional: Negative for activity change and fatigue. HENT: Negative for rhinorrhea and voice change. Eyes: Negative for visual disturbance. Respiratory: Positive for cough and shortness of breath. Cardiovascular: Negative for chest pain and leg swelling. Gastrointestinal: Negative for constipation, diarrhea, nausea and vomiting. Endocrine: Negative for polyuria. Genitourinary: Negative for difficulty urinating and dysuria. Musculoskeletal: Positive for gait problem. Negative for arthralgias and back pain. Skin: Negative for color change. Allergic/Immunologic: Negative for immunocompromised state. Neurological: Positive for weakness. Negative for dizziness and headaches. Psychiatric/Behavioral: Negative for confusion. Objective:  Blood pressure 116/63, pulse 81, temperature 96.9 °F (36.1 °C), temperature source Temporal, resp. rate 20, height 5' 5\" (1.651 m), weight (!) 330 lb (149.7 kg), SpO2 91 %, not currently breastfeeding. Intake/Output Summary (Last 24 hours) at 12/22/2021 1639  Last data filed at 12/22/2021 1511  Gross per 24 hour   Intake 710 ml   Output 300 ml   Net 410 ml       Physical Exam  Vitals and nursing note reviewed. HENT:      Head: Normocephalic and atraumatic. Right Ear: External ear normal.      Left Ear: External ear normal.      Nose: Nose normal.      Mouth/Throat:      Mouth: Mucous membranes are moist.   Eyes:      Conjunctiva/sclera: Conjunctivae normal.      Pupils: Pupils are equal, round, and reactive to light. Cardiovascular:      Rate and Rhythm: Normal rate and regular rhythm. Heart sounds: Normal heart sounds. Pulmonary:      Effort: Pulmonary effort is normal.      Breath sounds: Rhonchi present. Abdominal:      General: Abdomen is flat. Palpations: Abdomen is soft. Musculoskeletal:         General: Normal range of motion. Cervical back: Neck supple. No rigidity. No muscular tenderness. Skin:     General: Skin is warm and dry. Neurological:      Mental Status: She is alert and oriented to person, place, and time.    Psychiatric:         Mood and COMPARISON: None available. FINDINGS: Cardiac silhouette is normal in size. No pleural effusion or visible pneumothorax. Patchy bilateral airspace opacity is present. Central vascular congestion is noted. No acute osseous finding. Bilateral patchy airspace opacity. Central vascular congestion. Differential includes pneumonia and edema. Signed by Dr Khushbu Rae     Acute respiratory distress syndrome (ARDS) due to COVID-19 virus. Continue ongoing medical management.     Acute hypoxemic respiratory failure due to COVID-19.   Continue supportive care.       Ravindra Griffin DO

## 2021-12-22 NOTE — PROGRESS NOTES
Hospitalist Progress Note    Patient:  Coleman Brothers  YOB: 1965  Date of Service: 12/21/2021  MRN: [id number]   Acct: [de-identified]   Primary Care Physician: Albaro Brewer MD  Advance Directive: [facility]  Admit Date: 12/9/2021       Hospital Day: 12  Referring Provider: Shayna Argueta DO    Patient Seen, Chart, Consults, Notes, Labs, Radiology studies reviewed. Subjective:  Coleman Brothers is a 49 y.o. female  whom we are following for COVID-19 pneumonia, hypoxemic respiratory failure. She continues to improve. O2 requirements are decreasing. I feel she is stable for transfer to fourth floor.     Allergies:  Latex, Penicillins, Codeine, and Tape [adhesive tape]    Medicines:  Current Facility-Administered Medications   Medication Dose Route Frequency Provider Last Rate Last Admin    sodium zirconium cyclosilicate (LOKELMA) oral suspension 5 g  5 g Oral TID Sonia Masters MD   5 g at 12/21/21 0840    promethazine (PHENERGAN) injection 6.25 mg  6.25 mg IntraMUSCular Q6H PRN Sonia Masters MD   6.25 mg at 12/21/21 0223    lisinopril (PRINIVIL;ZESTRIL) tablet 10 mg  10 mg Oral BID Shayna Leaks, DO   10 mg at 12/21/21 8914    benzonatate (TESSALON) capsule 100 mg  100 mg Oral TID PRN Shayna Leaks, DO   100 mg at 12/19/21 1756    guaiFENesin (MUCINEX) extended release tablet 600 mg  600 mg Oral BID Shayna Leaks, DO   600 mg at 12/21/21 1020    opium-belladonna (B&O SUPPRETTES) 16.2-60 MG suppository 60 mg  60 mg Rectal Q8H PRN Shayna Leaks, DO        dextromethorphan [facility]) 30 MG/5ML extended release liquid 30 mg  30 mg Oral BID PRN Shayna Leaks, DO   30 mg at 12/20/21 2053    insulin glargine (LANTUS) injection vial 20 Units  20 Units SubCUTAneous Nightly Ceci Wang MD   20 Units at 12/20/21 1951    insulin lispro (HUMALOG) injection vial 0-18 Units  0-18 Units SubCUTAneous Q6H Ceci Wang MD   6 Units at 12/21/21 1400    melatonin disintegrating tablet 10 mg  10 mg Oral Nightly Kristina Steiner MD   10 mg at 12/20/21 1953    Benzocaine-Menthol (CEPACOL) 1 lozenge  1 lozenge Oral Q2H PRN Kristina Steiner MD   1 lozenge at 12/20/21 0344    sodium chloride (OCEAN, BABY AYR) 0.65 % nasal spray 1 spray  1 spray Each Nostril PRN Kristina Steiner MD   1 spray at 12/13/21 1727    dexmedetomidine (PRECEDEX) 400 mcg in sodium chloride 0.9 % 100 mL infusion  0.1-1.4 mcg/kg/hr IntraVENous Continuous Kristina Steiner MD   Stopped at 12/20/21 0332    enoxaparin (LOVENOX) injection 40 mg  40 mg SubCUTAneous BID Kristina Steiner MD   40 mg at 12/21/21 0840    budesonide-formoterol (SYMBICORT) 160-4.5 MCG/ACT inhaler 2 puff  2 puff Inhalation BID Kristina Steiner MD   2 puff at 12/21/21 0844    fluvoxaMINE (LUVOX) tablet 50 mg  50 mg Oral BID Kristina Steiner MD   50 mg at 12/21/21 0840    famotidine (PEPCID) injection 40 mg  40 mg IntraVENous Daily Kristina Steiner MD   40 mg at 12/21/21 0840    Vitamin D (CHOLECALCIFEROL) tablet 2,000 Units  2,000 Units Oral Daily Kristina Steiner MD   2,000 Units at 12/21/21 0842    LORazepam (ATIVAN) injection 0.5 mg  0.5 mg IntraVENous Q6H PRN Kristina Steiner MD   0.5 mg at 12/16/21 1118    ALPRAZolam (XANAX) tablet 1 mg  1 mg Oral Nightly PRN Kristina Steiner MD   1 mg at 12/20/21 1952    albuterol sulfate  (90 Base) MCG/ACT inhaler 2 puff  2 puff Inhalation 4x Daily PELON Adams   2 puff at 12/21/21 1715    calcium-cholecalciferol 500-200 MG-UNIT per tablet 1 tablet  1 tablet Oral Daily PELON Adams   1 tablet at 12/21/21 0842    HYDROcodone-acetaminophen ([facility]) 7.5-325 MG per tablet 1 tablet  1 tablet Oral Q6H PRN Anita Bora, APRN   1 tablet at 12/21/21 0150    levothyroxine (SYNTHROID) tablet 100 mcg  100 mcg Oral Daily Anita Bora, APRN   100 mcg at 12/21/21 0543    ascorbic acid (VITAMIN C) tablet 1,000 mg  1,000 mg Oral Daily Speedy Failing, APRN   1,000 mg at 12/21/21 0841    timolol (TIMOPTIC) 0.5 % ophthalmic solution 1 drop  1 drop Both Eyes BID Speedy Failing, APRN   1 drop at 12/21/21 0843    pravastatin (PRAVACHOL) tablet 40 mg  40 mg Oral Daily Speedy Failing, APRN   40 mg at 12/21/21 0840    polyethylene glycol (GLYCOLAX) packet 17 g  17 g Oral Daily Speedy Failing, APRN   17 g at 12/20/21 0749    ondansetron (ZOFRAN) tablet 8 mg  8 mg Oral Q8H PRN Speedy Failing, APRN   8 mg at 12/10/21 1751    meclizine (ANTIVERT) tablet 25 mg  25 mg Oral TID PRN Speedy Failing, APRN   25 mg at 12/10/21 0301    cetirizine (ZYRTEC) tablet 10 mg  10 mg Oral Daily Speedy Failing, APRN   10 mg at 12/21/21 0841    sodium chloride flush 0.9 % injection 5-40 mL  5-40 mL IntraVENous 2 times per day Speedy Failing, APRN   10 mL at 12/21/21 0843    sodium chloride flush 0.9 % injection 5-40 mL  5-40 mL IntraVENous PRN Speedy Failing, APRN   10 mL at 12/15/21 1155    0.9 % sodium chloride infusion  25 mL IntraVENous PRN Speedy Failing, APRN   Stopped at 12/11/21 2130    polyethylene glycol (GLYCOLAX) packet 17 g  17 g Oral Daily PRN Speedy Failing, APRN   17 g at 12/17/21 2112    acetaminophen (TYLENOL) tablet 650 mg  650 mg Oral Q6H PRN Speedy Failing, APRN   650 mg at 12/21/21 1559    Or    acetaminophen (TYLENOL) suppository 650 mg  650 mg Rectal Q6H PRN Speedy Failing, APRN        zinc sulfate (ZINCATE) capsule 50 mg  50 mg Oral Daily Speedy Failing, APRN   50 mg at 12/21/21 0842    glucose (GLUTOSE) 40 % oral gel 15 g  15 g Oral PRN Speedy Failing, APRN        dextrose 50 % IV solution  12.5 g IntraVENous PRN Speedy Failing, APRN        glucagon (rDNA) injection 1 mg  1 mg IntraMUSCular PRN Speedy Failing, APRN        dextrose 5 % solution  100 mL/hr IntraVENous PRN Speedy Failing, APRN        baricitinib Shannan Royal) tablet 4 mg  4 mg Oral Daily Speedy Failing, APRN   4 mg at 12/20/21 1953       Past Medical History:  Past Medical History:   Diagnosis Date    Anemia     Asthma     Bronchitis, acute     COPD (chronic obstructive pulmonary disease) [address]     Diabetes ([address]     Glaucoma     Hyperlipidemia     Hypertension     Hyperthyroidism     Morbid obesity ([address]     Sleep apnea     uses c-pap       Past Surgical History:  Past Surgical History:   Procedure Laterality Date    AXILLARY SURGERY Bilateral     excision and drainage of staph abscesses    BACK SURGERY  10/2004    Dr. Ana Celestin, [place] L4, L5    CARPAL TUNNEL RELEASE Right     CHOLECYSTECTOMY      COLONOSCOPY  03/20/2007    Dr Hardwick Modest N/A 9/11/2020    Dr Rodas Ramp yr recall    DILATION AND CURETTAGE OF UTERUS N/A 1/29/2021    DILATATION AND CURETTAGE HYSTEROSCOPY NOVASURE ABLATION performed by Jonel Vasquez MD at [facility] COLONOSCOPY FLX DX W/CO[address]J [address] WHEN PFRMD N/A 2/8/2017    Dr Saurav Meza, actively inflamed internal hemorrhoids, residulal liquid and some solid food particles in the colon-5 yr recall due to prep    STOMACH SURGERY  01/20/2014    Dr. Azevedo Gist ( gastric sleeve)    UPPER GASTROINTESTINAL ENDOSCOPY  10/30/2012    Dr Meagan Horta esophagitis, gastritis, Iliana (-)    UPPER GASTROINTESTINAL ENDOSCOPY  01/23/2008    Dr Cecelia Gilliland esophagitis    UPPER GASTROINTESTINAL ENDOSCOPY N/A 9/11/2020    Dr Vanessa Mccarthy       Family History  Family History   Problem Relation Age of Onset    Lung Cancer Mother     Kidney Cancer Mother     Lung Cancer Maternal Grandmother     Colon Cancer Maternal Grandfather     Liver Cancer Neg Hx     Liver Disease Neg Hx     Stomach Cancer Neg Hx     Rectal Cancer Neg Hx     Esophageal Cancer Neg Hx     Colon Polyps Neg Hx        Social History  Social History     Socioeconomic History    Marital status:      Spouse name: Not on file    Number of children: Not on file    Years of education: Not on file   [facility] education level: Not on file   Occupational History    Not on file   Tobacco Use    Smoking status: Former Smoker     Packs/day: 3.00     Years: 26.00     Pack years: 78.00     Types: Cigarettes     Quit date: 5/6/2011     Years since quitting: 10.6    Smokeless tobacco: Never Used   Vaping Use    Vaping Use: Never used   Substance and Sexual Activity    Alcohol use: No    Drug use: No    Sexual activity: Yes     Partners: Male   Other Topics Concern    Not on file   Social History Narrative    Not on file     Social Determinants of Health     Financial Resource Strain:     Difficulty of Paying Living Expenses: Not on file   Food Insecurity:     Worried About Running Out of Food in the Last Year: Not on file    Tiffanie of Food in the Last Year: Not on file   Transportation Needs:     Lack of Transportation (Medical): Not on file    Lack of Transportation (Non-Medical): Not on file   Physical Activity:     Days of Exercise per Week: Not on file    Minutes of Exercise per Session: Not on file   Stress:     Feeling of Stress : Not on file   Social Connections:     Frequency of Communication with Friends and Family: Not on file    Frequency of Social Gatherings with Friends and Family: Not on file    Attends Sabianism Services: Not on file    Active Member of [address] [place] [address] or Organizations: Not on file    Attends Club or Organization Meetings: Not on file    Marital Status: Not on file   Intimate Partner Violence:     Fear of Current or Ex-Partner: Not on file    Emotionally Abused: Not on file    Physically Abused: Not on file    Sexually Abused: Not on file   Housing Stability:     Unable to Pay for Housing in the Last Year: Not on file    Number of [place] in the Last Year: Not on file    Unstable Housing in the Last Year: Not on file         Review of Systems:    Review of Systems   Constitutional: Negative for activity change and fatigue. HENT: Negative for rhinorrhea and voice change. Eyes: Negative for visual disturbance. Respiratory: Positive for cough and shortness of breath. Cardiovascular: Negative for chest pain and leg swelling. Gastrointestinal: Negative for constipation, diarrhea, nausea and vomiting. Endocrine: Negative for polyuria. Genitourinary: Negative for difficulty urinating and dysuria. Musculoskeletal: Positive for gait problem. Negative for arthralgias and back pain. Skin: Negative for color change. Allergic/Immunologic: Negative for immunocompromised state. Neurological: Positive for weakness. Negative for dizziness and headaches. Psychiatric/Behavioral: Negative for confusion. Objective:  Blood pressure 129/86, pulse 112, temperature 98.2 °F (36.8 °C), temperature source Temporal, resp. rate 20, height 5' 5\" (1.651 m), weight (!) 330 lb (149.7 kg), SpO2 96 %, not currently breastfeeding. Intake/Output Summary (Last 24 hours) at 12/21/2021 1826  Last data filed at 12/21/2021 0900  Gross per 24 hour   Intake 491.61 ml   Output 1950 ml   Net -1458.39 ml       Physical Exam  Vitals and nursing note reviewed. Constitutional:       Appearance: She is ill-appearing. HENT:      Head: Normocephalic and atraumatic. Right Ear: External ear normal.      Left Ear: External ear normal.      Nose: Nose normal.      Mouth/Throat:      Mouth: Mucous membranes are moist.   Eyes:      Conjunctiva/sclera: Conjunctivae normal.      Pupils: Pupils are equal, round, and reactive to light. Cardiovascular:      Rate and Rhythm: Normal rate and regular rhythm. Heart sounds: Normal heart sounds. Pulmonary:      Effort: Pulmonary effort is normal.      Breath sounds: Rhonchi present. Abdominal:      General: Abdomen is flat. Palpations: Abdomen is soft. Musculoskeletal:         General: Normal range of motion. Cervical back: Neck supple. No rigidity. No muscular tenderness. Skin:     General: Skin is warm and dry.    Neurological: Mental Status: She is alert and oriented to person, place, and time. Psychiatric:         Mood and Affect: Mood normal.         Labs:  BMP:   Recent Labs     12/19/21  0406 12/20/21 0259 12/21/21 0218    136 136   K 4.9 5.5* 5.5*    96* 96*   CO2 29 28 30*   BUN 24* 22* 24*   CREATININE 0.7 0.8 0.8   CALCIUM 9.0 9.4 9.6     CBC:   Recent Labs     12/19/21  0406 12/20/21 0259 12/21/21 0218   WBC 12.7* 13.7* 14.7*   HGB 14.0 14.1 14.9   HCT 44.1 44.3 46.8   MCV 95.0 94.5 94.4   * 670* 796*     LIVER PROFILE:   Recent Labs     12/19/21  0406 12/20/21 0259 12/21/21 0218   AST 25 23 28   ALT 32 34* 45*   BILITOT <0.2 0.3 0.4   ALKPHOS 103 106* 106*     PT/INR: No results for input(s): PROTIME, INR in the last 72 hours. APTT: No results for input(s): APTT in the last 72 hours. BNP:  No results for input(s): BNP in the last 72 hours. Ionized Calcium:No results for input(s): IONCA in the last 72 hours. Magnesium:No results for input(s): MG in the last 72 hours. Phosphorus:No results for input(s): PHOS in the last 72 hours. HgbA1C: No results for input(s): LABA1C in the last 72 hours. Hepatic:   Recent Labs     12/19/21  0406 12/20/21 0259 12/21/21 0218   ALKPHOS 103 106* 106*   ALT 32 34* 45*   AST 25 23 28   PROT 6.5* 5.9* 6.6   BILITOT <0.2 0.3 0.4   LABALBU 3.1* 3.1* 3.6     Lactic Acid: No results for input(s): LACTA in the last 72 hours. Troponin: No results for input(s): CKTOTAL, CKMB, TROPONINT in the last 72 hours. ABGs: No results for input(s): PH, PCO2, PO2, HCO3, O2SAT in the last 72 hours. CRP:  No results for input(s): CRP in the last 72 hours. Sed Rate:  No results for input(s): SEDRATE in the last 72 hours. Cultures:   No results for input(s): CULTURE in the last 72 hours. No results for input(s): BC, Junito Sepulveda in the last 72 hours. No results for input(s): CXSURG in the last 72 hours.     Radiology reports as per the Radiologist  Radiology: XR CHEST PORTABLE    Result Date: 12/9/2021  EXAM: XR CHEST PORTABLE INDICATION: Shortness of breath, COVID positive COMPARISON: None available. FINDINGS: Cardiac silhouette is normal in size. No pleural effusion or visible pneumothorax. Patchy bilateral airspace opacity is present. Central vascular congestion is noted. No acute osseous finding. Bilateral patchy airspace opacity. Central vascular congestion. Differential includes pneumonia and edema. Signed by Dr Tristian Case     Acute respiratory distress syndrome (ARDS) due to COVID-19 virus. Continue ongoing medical management.     Acute hypoxemic respiratory failure due to COVID-19. Continue supportive care.     Please document 37 minutes of critical care time for patient assessment, chart review, discussion with staff, .       Jeanie Andujar, DO

## 2021-12-22 NOTE — PROGRESS NOTES
12/22/21 1711   Subjective   Subjective I don't feel like doing anything. I am nauseated  and I have had diarhea .   I have been up to the  [facility] about 5 times   Physical Therapy    Electronically signed by Kelli Dozier PTA on 12/22/2021 at 5:14 PM

## 2021-12-22 NOTE — PLAN OF CARE
Problem: Airway Clearance - Ineffective  Goal: Achieve or maintain patent airway  12/22/2021 1445 by Jose Lopez RN  Outcome: Ongoing  12/22/2021 0058 by Tyler Mulligan RN  Outcome: Ongoing     Problem: Gas Exchange - Impaired  Goal: Absence of hypoxia  12/22/2021 1445 by Jose Lopez RN  Outcome: Ongoing  12/22/2021 0058 by Tyler Mulligan RN  Outcome: Ongoing  Goal: Promote optimal lung function  12/22/2021 1445 by Jose Lopez RN  Outcome: Ongoing  12/22/2021 0058 by Tyler Mulligan RN  Outcome: Ongoing     Problem: Breathing Pattern - Ineffective  Goal: Ability to achieve and maintain a regular respiratory rate  12/22/2021 1445 by Jose Lopez RN  Outcome: Ongoing  12/22/2021 0058 by Tyler Mulligan RN  Outcome: Ongoing     Problem:  Body Temperature -  Risk of, Imbalanced  Goal: Ability to maintain a body temperature within defined limits  12/22/2021 1445 by Jose Lopez RN  Outcome: Ongoing  12/22/2021 0058 by Tyler Mulligan RN  Outcome: Ongoing  Goal: Will regain or maintain usual level of consciousness  12/22/2021 1445 by Jose Lopez RN  Outcome: Ongoing  12/22/2021 0058 by Tyler Mulligan RN  Outcome: Ongoing  Goal: Complications related to the disease process, condition or treatment will be avoided or minimized  12/22/2021 1445 by Jose Lopez RN  Outcome: Ongoing  12/22/2021 0058 by Tyler Mulligan RN  Outcome: Ongoing     Problem: Isolation Precautions - Risk of Spread of Infection  Goal: Prevent transmission of infection  12/22/2021 1445 by Jose Lopez RN  Outcome: Ongoing  12/22/2021 0058 by Tyler Mulligan RN  Outcome: Ongoing     Problem: Nutrition Deficits  Goal: Optimize nutritional status  12/22/2021 1445 by Jose Lopez RN  Outcome: Ongoing  12/22/2021 0058 by Tyler Mulligan RN  Outcome: Ongoing     Problem: Risk for Fluid Volume Deficit  Goal: Maintain normal heart rhythm  12/22/2021 1445 by Tulio Koehler RN  Outcome: Ongoing  12/22/2021 0058 by Skinny Werner RN  Outcome: Ongoing  Goal: Maintain absence of muscle cramping  12/22/2021 1445 by Tulio Koehler RN  Outcome: Ongoing  12/22/2021 0058 by Skinyn Werner RN  Outcome: Ongoing  Goal: Maintain normal serum potassium, sodium, calcium, phosphorus, and pH  12/22/2021 1445 by Tulio Koehler RN  Outcome: Ongoing  12/22/2021 0058 by Skinny Werner RN  Outcome: Ongoing     Problem: Loneliness or Risk for Loneliness  Goal: Demonstrate positive use of time alone when socialization is not possible  12/22/2021 1445 by Tulio Koehler RN  Outcome: Ongoing  12/22/2021 0058 by Skinny Werner RN  Outcome: Ongoing     Problem: Fatigue  Goal: Verbalize increase energy and improved vitality  12/22/2021 1445 by Tulio Koehler RN  Outcome: Ongoing  12/22/2021 0058 by Skinny Werner RN  Outcome: Ongoing     Problem: Patient Education: Go to Patient Education Activity  Goal: Patient/Family Education  12/22/2021 1445 by Tulio Koehler RN  Outcome: Ongoing  12/22/2021 0058 by Skinny Werner RN  Outcome: Ongoing     Problem: Falls - Risk of:  Goal: Will remain free from falls  Description: Will remain free from falls  12/22/2021 1445 by Tulio Koehler RN  Outcome: Ongoing  12/22/2021 0058 by Skinny Werner RN  Outcome: Ongoing  Goal: Absence of physical injury  Description: Absence of physical injury  12/22/2021 1445 by Tulio Koehler RN  Outcome: Ongoing  12/22/2021 0058 by Skinny Werner RN  Outcome: Ongoing     Problem: Pain:  Goal: Pain level will decrease  Description: Pain level will decrease  12/22/2021 1445 by Tulio Koehler RN  Outcome: Ongoing  12/22/2021 0058 by Skinny Werner RN  Outcome: Ongoing  Goal: Control of acute pain  Description: Control of acute pain  12/22/2021 1445 by Tulio Koehler RN  Outcome: Ongoing  12/22/2021 0058 by Skinny Werner RN  Outcome: Ongoing  Goal: Control of chronic pain  Description: Control of chronic pain  12/22/2021 1445 by Agatha Silva RN  Outcome: Ongoing  12/22/2021 0058 by Paulette Cerda RN  Outcome: Ongoing     Problem: Nutrition  Goal: Optimal nutrition therapy  12/22/2021 1445 by Agatha Silva RN  Outcome: Ongoing  12/22/2021 0058 by Paulette Cerda RN  Outcome: Ongoing     Problem: Skin Integrity:  Goal: Will show no infection signs and symptoms  Description: Will show no infection signs and symptoms  12/22/2021 1445 by Agatha Silva RN  Outcome: Ongoing  12/22/2021 0058 by Paulette Cerda RN  Outcome: Ongoing  Goal: Absence of new skin breakdown  Description: Absence of new skin breakdown  12/22/2021 1445 by Agatha Silva RN  Outcome: Ongoing  12/22/2021 0058 by Paulette Cerda RN  Outcome: Ongoing

## 2021-12-22 NOTE — PROGRESS NOTES
Patient states she takes 0.5 mg xanax at bedtime at home, and takes 0.5 mg approximately 6 hours later. When I went to give nightly dose, patient refused one of the 0.5 mg tablets. Contacted Dr. Chacha Fung to change order.     Electronically signed by Paulette Cerda RN on 12/22/2021 at 1:50 AM

## 2021-12-22 NOTE — PROGRESS NOTES
12/21/21 3350   Restrictions/Precautions   Restrictions/Precautions Fall Risk;Isolation   Position Activity Restriction   Other position/activity restrictions droplet plus precautions   Subjective   Subjective I have to go pee they took that catherter out and I don't have a BSC. Pain Screening   Patient Currently in Pain No   Oxygen Therapy   SpO2 96 %   O2 Flow Rate (L/min) 15 L/min   Bed Mobility   Rolling Stand by assistance   Supine to Sit Stand by assistance   Sit to Supine Stand by assistance   Scooting Stand by assistance   Comment Some dizziness with sitting EOB sat IND about 5 minutes   Transfers   Sit to Stand Minimal Assistance;Contact guard assistance   Stand to sit Contact guard assistance   Ambulation   Ambulation? Yes   Ambulation 1   Device Rolling Walker   Other Apparatus O2  (15L)   Assistance Contact guard assistance   Quality of Gait Steady NO LOB   Gait Deviations Slow Taylor; Increased SHIRA   Distance 10' x 2   Comments Patient walked to BR sat on toilet for about 15 minutes then BTB   Activity Tolerance   Activity Tolerance Patient Tolerated treatment well   Safety Devices   Type of devices Left in bed;Call light within reach; Bed alarm in place   Physical Therapy    Electronically signed by Sanjay Carvajal PTA on 12/21/2021 at 6:05 PM

## 2021-12-23 LAB
ALBUMIN SERPL-MCNC: 3.4 G/DL (ref 3.5–5.2)
ALP BLD-CCNC: 94 U/L (ref 35–104)
ALT SERPL-CCNC: 40 U/L (ref 5–33)
ANION GAP SERPL CALCULATED.3IONS-SCNC: 10 MMOL/L (ref 7–19)
AST SERPL-CCNC: 26 U/L (ref 5–32)
BASOPHILS ABSOLUTE: 0 K/UL (ref 0–0.2)
BASOPHILS RELATIVE PERCENT: 0.1 % (ref 0–1)
BILIRUB SERPL-MCNC: 0.3 MG/DL (ref 0.2–1.2)
BUN BLDV-MCNC: 46 MG/DL (ref 6–20)
CALCIUM SERPL-MCNC: 9.1 MG/DL (ref 8.6–10)
CHLORIDE BLD-SCNC: 98 MMOL/L (ref 98–111)
CO2: 28 MMOL/L (ref 22–29)
CREAT SERPL-MCNC: 1.1 MG/DL (ref 0.5–0.9)
EOSINOPHILS ABSOLUTE: 0.2 K/UL (ref 0–0.6)
EOSINOPHILS RELATIVE PERCENT: 0.8 % (ref 0–5)
GFR AFRICAN AMERICAN: >59
GFR NON-AFRICAN AMERICAN: 52
GLUCOSE BLD-MCNC: 107 MG/DL (ref 74–109)
GLUCOSE BLD-MCNC: 128 MG/DL (ref 70–99)
GLUCOSE BLD-MCNC: 165 MG/DL (ref 70–99)
GLUCOSE BLD-MCNC: 179 MG/DL (ref 70–99)
HCT VFR BLD CALC: 45.4 % (ref 37–47)
HEMOGLOBIN: 14.2 G/DL (ref 12–16)
IMMATURE GRANULOCYTES #: 0.4 K/UL
LYMPHOCYTES ABSOLUTE: 2.5 K/UL (ref 1.1–4.5)
LYMPHOCYTES RELATIVE PERCENT: 11.9 % (ref 20–40)
MCH RBC QN AUTO: 30.1 PG (ref 27–31)
MCHC RBC AUTO-ENTMCNC: 31.3 G/DL (ref 33–37)
MCV RBC AUTO: 96.4 FL (ref 81–99)
MONOCYTES ABSOLUTE: 2 K/UL (ref 0–0.9)
MONOCYTES RELATIVE PERCENT: 9.5 % (ref 0–10)
NEUTROPHILS ABSOLUTE: 15.7 K/UL (ref 1.5–7.5)
NEUTROPHILS RELATIVE PERCENT: 76 % (ref 50–65)
PDW BLD-RTO: 12.6 % (ref 11.5–14.5)
PERFORMED ON: ABNORMAL
PLATELET # BLD: 647 K/UL (ref 130–400)
PMV BLD AUTO: 10.2 FL (ref 9.4–12.3)
POTASSIUM REFLEX MAGNESIUM: 5.1 MMOL/L (ref 3.5–5)
RBC # BLD: 4.71 M/UL (ref 4.2–5.4)
SODIUM BLD-SCNC: 136 MMOL/L (ref 136–145)
TOTAL PROTEIN: 5.9 G/DL (ref 6.6–8.7)
WBC # BLD: 20.6 K/UL (ref 4.8–10.8)

## 2021-12-23 PROCEDURE — 82947 ASSAY GLUCOSE BLOOD QUANT: CPT

## 2021-12-23 PROCEDURE — 94660 CPAP INITIATION&MGMT: CPT

## 2021-12-23 PROCEDURE — 6370000000 HC RX 637 (ALT 250 FOR IP): Performed by: STUDENT IN AN ORGANIZED HEALTH CARE EDUCATION/TRAINING PROGRAM

## 2021-12-23 PROCEDURE — 6360000002 HC RX W HCPCS: Performed by: STUDENT IN AN ORGANIZED HEALTH CARE EDUCATION/TRAINING PROGRAM

## 2021-12-23 PROCEDURE — 6370000000 HC RX 637 (ALT 250 FOR IP): Performed by: NURSE PRACTITIONER

## 2021-12-23 PROCEDURE — 2700000000 HC OXYGEN THERAPY PER DAY

## 2021-12-23 PROCEDURE — 6370000000 HC RX 637 (ALT 250 FOR IP): Performed by: INTERNAL MEDICINE

## 2021-12-23 PROCEDURE — 6370000000 HC RX 637 (ALT 250 FOR IP): Performed by: HOSPITALIST

## 2021-12-23 PROCEDURE — 80053 COMPREHEN METABOLIC PANEL: CPT

## 2021-12-23 PROCEDURE — 2580000003 HC RX 258: Performed by: NURSE PRACTITIONER

## 2021-12-23 PROCEDURE — 1210000000 HC MED SURG R&B

## 2021-12-23 PROCEDURE — 36415 COLL VENOUS BLD VENIPUNCTURE: CPT

## 2021-12-23 PROCEDURE — 94761 N-INVAS EAR/PLS OXIMETRY MLT: CPT

## 2021-12-23 PROCEDURE — 85025 COMPLETE CBC W/AUTO DIFF WBC: CPT

## 2021-12-23 RX ADMIN — CETIRIZINE HYDROCHLORIDE 10 MG: 10 TABLET, FILM COATED ORAL at 10:47

## 2021-12-23 RX ADMIN — ZINC SULFATE 220 MG (50 MG) CAPSULE 50 MG: CAPSULE at 10:47

## 2021-12-23 RX ADMIN — BUDESONIDE AND FORMOTEROL FUMARATE DIHYDRATE 2 PUFF: 160; 4.5 AEROSOL RESPIRATORY (INHALATION) at 21:43

## 2021-12-23 RX ADMIN — Medication 10 MG: at 22:07

## 2021-12-23 RX ADMIN — SODIUM CHLORIDE, PRESERVATIVE FREE 10 ML: 5 INJECTION INTRAVENOUS at 21:43

## 2021-12-23 RX ADMIN — ALPRAZOLAM 0.5 MG: 0.5 TABLET ORAL at 06:15

## 2021-12-23 RX ADMIN — HYDROCODONE BITARTRATE AND ACETAMINOPHEN 1 TABLET: 7.5; 325 TABLET ORAL at 06:15

## 2021-12-23 RX ADMIN — Medication 2000 UNITS: at 10:47

## 2021-12-23 RX ADMIN — FLUVOXAMINE MALEATE 50 MG: 50 TABLET, COATED ORAL at 10:47

## 2021-12-23 RX ADMIN — FLUVOXAMINE MALEATE 50 MG: 50 TABLET, COATED ORAL at 22:07

## 2021-12-23 RX ADMIN — ONDANSETRON HYDROCHLORIDE 8 MG: 4 TABLET, FILM COATED ORAL at 22:07

## 2021-12-23 RX ADMIN — TIMOLOL MALEATE 1 DROP: 5 SOLUTION OPHTHALMIC at 21:43

## 2021-12-23 RX ADMIN — GUAIFENESIN 600 MG: 600 TABLET, EXTENDED RELEASE ORAL at 10:47

## 2021-12-23 RX ADMIN — ALBUTEROL SULFATE 2 PUFF: 90 AEROSOL, METERED RESPIRATORY (INHALATION) at 21:43

## 2021-12-23 RX ADMIN — Medication 1 TABLET: at 10:47

## 2021-12-23 RX ADMIN — PRAVASTATIN SODIUM 40 MG: 20 TABLET ORAL at 10:47

## 2021-12-23 RX ADMIN — INSULIN GLARGINE 20 UNITS: 100 INJECTION, SOLUTION SUBCUTANEOUS at 22:10

## 2021-12-23 RX ADMIN — GUAIFENESIN 600 MG: 600 TABLET, EXTENDED RELEASE ORAL at 22:07

## 2021-12-23 RX ADMIN — ENOXAPARIN SODIUM 40 MG: 100 INJECTION SUBCUTANEOUS at 10:48

## 2021-12-23 RX ADMIN — OXYCODONE HYDROCHLORIDE AND ACETAMINOPHEN 1000 MG: 500 TABLET ORAL at 10:47

## 2021-12-23 RX ADMIN — LEVOTHYROXINE SODIUM 100 MCG: 100 TABLET ORAL at 06:15

## 2021-12-23 ASSESSMENT — ENCOUNTER SYMPTOMS
BACK PAIN: 0
VOMITING: 0
VOICE CHANGE: 0
SHORTNESS OF BREATH: 0
NAUSEA: 0
COLOR CHANGE: 0
DIARRHEA: 0
CONSTIPATION: 0
COUGH: 0
RHINORRHEA: 0

## 2021-12-23 ASSESSMENT — PAIN SCALES - GENERAL: PAINLEVEL_OUTOF10: 7

## 2021-12-23 NOTE — PROGRESS NOTES
Hospitalist Progress Note    Patient:  Jeanette Ram  YOB: 1965  Date of Service: 12/23/2021  MRN: [id number]   Acct: [de-identified]   Primary Care Physician: Yvette Anguiano MD  Advance Directive: [facility]  Admit Date: 12/9/2021       Hospital Day: 15  Referring Provider: Anaid Marquez DO    Patient Seen, Chart, Consults, Notes, Labs, Radiology studies reviewed. Subjective:  Jeanette Ram is a 49 y.o. female  whom we are following for COVID-19 pneumonia, hypoxemic respiratory failure. She had a little bit of a setback today. She dropped her saturation. She had some increased respiratory distress. We placed her on BiPAP. This has made her more comfortable. She had been making excellent progress up to this point.     Allergies:  Latex, Penicillins, Codeine, and Tape [adhesive tape]    Medicines:  Current Facility-Administered Medications   Medication Dose Route Frequency Provider Last Rate Last Admin    dilTIAZem (CARDIZEM) tablet 30 mg  30 mg Oral 4 times per day Anaid Marquez DO        ALPRAZolam Eather Foots) tablet 0.5 mg  0.5 mg Oral Q6H PRN Dorothy Cope MD   0.5 mg at 12/23/21 0615    sodium zirconium cyclosilicate (LOKELMA) oral suspension 10 g  10 g Oral TID Anaid Marquez, DO   10 g at 12/22/21 1557    insulin glargine (LANTUS) injection vial 20 Units  20 Units SubCUTAneous BID Anaid Marquez, DO   20 Units at 12/22/21 2236    promethazine (PHENERGAN) injection 6.25 mg  6.25 mg IntraMUSCular Q6H PRN Dorothy Cope MD   6.25 mg at 12/22/21 2359    benzonatate (TESSALON) capsule 100 mg  100 mg Oral TID PRN Anaid Marquez, DO   100 mg at 12/22/21 1415    guaiFENesin [facility]) extended release tablet 600 mg  600 mg Oral BID Anaid Marquez, DO   600 mg at 12/23/21 1047    opium-belladonna (B&O SUPPRETTES) 16.2-60 MG suppository 60 mg  60 mg Rectal Q8H PRN Dellie Headings, DO        dextromethorphan (DELSYM) 30 MG/5ML extended release liquid 30 mg  30 mg Oral BID PRN Frank Styles DO   30 mg at 12/20/21 2053    insulin lispro (HUMALOG) injection vial 0-18 Units  0-18 Units SubCUTAneous Q6H Miriam Sutart MD   3 Units at 12/22/21 2236    melatonin disintegrating tablet 10 mg  10 mg Oral Nightly Miriam Stuart MD   10 mg at 12/22/21 2228    Benzocaine-Menthol (CEPACOL) 1 lozenge  1 lozenge Oral Q2H PRN Miriam Stuart MD   1 lozenge at 12/20/21 0344    sodium chloride (OCEAN, BABY AYR) 0.65 % nasal spray 1 spray  1 spray Each Nostril PRN Miriam Stuart MD   1 spray at 12/13/21 1727    dexmedetomidine (PRECEDEX) 400 mcg in sodium chloride 0.9 % 100 mL infusion  0.1-1.4 mcg/kg/hr IntraVENous Continuous Miriam Stuart MD   Stopped at 12/20/21 0332    enoxaparin (LOVENOX) injection 40 mg  40 mg SubCUTAneous BID Miriam Stuart MD   40 mg at 12/23/21 1048    budesonide-formoterol (SYMBICORT) 160-4.5 MCG/ACT inhaler 2 puff  2 puff Inhalation BID Miriam Stuart MD   2 puff at 12/22/21 2229    fluvoxaMINE (LUVOX) tablet 50 mg  50 mg Oral BID Miriam Stuart MD   50 mg at 12/23/21 1047    famotidine (PEPCID) injection 40 mg  40 mg IntraVENous Daily Miriam Stuart MD   40 mg at 12/22/21 1035    Vitamin D (CHOLECALCIFEROL) tablet 2,000 Units  2,000 Units Oral Daily Miriam Stuart MD   2,000 Units at 12/23/21 1047    LORazepam (ATIVAN) injection 0.5 mg  0.5 mg IntraVENous Q6H PRN Miriam Stuart MD   0.5 mg at 12/16/21 1118    albuterol sulfate  (90 Base) MCG/ACT inhaler 2 puff  2 puff Inhalation 4x Daily PELON Cancino   2 puff at 12/22/21 2230    calcium-cholecalciferol 500-200 MG-UNIT per tablet 1 tablet  1 tablet Oral Daily PELON Cancino   1 tablet at 12/23/21 1047    HYDROcodone-acetaminophen (NORCO) 7.5-325 MG per tablet 1 tablet  1 tablet Oral Q6H PRN PELON Cancino   1 tablet at 12/23/21 0615    levothyroxine (SYNTHROID) tablet 100 mcg  100 mcg Oral Daily Doyne Pass, APRN   100 mcg at 12/23/21 0615    ascorbic acid (VITAMIN C) tablet 1,000 mg  1,000 mg Oral Daily Doyne Pass, APRN   1,000 mg at 12/23/21 1047    timolol (TIMOPTIC) 0.5 % ophthalmic solution 1 drop  1 drop Both Eyes BID Doyne Pass, APRN   1 drop at 12/22/21 2230    pravastatin (PRAVACHOL) tablet 40 mg  40 mg Oral Daily Doyne Pass, APRN   40 mg at 12/23/21 1047    polyethylene glycol (GLYCOLAX) packet 17 g  17 g Oral Daily Doyne Pass, APRN   17 g at 12/20/21 0749    ondansetron (ZOFRAN) tablet 8 mg  8 mg Oral Q8H PRN Doyne Pass, APRN   8 mg at 12/10/21 1751    meclizine (ANTIVERT) tablet 25 mg  25 mg Oral TID PRN Doyne Pass, APRN   25 mg at 12/10/21 0301    cetirizine (ZYRTEC) tablet 10 mg  10 mg Oral Daily Doyne Pass, APRN   10 mg at 12/23/21 1047    sodium chloride flush 0.9 % injection 5-40 mL  5-40 mL IntraVENous 2 times per day Doyne Pass, APRN   10 mL at 12/22/21 2229    sodium chloride flush 0.9 % injection 5-40 mL  5-40 mL IntraVENous PRN Doyne Pass, APRN   10 mL at 12/15/21 1155    0.9 % sodium chloride infusion  25 mL IntraVENous PRN Doyne Pass, APRN   Stopped at 12/11/21 2130    polyethylene glycol (GLYCOLAX) packet 17 g  17 g Oral Daily PRN Doyne Pass, APRN   17 g at 12/17/21 2112    acetaminophen (TYLENOL) tablet 650 mg  650 mg Oral Q6H PRN Doyne Pass, APRN   650 mg at 12/21/21 1559    Or    acetaminophen (TYLENOL) suppository 650 mg  650 mg Rectal Q6H PRN Doyne Pass, APRN        zinc sulfate (ZINCATE) capsule 50 mg  50 mg Oral Daily Doyne Pass, APRN   50 mg at 12/23/21 1047    glucose (GLUTOSE) 40 % oral gel 15 g  15 g Oral PRN Doyne Pass, APRN        dextrose 50 % IV solution  12.5 g IntraVENous PRN Doyne Pass, APRN        glucagon (rDNA) injection 1 mg  1 mg IntraMUSCular PRN Doyne Pass, APRN        dextrose 5 % solution  100 mL/hr IntraVENous PRN PEOLN Reich           Past Medical History:  Past Medical History:   Diagnosis Date    Anemia     Asthma     Bronchitis, acute     [facility]     Diabetes [facility]     Glaucoma     Hyperlipidemia     Hypertension     Hyperthyroidism     [facility]     Sleep apnea     uses c-pap       Past Surgical History:  Past Surgical History:   Procedure Laterality Date    AXILLARY SURGERY Bilateral     excision and drainage of staph abscesses    BACK SURGERY  10/2004    Dr. Lenora Pierre, [place] L4, L5    CARPAL TUNNEL RELEASE Right     CHOLECYSTECTOMY      COLONOSCOPY  03/20/2007    Dr Shravan Montes N/A 9/11/2020    Dr Jones Smaller yr recall    DILATION AND CURETTAGE OF UTERUS N/A 1/29/2021    DILATATION AND CURETTAGE HYSTEROSCOPY NOVASURE ABLATION performed by Lyric Ness MD at [address] COLONOSCOPY FLX DX W/CO[address]J [address] WHEN PFRMD N/A 2/8/2017    Dr Vladimir Diop, actively inflamed internal hemorrhoids, residulal liquid and some solid food particles in the colon-5 yr recall due to prep    STOMACH SURGERY  01/20/2014    Dr. Cora Garcia ( gastric sleeve)    UPPER GASTROINTESTINAL ENDOSCOPY  10/30/2012    Dr Kayleigh Cook esophagitis, gastritis, Iliana (-)    UPPER GASTROINTESTINAL ENDOSCOPY  01/23/2008    Dr Linh Weinberg esophagitis    UPPER GASTROINTESTINAL ENDOSCOPY N/A 9/11/2020    Dr Theoplis Brunner       Family History  Family History   Problem Relation Age of Onset    Lung Cancer Mother     Kidney Cancer Mother     Lung Cancer Maternal Grandmother     Colon Cancer Maternal Grandfather     Liver Cancer Neg Hx     Liver Disease Neg Hx     Stomach Cancer Neg Hx     Rectal Cancer Neg Hx     Esophageal Cancer Neg Hx     Colon Polyps Neg Hx        Social History  Social History     Socioeconomic History    Marital status:      Spouse name: Not on file    Number of children: Not on file    Years of education: Not on file   Mai Highest education level: Not on file   Occupational History    Not on file   Tobacco Use    Smoking status: Former Smoker     Packs/day: 3.00     Years: 26.00     Pack years: 78.00     Types: Cigarettes     Quit date: 5/6/2011     Years since quitting: 10.6    Smokeless tobacco: Never Used   Vaping Use    Vaping Use: Never used   Substance and Sexual Activity    Alcohol use: No    Drug use: No    Sexual activity: Yes     Partners: Male   Other Topics Concern    Not on file   Social History Narrative    Not on file     Social Determinants of Health     Financial Resource Strain:     Difficulty of Paying Living Expenses: Not on file   Food Insecurity:     Worried About Running Out of Food in the Last Year: Not on file    Tiffanie of Food in the Last Year: Not on file   Transportation Needs:     Lack of Transportation (Medical): Not on file    Lack of Transportation (Non-Medical): Not on file   Physical Activity:     Days of Exercise per Week: Not on file    Minutes of Exercise per Session: Not on file   Stress:     Feeling of Stress : Not on file   Social Connections:     Frequency of Communication with Friends and Family: Not on file    Frequency of Social Gatherings with Friends and Family: Not on file    Attends Cheondoism Services: Not on file    Active Member of [address] [place] [address] [place] or Organizations: Not on file    Attends Club or Organization Meetings: Not on file    Marital Status: Not on file   Intimate Partner Violence:     Fear of Current or Ex-Partner: Not on file    Emotionally Abused: Not on file    Physically Abused: Not on file    Sexually Abused: Not on file   Housing Stability:     Unable to Pay for Housing in the Last Year: Not on file    Number of [place] in the Last Year: Not on file    Unstable Housing in the Last Year: Not on file         Review of Systems:    Review of Systems   Constitutional: Negative for activity change and fatigue.    HENT: Negative for rhinorrhea and voice change. Eyes: Negative for visual disturbance. Respiratory: Negative for cough and shortness of breath. Cardiovascular: Negative for chest pain and leg swelling. Gastrointestinal: Negative for constipation, diarrhea, nausea and vomiting. Endocrine: Negative for polyuria. Genitourinary: Negative for difficulty urinating and dysuria. Musculoskeletal: Positive for gait problem. Negative for arthralgias and back pain. Skin: Negative for color change. Allergic/Immunologic: Negative for immunocompromised state. Neurological: Positive for weakness. Negative for dizziness and headaches. Psychiatric/Behavioral: Negative for confusion. Objective:  Blood pressure (!) 110/49, pulse 107, temperature 97.9 °F (36.6 °C), temperature source Temporal, resp. rate 16, height 5' 5\" (1.651 m), weight (!) 330 lb (149.7 kg), SpO2 98 %, not currently breastfeeding. Intake/Output Summary (Last 24 hours) at 12/23/2021 1719  Last data filed at 12/23/2021 0525  Gross per 24 hour   Intake 550 ml   Output --   Net 550 ml       Physical Exam  Vitals and nursing note reviewed. HENT:      Head: Normocephalic and atraumatic. Right Ear: External ear normal.      Left Ear: External ear normal.      Nose: Nose normal.      Mouth/Throat:      Mouth: Mucous membranes are moist.   Eyes:      Conjunctiva/sclera: Conjunctivae normal.      Pupils: Pupils are equal, round, and reactive to light. Cardiovascular:      Rate and Rhythm: Normal rate and regular rhythm. Heart sounds: Normal heart sounds. Pulmonary:      Effort: Pulmonary effort is normal.      Breath sounds: Normal breath sounds. Abdominal:      General: Abdomen is flat. Palpations: Abdomen is soft. Musculoskeletal:         General: Normal range of motion. Cervical back: Neck supple. No rigidity. No muscular tenderness. Skin:     General: Skin is warm and dry.    Neurological:      Mental Status: She is alert and oriented to person, INDICATION: Shortness of breath, COVID positive COMPARISON: None available. FINDINGS: Cardiac silhouette is normal in size. No pleural effusion or visible pneumothorax. Patchy bilateral airspace opacity is present. Central vascular congestion is noted. No acute osseous finding. Bilateral patchy airspace opacity. Central vascular congestion. Differential includes pneumonia and edema. Signed by Dr Teodora Arevalo     COVID-19 pneumonia. Continue ongoing medical management.     Acute hypoxemic respiratory failure due to COVID-19. Continue supportive care. Steroid-induced hyperglycemia. Increase Lantus.       Esperanza John, DO

## 2021-12-23 NOTE — PROGRESS NOTES
Nutrition Assessment     Type and Reason for Visit: Reassess    Nutrition Recommendations/Plan:   Continue current POC. Nutrition Assessment:  Po intake remains fair ~50% most meals, however ONS intake remains >75%. Wt stable. Accuchecks [id number]. Continue current POC. Malnutrition Assessment:  Malnutrition Status: At risk for malnutrition (Comment)    Nutrition Related Findings: non-pitting edema      Current Nutrition Therapies:    ADULT ORAL NUTRITION SUPPLEMENT; Breakfast, Lunch, Dinner; Diabetic Oral Supplement  ADULT DIET; Regular; 3 carb choices (45 gm/meal); Low Fat/Low Chol/High Fiber/2 gm Na; Safety Tray; Safety Tray (Disposables); dislikes: pancakes, [place] toast, and salad    Anthropometric Measures:  · Height: 5' 5\" (165.1 cm)  · Current Body Wt: 330 lb (149.7 kg)   · BMI: 54.9    Nutrition Interventions:   Food and/or Nutrient Delivery:  Continue Current Diet,Continue Oral Nutrition Supplement   Coordination of Nutrition Care:  Continue to monitor while inpatient    Goals:  po intake 50% or greater. Weight stable or decrease 1-5# per week.   Accuchek's under 200       Nutrition Monitoring and Evaluation:   Food/Nutrient Intake Outcomes:  Food and Nutrient Intake,Supplement Intake  Physical Signs/Symptoms Outcomes:  Biochemical Data,Weight,Skin,Nutrition Focused Physical Findings,Fluid Status or Edema     Discharge Planning:    Continue current diet     Electronically signed by Shireen Martinez MS, RD, LD on 12/23/21 at 10:17 AM CST    Contact: [phone]

## 2021-12-23 NOTE — PROGRESS NOTES
Occupational Therapy    Attempted to see pt in PM and pt refused due to just getting back in bed after using BSC and having more difficulty breathing. Will continue to follow.

## 2021-12-23 NOTE — PROGRESS NOTES
Physical Therapy  Name: Kayla Hartman  MRN:  [id number]  Date of service:  12/23/2021 12/23/21 6499   Subjective   Subjective Attempt: Pt declines mobility at this time, states that she just got BTB from the [facility] with c-pap applied. Will cont to follow.          Electronically signed by Abhilash Haro PTA on 12/23/2021 at 3:30 PM

## 2021-12-23 NOTE — PLAN OF CARE
Problem: Airway Clearance - Ineffective  Goal: Achieve or maintain patent airway  12/23/2021 0237 by Adrian Wild RN  Outcome: Ongoing  12/22/2021 1445 by Laura Belcher RN  Outcome: Ongoing     Problem: Gas Exchange - Impaired  Goal: Absence of hypoxia  12/23/2021 0237 by Adrian Wild RN  Outcome: Ongoing  12/22/2021 1445 by Laura Belcher RN  Outcome: Ongoing  Goal: Promote optimal lung function  12/23/2021 0237 by Adrian Wild RN  Outcome: Ongoing  12/22/2021 1445 by Laura Belcher RN  Outcome: Ongoing     Problem: Breathing Pattern - Ineffective  Goal: Ability to achieve and maintain a regular respiratory rate  12/23/2021 0237 by Adrian Wild RN  Outcome: Ongoing  12/22/2021 1445 by Laura Belcher RN  Outcome: Ongoing     Problem:  Body Temperature -  Risk of, Imbalanced  Goal: Ability to maintain a body temperature within defined limits  12/23/2021 0237 by Adrian Wild RN  Outcome: Ongoing  12/22/2021 1445 by Laura Belcher RN  Outcome: Ongoing  Goal: Will regain or maintain usual level of consciousness  12/23/2021 0237 by Adrian Wild RN  Outcome: Ongoing  12/22/2021 1445 by Laura Belcher RN  Outcome: Ongoing  Goal: Complications related to the disease process, condition or treatment will be avoided or minimized  12/23/2021 0237 by Adrian Wild RN  Outcome: Ongoing  12/22/2021 1445 by Laura Belcher RN  Outcome: Ongoing     Problem: Isolation Precautions - Risk of Spread of Infection  Goal: Prevent transmission of infection  12/23/2021 0237 by Adrian Wild RN  Outcome: Ongoing  12/22/2021 1445 by Laura Belcher RN  Outcome: Ongoing     Problem: Nutrition Deficits  Goal: Optimize nutritional status  12/23/2021 0237 by Adrian Wild RN  Outcome: Ongoing  12/22/2021 1445 by Laura Belcher RN  Outcome: Ongoing     Problem: Risk for Fluid Volume Deficit  Goal: Maintain normal heart rhythm  12/23/2021 0237 by Carson Mcdowell RN  Outcome: Ongoing  12/22/2021 1445 by Clark Bliss RN  Outcome: Ongoing  Goal: Maintain absence of muscle cramping  12/23/2021 0237 by Carson Mcdowell RN  Outcome: Ongoing  12/22/2021 1445 by Clark Bliss RN  Outcome: Ongoing  Goal: Maintain normal serum potassium, sodium, calcium, phosphorus, and pH  12/23/2021 0237 by Carson Mcdowell RN  Outcome: Ongoing  12/22/2021 1445 by Clark Bliss RN  Outcome: Ongoing     Problem: Loneliness or Risk for Loneliness  Goal: Demonstrate positive use of time alone when socialization is not possible  12/23/2021 0237 by Carson Mcdowell RN  Outcome: Ongoing  12/22/2021 1445 by Clark Bliss RN  Outcome: Ongoing     Problem: Fatigue  Goal: Verbalize increase energy and improved vitality  12/23/2021 0237 by Carson Mcdowell RN  Outcome: Ongoing  12/22/2021 1445 by Clark Bliss RN  Outcome: Ongoing     Problem: Patient Education: Go to Patient Education Activity  Goal: Patient/Family Education  12/23/2021 0237 by Carson Mcdowell RN  Outcome: Ongoing  12/22/2021 1445 by Clark Bliss RN  Outcome: Ongoing     Problem: Falls - Risk of:  Goal: Will remain free from falls  Description: Will remain free from falls  12/23/2021 0237 by Carson Mcdowell RN  Outcome: Ongoing  12/22/2021 1445 by Clark Bliss RN  Outcome: Ongoing  Goal: Absence of physical injury  Description: Absence of physical injury  12/23/2021 0237 by Carson Mcdowell RN  Outcome: Ongoing  12/22/2021 1445 by Clark Bliss RN  Outcome: Ongoing     Problem: Pain:  Goal: Pain level will decrease  Description: Pain level will decrease  12/23/2021 0237 by Carson Mcdowell RN  Outcome: Ongoing  12/22/2021 1445 by Clark Bliss RN  Outcome: Ongoing  Goal: Control of acute pain  Description: Control of acute pain  12/23/2021 0237 by Carson Mcdowell RN  Outcome: Ongoing  12/22/2021 1445 by Clark Bliss RN  Outcome: Ongoing  Goal: Control of chronic pain  Description: Control of chronic pain  12/23/2021 0237 by Pedro Beach RN  Outcome: Ongoing  12/22/2021 1445 by Georgeanne Dakins, RN  Outcome: Ongoing     Problem: Nutrition  Goal: Optimal nutrition therapy  12/23/2021 0237 by Pedro Beach RN  Outcome: Ongoing  12/22/2021 1445 by Georgeanne Dakins, RN  Outcome: Ongoing     Problem: Skin Integrity:  Goal: Will show no infection signs and symptoms  Description: Will show no infection signs and symptoms  12/23/2021 0237 by Pedro Beach RN  Outcome: Ongoing  12/22/2021 1445 by Georgeanne Dakins, RN  Outcome: Ongoing  Goal: Absence of new skin breakdown  Description: Absence of new skin breakdown  12/23/2021 0237 by Pedro Beach RN  Outcome: Ongoing  12/22/2021 1445 by Georgeanne Dakins, RN  Outcome: Ongoing

## 2021-12-24 LAB
ALBUMIN SERPL-MCNC: 2.8 G/DL (ref 3.5–5.2)
ALP BLD-CCNC: 99 U/L (ref 35–104)
ALT SERPL-CCNC: 38 U/L (ref 5–33)
ANION GAP SERPL CALCULATED.3IONS-SCNC: 12 MMOL/L (ref 7–19)
AST SERPL-CCNC: 19 U/L (ref 5–32)
BASOPHILS ABSOLUTE: 0 K/UL (ref 0–0.2)
BASOPHILS RELATIVE PERCENT: 0.2 % (ref 0–1)
BILIRUB SERPL-MCNC: 0.4 MG/DL (ref 0.2–1.2)
BUN BLDV-MCNC: 50 MG/DL (ref 6–20)
CALCIUM SERPL-MCNC: 8.5 MG/DL (ref 8.6–10)
CHLORIDE BLD-SCNC: 98 MMOL/L (ref 98–111)
CO2: 25 MMOL/L (ref 22–29)
CREAT SERPL-MCNC: 1.2 MG/DL (ref 0.5–0.9)
EOSINOPHILS ABSOLUTE: 0.2 K/UL (ref 0–0.6)
EOSINOPHILS RELATIVE PERCENT: 0.9 % (ref 0–5)
GFR AFRICAN AMERICAN: 57
GFR NON-AFRICAN AMERICAN: 47
GLUCOSE BLD-MCNC: 116 MG/DL (ref 70–99)
GLUCOSE BLD-MCNC: 133 MG/DL (ref 70–99)
GLUCOSE BLD-MCNC: 135 MG/DL (ref 74–109)
GLUCOSE BLD-MCNC: 153 MG/DL (ref 70–99)
GLUCOSE BLD-MCNC: 190 MG/DL (ref 70–99)
HCT VFR BLD CALC: 41.2 % (ref 37–47)
HEMOGLOBIN: 12.7 G/DL (ref 12–16)
IMMATURE GRANULOCYTES #: 0.2 K/UL
LYMPHOCYTES ABSOLUTE: 1.6 K/UL (ref 1.1–4.5)
LYMPHOCYTES RELATIVE PERCENT: 8.6 % (ref 20–40)
MCH RBC QN AUTO: 29.7 PG (ref 27–31)
MCHC RBC AUTO-ENTMCNC: 30.8 G/DL (ref 33–37)
MCV RBC AUTO: 96.3 FL (ref 81–99)
MONOCYTES ABSOLUTE: 1.3 K/UL (ref 0–0.9)
MONOCYTES RELATIVE PERCENT: 7 % (ref 0–10)
NEUTROPHILS ABSOLUTE: 15.4 K/UL (ref 1.5–7.5)
NEUTROPHILS RELATIVE PERCENT: 82.1 % (ref 50–65)
PDW BLD-RTO: 12.9 % (ref 11.5–14.5)
PERFORMED ON: ABNORMAL
PLATELET # BLD: 472 K/UL (ref 130–400)
PMV BLD AUTO: 10.2 FL (ref 9.4–12.3)
POTASSIUM REFLEX MAGNESIUM: 4.4 MMOL/L (ref 3.5–5)
RBC # BLD: 4.28 M/UL (ref 4.2–5.4)
SODIUM BLD-SCNC: 135 MMOL/L (ref 136–145)
TOTAL PROTEIN: 6.2 G/DL (ref 6.6–8.7)
WBC # BLD: 18.7 K/UL (ref 4.8–10.8)

## 2021-12-24 PROCEDURE — 2700000000 HC OXYGEN THERAPY PER DAY

## 2021-12-24 PROCEDURE — 6370000000 HC RX 637 (ALT 250 FOR IP): Performed by: HOSPITALIST

## 2021-12-24 PROCEDURE — 2580000003 HC RX 258: Performed by: NURSE PRACTITIONER

## 2021-12-24 PROCEDURE — 36415 COLL VENOUS BLD VENIPUNCTURE: CPT

## 2021-12-24 PROCEDURE — 94660 CPAP INITIATION&MGMT: CPT

## 2021-12-24 PROCEDURE — 2580000003 HC RX 258: Performed by: HOSPITALIST

## 2021-12-24 PROCEDURE — 80053 COMPREHEN METABOLIC PANEL: CPT

## 2021-12-24 PROCEDURE — 94761 N-INVAS EAR/PLS OXIMETRY MLT: CPT

## 2021-12-24 PROCEDURE — 6360000002 HC RX W HCPCS: Performed by: STUDENT IN AN ORGANIZED HEALTH CARE EDUCATION/TRAINING PROGRAM

## 2021-12-24 PROCEDURE — 6370000000 HC RX 637 (ALT 250 FOR IP): Performed by: NURSE PRACTITIONER

## 2021-12-24 PROCEDURE — 82947 ASSAY GLUCOSE BLOOD QUANT: CPT

## 2021-12-24 PROCEDURE — 1210000000 HC MED SURG R&B

## 2021-12-24 PROCEDURE — 6370000000 HC RX 637 (ALT 250 FOR IP): Performed by: STUDENT IN AN ORGANIZED HEALTH CARE EDUCATION/TRAINING PROGRAM

## 2021-12-24 PROCEDURE — 2500000003 HC RX 250 WO HCPCS: Performed by: STUDENT IN AN ORGANIZED HEALTH CARE EDUCATION/TRAINING PROGRAM

## 2021-12-24 PROCEDURE — 6370000000 HC RX 637 (ALT 250 FOR IP): Performed by: INTERNAL MEDICINE

## 2021-12-24 PROCEDURE — 85025 COMPLETE CBC W/AUTO DIFF WBC: CPT

## 2021-12-24 RX ORDER — SODIUM CHLORIDE 9 MG/ML
INJECTION, SOLUTION INTRAVENOUS CONTINUOUS
Status: DISCONTINUED | OUTPATIENT
Start: 2021-12-24 | End: 2021-12-27

## 2021-12-24 RX ADMIN — ACETAMINOPHEN 650 MG: 325 TABLET ORAL at 22:11

## 2021-12-24 RX ADMIN — SODIUM CHLORIDE: 9 INJECTION, SOLUTION INTRAVENOUS at 05:50

## 2021-12-24 RX ADMIN — TIMOLOL MALEATE 1 DROP: 5 SOLUTION OPHTHALMIC at 22:12

## 2021-12-24 RX ADMIN — CETIRIZINE HYDROCHLORIDE 10 MG: 10 TABLET, FILM COATED ORAL at 08:58

## 2021-12-24 RX ADMIN — ONDANSETRON HYDROCHLORIDE 8 MG: 4 TABLET, FILM COATED ORAL at 22:11

## 2021-12-24 RX ADMIN — INSULIN GLARGINE 20 UNITS: 100 INJECTION, SOLUTION SUBCUTANEOUS at 22:10

## 2021-12-24 RX ADMIN — DILTIAZEM HYDROCHLORIDE 30 MG: 30 TABLET, FILM COATED ORAL at 17:06

## 2021-12-24 RX ADMIN — BUDESONIDE AND FORMOTEROL FUMARATE DIHYDRATE 2 PUFF: 160; 4.5 AEROSOL RESPIRATORY (INHALATION) at 09:00

## 2021-12-24 RX ADMIN — GUAIFENESIN 600 MG: 600 TABLET, EXTENDED RELEASE ORAL at 22:14

## 2021-12-24 RX ADMIN — GUAIFENESIN 600 MG: 600 TABLET, EXTENDED RELEASE ORAL at 08:58

## 2021-12-24 RX ADMIN — FLUVOXAMINE MALEATE 50 MG: 50 TABLET, COATED ORAL at 22:14

## 2021-12-24 RX ADMIN — ONDANSETRON HYDROCHLORIDE 8 MG: 4 TABLET, FILM COATED ORAL at 09:35

## 2021-12-24 RX ADMIN — Medication 2000 UNITS: at 08:59

## 2021-12-24 RX ADMIN — BUDESONIDE AND FORMOTEROL FUMARATE DIHYDRATE 2 PUFF: 160; 4.5 AEROSOL RESPIRATORY (INHALATION) at 22:12

## 2021-12-24 RX ADMIN — OXYCODONE HYDROCHLORIDE AND ACETAMINOPHEN 1000 MG: 500 TABLET ORAL at 08:59

## 2021-12-24 RX ADMIN — ALBUTEROL SULFATE 2 PUFF: 90 AEROSOL, METERED RESPIRATORY (INHALATION) at 17:07

## 2021-12-24 RX ADMIN — TIMOLOL MALEATE 1 DROP: 5 SOLUTION OPHTHALMIC at 09:01

## 2021-12-24 RX ADMIN — INSULIN LISPRO 3 UNITS: 100 INJECTION, SOLUTION INTRAVENOUS; SUBCUTANEOUS at 12:31

## 2021-12-24 RX ADMIN — DILTIAZEM HYDROCHLORIDE 30 MG: 30 TABLET, FILM COATED ORAL at 12:31

## 2021-12-24 RX ADMIN — Medication 10 MG: at 22:13

## 2021-12-24 RX ADMIN — ALBUTEROL SULFATE 2 PUFF: 90 AEROSOL, METERED RESPIRATORY (INHALATION) at 22:12

## 2021-12-24 RX ADMIN — INSULIN GLARGINE 20 UNITS: 100 INJECTION, SOLUTION SUBCUTANEOUS at 09:39

## 2021-12-24 RX ADMIN — ENOXAPARIN SODIUM 40 MG: 100 INJECTION SUBCUTANEOUS at 08:59

## 2021-12-24 RX ADMIN — ALBUTEROL SULFATE 2 PUFF: 90 AEROSOL, METERED RESPIRATORY (INHALATION) at 09:00

## 2021-12-24 RX ADMIN — INSULIN LISPRO 3 UNITS: 100 INJECTION, SOLUTION INTRAVENOUS; SUBCUTANEOUS at 22:13

## 2021-12-24 RX ADMIN — SODIUM CHLORIDE: 9 INJECTION, SOLUTION INTRAVENOUS at 09:38

## 2021-12-24 RX ADMIN — ACETAMINOPHEN 650 MG: 325 TABLET ORAL at 14:49

## 2021-12-24 RX ADMIN — Medication 1 TABLET: at 08:58

## 2021-12-24 RX ADMIN — ENOXAPARIN SODIUM 40 MG: 100 INJECTION SUBCUTANEOUS at 22:14

## 2021-12-24 RX ADMIN — PRAVASTATIN SODIUM 40 MG: 20 TABLET ORAL at 08:58

## 2021-12-24 RX ADMIN — ALBUTEROL SULFATE 2 PUFF: 90 AEROSOL, METERED RESPIRATORY (INHALATION) at 12:31

## 2021-12-24 RX ADMIN — ZINC SULFATE 220 MG (50 MG) CAPSULE 50 MG: CAPSULE at 08:59

## 2021-12-24 RX ADMIN — SODIUM CHLORIDE, PRESERVATIVE FREE 10 ML: 5 INJECTION INTRAVENOUS at 09:00

## 2021-12-24 RX ADMIN — FLUVOXAMINE MALEATE 50 MG: 50 TABLET, COATED ORAL at 08:59

## 2021-12-24 RX ADMIN — DILTIAZEM HYDROCHLORIDE 30 MG: 30 TABLET, FILM COATED ORAL at 22:11

## 2021-12-24 RX ADMIN — LEVOTHYROXINE SODIUM 100 MCG: 100 TABLET ORAL at 05:52

## 2021-12-24 ASSESSMENT — PAIN SCALES - GENERAL
PAINLEVEL_OUTOF10: 7
PAINLEVEL_OUTOF10: 7

## 2021-12-24 NOTE — PROGRESS NOTES
12/24/21 1543   Subjective   Subjective I have been up about 12 times to the [facility] I have diarhhea.   I don't feel like sitting in the chair   Physical Therapy    Electronically signed by Valentine Mitchell PTA on 12/24/2021 at 3:46 PM

## 2021-12-25 ENCOUNTER — APPOINTMENT (OUTPATIENT)
Dept: GENERAL RADIOLOGY | Age: 56
DRG: 177 | End: 2021-12-25
Payer: MEDICARE

## 2021-12-25 LAB
ALBUMIN SERPL-MCNC: 2.5 G/DL (ref 3.5–5.2)
ALP BLD-CCNC: 89 U/L (ref 35–104)
ALT SERPL-CCNC: 25 U/L (ref 5–33)
ANION GAP SERPL CALCULATED.3IONS-SCNC: 7 MMOL/L (ref 7–19)
AST SERPL-CCNC: 14 U/L (ref 5–32)
BASOPHILS ABSOLUTE: 0 K/UL (ref 0–0.2)
BASOPHILS RELATIVE PERCENT: 0.1 % (ref 0–1)
BILIRUB SERPL-MCNC: 0.3 MG/DL (ref 0.2–1.2)
BUN BLDV-MCNC: 22 MG/DL (ref 6–20)
CALCIUM SERPL-MCNC: 8 MG/DL (ref 8.6–10)
CHLORIDE BLD-SCNC: 104 MMOL/L (ref 98–111)
CO2: 25 MMOL/L (ref 22–29)
CREAT SERPL-MCNC: 0.7 MG/DL (ref 0.5–0.9)
EOSINOPHILS ABSOLUTE: 0.1 K/UL (ref 0–0.6)
EOSINOPHILS RELATIVE PERCENT: 0.9 % (ref 0–5)
GFR AFRICAN AMERICAN: >59
GFR NON-AFRICAN AMERICAN: >60
GLUCOSE BLD-MCNC: 126 MG/DL (ref 74–109)
GLUCOSE BLD-MCNC: 135 MG/DL (ref 70–99)
GLUCOSE BLD-MCNC: 140 MG/DL (ref 70–99)
GLUCOSE BLD-MCNC: 142 MG/DL (ref 70–99)
GLUCOSE BLD-MCNC: 192 MG/DL (ref 70–99)
HCT VFR BLD CALC: 36 % (ref 37–47)
HEMOGLOBIN: 11.2 G/DL (ref 12–16)
IMMATURE GRANULOCYTES #: 0.1 K/UL
LYMPHOCYTES ABSOLUTE: 0.9 K/UL (ref 1.1–4.5)
LYMPHOCYTES RELATIVE PERCENT: 6.4 % (ref 20–40)
MCH RBC QN AUTO: 30.4 PG (ref 27–31)
MCHC RBC AUTO-ENTMCNC: 31.1 G/DL (ref 33–37)
MCV RBC AUTO: 97.8 FL (ref 81–99)
MONOCYTES ABSOLUTE: 1.1 K/UL (ref 0–0.9)
MONOCYTES RELATIVE PERCENT: 7.8 % (ref 0–10)
NEUTROPHILS ABSOLUTE: 11.7 K/UL (ref 1.5–7.5)
NEUTROPHILS RELATIVE PERCENT: 83.8 % (ref 50–65)
PDW BLD-RTO: 12.6 % (ref 11.5–14.5)
PERFORMED ON: ABNORMAL
PLATELET # BLD: 344 K/UL (ref 130–400)
PMV BLD AUTO: 10.4 FL (ref 9.4–12.3)
POTASSIUM REFLEX MAGNESIUM: 4.1 MMOL/L (ref 3.5–5)
RBC # BLD: 3.68 M/UL (ref 4.2–5.4)
SODIUM BLD-SCNC: 136 MMOL/L (ref 136–145)
TOTAL PROTEIN: 5.6 G/DL (ref 6.6–8.7)
WBC # BLD: 14 K/UL (ref 4.8–10.8)

## 2021-12-25 PROCEDURE — 1210000000 HC MED SURG R&B

## 2021-12-25 PROCEDURE — 2580000003 HC RX 258: Performed by: HOSPITALIST

## 2021-12-25 PROCEDURE — 36415 COLL VENOUS BLD VENIPUNCTURE: CPT

## 2021-12-25 PROCEDURE — 2500000003 HC RX 250 WO HCPCS: Performed by: STUDENT IN AN ORGANIZED HEALTH CARE EDUCATION/TRAINING PROGRAM

## 2021-12-25 PROCEDURE — 2700000000 HC OXYGEN THERAPY PER DAY

## 2021-12-25 PROCEDURE — 80053 COMPREHEN METABOLIC PANEL: CPT

## 2021-12-25 PROCEDURE — 6360000002 HC RX W HCPCS: Performed by: STUDENT IN AN ORGANIZED HEALTH CARE EDUCATION/TRAINING PROGRAM

## 2021-12-25 PROCEDURE — 85025 COMPLETE CBC W/AUTO DIFF WBC: CPT

## 2021-12-25 PROCEDURE — 94660 CPAP INITIATION&MGMT: CPT

## 2021-12-25 PROCEDURE — 71045 X-RAY EXAM CHEST 1 VIEW: CPT

## 2021-12-25 PROCEDURE — 6370000000 HC RX 637 (ALT 250 FOR IP): Performed by: HOSPITALIST

## 2021-12-25 PROCEDURE — 82947 ASSAY GLUCOSE BLOOD QUANT: CPT

## 2021-12-25 PROCEDURE — 6370000000 HC RX 637 (ALT 250 FOR IP): Performed by: INTERNAL MEDICINE

## 2021-12-25 PROCEDURE — 94761 N-INVAS EAR/PLS OXIMETRY MLT: CPT

## 2021-12-25 PROCEDURE — 6370000000 HC RX 637 (ALT 250 FOR IP): Performed by: NURSE PRACTITIONER

## 2021-12-25 PROCEDURE — 6370000000 HC RX 637 (ALT 250 FOR IP): Performed by: STUDENT IN AN ORGANIZED HEALTH CARE EDUCATION/TRAINING PROGRAM

## 2021-12-25 RX ORDER — FLUVOXAMINE MALEATE 50 MG/1
25 TABLET, COATED ORAL 2 TIMES DAILY
Status: DISCONTINUED | OUTPATIENT
Start: 2021-12-25 | End: 2021-12-26

## 2021-12-25 RX ADMIN — FLUVOXAMINE MALEATE 50 MG: 50 TABLET, COATED ORAL at 09:00

## 2021-12-25 RX ADMIN — OXYCODONE HYDROCHLORIDE AND ACETAMINOPHEN 1000 MG: 500 TABLET ORAL at 09:00

## 2021-12-25 RX ADMIN — DILTIAZEM HYDROCHLORIDE 30 MG: 30 TABLET, FILM COATED ORAL at 17:32

## 2021-12-25 RX ADMIN — SODIUM CHLORIDE: 9 INJECTION, SOLUTION INTRAVENOUS at 15:24

## 2021-12-25 RX ADMIN — ACETAMINOPHEN 650 MG: 325 TABLET ORAL at 20:05

## 2021-12-25 RX ADMIN — INSULIN GLARGINE 20 UNITS: 100 INJECTION, SOLUTION SUBCUTANEOUS at 21:39

## 2021-12-25 RX ADMIN — BUDESONIDE AND FORMOTEROL FUMARATE DIHYDRATE 2 PUFF: 160; 4.5 AEROSOL RESPIRATORY (INHALATION) at 09:01

## 2021-12-25 RX ADMIN — TIMOLOL MALEATE 1 DROP: 5 SOLUTION OPHTHALMIC at 09:11

## 2021-12-25 RX ADMIN — FLUVOXAMINE MALEATE 25 MG: 50 TABLET, COATED ORAL at 20:06

## 2021-12-25 RX ADMIN — ENOXAPARIN SODIUM 40 MG: 100 INJECTION SUBCUTANEOUS at 20:04

## 2021-12-25 RX ADMIN — Medication 30 MG: at 20:07

## 2021-12-25 RX ADMIN — DILTIAZEM HYDROCHLORIDE 30 MG: 30 TABLET, FILM COATED ORAL at 13:11

## 2021-12-25 RX ADMIN — ALBUTEROL SULFATE 2 PUFF: 90 AEROSOL, METERED RESPIRATORY (INHALATION) at 17:32

## 2021-12-25 RX ADMIN — GUAIFENESIN 600 MG: 600 TABLET, EXTENDED RELEASE ORAL at 09:00

## 2021-12-25 RX ADMIN — TIMOLOL MALEATE 1 DROP: 5 SOLUTION OPHTHALMIC at 20:04

## 2021-12-25 RX ADMIN — PRAVASTATIN SODIUM 40 MG: 20 TABLET ORAL at 08:59

## 2021-12-25 RX ADMIN — ENOXAPARIN SODIUM 40 MG: 100 INJECTION SUBCUTANEOUS at 09:00

## 2021-12-25 RX ADMIN — BUDESONIDE AND FORMOTEROL FUMARATE DIHYDRATE 2 PUFF: 160; 4.5 AEROSOL RESPIRATORY (INHALATION) at 20:05

## 2021-12-25 RX ADMIN — INSULIN LISPRO 3 UNITS: 100 INJECTION, SOLUTION INTRAVENOUS; SUBCUTANEOUS at 13:12

## 2021-12-25 RX ADMIN — INSULIN GLARGINE 20 UNITS: 100 INJECTION, SOLUTION SUBCUTANEOUS at 09:17

## 2021-12-25 RX ADMIN — ONDANSETRON HYDROCHLORIDE 8 MG: 4 TABLET, FILM COATED ORAL at 08:59

## 2021-12-25 RX ADMIN — Medication 2000 UNITS: at 08:59

## 2021-12-25 RX ADMIN — Medication 1 TABLET: at 09:00

## 2021-12-25 RX ADMIN — Medication 30 MG: at 08:59

## 2021-12-25 RX ADMIN — FAMOTIDINE 40 MG: 10 INJECTION, SOLUTION INTRAVENOUS at 09:00

## 2021-12-25 RX ADMIN — GUAIFENESIN 600 MG: 600 TABLET, EXTENDED RELEASE ORAL at 20:05

## 2021-12-25 RX ADMIN — ACETAMINOPHEN 650 MG: 325 TABLET ORAL at 08:59

## 2021-12-25 RX ADMIN — INSULIN LISPRO 3 UNITS: 100 INJECTION, SOLUTION INTRAVENOUS; SUBCUTANEOUS at 21:14

## 2021-12-25 RX ADMIN — Medication 1 LOZENGE: at 20:04

## 2021-12-25 RX ADMIN — ONDANSETRON HYDROCHLORIDE 8 MG: 4 TABLET, FILM COATED ORAL at 20:06

## 2021-12-25 RX ADMIN — ALBUTEROL SULFATE 2 PUFF: 90 AEROSOL, METERED RESPIRATORY (INHALATION) at 20:08

## 2021-12-25 RX ADMIN — DILTIAZEM HYDROCHLORIDE 30 MG: 30 TABLET, FILM COATED ORAL at 09:16

## 2021-12-25 RX ADMIN — ALBUTEROL SULFATE 2 PUFF: 90 AEROSOL, METERED RESPIRATORY (INHALATION) at 13:11

## 2021-12-25 RX ADMIN — CETIRIZINE HYDROCHLORIDE 10 MG: 10 TABLET, FILM COATED ORAL at 09:01

## 2021-12-25 RX ADMIN — ALBUTEROL SULFATE 2 PUFF: 90 AEROSOL, METERED RESPIRATORY (INHALATION) at 09:01

## 2021-12-25 RX ADMIN — ZINC SULFATE 220 MG (50 MG) CAPSULE 50 MG: CAPSULE at 09:01

## 2021-12-25 RX ADMIN — LEVOTHYROXINE SODIUM 100 MCG: 100 TABLET ORAL at 09:01

## 2021-12-25 RX ADMIN — Medication 10 MG: at 20:05

## 2021-12-25 ASSESSMENT — ENCOUNTER SYMPTOMS
BACK PAIN: 0
COLOR CHANGE: 0
RHINORRHEA: 0
DIARRHEA: 1
NAUSEA: 0
SHORTNESS OF BREATH: 1
VOICE CHANGE: 0
COUGH: 0
CONSTIPATION: 0
VOMITING: 0

## 2021-12-25 ASSESSMENT — PAIN SCALES - GENERAL
PAINLEVEL_OUTOF10: 3
PAINLEVEL_OUTOF10: 3
PAINLEVEL_OUTOF10: 0

## 2021-12-25 NOTE — PROGRESS NOTES
Hospitalist Progress Note    Patient:  Joe Bashir  YOB: 1965  Date of Service: 12/25/2021  MRN: [id number]   Acct: [de-identified]   Primary Care Physician: Santo Vera MD  Advance Directive: Partial Code  Admit Date: 12/9/2021       Hospital Day: 16  Referring Provider: Mickey Lacy DO    Patient Seen, Chart, Consults, Notes, Labs, Radiology studies reviewed. Subjective:  Joe Bashir is a 49 y.o. female  whom we are following for COVID-19 pneumonia, hypoxemic respiratory failure. She is very weak. She had multiple stools yesterday. Breathing is better. She was able to come off of BiPAP. Flow rate requirement is also decreasing.     Allergies:  Latex, Penicillins, Codeine, and Tape [adhesive tape]    Medicines:  Current Facility-Administered Medications   Medication Dose Route Frequency Provider Last Rate Last Admin    fluvoxaMINE (LUVOX) tablet 25 mg  25 mg Oral BID Mickey Lacy DO        0.9 % sodium chloride infusion   IntraVENous Continuous Dennie Reek, MD 75 mL/hr at 12/25/21 1524 New Bag at 12/25/21 1524    dilTIAZem (CARDIZEM) tablet 30 mg  30 mg Oral 4 times per day Dennie Reek, MD   30 mg at 12/25/21 1311    ALPRAZolam (XANAX) tablet 0.5 mg  0.5 mg Oral Q6H PRN Dennie Reek, MD   0.5 mg at 12/23/21 0615    insulin glargine (LANTUS) injection vial 20 Units  20 Units SubCUTAneous BID Mickey Lacy DO   20 Units at 12/25/21 9134    promethazine (PHENERGAN) injection 6.25 mg  6.25 mg IntraMUSCular Q6H PRN Dennie Reek, MD   6.25 mg at 12/22/21 3956    benzonatate (TESSALON) capsule 100 mg  100 mg Oral TID PRN Mickey Lacy DO   100 mg at 12/22/21 1415    guaiFENesin [facility]) extended release tablet 600 mg  600 mg Oral BID Mickey Lacy DO   600 mg at 12/25/21 0900    opium-belladonna (B&O SUPPRETTES) 16.2-60 MG suppository 60 mg  60 mg Rectal Q8H PRN DO Chrissie Crain Anderson dextromethorphan (DELSYM) 30 MG/5ML extended release liquid 30 mg  30 mg Oral BID PRN Ryan Ege, DO   30 mg at 12/25/21 0859    insulin lispro (HUMALOG) injection vial 0-18 Units  0-18 Units SubCUTAneous Q6H Mehrdad Mena MD   3 Units at 12/25/21 1312    melatonin disintegrating tablet 10 mg  10 mg Oral Nightly Mehrdad Mena MD   10 mg at 12/24/21 2213    Benzocaine-Menthol (CEPACOL) 1 lozenge  1 lozenge Oral Q2H PRN Mehrdad Mena MD   1 lozenge at 12/20/21 0344    sodium chloride (OCEAN, BABY AYR) 0.65 % nasal spray 1 spray  1 spray Each Nostril PRN Mehrdad Mena MD   1 spray at 12/13/21 1727    dexmedetomidine (PRECEDEX) 400 mcg in sodium chloride 0.9 % 100 mL infusion  0.1-1.4 mcg/kg/hr IntraVENous Continuous Mehrdad Mena MD   Stopped at 12/20/21 0332    enoxaparin (LOVENOX) injection 40 mg  40 mg SubCUTAneous BID Mehrdad Mena MD   40 mg at 12/25/21 0900    budesonide-formoterol (SYMBICORT) 160-4.5 MCG/ACT inhaler 2 puff  2 puff Inhalation BID Mehrdad Mena MD   2 puff at 12/25/21 0901    famotidine (PEPCID) injection 40 mg  40 mg IntraVENous Daily Mehrdad Mena MD   40 mg at 12/25/21 0900    Vitamin D (CHOLECALCIFEROL) tablet 2,000 Units  2,000 Units Oral Daily Mehrdad Mena MD   2,000 Units at 12/25/21 0859    LORazepam (ATIVAN) injection 0.5 mg  0.5 mg IntraVENous Q6H PRN Mehrdad Mena MD   0.5 mg at 12/16/21 1118    albuterol sulfate  (90 Base) MCG/ACT inhaler 2 puff  2 puff Inhalation 4x Daily Marylene Brittle, APRN   2 puff at 12/25/21 1311    calcium-cholecalciferol 500-200 MG-UNIT per tablet 1 tablet  1 tablet Oral Daily Marylene Brittle, APRN   1 tablet at 12/25/21 0900    HYDROcodone-acetaminophen (NORCO) 7.5-325 MG per tablet 1 tablet  1 tablet Oral Q6H PRN Marylene Brittle, APRN   1 tablet at 12/23/21 0615    levothyroxine (SYNTHROID) tablet 100 mcg  100 mcg Oral Daily Marylene Brittle, APRN   100 mcg at 12/25/21 0901    timolol (TIMOPTIC) 0.5 % ophthalmic solution 1 drop  1 drop Both Eyes BID Desi Oddi, APRN   1 drop at 12/25/21 0911    pravastatin (PRAVACHOL) tablet 40 mg  40 mg Oral Daily Desi Oddi, APRN   40 mg at 12/25/21 0859    polyethylene glycol (GLYCOLAX) packet 17 g  17 g Oral Daily Desi Oddi, APRN   17 g at 12/20/21 0749    ondansetron (ZOFRAN) tablet 8 mg  8 mg Oral Q8H PRN Desi Oddi, APRN   8 mg at 12/25/21 6382    meclizine (ANTIVERT) tablet 25 mg  25 mg Oral TID PRN Desi Oddi, APRN   25 mg at 12/10/21 0301    sodium chloride flush 0.9 % injection 5-40 mL  5-40 mL IntraVENous 2 times per day Desi Oddi, APRN   10 mL at 12/24/21 0900    sodium chloride flush 0.9 % injection 5-40 mL  5-40 mL IntraVENous PRN Desi Oddi, APRN   10 mL at 12/15/21 1155    0.9 % sodium chloride infusion  25 mL IntraVENous PRN Desi Oddi, APRN   Stopped at 12/11/21 2130    polyethylene glycol (GLYCOLAX) packet 17 g  17 g Oral Daily PRN Desi Oddi, APRN   17 g at 12/17/21 2112    acetaminophen (TYLENOL) tablet 650 mg  650 mg Oral Q6H PRN Desi Oddi, APRN   650 mg at 12/25/21 8435    Or    acetaminophen (TYLENOL) suppository 650 mg  650 mg Rectal Q6H PRN Desi Oddi, APRN        zinc sulfate (ZINCATE) capsule 50 mg  50 mg Oral Daily Desi Oddi, APRN   50 mg at 12/25/21 0901    glucose (GLUTOSE) 40 % oral gel 15 g  15 g Oral PRN Desi Oddi, APRN        dextrose 50 % IV solution  12.5 g IntraVENous PRN Desi Oddi, APRN        glucagon (rDNA) injection 1 mg  1 mg IntraMUSCular PRN Desi Oddi, APRN        dextrose 5 % solution  100 mL/hr IntraVENous PRN Desi Oddi, APRN           Past Medical History:  Past Medical History:   Diagnosis Date    Anemia     Asthma     Bronchitis, acute     [facility]     Diabetes [facility]     Glaucoma     Hyperlipidemia     Hypertension     Hyperthyroidism     Morbid obesity ([facility] [address] [facility]     Sleep apnea     uses c-pap       Past Surgical History:  Past Surgical History:   Procedure Laterality Date    AXILLARY SURGERY Bilateral     excision and drainage of staph abscesses    BACK SURGERY  10/2004    Dr. Lenora Pierre, [place] L4, L5    CARPAL TUNNEL RELEASE Right     CHOLECYSTECTOMY      COLONOSCOPY  03/20/2007    Dr Eli Goel 9/11/2020    Dr Jones Smaller yr recall    DILATION AND CURETTAGE OF UTERUS N/A 1/29/2021    DILATATION AND CURETTAGE HYSTEROSCOPY Sandraeliu Agarwal performed by Lyric Ness MD at [address] [facility] FLX DX W/COLLJ [address] WHEN PFRMD N/A 2/8/2017    Dr Vladimir Diop, actively inflamed internal hemorrhoids, residulal liquid and some solid food particles in the colon-5 yr recall due to prep    STOMACH SURGERY  01/20/2014    Dr. Cora Garcia ( gastric sleeve)    UPPER GASTROINTESTINAL ENDOSCOPY  10/30/2012    Dr Kayleigh Cook esophagitis, gastritis, Iliana (-)    UPPER GASTROINTESTINAL ENDOSCOPY  01/23/2008    Dr Linh Weinberg esophagitis    UPPER GASTROINTESTINAL ENDOSCOPY N/A 9/11/2020    Dr Theoplis Brunner       Family History  Family History   Problem Relation Age of Onset    Lung Cancer Mother     Kidney Cancer Mother     Lung Cancer Maternal Grandmother     Colon Cancer Maternal Grandfather     Liver Cancer Neg Hx     Liver Disease Neg Hx     Stomach Cancer Neg Hx     Rectal Cancer Neg Hx     Esophageal Cancer Neg Hx     Colon Polyps Neg Hx        Social History  Social History     Socioeconomic History    Marital status:      Spouse name: Not on file    Number of children: Not on file    Years of education: Not on file    Highest education level: Not on file   Occupational History    Not on file   Tobacco Use    Smoking status: Former Smoker     Packs/day: 3.00     Years: 26.00     Pack years: 78.00     Types: Cigarettes     Quit date: 5/6/2011     Years since quitting: 10.6    Smokeless tobacco: Never Used   Vaping Use    Vaping Use: Never used   Substance and Sexual Activity    Alcohol use: No    Drug use: No    Sexual activity: Yes     Partners: Male   Other Topics Concern    Not on file   Social History Narrative    Not on file     Social Determinants of Health     Financial Resource Strain:     Difficulty of Paying Living Expenses: Not on file   Food Insecurity:     Worried About Running Out of Food in the Last Year: Not on file    Tiffanie of Food in the Last Year: Not on file   Transportation Needs:     Lack of Transportation (Medical): Not on file    Lack of Transportation (Non-Medical): Not on file   Physical Activity:     Days of Exercise per Week: Not on file    Minutes of Exercise per Session: Not on file   Stress:     Feeling of Stress : Not on file   Social Connections:     Frequency of Communication with Friends and Family: Not on file    Frequency of Social Gatherings with Friends and Family: Not on file    Attends Rastafarian Services: Not on file    Active Member of [address] [place] [address] or Organizations: Not on file    Attends Club or Organization Meetings: Not on file    Marital Status: Not on file   Intimate Partner Violence:     Fear of Current or Ex-Partner: Not on file    Emotionally Abused: Not on file    Physically Abused: Not on file    Sexually Abused: Not on file   Housing Stability:     Unable to Pay for Housing in the Last Year: Not on file    Number of [place] in the Last Year: Not on file    Unstable Housing in the Last Year: Not on file         Review of Systems:    Review of Systems   Constitutional: Positive for activity change and fatigue. HENT: Negative for rhinorrhea and voice change. Eyes: Negative for visual disturbance. Respiratory: Positive for shortness of breath. Negative for cough. Cardiovascular: Negative for chest pain and leg swelling. Gastrointestinal: Positive for diarrhea. Negative for constipation, nausea and vomiting. Endocrine: Negative for polyuria. Genitourinary: Negative for difficulty urinating and dysuria. Musculoskeletal: Positive for gait problem. Negative for arthralgias and back pain. Skin: Negative for color change. Allergic/Immunologic: Negative for immunocompromised state. Neurological: Positive for weakness. Negative for dizziness and headaches. Psychiatric/Behavioral: Negative for confusion. Objective:  Blood pressure 121/75, pulse 107, temperature 97.7 °F (36.5 °C), temperature source Temporal, resp. rate 24, height 5' 5\" (1.651 m), weight (!) 330 lb (149.7 kg), SpO2 93 %, not currently breastfeeding. No intake or output data in the 24 hours ending 12/25/21 1529    Physical Exam  Vitals and nursing note reviewed. HENT:      Head: Normocephalic and atraumatic. Right Ear: External ear normal.      Left Ear: External ear normal.      Nose: Nose normal.      Mouth/Throat:      Mouth: Mucous membranes are moist.   Eyes:      Conjunctiva/sclera: Conjunctivae normal.      Pupils: Pupils are equal, round, and reactive to light. Cardiovascular:      Rate and Rhythm: Normal rate and regular rhythm. Heart sounds: Normal heart sounds. Pulmonary:      Effort: Pulmonary effort is normal.      Breath sounds: Normal breath sounds. Abdominal:      General: Abdomen is flat. Palpations: Abdomen is soft. Musculoskeletal:         General: Normal range of motion. Cervical back: Neck supple. No rigidity. No muscular tenderness. Skin:     General: Skin is warm and dry. Neurological:      Mental Status: She is alert and oriented to person, place, and time.    Psychiatric:         Mood and Affect: Mood normal.         Labs:  BMP:   Recent Labs     12/23/21  0256 12/24/21  0512 12/25/21  0323    135* 136   K 5.1* 4.4 4.1   CL 98 98 104   CO2 28 25 25   BUN 46* 50* 22*   CREATININE 1.1* 1.2* 0.7   CALCIUM 9.1 8.5* 8.0*     CBC:   Recent Labs     12/23/21  0256 12/24/21  2170 12/25/21  0323   WBC 20.6* 18.7* 14.0*   HGB 14.2 12.7 11.2*   HCT 45.4 41.2 36.0*   MCV 96.4 96.3 97.8   * 472* 344     LIVER PROFILE:   Recent Labs     12/23/21  0256 12/24/21  0512 12/25/21  0323   AST 26 19 14   ALT 40* 38* 25   BILITOT 0.3 0.4 0.3   ALKPHOS 94 99 89     PT/INR: No results for input(s): PROTIME, INR in the last 72 hours. APTT: No results for input(s): APTT in the last 72 hours. BNP:  No results for input(s): BNP in the last 72 hours. Ionized Calcium:No results for input(s): IONCA in the last 72 hours. Magnesium:No results for input(s): MG in the last 72 hours. Phosphorus:No results for input(s): PHOS in the last 72 hours. HgbA1C: No results for input(s): LABA1C in the last 72 hours. Hepatic:   Recent Labs     12/23/21  0256 12/24/21  0512 12/25/21  0323   ALKPHOS 94 99 89   ALT 40* 38* 25   AST 26 19 14   PROT 5.9* 6.2* 5.6*   BILITOT 0.3 0.4 0.3   LABALBU 3.4* 2.8* 2.5*     Lactic Acid: No results for input(s): LACTA in the last 72 hours. Troponin: No results for input(s): CKTOTAL, CKMB, TROPONINT in the last 72 hours. ABGs: No results for input(s): PH, PCO2, PO2, HCO3, O2SAT in the last 72 hours. CRP:  No results for input(s): CRP in the last 72 hours. Sed Rate:  No results for input(s): SEDRATE in the last 72 hours. Cultures:   No results for input(s): CULTURE in the last 72 hours. No results for input(s): BC, Kwesi Cohens in the last 72 hours. No results for input(s): CXSURG in the last 72 hours. Radiology reports as per the Radiologist  Radiology: XR CHEST PORTABLE    Result Date: 12/9/2021  EXAM: XR CHEST PORTABLE INDICATION: Shortness of breath, COVID positive COMPARISON: None available. FINDINGS: Cardiac silhouette is normal in size. No pleural effusion or visible pneumothorax. Patchy bilateral airspace opacity is present. Central vascular congestion is noted. No acute osseous finding. Bilateral patchy airspace opacity. Central vascular congestion.  Differential

## 2021-12-25 NOTE — PROGRESS NOTES
Spoke with patient regarding code status. Would like to have chest compressions and code drugs, and any other life saving measures except intubation/ventilation. Patient wishes to be changed to DNI.  Updated MD. Electronically signed by Chava Bui RN on 12/25/2021 at 2:26 PM

## 2021-12-26 LAB
ALBUMIN SERPL-MCNC: 2.5 G/DL (ref 3.5–5.2)
ALP BLD-CCNC: 99 U/L (ref 35–104)
ALT SERPL-CCNC: 25 U/L (ref 5–33)
ANION GAP SERPL CALCULATED.3IONS-SCNC: 10 MMOL/L (ref 7–19)
AST SERPL-CCNC: 20 U/L (ref 5–32)
BASOPHILS ABSOLUTE: 0 K/UL (ref 0–0.2)
BASOPHILS RELATIVE PERCENT: 0.1 % (ref 0–1)
BILIRUB SERPL-MCNC: <0.2 MG/DL (ref 0.2–1.2)
BUN BLDV-MCNC: 10 MG/DL (ref 6–20)
CALCIUM SERPL-MCNC: 8.5 MG/DL (ref 8.6–10)
CHLORIDE BLD-SCNC: 104 MMOL/L (ref 98–111)
CO2: 24 MMOL/L (ref 22–29)
CREAT SERPL-MCNC: 0.7 MG/DL (ref 0.5–0.9)
EOSINOPHILS ABSOLUTE: 0.2 K/UL (ref 0–0.6)
EOSINOPHILS RELATIVE PERCENT: 1.4 % (ref 0–5)
GFR AFRICAN AMERICAN: >59
GFR NON-AFRICAN AMERICAN: >60
GLUCOSE BLD-MCNC: 119 MG/DL (ref 70–99)
GLUCOSE BLD-MCNC: 136 MG/DL (ref 70–99)
GLUCOSE BLD-MCNC: 142 MG/DL (ref 70–99)
GLUCOSE BLD-MCNC: 163 MG/DL (ref 74–109)
GLUCOSE BLD-MCNC: 194 MG/DL (ref 70–99)
GLUCOSE BLD-MCNC: 90 MG/DL (ref 70–99)
HCT VFR BLD CALC: 35.8 % (ref 37–47)
HEMOGLOBIN: 10.8 G/DL (ref 12–16)
IMMATURE GRANULOCYTES #: 0.1 K/UL
LYMPHOCYTES ABSOLUTE: 0.9 K/UL (ref 1.1–4.5)
LYMPHOCYTES RELATIVE PERCENT: 8.3 % (ref 20–40)
MCH RBC QN AUTO: 30.2 PG (ref 27–31)
MCHC RBC AUTO-ENTMCNC: 30.2 G/DL (ref 33–37)
MCV RBC AUTO: 100 FL (ref 81–99)
MONOCYTES ABSOLUTE: 1 K/UL (ref 0–0.9)
MONOCYTES RELATIVE PERCENT: 9.2 % (ref 0–10)
NEUTROPHILS ABSOLUTE: 8.8 K/UL (ref 1.5–7.5)
NEUTROPHILS RELATIVE PERCENT: 79.8 % (ref 50–65)
PDW BLD-RTO: 12.9 % (ref 11.5–14.5)
PERFORMED ON: ABNORMAL
PERFORMED ON: NORMAL
PLATELET # BLD: 313 K/UL (ref 130–400)
PMV BLD AUTO: 10.8 FL (ref 9.4–12.3)
POTASSIUM REFLEX MAGNESIUM: 4.3 MMOL/L (ref 3.5–5)
RBC # BLD: 3.58 M/UL (ref 4.2–5.4)
SODIUM BLD-SCNC: 138 MMOL/L (ref 136–145)
TOTAL PROTEIN: 6.1 G/DL (ref 6.6–8.7)
WBC # BLD: 11.1 K/UL (ref 4.8–10.8)

## 2021-12-26 PROCEDURE — 6370000000 HC RX 637 (ALT 250 FOR IP): Performed by: INTERNAL MEDICINE

## 2021-12-26 PROCEDURE — 6370000000 HC RX 637 (ALT 250 FOR IP): Performed by: NURSE PRACTITIONER

## 2021-12-26 PROCEDURE — 85025 COMPLETE CBC W/AUTO DIFF WBC: CPT

## 2021-12-26 PROCEDURE — 94761 N-INVAS EAR/PLS OXIMETRY MLT: CPT

## 2021-12-26 PROCEDURE — 6360000002 HC RX W HCPCS: Performed by: STUDENT IN AN ORGANIZED HEALTH CARE EDUCATION/TRAINING PROGRAM

## 2021-12-26 PROCEDURE — 6370000000 HC RX 637 (ALT 250 FOR IP): Performed by: HOSPITALIST

## 2021-12-26 PROCEDURE — 6370000000 HC RX 637 (ALT 250 FOR IP): Performed by: STUDENT IN AN ORGANIZED HEALTH CARE EDUCATION/TRAINING PROGRAM

## 2021-12-26 PROCEDURE — 36415 COLL VENOUS BLD VENIPUNCTURE: CPT

## 2021-12-26 PROCEDURE — 2700000000 HC OXYGEN THERAPY PER DAY

## 2021-12-26 PROCEDURE — 94660 CPAP INITIATION&MGMT: CPT

## 2021-12-26 PROCEDURE — 2580000003 HC RX 258: Performed by: HOSPITALIST

## 2021-12-26 PROCEDURE — 82947 ASSAY GLUCOSE BLOOD QUANT: CPT

## 2021-12-26 PROCEDURE — 1210000000 HC MED SURG R&B

## 2021-12-26 PROCEDURE — 80053 COMPREHEN METABOLIC PANEL: CPT

## 2021-12-26 RX ORDER — LOPERAMIDE HYDROCHLORIDE 2 MG/1
2 CAPSULE ORAL 4 TIMES DAILY PRN
Status: DISCONTINUED | OUTPATIENT
Start: 2021-12-26 | End: 2022-01-04 | Stop reason: HOSPADM

## 2021-12-26 RX ADMIN — ALBUTEROL SULFATE 2 PUFF: 90 AEROSOL, METERED RESPIRATORY (INHALATION) at 07:56

## 2021-12-26 RX ADMIN — ENOXAPARIN SODIUM 40 MG: 100 INJECTION SUBCUTANEOUS at 09:04

## 2021-12-26 RX ADMIN — ALPRAZOLAM 0.5 MG: 0.5 TABLET ORAL at 14:31

## 2021-12-26 RX ADMIN — ACETAMINOPHEN 650 MG: 325 TABLET ORAL at 14:31

## 2021-12-26 RX ADMIN — ALPRAZOLAM 0.5 MG: 0.5 TABLET ORAL at 07:55

## 2021-12-26 RX ADMIN — INSULIN LISPRO 3 UNITS: 100 INJECTION, SOLUTION INTRAVENOUS; SUBCUTANEOUS at 02:15

## 2021-12-26 RX ADMIN — LEVOTHYROXINE SODIUM 100 MCG: 100 TABLET ORAL at 06:28

## 2021-12-26 RX ADMIN — TIMOLOL MALEATE 1 DROP: 5 SOLUTION OPHTHALMIC at 07:57

## 2021-12-26 RX ADMIN — ALPRAZOLAM 0.5 MG: 0.5 TABLET ORAL at 20:24

## 2021-12-26 RX ADMIN — BUDESONIDE AND FORMOTEROL FUMARATE DIHYDRATE 2 PUFF: 160; 4.5 AEROSOL RESPIRATORY (INHALATION) at 07:54

## 2021-12-26 RX ADMIN — ALBUTEROL SULFATE 2 PUFF: 90 AEROSOL, METERED RESPIRATORY (INHALATION) at 20:23

## 2021-12-26 RX ADMIN — ZINC SULFATE 220 MG (50 MG) CAPSULE 50 MG: CAPSULE at 07:55

## 2021-12-26 RX ADMIN — PRAVASTATIN SODIUM 40 MG: 20 TABLET ORAL at 07:55

## 2021-12-26 RX ADMIN — FLUVOXAMINE MALEATE 25 MG: 50 TABLET, COATED ORAL at 07:56

## 2021-12-26 RX ADMIN — ALBUTEROL SULFATE 2 PUFF: 90 AEROSOL, METERED RESPIRATORY (INHALATION) at 14:32

## 2021-12-26 RX ADMIN — INSULIN GLARGINE 20 UNITS: 100 INJECTION, SOLUTION SUBCUTANEOUS at 07:58

## 2021-12-26 RX ADMIN — TIMOLOL MALEATE 1 DROP: 5 SOLUTION OPHTHALMIC at 20:25

## 2021-12-26 RX ADMIN — Medication 30 MG: at 14:31

## 2021-12-26 RX ADMIN — SODIUM CHLORIDE: 9 INJECTION, SOLUTION INTRAVENOUS at 20:23

## 2021-12-26 RX ADMIN — ONDANSETRON HYDROCHLORIDE 8 MG: 4 TABLET, FILM COATED ORAL at 20:30

## 2021-12-26 RX ADMIN — ACETAMINOPHEN 650 MG: 325 TABLET ORAL at 20:24

## 2021-12-26 RX ADMIN — DILTIAZEM HYDROCHLORIDE 30 MG: 30 TABLET, FILM COATED ORAL at 06:28

## 2021-12-26 RX ADMIN — BENZONATATE 100 MG: 100 CAPSULE ORAL at 07:55

## 2021-12-26 RX ADMIN — ACETAMINOPHEN 650 MG: 325 TABLET ORAL at 02:15

## 2021-12-26 RX ADMIN — Medication 1 TABLET: at 07:55

## 2021-12-26 RX ADMIN — Medication 10 MG: at 20:24

## 2021-12-26 RX ADMIN — DILTIAZEM HYDROCHLORIDE 30 MG: 30 TABLET, FILM COATED ORAL at 00:53

## 2021-12-26 RX ADMIN — Medication 30 MG: at 20:24

## 2021-12-26 RX ADMIN — Medication 2000 UNITS: at 07:56

## 2021-12-26 RX ADMIN — INSULIN GLARGINE 20 UNITS: 100 INJECTION, SOLUTION SUBCUTANEOUS at 22:38

## 2021-12-26 RX ADMIN — ALBUTEROL SULFATE 2 PUFF: 90 AEROSOL, METERED RESPIRATORY (INHALATION) at 18:15

## 2021-12-26 RX ADMIN — GUAIFENESIN 600 MG: 600 TABLET, EXTENDED RELEASE ORAL at 07:58

## 2021-12-26 RX ADMIN — ENOXAPARIN SODIUM 40 MG: 100 INJECTION SUBCUTANEOUS at 20:25

## 2021-12-26 RX ADMIN — SODIUM CHLORIDE: 9 INJECTION, SOLUTION INTRAVENOUS at 06:27

## 2021-12-26 ASSESSMENT — PAIN DESCRIPTION - PAIN TYPE: TYPE: ACUTE PAIN

## 2021-12-26 ASSESSMENT — PAIN SCALES - GENERAL
PAINLEVEL_OUTOF10: 0
PAINLEVEL_OUTOF10: 6
PAINLEVEL_OUTOF10: 3
PAINLEVEL_OUTOF10: 2
PAINLEVEL_OUTOF10: 0
PAINLEVEL_OUTOF10: 3

## 2021-12-26 ASSESSMENT — ENCOUNTER SYMPTOMS
COLOR CHANGE: 0
BACK PAIN: 0
NAUSEA: 0
DIARRHEA: 1
VOMITING: 0
RHINORRHEA: 0
COUGH: 1
CONSTIPATION: 0
VOICE CHANGE: 0
SHORTNESS OF BREATH: 1

## 2021-12-26 ASSESSMENT — PAIN DESCRIPTION - FREQUENCY: FREQUENCY: CONTINUOUS

## 2021-12-26 ASSESSMENT — PAIN - FUNCTIONAL ASSESSMENT: PAIN_FUNCTIONAL_ASSESSMENT: PREVENTS OR INTERFERES WITH MANY ACTIVE NOT PASSIVE ACTIVITIES

## 2021-12-26 ASSESSMENT — PAIN DESCRIPTION - ORIENTATION: ORIENTATION: RIGHT;LEFT

## 2021-12-26 ASSESSMENT — PAIN DESCRIPTION - ONSET: ONSET: GRADUAL

## 2021-12-26 ASSESSMENT — PAIN DESCRIPTION - PROGRESSION: CLINICAL_PROGRESSION: NOT CHANGED

## 2021-12-26 ASSESSMENT — PAIN DESCRIPTION - LOCATION: LOCATION: EAR

## 2021-12-26 ASSESSMENT — PAIN DESCRIPTION - DESCRIPTORS: DESCRIPTORS: ACHING

## 2021-12-26 NOTE — PROGRESS NOTES
Hospitalist Progress Note    Patient:  Sheeba Solis  YOB: 1965  Date of Service: 12/26/2021  MRN: [id number]   Acct: [de-identified]   Primary Care Physician: Daniel Meza MD  Advance Directive: Partial Code  Admit Date: 12/9/2021       Hospital Day: 17  Referring Provider: Beto Gabriel DO    Patient Seen, Chart, Consults, Notes, Labs, Radiology studies reviewed. Subjective:  Sheeba Solis is a 49 y.o. female  whom we are following for COVID 19 pneumonia, hypoxemic respiratory failure. She is complaining of persistent diarrhea. Still having quite a bit of coughing. No change in her O2 flow rate. Glucose is much improved.     Allergies:  Latex, Penicillins, Codeine, and Tape [adhesive tape]    Medicines:  Current Facility-Administered Medications   Medication Dose Route Frequency Provider Last Rate Last Admin    loperamide (IMODIUM) capsule 2 mg  2 mg Oral 4x Daily PRN Beto Gabriel, DO        0.9 % sodium chloride infusion   IntraVENous Continuous Ab De Anda MD 75 mL/hr at 12/26/21 0627 New Bag at 12/26/21 0627    ALPRAZolam (XANAX) tablet 0.5 mg  0.5 mg Oral Q6H PRN Ab De Anda MD   0.5 mg at 12/26/21 0755    insulin glargine (LANTUS) injection vial 20 Units  20 Units SubCUTAneous BID Beto Gabriel, DO   20 Units at 12/26/21 0758    promethazine (PHENERGAN) injection 6.25 mg  6.25 mg IntraMUSCular Q6H PRN Ab De Anda MD   6.25 mg at 12/22/21 2359    benzonatate (TESSALON) capsule 100 mg  100 mg Oral TID PRN Beto Gabriel, DO   100 mg at 12/26/21 0755    opium-belladonna (B&O SUPPRETTES) 16.2-60 MG suppository 60 mg  60 mg Rectal Q8H PRN Beto Gabriel, DO        dextromethorphan [facility]) 30 MG/5ML extended release liquid 30 mg  30 mg Oral BID PRN Beto Gabriel, DO   30 mg at 12/25/21 2007    insulin lispro (HUMALOG) injection vial 0-18 Units  0-18 Units SubCUTAneous Q6H Radha Webb MD   3 Units 10 mL at 12/15/21 1155    0.9 % sodium chloride infusion  25 mL IntraVENous PRN Anita Bora, APRN   Stopped at 12/11/21 2130    polyethylene glycol (GLYCOLAX) packet 17 g  17 g Oral Daily PRN Anita Bora, APRN   17 g at 12/17/21 2112    acetaminophen (TYLENOL) tablet 650 mg  650 mg Oral Q6H PRN Anita Bora, APRN   650 mg at 12/26/21 0215    Or    acetaminophen (TYLENOL) suppository 650 mg  650 mg Rectal Q6H PRN Anita Bora, APRN        glucose (GLUTOSE) 40 % oral gel 15 g  15 g Oral PRN Anita Bora, APRN        dextrose 50 % IV solution  12.5 g IntraVENous PRN Anita Bora, APRN        glucagon (rDNA) injection 1 mg  1 mg IntraMUSCular PRN Anita Bora, APRN        dextrose 5 % solution  100 mL/hr IntraVENous PRN Anita Bora, APRN           Past Medical History:  Past Medical History:   Diagnosis Date    Anemia     Asthma     Bronchitis, acute     [facility] obstructive [facility] disease) [facility] [facility]     Diabetes [facility] [facility]     Glaucoma     Hyperlipidemia     Hypertension     Hyperthyroidism     Morbid obesity ([facility] [address]     Sleep apnea     uses c-pap       Past Surgical History:  Past Surgical History:   Procedure Laterality Date    AXILLARY SURGERY Bilateral     excision and drainage of staph abscesses    BACK SURGERY  10/2004    Dr. Rowan Gunn, [place] L4, L5    CARPAL TUNNEL RELEASE Right     CHOLECYSTECTOMY      COLONOSCOPY  03/20/2007    Dr Abhilash Gill N/A 9/11/2020    Dr Lazara Beverly yr recall    DILATION AND CURETTAGE OF UTERUS N/A 1/29/2021    DILATATION AND CURETTAGE HYSTEROSCOPY NOVASURE ABLATION performed by Dorota Hurtado MD at [facility] COLONOSCOPY FLX DX W/COLLJ [address] WHEN PFRMD N/A 2/8/2017    Dr Annia Strickland, actively inflamed internal hemorrhoids, residulal liquid and some solid food particles in the colon-5 yr recall due to prep    STOMACH SURGERY  01/20/2014    Dr. Rodriguez Mom ( gastric sleeve)    UPPER GASTROINTESTINAL ENDOSCOPY 10/30/2012    Dr David Fernandez esophagitis, gastritis, Iliana (-)    UPPER GASTROINTESTINAL ENDOSCOPY  01/23/2008    Dr Mena Gomez esophagitis    UPPER GASTROINTESTINAL ENDOSCOPY N/A 9/11/2020    Dr Otilio Garcia       Family History  Family History   Problem Relation Age of Onset    Lung Cancer Mother     Kidney Cancer Mother     Lung Cancer Maternal Grandmother     Colon Cancer Maternal Grandfather     Liver Cancer Neg Hx     Liver Disease Neg Hx     Stomach Cancer Neg Hx     Rectal Cancer Neg Hx     Esophageal Cancer Neg Hx     Colon Polyps Neg Hx        Social History  Social History     Socioeconomic History    Marital status:      Spouse name: Not on file    Number of children: Not on file    Years of education: Not on file    Highest education level: Not on file   Occupational History    Not on file   Tobacco Use    Smoking status: Former Smoker     Packs/day: 3.00     Years: 26.00     Pack years: 78.00     Types: Cigarettes     Quit date: 5/6/2011     Years since quitting: 10.6    Smokeless tobacco: Never Used   Vaping Use    Vaping Use: Never used   Substance and Sexual Activity    Alcohol use: No    Drug use: No    Sexual activity: Yes     Partners: Male   Other Topics Concern    Not on file   Social History Narrative    Not on file     Social Determinants of Health     Financial Resource Strain:     Difficulty of Paying Living Expenses: Not on file   Food Insecurity:     Worried About [address] in the Last Year: Not on file    Tiff[place] of Food in the Last Year: Not on file   Transportation Needs:     Lack of Transportation (Medical): Not on file    Lack of Transportation (Non-Medical):  Not on file   Physical Activity:     Days of Exercise per Week: Not on file    Minutes of Exercise per Session: Not on file   Stress:     Feeling of Stress : Not on file   Social Connections:     Frequency of Communication with Friends and Family: Not on file    Frequency of Social Gatherings with Friends and Family: Not on file    Attends Anabaptist Services: Not on file    Active Member of Clubs or Organizations: Not on file    Attends Club or Organization Meetings: Not on file    Marital Status: Not on file   Intimate Partner Violence:     Fear of Current or Ex-Partner: Not on file    Emotionally Abused: Not on file    Physically Abused: Not on file    Sexually Abused: Not on file   Housing Stability:     Unable to Pay for Housing in the Last Year: Not on file    Number of [place] in the Last Year: Not on file    Unstable Housing in the Last Year: Not on file         Review of Systems:    Review of Systems   Constitutional: Positive for activity change and fatigue. HENT: Negative for rhinorrhea and voice change. Eyes: Negative for visual disturbance. Respiratory: Positive for cough and shortness of breath. Cardiovascular: Negative for chest pain and leg swelling. Gastrointestinal: Positive for diarrhea. Negative for constipation, nausea and vomiting. Endocrine: Negative for polyuria. Genitourinary: Negative for difficulty urinating and dysuria. Musculoskeletal: Negative for arthralgias and back pain. Skin: Negative for color change. Allergic/Immunologic: Negative for immunocompromised state. Neurological: Positive for weakness. Negative for dizziness and headaches. Psychiatric/Behavioral: Negative for confusion. Objective:  Blood pressure 132/72, pulse 94, temperature 99.5 °F (37.5 °C), temperature source Temporal, resp. rate 19, height 5' 5\" (1.651 m), weight (!) 330 lb (149.7 kg), SpO2 96 %, not currently breastfeeding. Intake/Output Summary (Last 24 hours) at 12/26/2021 1344  Last data filed at 12/26/2021 0502  Gross per 24 hour   Intake 1000 ml   Output 1 ml   Net 999 ml       Physical Exam  Vitals and nursing note reviewed. Constitutional:       Appearance: She is ill-appearing.    HENT:      Head: Normocephalic and atraumatic. Right Ear: External ear normal.      Left Ear: External ear normal.      Nose: Nose normal.      Mouth/Throat:      Mouth: Mucous membranes are moist.   Eyes:      Conjunctiva/sclera: Conjunctivae normal.      Pupils: Pupils are equal, round, and reactive to light. Cardiovascular:      Rate and Rhythm: Normal rate and regular rhythm. Heart sounds: Normal heart sounds. Pulmonary:      Effort: Pulmonary effort is normal.      Breath sounds: Normal breath sounds. Abdominal:      General: Abdomen is flat. Palpations: Abdomen is soft. Musculoskeletal:         General: Normal range of motion. Cervical back: Neck supple. No rigidity. No muscular tenderness. Skin:     General: Skin is warm and dry. Neurological:      Mental Status: She is alert and oriented to person, place, and time. Psychiatric:         Mood and Affect: Mood normal.         Labs:  BMP:   Recent Labs     12/24/21  0512 12/25/21 0323 12/26/21  0309   * 136 138   K 4.4 4.1 4.3   CL 98 104 104   CO2 25 25 24   BUN 50* 22* 10   CREATININE 1.2* 0.7 0.7   CALCIUM 8.5* 8.0* 8.5*     CBC:   Recent Labs     12/24/21  0512 12/25/21 0323 12/26/21  0309   WBC 18.7* 14.0* 11.1*   HGB 12.7 11.2* 10.8*   HCT 41.2 36.0* 35.8*   MCV 96.3 97.8 100.0*   * 344 313     LIVER PROFILE:   Recent Labs     12/24/21  0512 12/25/21 0323 12/26/21  0309   AST 19 14 20   ALT 38* 25 25   BILITOT 0.4 0.3 <0.2   ALKPHOS 99 89 99     PT/INR: No results for input(s): PROTIME, INR in the last 72 hours. APTT: No results for input(s): APTT in the last 72 hours. BNP:  No results for input(s): BNP in the last 72 hours. Ionized Calcium:No results for input(s): IONCA in the last 72 hours. Magnesium:No results for input(s): MG in the last 72 hours. Phosphorus:No results for input(s): PHOS in the last 72 hours. HgbA1C: No results for input(s): LABA1C in the last 72 hours.   Hepatic:   Recent Labs     12/24/21  0512 12/25/21  0328 12/26/21  0309   ALKPHOS 99 89 99   ALT 38* 25 25   AST 19 14 20   PROT 6.2* 5.6* 6.1*   BILITOT 0.4 0.3 <0.2   LABALBU 2.8* 2.5* 2.5*     Lactic Acid: No results for input(s): LACTA in the last 72 hours. Troponin: No results for input(s): CKTOTAL, CKMB, TROPONINT in the last 72 hours. ABGs: No results for input(s): PH, PCO2, PO2, HCO3, O2SAT in the last 72 hours. CRP:  No results for input(s): CRP in the last 72 hours. Sed Rate:  No results for input(s): SEDRATE in the last 72 hours. Cultures:   No results for input(s): CULTURE in the last 72 hours. No results for input(s): BC, Noretta Ashlee in the last 72 hours. No results for input(s): CXSURG in the last 72 hours. Radiology reports as per the Radiologist  Radiology: XR CHEST PORTABLE    Result Date: 12/9/2021  EXAM: XR CHEST PORTABLE INDICATION: Shortness of breath, COVID positive COMPARISON: None available. FINDINGS: Cardiac silhouette is normal in size. No pleural effusion or visible pneumothorax. Patchy bilateral airspace opacity is present. Central vascular congestion is noted. No acute osseous finding. Bilateral patchy airspace opacity. Central vascular congestion. Differential includes pneumonia and edema. Signed by Dr Navdeep Bonilla. Continue ongoing medical management.     Acute hypoxemic respiratory failure due to COVID-19. Continue supportive care.     Steroid-induced hyperglycemia. Dexamethasone completed. Continue Lantus.       Mariama Clements DO

## 2021-12-27 LAB
ALBUMIN SERPL-MCNC: 2.9 G/DL (ref 3.5–5.2)
ALP BLD-CCNC: 104 U/L (ref 35–104)
ALT SERPL-CCNC: 24 U/L (ref 5–33)
ANION GAP SERPL CALCULATED.3IONS-SCNC: 8 MMOL/L (ref 7–19)
AST SERPL-CCNC: 15 U/L (ref 5–32)
BILIRUB SERPL-MCNC: <0.2 MG/DL (ref 0.2–1.2)
BUN BLDV-MCNC: 4 MG/DL (ref 6–20)
CALCIUM SERPL-MCNC: 8.4 MG/DL (ref 8.6–10)
CHLORIDE BLD-SCNC: 103 MMOL/L (ref 98–111)
CO2: 30 MMOL/L (ref 22–29)
CREAT SERPL-MCNC: 0.7 MG/DL (ref 0.5–0.9)
GFR AFRICAN AMERICAN: >59
GFR NON-AFRICAN AMERICAN: >60
GLUCOSE BLD-MCNC: 109 MG/DL (ref 70–99)
GLUCOSE BLD-MCNC: 117 MG/DL (ref 70–99)
GLUCOSE BLD-MCNC: 123 MG/DL (ref 70–99)
GLUCOSE BLD-MCNC: 128 MG/DL (ref 74–109)
GLUCOSE BLD-MCNC: 149 MG/DL (ref 70–99)
PERFORMED ON: ABNORMAL
POTASSIUM REFLEX MAGNESIUM: 4.2 MMOL/L (ref 3.5–5)
SODIUM BLD-SCNC: 141 MMOL/L (ref 136–145)
TOTAL PROTEIN: 5.4 G/DL (ref 6.6–8.7)

## 2021-12-27 PROCEDURE — 6370000000 HC RX 637 (ALT 250 FOR IP): Performed by: INTERNAL MEDICINE

## 2021-12-27 PROCEDURE — 6370000000 HC RX 637 (ALT 250 FOR IP): Performed by: HOSPITALIST

## 2021-12-27 PROCEDURE — 6370000000 HC RX 637 (ALT 250 FOR IP)

## 2021-12-27 PROCEDURE — 2700000000 HC OXYGEN THERAPY PER DAY

## 2021-12-27 PROCEDURE — 80053 COMPREHEN METABOLIC PANEL: CPT

## 2021-12-27 PROCEDURE — 36415 COLL VENOUS BLD VENIPUNCTURE: CPT

## 2021-12-27 PROCEDURE — 2580000003 HC RX 258: Performed by: HOSPITALIST

## 2021-12-27 PROCEDURE — 6360000002 HC RX W HCPCS: Performed by: STUDENT IN AN ORGANIZED HEALTH CARE EDUCATION/TRAINING PROGRAM

## 2021-12-27 PROCEDURE — 6370000000 HC RX 637 (ALT 250 FOR IP): Performed by: STUDENT IN AN ORGANIZED HEALTH CARE EDUCATION/TRAINING PROGRAM

## 2021-12-27 PROCEDURE — 94660 CPAP INITIATION&MGMT: CPT

## 2021-12-27 PROCEDURE — 6370000000 HC RX 637 (ALT 250 FOR IP): Performed by: NURSE PRACTITIONER

## 2021-12-27 PROCEDURE — 1210000000 HC MED SURG R&B

## 2021-12-27 PROCEDURE — 2580000003 HC RX 258: Performed by: NURSE PRACTITIONER

## 2021-12-27 PROCEDURE — 82947 ASSAY GLUCOSE BLOOD QUANT: CPT

## 2021-12-27 RX ADMIN — ALBUTEROL SULFATE 2 PUFF: 90 AEROSOL, METERED RESPIRATORY (INHALATION) at 09:43

## 2021-12-27 RX ADMIN — ACETAMINOPHEN 650 MG: 325 TABLET ORAL at 22:12

## 2021-12-27 RX ADMIN — ALPRAZOLAM 0.5 MG: 0.5 TABLET ORAL at 22:12

## 2021-12-27 RX ADMIN — Medication 1 TABLET: at 09:42

## 2021-12-27 RX ADMIN — ALBUTEROL SULFATE 2 PUFF: 90 AEROSOL, METERED RESPIRATORY (INHALATION) at 22:14

## 2021-12-27 RX ADMIN — INSULIN GLARGINE 20 UNITS: 100 INJECTION, SOLUTION SUBCUTANEOUS at 09:16

## 2021-12-27 RX ADMIN — PRAVASTATIN SODIUM 40 MG: 20 TABLET ORAL at 09:42

## 2021-12-27 RX ADMIN — ONDANSETRON HYDROCHLORIDE 8 MG: 4 TABLET, FILM COATED ORAL at 09:58

## 2021-12-27 RX ADMIN — Medication 5 DROP: at 22:13

## 2021-12-27 RX ADMIN — ENOXAPARIN SODIUM 40 MG: 100 INJECTION SUBCUTANEOUS at 09:00

## 2021-12-27 RX ADMIN — LEVOTHYROXINE SODIUM 100 MCG: 100 TABLET ORAL at 05:36

## 2021-12-27 RX ADMIN — INSULIN LISPRO 3 UNITS: 100 INJECTION, SOLUTION INTRAVENOUS; SUBCUTANEOUS at 22:15

## 2021-12-27 RX ADMIN — Medication 30 MG: at 22:14

## 2021-12-27 RX ADMIN — ENOXAPARIN SODIUM 40 MG: 100 INJECTION SUBCUTANEOUS at 22:13

## 2021-12-27 RX ADMIN — ACETAMINOPHEN 650 MG: 325 TABLET ORAL at 16:49

## 2021-12-27 RX ADMIN — INSULIN GLARGINE 20 UNITS: 100 INJECTION, SOLUTION SUBCUTANEOUS at 22:15

## 2021-12-27 RX ADMIN — Medication 10 MG: at 22:12

## 2021-12-27 RX ADMIN — ALBUTEROL SULFATE 2 PUFF: 90 AEROSOL, METERED RESPIRATORY (INHALATION) at 16:51

## 2021-12-27 RX ADMIN — Medication 2000 UNITS: at 09:42

## 2021-12-27 RX ADMIN — ALBUTEROL SULFATE 2 PUFF: 90 AEROSOL, METERED RESPIRATORY (INHALATION) at 13:43

## 2021-12-27 RX ADMIN — ACETAMINOPHEN 650 MG: 325 TABLET ORAL at 09:57

## 2021-12-27 RX ADMIN — TIMOLOL MALEATE 1 DROP: 5 SOLUTION OPHTHALMIC at 22:14

## 2021-12-27 RX ADMIN — ONDANSETRON HYDROCHLORIDE 8 MG: 4 TABLET, FILM COATED ORAL at 22:13

## 2021-12-27 RX ADMIN — BENZONATATE 100 MG: 100 CAPSULE ORAL at 22:13

## 2021-12-27 RX ADMIN — SODIUM CHLORIDE, PRESERVATIVE FREE 10 ML: 5 INJECTION INTRAVENOUS at 22:13

## 2021-12-27 RX ADMIN — SODIUM CHLORIDE: 9 INJECTION, SOLUTION INTRAVENOUS at 09:59

## 2021-12-27 RX ADMIN — ACETAMINOPHEN 650 MG: 325 TABLET ORAL at 05:37

## 2021-12-27 RX ADMIN — TIMOLOL MALEATE 1 DROP: 5 SOLUTION OPHTHALMIC at 09:43

## 2021-12-27 RX ADMIN — Medication 30 MG: at 16:50

## 2021-12-27 RX ADMIN — SODIUM CHLORIDE, PRESERVATIVE FREE 5 ML: 5 INJECTION INTRAVENOUS at 09:17

## 2021-12-27 ASSESSMENT — ENCOUNTER SYMPTOMS
NAUSEA: 0
COLOR CHANGE: 0
SHORTNESS OF BREATH: 1
COUGH: 1
VOICE CHANGE: 0
CONSTIPATION: 0
BACK PAIN: 0
DIARRHEA: 1
VOMITING: 0
RHINORRHEA: 0

## 2021-12-27 ASSESSMENT — PAIN DESCRIPTION - LOCATION: LOCATION: HEAD

## 2021-12-27 ASSESSMENT — PAIN SCALES - GENERAL
PAINLEVEL_OUTOF10: 8
PAINLEVEL_OUTOF10: 9
PAINLEVEL_OUTOF10: 2
PAINLEVEL_OUTOF10: 0
PAINLEVEL_OUTOF10: 6
PAINLEVEL_OUTOF10: 3

## 2021-12-27 ASSESSMENT — PAIN DESCRIPTION - PAIN TYPE: TYPE: ACUTE PAIN

## 2021-12-27 ASSESSMENT — PAIN DESCRIPTION - DESCRIPTORS: DESCRIPTORS: HEADACHE

## 2021-12-27 NOTE — PROGRESS NOTES
Hospitalist Progress Note    Patient:  Iam Atkins  YOB: 1965  Date of Service: 12/27/2021  MRN: [id number]   Acct: [de-identified]   Primary Care Physician: Rosalinda Suarez MD  Advance Directive: Partial Code  Admit Date: 12/9/2021       Hospital Day: 18  Referring Provider: Mariama Clements DO    Patient Seen, Chart, Consults, Notes, Labs, Radiology studies reviewed. Subjective:  Iam Atkins is a 49 y.o. female  whom we are following for COVID-19 pneumonia, hypoxemic respiratory failure. Her diarrhea has subsided. She is complaining of right ear pain. On examination, she has wax that is pressing up against the tympanic membrane. She still has been unable to budge from the standpoint of her O2 requirements. She will desaturate with activity. We will ask LTAC to evaluate. Chest x-ray still shows fairly dense infiltrates.     Allergies:  Latex, Penicillins, Codeine, and Tape [adhesive tape]    Medicines:  Current Facility-Administered Medications   Medication Dose Route Frequency Provider Last Rate Last Admin    carbamide peroxide (DEBROX) 6.5 % otic solution 5 drop  5 drop Right Ear BID Mariama Clements DO        loperamide (IMODIUM) capsule 2 mg  2 mg Oral 4x Daily PRN Mariama Clements DO        ALPRAZolam [place] [facility]) tablet 0.5 mg  0.5 mg Oral Q6H PRN Ramona Hummel MD   0.5 mg at 12/26/21 2024    insulin glargine (LANTUS) injection vial 20 Units  20 Units SubCUTAneous BID Mariama Clements DO   20 Units at 12/27/21 4015    promethazine (PHENERGAN) injection 6.25 mg  6.25 mg IntraMUSCular Q6H PRN Ramona Hummel MD   6.25 mg at 12/22/21 5577    benzonatate (TESSALON) capsule 100 mg  100 mg Oral TID PRN Mariama Clements DO   100 mg at 12/26/21 0425    opium-belladonna (B&O SUPPRETTES) 16.2-60 MG suppository 60 mg  60 mg Rectal Q8H PRN Mariama Clements DO        dextromethorphan [facility]) 30 MG/5ML extended release liquid 30 mg  30 mg Oral BID PRN Geoff CuellarsolaDO   30 mg at 12/27/21 1650    insulin lispro (HUMALOG) injection vial 0-18 Units  0-18 Units SubCUTAneous Q6H Laurie Guevara MD   3 Units at 12/26/21 0215    melatonin disintegrating tablet 10 mg  10 mg Oral Nightly Laurie Guevara MD   10 mg at 12/26/21 2024    Benzocaine-Menthol (CEPACOL) 1 lozenge  1 lozenge Oral Q2H PRN Laurie Guevara MD   1 lozenge at 12/25/21 2004    sodium chloride (OCEAN, BABY AYR) 0.65 % nasal spray 1 spray  1 spray Each Nostril PRN Laurie Guevara MD   1 spray at 12/13/21 1727    enoxaparin (LOVENOX) injection 40 mg  40 mg SubCUTAneous BID Laurie Guevara MD   40 mg at 12/27/21 0900    Vitamin D (CHOLECALCIFEROL) tablet 2,000 Units  2,000 Units Oral Daily Laurie Guevara MD   2,000 Units at 12/27/21 0942    LORazepam (ATIVAN) injection 0.5 mg  0.5 mg IntraVENous Q6H PRN Laurie Guevara MD   0.5 mg at 12/16/21 1118    albuterol sulfate  (90 Base) MCG/ACT inhaler 2 puff  2 puff Inhalation 4x Daily Mo Dusty, APRN   2 puff at 12/27/21 1651    calcium-cholecalciferol 500-200 MG-UNIT per tablet 1 tablet  1 tablet Oral Daily Mo Dusty, APRN   1 tablet at 12/27/21 0942    HYDROcodone-acetaminophen (NORCO) 7.5-325 MG per tablet 1 tablet  1 tablet Oral Q6H PRN Mo Dusty, APRN   1 tablet at 12/23/21 0615    levothyroxine (SYNTHROID) tablet 100 mcg  100 mcg Oral Daily Mo Dusty, APRN   100 mcg at 12/27/21 0536    timolol (TIMOPTIC) 0.5 % ophthalmic solution 1 drop  1 drop Both Eyes BID Mo Dusty, APRN   1 drop at 12/27/21 0943    pravastatin (PRAVACHOL) tablet 40 mg  40 mg Oral Daily Mo Dusty, APRN   40 mg at 12/27/21 0942    ondansetron (ZOFRAN) tablet 8 mg  8 mg Oral Q8H PRN Mo Dusty, APRN   8 mg at 12/27/21 8389    meclizine (ANTIVERT) tablet 25 mg  25 mg Oral TID PRN Mo Dusty, APRN   25 mg at 12/10/21 0301    sodium chloride flush 0.9 % injection 5-40 mL  5-40 mL IntraVENous 2 times per day Anita Bora, APRN   5 mL at 12/27/21 8929    sodium chloride flush 0.9 % injection 5-40 mL  5-40 mL IntraVENous PRN Anita Bora, APRN   10 mL at 12/15/21 1155    0.9 % sodium chloride infusion  25 mL IntraVENous PRN Anita Bora, APRN   Stopped at 12/11/21 2130    polyethylene glycol (GLYCOLAX) packet 17 g  17 g Oral Daily PRN Anita Bora, APRN   17 g at 12/17/21 2112    acetaminophen (TYLENOL) tablet 650 mg  650 mg Oral Q6H PRN Anita Bora, APRN   650 mg at 12/27/21 1649    Or    acetaminophen (TYLENOL) suppository 650 mg  650 mg Rectal Q6H PRN Anita Bora, APRN        glucose (GLUTOSE) 40 % oral gel 15 g  15 g Oral PRN Anita Bora, APRN        dextrose 50 % IV solution  12.5 g IntraVENous PRN Anita Bora, APRN        glucagon (rDNA) injection 1 mg  1 mg IntraMUSCular PRN Anita Bora, APRN        dextrose 5 % solution  100 mL/hr IntraVENous PRN Anita Bora, APRN           Past Medical History:  Past Medical History:   Diagnosis Date    Anemia     Asthma     Bronchitis, acute     COPD (chronic obstructive pulmonary disease) ([facility] [address]     Diabetes ([facility] [address]     Glaucoma     Hyperlipidemia     Hypertension     Hyperthyroidism     Morbid obesity ([facility] [address]     Sleep apnea     uses c-pap       Past Surgical History:  Past Surgical History:   Procedure Laterality Date    AXILLARY SURGERY Bilateral     excision and drainage of staph abscesses    BACK SURGERY  10/2004    Dr. Rowan Gunn, [place] L4, L5    CARPAL TUNNEL RELEASE Right     CHOLECYSTECTOMY      COLONOSCOPY  03/20/2007    Dr Abhilash Gill N/A 9/11/2020    Dr Lazara Beverly yr recall    DILATION AND CURETTAGE OF UTERUS N/A 1/29/2021    DILATATION AND CURETTAGE HYSTEROSCOPY NOVASURE ABLATION performed by Dorota Hurtado MD at [address] [place] [address] FLX DX W/COLLJ SPEC WHEN PFRMD N/A 2/8/2017    Dr Annia Strickland, actively inflamed internal hemorrhoids, residulal liquid and some solid food particles in the colon-5 yr recall due to prep    STOMACH SURGERY  01/20/2014    Dr. Yusuf Belle ( gastric sleeve)    UPPER GASTROINTESTINAL ENDOSCOPY  10/30/2012    Dr Bashir Best esophagitis, gastritis, Iliana (-)    UPPER GASTROINTESTINAL ENDOSCOPY  01/23/2008    Dr Polo Larson esophagitis    UPPER GASTROINTESTINAL ENDOSCOPY N/A 9/11/2020    Dr Felicia Peng       Family History  Family History   Problem Relation Age of Onset    Lung Cancer Mother     Kidney Cancer Mother     Lung Cancer Maternal Grandmother     Colon Cancer Maternal Grandfather     Liver Cancer Neg Hx     Liver Disease Neg Hx     Stomach Cancer Neg Hx     Rectal Cancer Neg Hx     Esophageal Cancer Neg Hx     Colon Polyps Neg Hx        Social History  Social History     Socioeconomic History    Marital status:      Spouse name: Not on file    Number of children: Not on file    Years of education: Not on file    Highest education level: Not on file   Occupational History    Not on file   Tobacco Use    Smoking status: Former Smoker     Packs/day: 3.00     Years: 26.00     Pack years: 78.00     Types: Cigarettes     Quit date: 5/6/2011     Years since quitting: 10.6    Smokeless tobacco: Never Used   Vaping Use    Vaping Use: Never used   Substance and Sexual Activity    Alcohol use: No    Drug use: No    Sexual activity: Yes     Partners: Male   Other Topics Concern    Not on file   Social History Narrative    Not on file     Social Determinants of Health     Financial Resource Strain:     Difficulty of Paying Living Expenses: Not on file   Food Insecurity:     Worried About [address] in the Last Year: Not on file    Tiffanie of Food in the Last Year: Not on file   Transportation Needs:     Lack of Transportation (Medical): Not on file    Lack of Transportation (Non-Medical):  Not on file   Physical Activity:     Days of Exercise per Week: Not on file    Minutes of Exercise per Session: Not on file   Stress:     Feeling of Stress : Not on file   Social Connections:     Frequency of Communication with Friends and Family: Not on file    Frequency of Social Gatherings with Friends and Family: Not on file    Attends Voodoo Services: Not on file    Active Member of Clubs or Organizations: Not on file    Attends Club or Organization Meetings: Not on file    Marital Status: Not on file   Intimate Partner Violence:     Fear of Current or Ex-Partner: Not on file    Emotionally Abused: Not on file    Physically Abused: Not on file    Sexually Abused: Not on file   Housing Stability:     Unable to Pay for Housing in the Last Year: Not on file    Number of [place] in the Last Year: Not on file    Unstable Housing in the Last Year: Not on file         Review of Systems:    Review of Systems   Constitutional: Negative for activity change and fatigue. HENT: Positive for ear pain. Negative for rhinorrhea and voice change. Eyes: Negative for visual disturbance. Respiratory: Positive for cough and shortness of breath. Cardiovascular: Negative for chest pain and leg swelling. Gastrointestinal: Positive for diarrhea. Negative for constipation, nausea and vomiting. Endocrine: Negative for polyuria. Genitourinary: Negative for difficulty urinating and dysuria. Musculoskeletal: Positive for gait problem. Negative for arthralgias and back pain. Skin: Negative for color change. Allergic/Immunologic: Negative for immunocompromised state. Neurological: Positive for weakness. Negative for dizziness and headaches. Psychiatric/Behavioral: Negative for confusion. Objective:  Blood pressure (!) 120/90, pulse 112, temperature 97.2 °F (36.2 °C), temperature source Temporal, resp. rate 16, height 5' 5\" (1.651 m), weight (!) 330 lb (149.7 kg), SpO2 93 %, not currently breastfeeding.     Intake/Output Summary (Last 24 hours) at 12/27/2021 6796  Last data filed at 12/26/2021 2156  Gross per 24 hour   Intake 1212.78 ml   Output --   Net 1212.78 ml       Physical Exam  Vitals and nursing note reviewed. HENT:      Head: Normocephalic and atraumatic. Right Ear: External ear normal.      Left Ear: External ear normal.      Nose: Nose normal.      Mouth/Throat:      Mouth: Mucous membranes are moist.   Eyes:      Conjunctiva/sclera: Conjunctivae normal.      Pupils: Pupils are equal, round, and reactive to light. Cardiovascular:      Rate and Rhythm: Normal rate and regular rhythm. Heart sounds: Normal heart sounds. Pulmonary:      Effort: Pulmonary effort is normal.      Breath sounds: Normal breath sounds. Abdominal:      General: Abdomen is flat. Palpations: Abdomen is soft. Musculoskeletal:         General: Normal range of motion. Cervical back: Neck supple. No rigidity. No muscular tenderness. Skin:     General: Skin is warm and dry. Neurological:      Mental Status: She is alert and oriented to person, place, and time. Psychiatric:         Mood and Affect: Mood normal.         Labs:  BMP:   Recent Labs     12/25/21 0323 12/26/21 0309 12/27/21  0523    138 141   K 4.1 4.3 4.2    104 103   CO2 25 24 30*   BUN 22* 10 4*   CREATININE 0.7 0.7 0.7   CALCIUM 8.0* 8.5* 8.4*     CBC:   Recent Labs     12/25/21 0323 12/26/21  0309   WBC 14.0* 11.1*   HGB 11.2* 10.8*   HCT 36.0* 35.8*   MCV 97.8 100.0*    313     LIVER PROFILE:   Recent Labs     12/25/21 0323 12/26/21 0309 12/27/21  0523   AST 14 20 15   ALT 25 25 24   BILITOT 0.3 <0.2 <0.2   ALKPHOS 89 99 104     PT/INR: No results for input(s): PROTIME, INR in the last 72 hours. APTT: No results for input(s): APTT in the last 72 hours. BNP:  No results for input(s): BNP in the last 72 hours. Ionized Calcium:No results for input(s): IONCA in the last 72 hours. Magnesium:No results for input(s): MG in the last 72 hours.   Phosphorus:No results for input(s): PHOS in the last 72 hours. HgbA1C: No results for input(s): LABA1C in the last 72 hours. Hepatic:   Recent Labs     12/25/21  0323 12/26/21  0309 12/27/21  0523   ALKPHOS 89 99 104   ALT 25 25 24   AST 14 20 15   PROT 5.6* 6.1* 5.4*   BILITOT 0.3 <0.2 <0.2   LABALBU 2.5* 2.5* 2.9*     Lactic Acid: No results for input(s): LACTA in the last 72 hours. Troponin: No results for input(s): CKTOTAL, CKMB, TROPONINT in the last 72 hours. ABGs: No results for input(s): PH, PCO2, PO2, HCO3, O2SAT in the last 72 hours. CRP:  No results for input(s): CRP in the last 72 hours. Sed Rate:  No results for input(s): SEDRATE in the last 72 hours. Cultures:   No results for input(s): CULTURE in the last 72 hours. No results for input(s): BC, Deette Lover in the last 72 hours. No results for input(s): CXSURG in the last 72 hours. Radiology reports as per the Radiologist  Radiology: XR CHEST PORTABLE    Result Date: 12/9/2021  EXAM: XR CHEST PORTABLE INDICATION: Shortness of breath, COVID positive COMPARISON: None available. FINDINGS: Cardiac silhouette is normal in size. No pleural effusion or visible pneumothorax. Patchy bilateral airspace opacity is present. Central vascular congestion is noted. No acute osseous finding. Bilateral patchy airspace opacity. Central vascular congestion. Differential includes pneumonia and edema. Signed by Dr Kya Macias. Continue ongoing medical management.     Acute hypoxemic respiratory failure due to COVID-19. Continue supportive care. LTAC evaluation.     Steroid-induced hyperglycemia. Dexamethasone completed. Continue Lantus.       Mickey Lacy DO

## 2021-12-28 ENCOUNTER — APPOINTMENT (OUTPATIENT)
Dept: GENERAL RADIOLOGY | Age: 56
DRG: 177 | End: 2021-12-28
Payer: MEDICARE

## 2021-12-28 LAB
ALBUMIN SERPL-MCNC: 2.8 G/DL (ref 3.5–5.2)
ALP BLD-CCNC: 115 U/L (ref 35–104)
ALT SERPL-CCNC: 25 U/L (ref 5–33)
ANION GAP SERPL CALCULATED.3IONS-SCNC: 12 MMOL/L (ref 7–19)
AST SERPL-CCNC: 18 U/L (ref 5–32)
BILIRUB SERPL-MCNC: <0.2 MG/DL (ref 0.2–1.2)
BUN BLDV-MCNC: 5 MG/DL (ref 6–20)
CALCIUM SERPL-MCNC: 9.1 MG/DL (ref 8.6–10)
CHLORIDE BLD-SCNC: 104 MMOL/L (ref 98–111)
CO2: 29 MMOL/L (ref 22–29)
CREAT SERPL-MCNC: 0.7 MG/DL (ref 0.5–0.9)
GFR AFRICAN AMERICAN: >59
GFR NON-AFRICAN AMERICAN: >60
GLUCOSE BLD-MCNC: 103 MG/DL (ref 74–109)
GLUCOSE BLD-MCNC: 109 MG/DL (ref 70–99)
GLUCOSE BLD-MCNC: 134 MG/DL (ref 70–99)
GLUCOSE BLD-MCNC: 141 MG/DL (ref 70–99)
GLUCOSE BLD-MCNC: 226 MG/DL (ref 70–99)
PERFORMED ON: ABNORMAL
POTASSIUM REFLEX MAGNESIUM: 4.3 MMOL/L (ref 3.5–5)
SODIUM BLD-SCNC: 145 MMOL/L (ref 136–145)
TOTAL PROTEIN: 5.5 G/DL (ref 6.6–8.7)

## 2021-12-28 PROCEDURE — 82947 ASSAY GLUCOSE BLOOD QUANT: CPT

## 2021-12-28 PROCEDURE — 71045 X-RAY EXAM CHEST 1 VIEW: CPT

## 2021-12-28 PROCEDURE — 80053 COMPREHEN METABOLIC PANEL: CPT

## 2021-12-28 PROCEDURE — 6370000000 HC RX 637 (ALT 250 FOR IP): Performed by: INTERNAL MEDICINE

## 2021-12-28 PROCEDURE — 36415 COLL VENOUS BLD VENIPUNCTURE: CPT

## 2021-12-28 PROCEDURE — 97535 SELF CARE MNGMENT TRAINING: CPT

## 2021-12-28 PROCEDURE — 2580000003 HC RX 258: Performed by: NURSE PRACTITIONER

## 2021-12-28 PROCEDURE — 6360000002 HC RX W HCPCS: Performed by: STUDENT IN AN ORGANIZED HEALTH CARE EDUCATION/TRAINING PROGRAM

## 2021-12-28 PROCEDURE — 6370000000 HC RX 637 (ALT 250 FOR IP): Performed by: HOSPITALIST

## 2021-12-28 PROCEDURE — 1210000000 HC MED SURG R&B

## 2021-12-28 PROCEDURE — 6370000000 HC RX 637 (ALT 250 FOR IP): Performed by: STUDENT IN AN ORGANIZED HEALTH CARE EDUCATION/TRAINING PROGRAM

## 2021-12-28 PROCEDURE — 94761 N-INVAS EAR/PLS OXIMETRY MLT: CPT

## 2021-12-28 PROCEDURE — 6370000000 HC RX 637 (ALT 250 FOR IP): Performed by: NURSE PRACTITIONER

## 2021-12-28 PROCEDURE — 94660 CPAP INITIATION&MGMT: CPT

## 2021-12-28 PROCEDURE — 2700000000 HC OXYGEN THERAPY PER DAY

## 2021-12-28 RX ORDER — LEVOFLOXACIN 500 MG/1
250 TABLET, FILM COATED ORAL DAILY
Status: DISCONTINUED | OUTPATIENT
Start: 2021-12-28 | End: 2021-12-29

## 2021-12-28 RX ADMIN — ACETAMINOPHEN 650 MG: 325 TABLET ORAL at 06:29

## 2021-12-28 RX ADMIN — ALPRAZOLAM 0.5 MG: 0.5 TABLET ORAL at 20:51

## 2021-12-28 RX ADMIN — ALBUTEROL SULFATE 2 PUFF: 90 AEROSOL, METERED RESPIRATORY (INHALATION) at 20:52

## 2021-12-28 RX ADMIN — SODIUM CHLORIDE, PRESERVATIVE FREE 5 ML: 5 INJECTION INTRAVENOUS at 10:10

## 2021-12-28 RX ADMIN — BENZONATATE 100 MG: 100 CAPSULE ORAL at 06:29

## 2021-12-28 RX ADMIN — INSULIN GLARGINE 20 UNITS: 100 INJECTION, SOLUTION SUBCUTANEOUS at 20:56

## 2021-12-28 RX ADMIN — ALBUTEROL SULFATE 2 PUFF: 90 AEROSOL, METERED RESPIRATORY (INHALATION) at 16:30

## 2021-12-28 RX ADMIN — TIMOLOL MALEATE 1 DROP: 5 SOLUTION OPHTHALMIC at 09:32

## 2021-12-28 RX ADMIN — Medication 30 MG: at 18:42

## 2021-12-28 RX ADMIN — ENOXAPARIN SODIUM 40 MG: 100 INJECTION SUBCUTANEOUS at 09:32

## 2021-12-28 RX ADMIN — TIMOLOL MALEATE 1 DROP: 5 SOLUTION OPHTHALMIC at 20:52

## 2021-12-28 RX ADMIN — Medication 5 DROP: at 09:32

## 2021-12-28 RX ADMIN — LEVOFLOXACIN 250 MG: 500 TABLET, FILM COATED ORAL at 14:08

## 2021-12-28 RX ADMIN — Medication 10 MG: at 20:51

## 2021-12-28 RX ADMIN — ACETAMINOPHEN 650 MG: 325 TABLET ORAL at 18:42

## 2021-12-28 RX ADMIN — PRAVASTATIN SODIUM 40 MG: 20 TABLET ORAL at 09:32

## 2021-12-28 RX ADMIN — BENZONATATE 100 MG: 100 CAPSULE ORAL at 20:51

## 2021-12-28 RX ADMIN — ALBUTEROL SULFATE 2 PUFF: 90 AEROSOL, METERED RESPIRATORY (INHALATION) at 12:38

## 2021-12-28 RX ADMIN — Medication 1 TABLET: at 09:32

## 2021-12-28 RX ADMIN — Medication 2000 UNITS: at 09:32

## 2021-12-28 RX ADMIN — ALBUTEROL SULFATE 2 PUFF: 90 AEROSOL, METERED RESPIRATORY (INHALATION) at 09:31

## 2021-12-28 RX ADMIN — ONDANSETRON HYDROCHLORIDE 8 MG: 4 TABLET, FILM COATED ORAL at 18:42

## 2021-12-28 RX ADMIN — LEVOTHYROXINE SODIUM 100 MCG: 100 TABLET ORAL at 04:46

## 2021-12-28 RX ADMIN — Medication 1 LOZENGE: at 04:47

## 2021-12-28 RX ADMIN — SODIUM CHLORIDE, PRESERVATIVE FREE 10 ML: 5 INJECTION INTRAVENOUS at 20:54

## 2021-12-28 RX ADMIN — Medication 5 DROP: at 20:52

## 2021-12-28 RX ADMIN — INSULIN GLARGINE 20 UNITS: 100 INJECTION, SOLUTION SUBCUTANEOUS at 08:37

## 2021-12-28 RX ADMIN — ACETAMINOPHEN 650 MG: 325 TABLET ORAL at 10:52

## 2021-12-28 RX ADMIN — ENOXAPARIN SODIUM 40 MG: 100 INJECTION SUBCUTANEOUS at 20:52

## 2021-12-28 RX ADMIN — Medication 1 LOZENGE: at 14:20

## 2021-12-28 ASSESSMENT — ENCOUNTER SYMPTOMS
RHINORRHEA: 0
VOICE CHANGE: 0
VOMITING: 0
COLOR CHANGE: 0
DIARRHEA: 0
NAUSEA: 0
COUGH: 1
SHORTNESS OF BREATH: 0
CONSTIPATION: 0
BACK PAIN: 0

## 2021-12-28 ASSESSMENT — PAIN SCALES - GENERAL
PAINLEVEL_OUTOF10: 3
PAINLEVEL_OUTOF10: 7
PAINLEVEL_OUTOF10: 8
PAINLEVEL_OUTOF10: 8
PAINLEVEL_OUTOF10: 6

## 2021-12-28 ASSESSMENT — PAIN DESCRIPTION - DESCRIPTORS: DESCRIPTORS: HEADACHE

## 2021-12-28 ASSESSMENT — PAIN DESCRIPTION - PAIN TYPE: TYPE: ACUTE PAIN

## 2021-12-28 ASSESSMENT — PAIN DESCRIPTION - LOCATION: LOCATION: HEAD

## 2021-12-28 NOTE — PROGRESS NOTES
Occupational Therapy     12/28/21 9667   Restrictions/Precautions   Restrictions/Precautions Fall Risk;Isolation   Position Activity Restriction   Other position/activity restrictions droplet plus precautions   Vision   Vision [facility]   Hearing   Hearing [facility]   General   Chart Reviewed Yes   Patient assessed for rehabilitation services? Yes   Response to previous treatment Patient with no complaints from previous session   Family / Caregiver Present No   Subjective   Subjective Pt sitting up in bed upon arrival for therapy. Pt states she does not feel well today but she is agreeable to participate with encouragement. Pain Assessment   Patient Currently in Pain No   Oxygen Therapy   SpO2 93 %   Pulse Oximeter Device Mode Continuous   O2 Device High flow nasal cannula   PEEP/CPAP (cm H2O) 15 cm H20   Patient Observation   Observations O2= 93-87% with movement. UG=783-461 bpm with movement. Orientation   Overall Orientation Status WNL   ADL   Feeding Independent   Grooming Independent;Setup   UE Bathing Minimal assistance   LE Bathing Moderate assistance  (unable to reach backside and would benefit from LH sponge)   UE Dressing Independent;Setup   LE Dressing Contact guard assistance;Minimal assistance  (able to cross legs in bed to get socks on)   Toileting Maximum assistance  (Max assist for jazmyn care/ clean up.)   Additional Comments Pt unable to reach around to wipe herself or clean herself. States she lays down on her bed to do it at home or takes a shower.     Balance   Sitting Balance Independent   Standing Balance Contact guard assistance   Bed mobility   Scooting Stand by assistance   Transfers   Stand Step Transfers Contact guard assistance   Sit to stand Contact guard assistance   Stand to sit Contact guard assistance   Toilet Transfers   Toilet - Technique Stand step   Equipment Used Standard bedside commode   Toilet Transfer Contact guard assistance   Assessment   Performance deficits / Impairments Decreased functional mobility ; Decreased ADL status; Decreased strength;Decreased endurance;Decreased balance;Decreased fine motor control   Assessment Pt progressing as tolerated. Treatment Diagnosis Acute respiratory distress syndrome due to Covid 19   Prognosis Good   REQUIRES OT FOLLOW UP Yes   Treatment Initiated  Tx focused on BSC use, basin bath and dressing techniques. Discharge Recommendations Continue to assess pending progress; Patient would benefit from continued therapy after discharge   Activity Tolerance   Activity Tolerance Patient limited by fatigue   Safety Devices   Safety Devices in place Yes   Type of devices Left in bed;Call light within reach; Bed alarm in place   Plan   Times per week 3-5   Times per day Daily   Electronically signed by RAJESH Suarez on 12/28/2021 at 2:46 PM

## 2021-12-28 NOTE — PROGRESS NOTES
Hospitalist Progress Note    Patient:  Mildred Merida  YOB: 1965  Date of Service: 12/28/2021  MRN: [id number]   Acct: [de-identified]   Primary Care Physician: Malcolm Hinkle MD  Advance Directive: Partial Code  Admit Date: 12/9/2021       Hospital Day: 23  Referring Provider: Mylene Bruno DO    Patient Seen, Chart, Consults, Notes, Labs, Radiology studies reviewed. Subjective:  Mildred Merida is a 49 y.o. female  whom we are following for COVID-19 pneumonia, hypoxemic respiratory failure. She is complaining of persistent right ear pain. Also complaining of cough and congestion. I will obtain an x-ray to look for possible secondary pneumonia. I will also empirically start her on Levaquin. Her diarrhea has resolved. Precertification process for LTAC is being initiated.     Allergies:  Latex, Penicillins, Codeine, and Tape [adhesive tape]    Medicines:  Current Facility-Administered Medications   Medication Dose Route Frequency Provider Last Rate Last Admin    levoFLOXacin (LEVAQUIN) tablet 250 mg  250 mg Oral Daily Valeta Friar, DO   250 mg at 12/28/21 1408    carbamide peroxide (DEBROX) 6.5 % otic solution 5 drop  5 drop Right Ear BID Valeta Friar, DO   5 drop at 12/28/21 0932    insulin lispro (HUMALOG) injection vial 0-18 Units  0-18 Units SubCUTAneous 4x Daily AC & HS Erick Shelton, PELON - CNP   3 Units at 12/27/21 2215    loperamide (IMODIUM) capsule 2 mg  2 mg Oral 4x Daily PRN Mylene Bruno DO        ALPRAZolam Lakshmi Crosser) tablet 0.5 mg  0.5 mg Oral Q6H PRN Jim York MD   0.5 mg at 12/27/21 2212    insulin glargine (LANTUS) injection vial 20 Units  20 Units SubCUTAneous BID Valeta Friar, DO   20 Units at 12/28/21 5911    promethazine (PHENERGAN) injection 6.25 mg  6.25 mg IntraMUSCular Q6H PRN Jim York MD   6.25 mg at 12/22/21 2354    benzonatate (TESSALON) capsule 100 mg  100 mg Oral TID PRN Florentino Baker Mehrdad, DO   100 mg at 12/28/21 3455    opium-belladonna (B&O SUPPRETTES) 16.2-60 MG suppository 60 mg  60 mg Rectal Q8H PRN Ravindra Griffin DO        dextromethorphan [facility]) 30 MG/5ML extended release liquid 30 mg  30 mg Oral BID PRN Ravindra Griffin DO   30 mg at 12/27/21 2214    melatonin disintegrating tablet 10 mg  10 mg Oral Nightly Jay Linares MD   10 mg at 12/27/21 2212    Benzocaine-Menthol (CEPACOL) 1 lozenge  1 lozenge Oral Q2H PRN Jay Linares MD   1 lozenge at 12/28/21 1420    sodium chloride (OCEAN, BABY AYR) 0.65 % nasal spray 1 spray  1 spray Each Nostril PRN Jay Linares MD   1 spray at 12/13/21 1727    enoxaparin (LOVENOX) injection 40 mg  40 mg SubCUTAneous BID Jay Linares MD   40 mg at 12/28/21 0932    Vitamin D (CHOLECALCIFEROL) tablet 2,000 Units  2,000 Units Oral Daily Jay Linares MD   2,000 Units at 12/28/21 0932    LORazepam (ATIVAN) injection 0.5 mg  0.5 mg IntraVENous Q6H PRN Jay Linares MD   0.5 mg at 12/16/21 1118    albuterol sulfate  (90 Base) MCG/ACT inhaler 2 puff  2 puff Inhalation 4x Daily PELON Tovar   2 puff at 12/28/21 1238    calcium-cholecalciferol 500-200 MG-UNIT per tablet 1 tablet  1 tablet Oral Daily Chris Armenta APRN   1 tablet at 12/28/21 0932    HYDROcodone-acetaminophen (Sally Schillings) 7.5-325 MG per tablet 1 tablet  1 tablet Oral Q6H PRN Chris Armenta APRN   1 tablet at 12/23/21 0615    levothyroxine (SYNTHROID) tablet 100 mcg  100 mcg Oral Daily Chris Armenta APRN   100 mcg at 12/28/21 0446    timolol (TIMOPTIC) 0.5 % ophthalmic solution 1 drop  1 drop Both Eyes BID Chris Armenta, PELON   1 drop at 12/28/21 0932    pravastatin (PRAVACHOL) tablet 40 mg  40 mg Oral Daily PELON Tovar   40 mg at 12/28/21 0932    ondansetron (ZOFRAN) tablet 8 mg  8 mg Oral Q8H PRN PELON Tovar   8 mg at 12/27/21 2213    meclizine (ANTIVERT) tablet 25 mg  25 mg Oral TID PRN Lysbeth Ala, APRN   25 mg at 12/10/21 0301    sodium chloride flush 0.9 % injection 5-40 mL  5-40 mL IntraVENous 2 times per day Lysbeth Ala, APRN   5 mL at 12/28/21 1010    sodium chloride flush 0.9 % injection 5-40 mL  5-40 mL IntraVENous PRN Lysbeth Ala, APRN   10 mL at 12/15/21 1155    0.9 % sodium chloride infusion  25 mL IntraVENous PRN Lysbeth Ala, APRN   Stopped at 12/11/21 2130    polyethylene glycol (GLYCOLAX) packet 17 g  17 g Oral Daily PRN Lysbeth Ala, APRN   17 g at 12/17/21 2112    acetaminophen (TYLENOL) tablet 650 mg  650 mg Oral Q6H PRN Lysbeth Ala, APRN   650 mg at 12/28/21 1052    Or    acetaminophen (TYLENOL) suppository 650 mg  650 mg Rectal Q6H PRN Lysbeth Ala, APRN        glucose (GLUTOSE) 40 % oral gel 15 g  15 g Oral PRN Lysbeth Ala, APRN        dextrose 50 % IV solution  12.5 g IntraVENous PRN Lysbeth Ala, APRN        glucagon (rDNA) injection 1 mg  1 mg IntraMUSCular PRN Lysbeth Ala, APRN        dextrose 5 % solution  100 mL/hr IntraVENous PRN Lysbeth Ala, APRN           Past Medical History:  Past Medical History:   Diagnosis Date    Anemia     Asthma     Bronchitis, acute     COPD [facility]chronic obstructive pulmonary disease) [facility] [address]     Diabetes ([facility] [address]     Glaucoma     Hyperlipidemia     Hypertension     Hyperthyroidism     Morbid obesity ([facility] [address]     Sleep apnea     uses c-pap       Past Surgical History:  Past Surgical History:   Procedure Laterality Date    AXILLARY SURGERY Bilateral     excision and drainage of staph abscesses    BACK SURGERY  10/2004    Dr. Skylar Alejandro, [place] L4, L5    CARPAL TUNNEL RELEASE Right     CHOLECYSTECTOMY      COLONOSCOPY  03/20/2007    Dr Aaliyah Cruz N/A 9/11/2020    Dr Gabriel Rjoas yr recall   4 [facility] N/A 1/29/2021    DILATATION AND CURETTAGE HYSTEROSCOPY NOVASURE ABLATION performed by Telly Lawler MD at [facility] DX W/COLLJ SPEC WHEN PFRMD N/A 2/8/2017    Dr Louann Valadez, actively inflamed internal hemorrhoids, residulal liquid and some solid food particles in the colon-5 yr recall due to prep    STOMACH SURGERY  01/20/2014    Dr. Justino Romero ( gastric sleeve)    UPPER GASTROINTESTINAL ENDOSCOPY  10/30/2012    Dr Myrna Hamlin esophagitis, gastritis, Iliana (-)    UPPER GASTROINTESTINAL ENDOSCOPY  01/23/2008    Dr Amrita Gonzalez esophagitis    UPPER GASTROINTESTINAL ENDOSCOPY N/A 9/11/2020    Dr Marlene Zhang       Family History  Family History   Problem Relation Age of Onset    Lung Cancer Mother     Kidney Cancer Mother     Lung Cancer Maternal Grandmother     Colon Cancer Maternal Grandfather     Liver Cancer Neg Hx     Liver Disease Neg Hx     Stomach Cancer Neg Hx     Rectal Cancer Neg Hx     Esophageal Cancer Neg Hx     Colon Polyps Neg Hx        Social History  Social History     Socioeconomic History    Marital status:      Spouse name: Not on file    Number of children: Not on file    Years of education: Not on file    Highest education level: Not on file   Occupational History    Not on file   Tobacco Use    Smoking status: Former Smoker     Packs/day: 3.00     Years: 26.00     Pack years: 78.00     Types: Cigarettes     Quit date: 5/6/2011     Years since quitting: 10.6    Smokeless tobacco: Never Used   Vaping Use    Vaping Use: Never used   Substance and Sexual Activity    Alcohol use: No    Drug use: No    Sexual activity: Yes     Partners: Male   Other Topics Concern    Not on file   Social History Narrative    Not on file     Social Determinants of Health     Financial Resource Strain:     Difficulty of Paying Living Expenses: Not on file   Food Insecurity:     Worried About [address] [employer] in the Last Year: Not on file    Tiffanie of Food in the Last Year: Not on file   Transportation Needs:     Lack of Transportation (Medical):  Not on file    Lack of Transportation (Non-Medical): Not on file   Physical Activity:     Days of Exercise per Week: Not on file    Minutes of Exercise per Session: Not on file   Stress:     Feeling of Stress : Not on file   Social Connections:     Frequency of Communication with Friends and Family: Not on file    Frequency of Social Gatherings with Friends and Family: Not on file    Attends Mosque Services: Not on file    Active Member of [address] [place] [address] or Organizations: Not on file    Attends Club or Organization Meetings: Not on file    Marital Status: Not on file   Intimate Partner Violence:     Fear of Current or Ex-Partner: Not on file    Emotionally Abused: Not on file    Physically Abused: Not on file    Sexually Abused: Not on file   Housing Stability:     Unable to Pay for Housing in the Last Year: Not on file    Number of [place] in the Last Year: Not on file    Unstable Housing in the Last Year: Not on file         Review of Systems:    Review of Systems   Constitutional: Positive for activity change, fatigue and fever. HENT: Negative for rhinorrhea and voice change. Eyes: Negative for visual disturbance. Respiratory: Positive for cough. Negative for shortness of breath. Cardiovascular: Negative for chest pain and leg swelling. Gastrointestinal: Negative for constipation, diarrhea, nausea and vomiting. Endocrine: Negative for polyuria. Genitourinary: Negative for difficulty urinating and dysuria. Musculoskeletal: Positive for gait problem. Negative for arthralgias and back pain. Skin: Negative for color change. Allergic/Immunologic: Negative for immunocompromised state. Neurological: Positive for weakness. Negative for dizziness and headaches. Psychiatric/Behavioral: Negative for confusion. Objective:  Blood pressure (!) 143/84, pulse 99, temperature 97.2 °F (36.2 °C), temperature source Temporal, resp.  rate 17, height 5' 5\" (1.651 m), weight (!) 330 lb (149.7 kg), SpO2 93 %, not currently breastfeeding. No intake or output data in the 24 hours ending 12/28/21 1519    Physical Exam  Vitals and nursing note reviewed. HENT:      Head: Normocephalic and atraumatic. Right Ear: External ear normal.      Left Ear: External ear normal.      Nose: Nose normal.      Mouth/Throat:      Mouth: Mucous membranes are moist.   Eyes:      Conjunctiva/sclera: Conjunctivae normal.      Pupils: Pupils are equal, round, and reactive to light. Cardiovascular:      Rate and Rhythm: Normal rate and regular rhythm. Heart sounds: Normal heart sounds. Pulmonary:      Effort: Pulmonary effort is normal.      Breath sounds: Normal breath sounds. Abdominal:      General: Abdomen is flat. Palpations: Abdomen is soft. Musculoskeletal:         General: Normal range of motion. Cervical back: Neck supple. No rigidity. No muscular tenderness. Skin:     General: Skin is warm and dry. Neurological:      Mental Status: She is alert and oriented to person, place, and time. Psychiatric:         Mood and Affect: Mood normal.         Labs:  BMP:   Recent Labs     12/26/21 0309 12/27/21 0523 12/28/21 0312    141 145   K 4.3 4.2 4.3    103 104   CO2 24 30* 29   BUN 10 4* 5*   CREATININE 0.7 0.7 0.7   CALCIUM 8.5* 8.4* 9.1     CBC:   Recent Labs     12/26/21 0309   WBC 11.1*   HGB 10.8*   HCT 35.8*   .0*        LIVER PROFILE:   Recent Labs     12/26/21 0309 12/27/21 0523 12/28/21 0312   AST 20 15 18   ALT 25 24 25   BILITOT <0.2 <0.2 <0.2   ALKPHOS 99 104 115*     PT/INR: No results for input(s): PROTIME, INR in the last 72 hours. APTT: No results for input(s): APTT in the last 72 hours. BNP:  No results for input(s): BNP in the last 72 hours. Ionized Calcium:No results for input(s): IONCA in the last 72 hours. Magnesium:No results for input(s): MG in the last 72 hours. Phosphorus:No results for input(s): PHOS in the last 72 hours.   HgbA1C: No results for input(s): LABA1C in the last 72 hours. Hepatic:   Recent Labs     12/26/21  0309 12/27/21  0523 12/28/21  0312   ALKPHOS 99 104 115*   ALT 25 24 25   AST 20 15 18   PROT 6.1* 5.4* 5.5*   BILITOT <0.2 <0.2 <0.2   LABALBU 2.5* 2.9* 2.8*     Lactic Acid: No results for input(s): LACTA in the last 72 hours. Troponin: No results for input(s): CKTOTAL, CKMB, TROPONINT in the last 72 hours. ABGs: No results for input(s): PH, PCO2, PO2, HCO3, O2SAT in the last 72 hours. CRP:  No results for input(s): CRP in the last 72 hours. Sed Rate:  No results for input(s): SEDRATE in the last 72 hours. Cultures:   No results for input(s): CULTURE in the last 72 hours. No results for input(s): BC, [facility] in the last 72 hours. No results for input(s): CXSURG in the last 72 hours. Radiology reports as per the Radiologist  Radiology: XR CHEST PORTABLE    Result Date: 12/9/2021  EXAM: XR CHEST PORTABLE INDICATION: Shortness of breath, COVID positive COMPARISON: None available. FINDINGS: Cardiac silhouette is normal in size. No pleural effusion or visible pneumothorax. Patchy bilateral airspace opacity is present. Central vascular congestion is noted. No acute osseous finding. Bilateral patchy airspace opacity. Central vascular congestion. Differential includes pneumonia and edema. Signed by Dr Ananth Martínez. Continue ongoing medical management.     Acute hypoxemic respiratory failure due to COVID-19. Continue supportive care. LTAC evaluation.     Steroid-induced hyperglycemia. Dexamethasone completed. Continue Lantus.       Vivian Good DO

## 2021-12-28 NOTE — CARE COORDINATION
Pre-cert initiated for LTAC.    Continue Care LTAC   P   F  Electronically signed by Mercy Chaney on 12/28/2021 at 10:59 AM

## 2021-12-29 LAB
ALBUMIN SERPL-MCNC: 2.9 G/DL (ref 3.5–5.2)
ALP BLD-CCNC: 120 U/L (ref 35–104)
ALT SERPL-CCNC: 25 U/L (ref 5–33)
ANION GAP SERPL CALCULATED.3IONS-SCNC: 11 MMOL/L (ref 7–19)
AST SERPL-CCNC: 15 U/L (ref 5–32)
BILIRUB SERPL-MCNC: 0.3 MG/DL (ref 0.2–1.2)
BUN BLDV-MCNC: 6 MG/DL (ref 6–20)
CALCIUM SERPL-MCNC: 9.3 MG/DL (ref 8.6–10)
CHLORIDE BLD-SCNC: 99 MMOL/L (ref 98–111)
CO2: 33 MMOL/L (ref 22–29)
CREAT SERPL-MCNC: 0.6 MG/DL (ref 0.5–0.9)
GFR AFRICAN AMERICAN: >59
GFR NON-AFRICAN AMERICAN: >60
GLUCOSE BLD-MCNC: 114 MG/DL (ref 74–109)
GLUCOSE BLD-MCNC: 142 MG/DL (ref 70–99)
GLUCOSE BLD-MCNC: 165 MG/DL (ref 70–99)
GLUCOSE BLD-MCNC: 179 MG/DL (ref 70–99)
GLUCOSE BLD-MCNC: 222 MG/DL (ref 70–99)
PERFORMED ON: ABNORMAL
POTASSIUM REFLEX MAGNESIUM: 4 MMOL/L (ref 3.5–5)
REASON FOR REJECTION: NORMAL
REJECTED TEST: NORMAL
SODIUM BLD-SCNC: 143 MMOL/L (ref 136–145)
TOTAL PROTEIN: 5.9 G/DL (ref 6.6–8.7)

## 2021-12-29 PROCEDURE — 80053 COMPREHEN METABOLIC PANEL: CPT

## 2021-12-29 PROCEDURE — 6360000002 HC RX W HCPCS: Performed by: STUDENT IN AN ORGANIZED HEALTH CARE EDUCATION/TRAINING PROGRAM

## 2021-12-29 PROCEDURE — 6370000000 HC RX 637 (ALT 250 FOR IP): Performed by: INTERNAL MEDICINE

## 2021-12-29 PROCEDURE — 6360000002 HC RX W HCPCS: Performed by: INTERNAL MEDICINE

## 2021-12-29 PROCEDURE — 6370000000 HC RX 637 (ALT 250 FOR IP): Performed by: HOSPITALIST

## 2021-12-29 PROCEDURE — 2580000003 HC RX 258: Performed by: NURSE PRACTITIONER

## 2021-12-29 PROCEDURE — 94660 CPAP INITIATION&MGMT: CPT

## 2021-12-29 PROCEDURE — 94761 N-INVAS EAR/PLS OXIMETRY MLT: CPT

## 2021-12-29 PROCEDURE — 36415 COLL VENOUS BLD VENIPUNCTURE: CPT

## 2021-12-29 PROCEDURE — 6370000000 HC RX 637 (ALT 250 FOR IP)

## 2021-12-29 PROCEDURE — 6370000000 HC RX 637 (ALT 250 FOR IP): Performed by: STUDENT IN AN ORGANIZED HEALTH CARE EDUCATION/TRAINING PROGRAM

## 2021-12-29 PROCEDURE — 82947 ASSAY GLUCOSE BLOOD QUANT: CPT

## 2021-12-29 PROCEDURE — 2700000000 HC OXYGEN THERAPY PER DAY

## 2021-12-29 PROCEDURE — 1210000000 HC MED SURG R&B

## 2021-12-29 PROCEDURE — 6370000000 HC RX 637 (ALT 250 FOR IP): Performed by: NURSE PRACTITIONER

## 2021-12-29 RX ORDER — LEVOFLOXACIN 500 MG/1
750 TABLET, FILM COATED ORAL DAILY
Status: DISCONTINUED | OUTPATIENT
Start: 2021-12-30 | End: 2022-01-04 | Stop reason: HOSPADM

## 2021-12-29 RX ORDER — LEVOFLOXACIN 500 MG/1
500 TABLET, FILM COATED ORAL ONCE
Status: COMPLETED | OUTPATIENT
Start: 2021-12-29 | End: 2021-12-29

## 2021-12-29 RX ORDER — DEXAMETHASONE SODIUM PHOSPHATE 10 MG/ML
6 INJECTION, SOLUTION INTRAMUSCULAR; INTRAVENOUS EVERY 24 HOURS
Status: DISCONTINUED | OUTPATIENT
Start: 2021-12-29 | End: 2022-01-02

## 2021-12-29 RX ADMIN — LEVOFLOXACIN 250 MG: 500 TABLET, FILM COATED ORAL at 12:43

## 2021-12-29 RX ADMIN — ACETAMINOPHEN 650 MG: 325 TABLET ORAL at 05:12

## 2021-12-29 RX ADMIN — ALBUTEROL SULFATE 2 PUFF: 90 AEROSOL, METERED RESPIRATORY (INHALATION) at 21:25

## 2021-12-29 RX ADMIN — LEVOTHYROXINE SODIUM 100 MCG: 100 TABLET ORAL at 05:12

## 2021-12-29 RX ADMIN — ALBUTEROL SULFATE 2 PUFF: 90 AEROSOL, METERED RESPIRATORY (INHALATION) at 10:11

## 2021-12-29 RX ADMIN — LEVOFLOXACIN 500 MG: 500 TABLET, FILM COATED ORAL at 13:49

## 2021-12-29 RX ADMIN — Medication 1 TABLET: at 10:04

## 2021-12-29 RX ADMIN — Medication 5 DROP: at 21:23

## 2021-12-29 RX ADMIN — ALBUTEROL SULFATE 2 PUFF: 90 AEROSOL, METERED RESPIRATORY (INHALATION) at 12:43

## 2021-12-29 RX ADMIN — INSULIN LISPRO 3 UNITS: 100 INJECTION, SOLUTION INTRAVENOUS; SUBCUTANEOUS at 12:43

## 2021-12-29 RX ADMIN — ENOXAPARIN SODIUM 40 MG: 100 INJECTION SUBCUTANEOUS at 21:12

## 2021-12-29 RX ADMIN — INSULIN LISPRO 6 UNITS: 100 INJECTION, SOLUTION INTRAVENOUS; SUBCUTANEOUS at 23:13

## 2021-12-29 RX ADMIN — ACETAMINOPHEN 650 MG: 325 TABLET ORAL at 21:20

## 2021-12-29 RX ADMIN — TIMOLOL MALEATE 1 DROP: 5 SOLUTION OPHTHALMIC at 10:12

## 2021-12-29 RX ADMIN — ALBUTEROL SULFATE 2 PUFF: 90 AEROSOL, METERED RESPIRATORY (INHALATION) at 17:30

## 2021-12-29 RX ADMIN — Medication 30 MG: at 21:12

## 2021-12-29 RX ADMIN — BENZONATATE 100 MG: 100 CAPSULE ORAL at 21:12

## 2021-12-29 RX ADMIN — TIMOLOL MALEATE 1 DROP: 5 SOLUTION OPHTHALMIC at 21:13

## 2021-12-29 RX ADMIN — ACETAMINOPHEN 650 MG: 325 TABLET ORAL at 10:14

## 2021-12-29 RX ADMIN — ENOXAPARIN SODIUM 40 MG: 100 INJECTION SUBCUTANEOUS at 10:04

## 2021-12-29 RX ADMIN — SODIUM CHLORIDE, PRESERVATIVE FREE 10 ML: 5 INJECTION INTRAVENOUS at 10:04

## 2021-12-29 RX ADMIN — ALPRAZOLAM 0.5 MG: 0.5 TABLET ORAL at 21:12

## 2021-12-29 RX ADMIN — DEXAMETHASONE SODIUM PHOSPHATE 6 MG: 10 INJECTION INTRAMUSCULAR; INTRAVENOUS at 13:49

## 2021-12-29 RX ADMIN — INSULIN LISPRO 3 UNITS: 100 INJECTION, SOLUTION INTRAVENOUS; SUBCUTANEOUS at 16:54

## 2021-12-29 RX ADMIN — Medication 5 DROP: at 10:12

## 2021-12-29 RX ADMIN — Medication 2000 UNITS: at 10:04

## 2021-12-29 RX ADMIN — Medication 1 LOZENGE: at 21:24

## 2021-12-29 RX ADMIN — INSULIN GLARGINE 20 UNITS: 100 INJECTION, SOLUTION SUBCUTANEOUS at 23:11

## 2021-12-29 RX ADMIN — Medication 30 MG: at 00:31

## 2021-12-29 RX ADMIN — INSULIN GLARGINE 20 UNITS: 100 INJECTION, SOLUTION SUBCUTANEOUS at 10:20

## 2021-12-29 RX ADMIN — Medication 10 MG: at 21:12

## 2021-12-29 RX ADMIN — ONDANSETRON HYDROCHLORIDE 8 MG: 4 TABLET, FILM COATED ORAL at 21:20

## 2021-12-29 RX ADMIN — PRAVASTATIN SODIUM 40 MG: 20 TABLET ORAL at 10:04

## 2021-12-29 ASSESSMENT — PAIN SCALES - GENERAL
PAINLEVEL_OUTOF10: 3
PAINLEVEL_OUTOF10: 7
PAINLEVEL_OUTOF10: 6
PAINLEVEL_OUTOF10: 5

## 2021-12-29 ASSESSMENT — PAIN DESCRIPTION - PAIN TYPE: TYPE: CHRONIC PAIN

## 2021-12-29 ASSESSMENT — PAIN DESCRIPTION - LOCATION: LOCATION: GENERALIZED

## 2021-12-29 NOTE — PROGRESS NOTES
Hospitalist Progress Note  [facility]     Patient: Padmini Hutchins  : 1965  MRN: [id number]  Code Status: Partial Code    Hospital Day: 20   Date of Service: 2021    Subjective:   Patient seen in COVID-19 unit. No current complaints.     Past Medical History:   Diagnosis Date    Anemia     Asthma     Bronchitis, acute     COPD (chronic obstructive pulmonary disease) (HCC)     Diabetes ([facility] [address]     Glaucoma     Hyperlipidemia     Hypertension     Hyperthyroidism     Morbid obesity ([facility] [address]     Sleep apnea     uses c-pap     Past Surgical History:   Procedure Laterality Date    AXILLARY SURGERY Bilateral     excision and drainage of staph abscesses    BACK SURGERY  10/2004    Dr. Rocio Hawkins, [place] L4, L5    CARPAL TUNNEL RELEASE Right     CHOLECYSTECTOMY      COLONOSCOPY  2007    Dr Sari Patel N/A 2020    Dr Sue Land yr recall    DILATION AND CURETTAGE OF UTERUS N/A 2021    DILATATION AND CURETTAGE HYSTEROSCOPY NOVASURE ABLATION performed by Armond Cummings MD at [address] COLON[address]COPY FLX DX W/COLLJ [address] WHEN PFRMD N/A 2017    Dr Bryant Suarez, actively inflamed internal hemorrhoids, residulal liquid and some solid food particles in the colon-5 yr recall due to prep    STOMACH SURGERY  2014    Dr. Gideon Pineda ( gastric sleeve)    UPPER GASTROINTESTINAL ENDOSCOPY  10/30/2012    Dr Annalise Crook esophagitis, gastritis, Iliana (-)    UPPER GASTROINTESTINAL ENDOSCOPY  2008    Dr Tara Allen esophagitis    UPPER GASTROINTESTINAL ENDOSCOPY N/A 2020    Dr Velma Tomlinson       Family History   Problem Relation Age of Onset    Lung Cancer Mother     Kidney Cancer Mother     Lung Cancer Maternal Grandmother     Colon Cancer Maternal Grandfather     Liver Cancer Neg Hx     Liver Disease Neg Hx     Stomach Cancer Neg Hx     Rectal Cancer Neg Hx     Esophageal Cancer Neg Hx     Colon Polyps Neg Hx        Social History Socioeconomic History    Marital status:      Spouse name: Not on file    Number of children: Not on file    Years of education: Not on file    Highest education level: Not on file   Occupational History    Not on file   Tobacco Use    Smoking status: Former Smoker     Packs/day: 3.00     Years: 26.00     Pack years: 78.00     Types: Cigarettes     Quit date: 5/6/2011     Years since quitting: 10.6    Smokeless tobacco: Never Used   Vaping Use    Vaping Use: Never used   Substance and Sexual Activity    Alcohol use: No    Drug use: No    Sexual activity: Yes     Partners: Male   Other Topics Concern    Not on file   Social History Narrative    Not on file     Social Determinants of Health     Financial Resource Strain:     Difficulty of Paying Living Expenses: Not on file   Food Insecurity:     Worried About [address] [employer] in the Last Year: Not on file    Tiffanie of Food in the Last Year: Not on file   Transportation Needs:     Lack of Transportation (Medical): Not on file    Lack of Transportation (Non-Medical):  Not on file   Physical Activity:     Days of Exercise per Week: Not on file    Minutes of Exercise per Session: Not on file   Stress:     Feeling of Stress : Not on file   Social Connections:     Frequency of Communication with Friends and Family: Not on file    Frequency of Social Gatherings with Friends and Family: Not on file    Attends [employer] Services: Not on file    Active Member of Clubs or Organizations: Not on file    Attends Club or Organization Meetings: Not on file    Marital Status: Not on file   Intimate Partner Violence:     Fear of Current or Ex-Partner: Not on file    Emotionally Abused: Not on file    Physically Abused: Not on file    Sexually Abused: Not on file   Housing Stability:     Unable to Pay for Housing in the Last Year: Not on file    Number of [place] in the Last Year: Not on file    Unstable Housing in the Last Year: Not on file       Current Facility-Administered Medications   Medication Dose Route Frequency Provider Last Rate Last Admin    dexamethasone (PF) (DECADRON) injection 6 mg  6 mg IntraVENous Q24H [facility] 12[facility]] levoFLOXacin (LEVAQUIN) tablet 750 mg  750 mg Oral Daily Therese Knight MD        levoFLOXacin [facility]) tablet 500 mg  500 mg Oral Once Therese Knight MD        carbamide peroxide [facility]) 6.5 % otic solution 5 drop  5 drop Right Ear BID Lauryn Crum DO   5 drop at 12/29/21 1012    insulin lispro (HUMALOG) injection vial 0-18 Units  0-18 Units SubCUTAneous 4x Daily AC & HS [facility], APRN - CNP   3 Units at 12/27/21 2215    loperamide (IMODIUM) capsule 2 mg  2 mg Oral 4x Daily PRN Lauryn Crum DO        ALPRAZolam Yary Nice) tablet 0.5 mg  0.5 mg Oral Q6H PRN Yojana Chamberlain MD   0.5 mg at 12/28/21 2051    insulin glargine (LANTUS) injection vial 20 Units  20 Units SubCUTAneous BID Lauryn Crum DO   20 Units at 12/29/21 1020    promethazine (PHENERGAN) injection 6.25 mg  6.25 mg IntraMUSCular Q6H PRN Yojana Chamberlain MD   6.25 mg at 12/22/21 2359    benzonatate (TESSALON) capsule 100 mg  100 mg Oral TID PRN Lauryn Crum DO   100 mg at 12/28/21 2051    opium-belladonna (B&O SUPPRETTES) 16.2-60 MG suppository 60 mg  60 mg Rectal Q8H PRN Lauryn Crum DO        dextromethorphan [facility]) 30 MG/5ML extended release liquid 30 mg  30 mg Oral BID PRN Lauryn Crum DO   30 mg at 12/29/21 0031    melatonin disintegrating tablet 10 mg  10 mg Oral Nightly Elia Euceda MD   10 mg at 12/28/21 2051    Benzocaine-Menthol (CEPACOL) 1 lozenge  1 lozenge Oral Q2H PRN Elia Euceda MD   1 lozenge at 12/28/21 1420    sodium chloride (OCEAN, BABY AYR) 0.65 % nasal spray 1 spray  1 spray Each Nostril PRN Elia Euceda MD   1 spray at 12/13/21 1727    enoxaparin (LOVENOX) injection 40 mg  40 mg SubCUTAneous BID Anshu Dennis MD   40 mg at 12/29/21 1004    Vitamin D (CHOLECALCIFEROL) tablet 2,000 Units  2,000 Units Oral Daily Anshu Dennis MD   2,000 Units at 12/29/21 1004    LORazepam (ATIVAN) injection 0.5 mg  0.5 mg IntraVENous Q6H PRN Anshu Dennis MD   0.5 mg at 12/16/21 1118    albuterol sulfate  (90 Base) MCG/ACT inhaler 2 puff  2 puff Inhalation 4x Daily PELON Dye   2 puff at 12/29/21 1243    calcium-cholecalciferol 500-200 MG-UNIT per tablet 1 tablet  1 tablet Oral Daily PELON Dye   1 tablet at 12/29/21 1004    HYDROcodone-acetaminophen ([place]) 7.5-325 MG per tablet 1 tablet  1 tablet Oral Q6H PRN PELON Dye   1 tablet at 12/23/21 0615    levothyroxine (SYNTHROID) tablet 100 mcg  100 mcg Oral Daily PELON Dye   100 mcg at 12/29/21 0512    timolol (TIMOPTIC) 0.5 % ophthalmic solution 1 drop  1 drop Both Eyes BID PELON Dye   1 drop at 12/29/21 1012    pravastatin (PRAVACHOL) tablet 40 mg  40 mg Oral Daily PELON Dye   40 mg at 12/29/21 1004    ondansetron (ZOFRAN) tablet 8 mg  8 mg Oral Q8H PRN PELON Dye   8 mg at 12/28/21 1842    meclizine (ANTIVERT) tablet 25 mg  25 mg Oral TID PRN PELON Dye   25 mg at 12/10/21 0301    sodium chloride flush 0.9 % injection 5-40 mL  5-40 mL IntraVENous 2 times per day PELON Dye   10 mL at 12/29/21 1004    sodium chloride flush 0.9 % injection 5-40 mL  5-40 mL IntraVENous PRN PELON Dye   10 mL at 12/15/21 1155    0.9 % sodium chloride infusion  25 mL IntraVENous PRN PELON Dye   Stopped at 12/11/21 2130    polyethylene glycol (GLYCOLAX) packet 17 g  17 g Oral Daily PRN PELON Dye   17 g at 12/17/21 2112    acetaminophen (TYLENOL) tablet 650 mg  650 mg Oral Q6H PRN PELON Dye   650 mg at 12/29/21 1014    Or    acetaminophen (TYLENOL) suppository 650 mg  650 mg Rectal Q6H PRN PELON Moon        glucose (GLUTOSE) 40 % oral gel 15 g  15 g Oral PRN PELON Moon        dextrose 50 % IV solution  12.5 g IntraVENous PRN PELON Moon        glucagon (rDNA) injection 1 mg  1 mg IntraMUSCular PRN PELON Moon        dextrose 5 % solution  100 mL/hr IntraVENous PRN PELON Moon             sodium chloride Stopped (12/11/21 2130)    dextrose          Objective:   /83   Pulse 89   Temp 96.8 °F (36 °C) (Temporal)   Resp 20   Ht 5' 5\" (1.651 m)   Wt (!) 330 lb (149.7 kg)   SpO2 97%   BMI 54.91 kg/m²     In an effort to reduce COVID-19 exposure, patient seen from doorway and case discussed in detail with unit staff    No results for input(s): WBC, RBC, HGB, HCT, MCV, MCH, MCHC, PLT in the last 72 hours. Recent Labs     12/27/21 0523 12/28/21 0312 12/29/21 0419    145 143   K 4.2 4.3 4.0   ANIONGAP 8 12 11    104 99   CO2 30* 29 33*   BUN 4* 5* 6   CREATININE 0.7 0.7 0.6   GLUCOSE 128* 103 114*   CALCIUM 8.4* 9.1 9.3     No results for input(s): MG, PHOS in the last 72 hours. Recent Labs     12/27/21 0523 12/28/21 0312 12/29/21 0419   AST 15 18 15   ALT 24 25 25   BILITOT <0.2 <0.2 0.3   ALKPHOS 104 115* 120*     No results for input(s): PH, PO2, PCO2, HCO3, BE, O2SAT in the last 72 hours. No results for input(s): TROPONINI in the last 72 hours. No results for input(s): INR in the last 72 hours. No results for input(s): LACTA in the last 72 hours. Intake/Output Summary (Last 24 hours) at 12/29/2021 1329  Last data filed at 12/28/2021 1938  Gross per 24 hour   Intake 240 ml   Output --   Net 240 ml       XR CHEST PORTABLE    Result Date: 12/28/2021  EXAMINATION: XR CHEST PORTABLE 12/28/2021 3:01 PM HISTORY: Cough and fever. COVID-19 pneumonia. Report: A portable upright frontal view the chest was obtained. COMPARISON: Portable chest x-ray 12/25/2021.  There are extensive mixed alveolar interstitial infiltrates that appear essentially unchanged. The lungs are mildly hypoaerated. No pneumothorax or pleural effusion is identified. Heart size appears normal. The osseous structures and upper abdomen appear within normal limits. Stable chest x-ray with extensive bilateral pulmonary infiltrates compatible with the history of COVID-19 pneumonia. No pneumothorax or pleural effusion. No evidence of pneumomediastinum. Signed by Dr Mik Samson.  [place]       Assessment and Plan:   COVID-19 pneumonia  I assumed care of this patient on 12/29/2021 with ongoing treatment plan on board    Acute hypoxemic respiratory failure  Secondary to above  Requiring HFNC 15 L  Wean as tolerated  No supplemental home O2 requirement  TTE reviewed    Prior tobacco dependence  Counseled on continued cessation    Morbid obesity  Counseled    MAHIN  CPAP with sleep    DVT prophylaxis  Lovenox    Total critical care time: 43 minutes    Ashely Mccarthy MD   12/29/2021 1:29 PM

## 2021-12-29 NOTE — CARE COORDINATION
Pre-cert initiated for [facility] 12/28/2021. Insurance denied and offered [id number] 12/29/2021. MD can call [phone] within 3 calender days to participate in [id number]. MD informed.   Continue [facility]   P  [id number]  Electronically signed by Nico Lundberg RN on 12/29/2021 at 10:17 AM

## 2021-12-29 NOTE — PROGRESS NOTES
Nutrition Assessment     Type and Reason for Visit: Reassess    Nutrition Recommendations/Plan:   New weight  Continue ONS and current diet. Nutrition Assessment:  Po intake declining since previous assessment, however limited po intake documentation available. Accuchecks improving [id number]. No new weight X 10 days. Continue ONS and encourage po intake; obtain new weight. Malnutrition Assessment:  Malnutrition Status: At risk for malnutrition (Comment)    Current Nutrition Therapies:    ADULT ORAL NUTRITION SUPPLEMENT; Breakfast, Lunch, Dinner; Diabetic Oral Supplement  ADULT DIET; Regular; GI [place] (GERD/Peptic Ulcer); Safety Tray; Safety Tray (Disposables); pt does not eat beef or pork, does eat chicken and turkey, prefers toast with sugar free jelly for breakfast, fruits, & soups    Anthropometric Measures:  · Height: 5' 5\" (165.1 cm)  · Current Body Wt: 330 lb (149.7 kg)   · BMI: 54.9    Nutrition Interventions:   Food and/or Nutrient Delivery:  Continue Current Diet,Continue Oral Nutrition Supplement   Coordination of Nutrition Care:  Continue to monitor while inpatient    Goals:  po intake 50% or greater. Weight stable or decrease 1-5# per week.   Accuchek's under 200       Nutrition Monitoring and Evaluation:   Food/Nutrient Intake Outcomes:  Food and Nutrient Intake,Supplement Intake  Physical Signs/Symptoms Outcomes:  Biochemical Data,Weight,Skin,Nutrition Focused Physical Findings,Fluid Status or Edema     Discharge Planning:    Continue current diet     Electronically signed by Media Goltz, MS, RD, LD on 12/29/21 at 2:37 PM CST    Contact: [phone]

## 2021-12-29 NOTE — PLAN OF CARE
Problem: Airway Clearance - Ineffective  Goal: Achieve or maintain patent airway  12/29/2021 0027 by Destiney Monreal RN  Outcome: Ongoing     Problem: Gas Exchange - Impaired  Goal: Absence of hypoxia  12/29/2021 0027 by Destiney Monreal RN  Outcome: Ongoing     Problem: Gas Exchange - Impaired  Goal: Promote optimal lung function  12/29/2021 0027 by Destiney Monreal RN  Outcome: Ongoing     Problem: Breathing Pattern - Ineffective  Goal: Ability to achieve and maintain a regular respiratory rate  12/29/2021 0027 by Destiney Monreal RN  Outcome: Ongoing     Problem: Body Temperature -  Risk of, Imbalanced  Goal: Ability to maintain a body temperature within defined limits  12/29/2021 0027 by Destiney Monreal RN  Outcome: Ongoing     Problem: Body Temperature -  Risk of, Imbalanced  Goal: Will regain or maintain usual level of consciousness  12/29/2021 0027 by Destiney Monreal RN  Outcome: Ongoing     Problem:  Body Temperature -  Risk of, Imbalanced  Goal: Complications related to the disease process, condition or treatment will be avoided or minimized  12/29/2021 0027 by Destiney Monreal RN  Outcome: Ongoing     Problem: Isolation Precautions - Risk of Spread of Infection  Goal: Prevent transmission of infection  12/29/2021 0027 by Destiney Monreal RN  Outcome: Ongoing     Problem: Nutrition Deficits  Goal: Optimize nutritional status  12/29/2021 0027 by Destiney Monreal RN  Outcome: Ongoing     Problem: Risk for Fluid Volume Deficit  Goal: Maintain normal heart rhythm  12/29/2021 0027 by Destiney Monreal RN  Outcome: Ongoing     Problem: Risk for Fluid Volume Deficit  Goal: Maintain absence of muscle cramping  12/29/2021 0027 by Destiney Monreal RN  Outcome: Ongoing     Problem: Risk for Fluid Volume Deficit  Goal: Maintain normal serum potassium, sodium, calcium, phosphorus, and pH  12/29/2021 0027 by Destiney Monreal RN  Outcome: Ongoing     Problem: Loneliness or Risk for Loneliness  Goal: Demonstrate positive use of time alone when socialization is not possible  12/29/2021 0027 by May Ochoa RN  Outcome: Ongoing     Problem: Patient Education: Go to Patient Education Activity  Goal: Patient/Family Education  12/29/2021 0027 by May Ochoa RN  Outcome: Ongoing     Problem: Falls - Risk of:  Goal: Will remain free from falls  Description: Will remain free from falls  12/29/2021 0027 by May Ochoa RN  Outcome: Ongoing     Problem: Falls - Risk of:  Goal: Absence of physical injury  Description: Absence of physical injury  12/29/2021 0027 by May Ochoa RN  Outcome: Ongoing     Problem: Pain:  Goal: Control of acute pain  Description: Control of acute pain  12/29/2021 0027 by May Ochoa RN  Outcome: Ongoing     Problem: Pain:  Goal: Control of chronic pain  Description: Control of chronic pain  12/29/2021 0027 by May Ochoa RN  Outcome: Ongoing     Problem: Nutrition  Goal: Optimal nutrition therapy  12/29/2021 0027 by May Ochoa RN  Outcome: Ongoing     Problem: Skin Integrity:  Goal: Will show no infection signs and symptoms  Description: Will show no infection signs and symptoms  12/29/2021 0027 by May Ochoa RN  Outcome: Ongoing     Problem: Skin Integrity:  Goal: Absence of new skin breakdown  Description: Absence of new skin breakdown  12/29/2021 0027 by May Ochoa RN  Outcome: Ongoing

## 2021-12-30 LAB
ALBUMIN SERPL-MCNC: 2.9 G/DL (ref 3.5–5.2)
ALP BLD-CCNC: 114 U/L (ref 35–104)
ALT SERPL-CCNC: 23 U/L (ref 5–33)
ANION GAP SERPL CALCULATED.3IONS-SCNC: 11 MMOL/L (ref 7–19)
AST SERPL-CCNC: 12 U/L (ref 5–32)
BASOPHILS ABSOLUTE: 0 K/UL (ref 0–0.2)
BASOPHILS RELATIVE PERCENT: 0.3 % (ref 0–1)
BILIRUB SERPL-MCNC: <0.2 MG/DL (ref 0.2–1.2)
BUN BLDV-MCNC: 10 MG/DL (ref 6–20)
CALCIUM SERPL-MCNC: 9.2 MG/DL (ref 8.6–10)
CHLORIDE BLD-SCNC: 99 MMOL/L (ref 98–111)
CO2: 33 MMOL/L (ref 22–29)
CREAT SERPL-MCNC: 0.7 MG/DL (ref 0.5–0.9)
EOSINOPHILS ABSOLUTE: 0 K/UL (ref 0–0.6)
EOSINOPHILS RELATIVE PERCENT: 0.3 % (ref 0–5)
GFR AFRICAN AMERICAN: >59
GFR NON-AFRICAN AMERICAN: >60
GLUCOSE BLD-MCNC: 130 MG/DL (ref 70–99)
GLUCOSE BLD-MCNC: 136 MG/DL (ref 70–99)
GLUCOSE BLD-MCNC: 154 MG/DL (ref 74–109)
GLUCOSE BLD-MCNC: 204 MG/DL (ref 70–99)
GLUCOSE BLD-MCNC: 259 MG/DL (ref 70–99)
HCT VFR BLD CALC: 34.7 % (ref 37–47)
HEMOGLOBIN: 10.6 G/DL (ref 12–16)
IMMATURE GRANULOCYTES #: 0.2 K/UL
LYMPHOCYTES ABSOLUTE: 0.8 K/UL (ref 1.1–4.5)
LYMPHOCYTES RELATIVE PERCENT: 11 % (ref 20–40)
MAGNESIUM: 1.7 MG/DL (ref 1.6–2.6)
MCH RBC QN AUTO: 30 PG (ref 27–31)
MCHC RBC AUTO-ENTMCNC: 30.5 G/DL (ref 33–37)
MCV RBC AUTO: 98.3 FL (ref 81–99)
MONOCYTES ABSOLUTE: 0.6 K/UL (ref 0–0.9)
MONOCYTES RELATIVE PERCENT: 7.9 % (ref 0–10)
NEUTROPHILS ABSOLUTE: 5.8 K/UL (ref 1.5–7.5)
NEUTROPHILS RELATIVE PERCENT: 78.3 % (ref 50–65)
PDW BLD-RTO: 12.9 % (ref 11.5–14.5)
PERFORMED ON: ABNORMAL
PLATELET # BLD: 237 K/UL (ref 130–400)
PMV BLD AUTO: 9.7 FL (ref 9.4–12.3)
POTASSIUM SERPL-SCNC: 4.5 MMOL/L (ref 3.5–5)
RBC # BLD: 3.53 M/UL (ref 4.2–5.4)
SODIUM BLD-SCNC: 143 MMOL/L (ref 136–145)
TOTAL PROTEIN: 6 G/DL (ref 6.6–8.7)
WBC # BLD: 7.4 K/UL (ref 4.8–10.8)

## 2021-12-30 PROCEDURE — 6370000000 HC RX 637 (ALT 250 FOR IP): Performed by: NURSE PRACTITIONER

## 2021-12-30 PROCEDURE — 6370000000 HC RX 637 (ALT 250 FOR IP): Performed by: INTERNAL MEDICINE

## 2021-12-30 PROCEDURE — 80053 COMPREHEN METABOLIC PANEL: CPT

## 2021-12-30 PROCEDURE — 94761 N-INVAS EAR/PLS OXIMETRY MLT: CPT

## 2021-12-30 PROCEDURE — 6360000002 HC RX W HCPCS: Performed by: STUDENT IN AN ORGANIZED HEALTH CARE EDUCATION/TRAINING PROGRAM

## 2021-12-30 PROCEDURE — 6360000002 HC RX W HCPCS: Performed by: INTERNAL MEDICINE

## 2021-12-30 PROCEDURE — 85025 COMPLETE CBC W/AUTO DIFF WBC: CPT

## 2021-12-30 PROCEDURE — 94660 CPAP INITIATION&MGMT: CPT

## 2021-12-30 PROCEDURE — 83735 ASSAY OF MAGNESIUM: CPT

## 2021-12-30 PROCEDURE — 1210000000 HC MED SURG R&B

## 2021-12-30 PROCEDURE — 6370000000 HC RX 637 (ALT 250 FOR IP): Performed by: STUDENT IN AN ORGANIZED HEALTH CARE EDUCATION/TRAINING PROGRAM

## 2021-12-30 PROCEDURE — 82947 ASSAY GLUCOSE BLOOD QUANT: CPT

## 2021-12-30 PROCEDURE — 2580000003 HC RX 258: Performed by: NURSE PRACTITIONER

## 2021-12-30 PROCEDURE — 6370000000 HC RX 637 (ALT 250 FOR IP): Performed by: HOSPITALIST

## 2021-12-30 PROCEDURE — 6370000000 HC RX 637 (ALT 250 FOR IP)

## 2021-12-30 PROCEDURE — 2700000000 HC OXYGEN THERAPY PER DAY

## 2021-12-30 RX ORDER — DEXTROMETHORPHAN POLISTIREX 30 MG/5ML
30 SUSPENSION ORAL 3 TIMES DAILY PRN
Status: DISCONTINUED | OUTPATIENT
Start: 2021-12-30 | End: 2022-01-04 | Stop reason: HOSPADM

## 2021-12-30 RX ADMIN — ALPRAZOLAM 0.5 MG: 0.5 TABLET ORAL at 12:09

## 2021-12-30 RX ADMIN — ALBUTEROL SULFATE 2 PUFF: 90 AEROSOL, METERED RESPIRATORY (INHALATION) at 21:39

## 2021-12-30 RX ADMIN — INSULIN LISPRO 9 UNITS: 100 INJECTION, SOLUTION INTRAVENOUS; SUBCUTANEOUS at 21:40

## 2021-12-30 RX ADMIN — TIMOLOL MALEATE 1 DROP: 5 SOLUTION OPHTHALMIC at 21:40

## 2021-12-30 RX ADMIN — ENOXAPARIN SODIUM 40 MG: 100 INJECTION SUBCUTANEOUS at 21:37

## 2021-12-30 RX ADMIN — INSULIN GLARGINE 20 UNITS: 100 INJECTION, SOLUTION SUBCUTANEOUS at 21:40

## 2021-12-30 RX ADMIN — ALBUTEROL SULFATE 2 PUFF: 90 AEROSOL, METERED RESPIRATORY (INHALATION) at 09:01

## 2021-12-30 RX ADMIN — DEXAMETHASONE SODIUM PHOSPHATE 6 MG: 10 INJECTION INTRAMUSCULAR; INTRAVENOUS at 12:10

## 2021-12-30 RX ADMIN — BENZONATATE 100 MG: 100 CAPSULE ORAL at 17:28

## 2021-12-30 RX ADMIN — SODIUM CHLORIDE, PRESERVATIVE FREE 10 ML: 5 INJECTION INTRAVENOUS at 21:37

## 2021-12-30 RX ADMIN — Medication 10 MG: at 21:37

## 2021-12-30 RX ADMIN — ACETAMINOPHEN 650 MG: 325 TABLET ORAL at 18:37

## 2021-12-30 RX ADMIN — LEVOTHYROXINE SODIUM 100 MCG: 100 TABLET ORAL at 09:00

## 2021-12-30 RX ADMIN — Medication 2000 UNITS: at 09:01

## 2021-12-30 RX ADMIN — Medication 5 DROP: at 09:01

## 2021-12-30 RX ADMIN — ONDANSETRON HYDROCHLORIDE 8 MG: 4 TABLET, FILM COATED ORAL at 17:28

## 2021-12-30 RX ADMIN — BENZONATATE 100 MG: 100 CAPSULE ORAL at 21:50

## 2021-12-30 RX ADMIN — TIMOLOL MALEATE 1 DROP: 5 SOLUTION OPHTHALMIC at 09:01

## 2021-12-30 RX ADMIN — INSULIN LISPRO 6 UNITS: 100 INJECTION, SOLUTION INTRAVENOUS; SUBCUTANEOUS at 17:29

## 2021-12-30 RX ADMIN — ALPRAZOLAM 0.5 MG: 0.5 TABLET ORAL at 21:37

## 2021-12-30 RX ADMIN — ALBUTEROL SULFATE 2 PUFF: 90 AEROSOL, METERED RESPIRATORY (INHALATION) at 12:15

## 2021-12-30 RX ADMIN — Medication 5 DROP: at 21:40

## 2021-12-30 RX ADMIN — Medication 1 TABLET: at 09:01

## 2021-12-30 RX ADMIN — PRAVASTATIN SODIUM 40 MG: 20 TABLET ORAL at 09:00

## 2021-12-30 RX ADMIN — Medication 30 MG: at 17:29

## 2021-12-30 RX ADMIN — BENZONATATE 100 MG: 100 CAPSULE ORAL at 09:58

## 2021-12-30 RX ADMIN — INSULIN GLARGINE 20 UNITS: 100 INJECTION, SOLUTION SUBCUTANEOUS at 09:14

## 2021-12-30 RX ADMIN — ENOXAPARIN SODIUM 40 MG: 100 INJECTION SUBCUTANEOUS at 09:01

## 2021-12-30 RX ADMIN — Medication 30 MG: at 09:58

## 2021-12-30 RX ADMIN — ALBUTEROL SULFATE 2 PUFF: 90 AEROSOL, METERED RESPIRATORY (INHALATION) at 17:29

## 2021-12-30 RX ADMIN — Medication 30 MG: at 21:50

## 2021-12-30 RX ADMIN — ONDANSETRON HYDROCHLORIDE 8 MG: 4 TABLET, FILM COATED ORAL at 09:58

## 2021-12-30 RX ADMIN — LEVOFLOXACIN 750 MG: 500 TABLET, FILM COATED ORAL at 12:09

## 2021-12-30 ASSESSMENT — PAIN SCALES - GENERAL: PAINLEVEL_OUTOF10: 3

## 2021-12-30 NOTE — PROGRESS NOTES
Hospitalist Progress Note  [facility]     Patient: Mildred Merida  : 1965  MRN: [id number]  Code Status: Partial Code    Hospital Day: 21   Date of Service: 2021    Subjective:   Patient seen in COVID-19 unit. No current complaints.     Past Medical History:   Diagnosis Date    Anemia     Asthma     Bronchitis, acute     COPD (chronic obstructive pulmonary disease) (HCC)     Diabetes ([facility] [address]     Glaucoma     Hyperlipidemia     Hypertension     Hyperthyroidism     Morbid obesity ([facility] [address]     Sleep apnea     uses c-pap       Past Surgical History:   Procedure Laterality Date    AXILLARY SURGERY Bilateral     excision and drainage of staph abscesses    BACK SURGERY  10/2004    Dr. Glen Juárez, [place] L4, L5    CARPAL TUNNEL RELEASE Right     CHOLECYSTECTOMY      COLONOSCOPY  2007    Dr Hao Morrissey N/A 2020    Dr Selvin James yr recall    DILATION AND CURETTAGE OF UTERUS N/A 2021    DILATATION AND CURETTAGE HYSTEROSCOPY NOVASURE ABLATION performed by Devan Corrigan MD at [address] COLONOSCOPY FLX DX W/COLLJ [address] WHEN PFRMD N/A 2017    Dr Jayne Barnes, actively inflamed internal hemorrhoids, residulal liquid and some solid food particles in the colon-5 yr recall due to prep    STOMACH SURGERY  2014    Dr. Yusuf Belle ( gastric sleeve)    UPPER GASTROINTESTINAL ENDOSCOPY  10/30/2012    Dr Bashir Best esophagitis, gastritis, Iliana (-)    UPPER GASTROINTESTINAL ENDOSCOPY  2008    Dr Polo Larson esophagitis    UPPER GASTROINTESTINAL ENDOSCOPY N/A 2020    Dr Felicia Peng       Family History   Problem Relation Age of Onset    Lung Cancer Mother     Kidney Cancer Mother     Lung Cancer Maternal Grandmother     Colon Cancer Maternal Grandfather     Liver Cancer Neg Hx     Liver Disease Neg Hx     Stomach Cancer Neg Hx     Rectal Cancer Neg Hx     Esophageal Cancer Neg Hx     Colon Polyps Neg Hx        Social History Socioeconomic History    Marital status:      Spouse name: Not on file    Number of children: Not on file    Years of education: Not on file    Highest education level: Not on file   Occupational History    Not on file   Tobacco Use    Smoking status: Former Smoker     Packs/day: 3.00     Years: 26.00     Pack years: 78.00     Types: Cigarettes     Quit date: 5/6/2011     Years since quitting: 10.6    Smokeless tobacco: Never Used   Vaping Use    Vaping Use: Never used   Substance and Sexual Activity    Alcohol use: No    Drug use: No    Sexual activity: Yes     Partners: Male   Other Topics Concern    Not on file   Social History Narrative    Not on file     Social Determinants of Health     Financial Resource Strain:     Difficulty of Paying Living Expenses: Not on file   Food Insecurity:     Worried About [address] [employer] in the Last Year: Not on file    Tiff[place] of Food in the Last Year: Not on file   Transportation Needs:     Lack of Transportation (Medical): Not on file    Lack of Transportation (Non-Medical):  Not on file   Physical Activity:     Days of Exercise per Week: Not on file    Minutes of Exercise per Session: Not on file   Stress:     Feeling of Stress : Not on file   Social Connections:     Frequency of Communication with Friends and Family: Not on file    Frequency of Social Gatherings with Friends and Family: Not on file    Attends Adventism Services: Not on file    Active Member of Clubs or Organizations: Not on file    Attends Club or Organization Meetings: Not on file    Marital Status: Not on file   Intimate Partner Violence:     Fear of Current or Ex-Partner: Not on file    Emotionally Abused: Not on file    Physically Abused: Not on file    Sexually Abused: Not on file   Housing Stability:     Unable to Pay for Housing in the Last Year: Not on file    Number of [place] in the Last Year: Not on file    Unstable Housing in the Last Year: Not on file       Current Facility-Administered Medications   Medication Dose Route Frequency Provider Last Rate Last Admin    dextromethorphan (DELSYM) 30 MG/5ML extended release liquid 30 mg  30 mg Oral TID PRN Chaitanya Herrera MD   30 mg at 12/30/21 0958    dexamethasone (PF) (DECADRON) injection 6 mg  6 mg IntraVENous Q24H Chaitanya Herrera MD   6 mg at 12/30/21 1210    levoFLOXacin (LEVAQUIN) tablet 750 mg  750 mg Oral Daily Chaitanya Herrera MD   750 mg at 12/30/21 1209    carbamide peroxide (DEBROX) 6.5 % otic solution 5 drop  5 drop Right Ear BID Fabiolagreg Light, DO   5 drop at 12/30/21 0901    insulin lispro (HUMALOG) injection vial 0-18 Units  0-18 Units SubCUTAneous 4x Daily AC & HS Zaida Orlando, APRN - CNP   6 Units at 12/29/21 2313    loperamide (IMODIUM) capsule 2 mg  2 mg Oral 4x Daily PRN Fabiola Light DO        ALPRAZolam Marguarite Pedlar) tablet 0.5 mg  0.5 mg Oral Q6H PRN Jose Miguel Ahn MD   0.5 mg at 12/30/21 1209    insulin glargine (LANTUS) injection vial 20 Units  20 Units SubCUTAneous BID Fabiola Light DO   20 Units at 12/30/21 0914    promethazine (PHENERGAN) injection 6.25 mg  6.25 mg IntraMUSCular Q6H PRN Jose Miguel Ahn MD   6.25 mg at 12/22/21 2359    benzonatate (TESSALON) capsule 100 mg  100 mg Oral TID PRN Fabiola Light DO   100 mg at 12/30/21 4492    opium-belladonna (B&O SUPPRETTES) 16.2-60 MG suppository 60 mg  60 mg Rectal Q8H PRN Fabiola Light, DO        melatonin disintegrating tablet 10 mg  10 mg Oral Nightly Sherry Graham MD   10 mg at 12/29/21 2112    Benzocaine-Menthol (CEPACOL) 1 lozenge  1 lozenge Oral Q2H PRN Sherry Graham MD   1 lozenge at 12/29/21 2124    sodium chloride (OCEAN, BABY AYR) 0.65 % nasal spray 1 spray  1 spray Each Nostril PRN Sherry Graham MD   1 spray at 12/13/21 1727    enoxaparin (LOVENOX) injection 40 mg  40 mg SubCUTAneous BID Sherry Graham MD   40 mg at 12/30/21 0901    Vitamin D (CHOLECALCIFEROL) tablet 2,000 Units  2,000 Units Oral Daily Kerry Rich MD   2,000 Units at 12/30/21 0901    LORazepam (ATIVAN) injection 0.5 mg  0.5 mg IntraVENous Q6H PRN Kerry Rich MD   0.5 mg at 12/16/21 1118    albuterol sulfate  (90 Base) MCG/ACT inhaler 2 puff  2 puff Inhalation 4x Daily Lysbeth Ala, APRN   2 puff at 12/30/21 1215    calcium-cholecalciferol 500-200 MG-UNIT per tablet 1 tablet  1 tablet Oral Daily Lysbeth Ala, APRN   1 tablet at 12/30/21 0901    HYDROcodone-acetaminophen (Rodriguez Radon) 7.5-325 MG per tablet 1 tablet  1 tablet Oral Q6H PRN Lysbeth Ala, APRN   1 tablet at 12/23/21 0615    levothyroxine (SYNTHROID) tablet 100 mcg  100 mcg Oral Daily Lysbeth Ala, APRN   100 mcg at 12/30/21 0900    timolol (TIMOPTIC) 0.5 % ophthalmic solution 1 drop  1 drop Both Eyes BID Lysbeth Ala, APRN   1 drop at 12/30/21 0901    pravastatin (PRAVACHOL) tablet 40 mg  40 mg Oral Daily Lysbeth Ala, APRN   40 mg at 12/30/21 0900    ondansetron (ZOFRAN) tablet 8 mg  8 mg Oral Q8H PRN Lysbeth Ala, APRN   8 mg at 12/30/21 5215    meclizine (ANTIVERT) tablet 25 mg  25 mg Oral TID PRN Lysbeth Ala, APRN   25 mg at 12/10/21 0301    sodium chloride flush 0.9 % injection 5-40 mL  5-40 mL IntraVENous 2 times per day Lysbeth Ala, APRN   10 mL at 12/29/21 1004    sodium chloride flush 0.9 % injection 5-40 mL  5-40 mL IntraVENous PRN Lysbeth Ala, APRN   10 mL at 12/15/21 1155    0.9 % sodium chloride infusion  25 mL IntraVENous PRN Lysbeth Ala, APRN   Stopped at 12/11/21 2130    polyethylene glycol (GLYCOLAX) packet 17 g  17 g Oral Daily PRN Lysbeth Ala, APRN   17 g at 12/17/21 2112    acetaminophen (TYLENOL) tablet 650 mg  650 mg Oral Q6H PRN Lysbeth Ala, APRN   650 mg at 12/29/21 2120    Or    acetaminophen (TYLENOL) suppository 650 mg  650 mg Rectal Q6H PRN Lysbeth Ala, APRN        glucose (GLUTOSE) 40 % oral gel 15 g  15 g Oral PRN Whitney [place], APRN        dextrose 50 % IV solution  12.5 g IntraVENous PRN Whitney [place], APRN        glucagon (rDNA) injection 1 mg  1 mg IntraMUSCular PRN Whitney [place], APRN        dextrose 5 % solution  100 mL/hr IntraVENous PRN Whitney [place], APRN             sodium chloride Stopped (12/11/21 2130)    dextrose          Objective:   /89   Pulse 88   Temp 97.3 °F (36.3 °C) (Temporal)   Resp 20   Ht 5' 5\" (1.651 m)   Wt (!) 332 lb (150.6 kg)   SpO2 91%   BMI 55.25 kg/m²     In an effort to reduce COVID-19 exposure, patient seen from doorway and case discussed in detail with unit staff    Recent Labs     12/30/21  0549   WBC 7.4   RBC 3.53*   HGB 10.6*   HCT 34.7*   MCV 98.3   MCH 30.0   MCHC 30.5*        Recent Labs     12/28/21  0312 12/29/21  0419 12/30/21  0549    143 143   K 4.3 4.0 4.5   ANIONGAP 12 11 11    99 99   CO2 29 33* 33*   BUN 5* 6 10   CREATININE 0.7 0.6 0.7   GLUCOSE 103 114* 154*   CALCIUM 9.1 9.3 9.2     Recent Labs     12/30/21  0549   MG 1.7     Recent Labs     12/28/21  0312 12/29/21  0419 12/30/21  0549   AST 18 15 12   ALT 25 25 23   BILITOT <0.2 0.3 <0.2   ALKPHOS 115* 120* 114*     No results for input(s): PH, PO2, PCO2, HCO3, BE, O2SAT in the last 72 hours. No results for input(s): TROPONINI in the last 72 hours. No results for input(s): INR in the last 72 hours. No results for input(s): LACTA in the last 72 hours. Intake/Output Summary (Last 24 hours) at 12/30/2021 1332  Last data filed at 12/30/2021 8958  Gross per 24 hour   Intake 980 ml   Output --   Net 980 ml       XR CHEST PORTABLE    Result Date: 12/28/2021  EXAMINATION: XR CHEST PORTABLE 12/28/2021 3:01 PM HISTORY: Cough and fever. COVID-19 pneumonia. Report: A portable upright frontal view the chest was obtained. COMPARISON: Portable chest x-ray 12/25/2021.  There are extensive mixed alveolar interstitial infiltrates that appear essentially unchanged. The lungs are mildly hypoaerated. No pneumothorax or pleural effusion is identified. Heart size appears normal. The osseous structures and upper abdomen appear within normal limits. Stable chest x-ray with extensive bilateral pulmonary infiltrates compatible with the history of COVID-19 pneumonia. No pneumothorax or pleural effusion. No evidence of pneumomediastinum. Signed by Dr Alin Valle       Assessment and Plan:   COVID-19 pneumonia  I assumed care of this patient on 12/29/2021 with ongoing treatment plan on board  Vaccination status unknown     Acute hypoxemic respiratory failure  Secondary to above  Requiring HFNC 12 L  Wean as tolerated  No supplemental home O2 requirement  TTE reviewed    Prior tobacco dependence  Counseled on continued cessation     Morbid obesity  Counseled     MAHIN  CPAP with sleep     DVT prophylaxis  Lovenox    Total critical care time: 40 minutes    Franny Hooker MD   12/30/2021 1:32 PM

## 2021-12-30 NOTE — PROGRESS NOTES
Visited with pt from the doorway to provide spiritual care. Pt says they are trying to get her into LTAC at [facility]. This  provided spiritual care with sustaining presence, nurtured hope, and prayer. Pt expressed gratitude for spiritual care.     Electronically signed by Angela Zuniga on 12/30/2021 at 3:31 PM

## 2021-12-30 NOTE — PLAN OF CARE
Problem: Airway Clearance - Ineffective  Goal: Achieve or maintain patent airway  Outcome: Ongoing     Problem: Gas Exchange - Impaired  Goal: Absence of hypoxia  Outcome: Ongoing  Goal: Promote optimal lung function  Outcome: Ongoing     Problem: Breathing Pattern - Ineffective  Goal: Ability to achieve and maintain a regular respiratory rate  Outcome: Ongoing     Problem:  Body Temperature -  Risk of, Imbalanced  Goal: Ability to maintain a body temperature within defined limits  Outcome: Ongoing  Goal: Will regain or maintain usual level of consciousness  Outcome: Ongoing  Goal: Complications related to the disease process, condition or treatment will be avoided or minimized  Outcome: Ongoing     Problem: Isolation Precautions - Risk of Spread of Infection  Goal: Prevent transmission of infection  Outcome: Ongoing     Problem: Nutrition Deficits  Goal: Optimize nutritional status  Outcome: Ongoing     Problem: Risk for Fluid Volume Deficit  Goal: Maintain normal heart rhythm  Outcome: Ongoing  Goal: Maintain absence of muscle cramping  Outcome: Ongoing  Goal: Maintain normal serum potassium, sodium, calcium, phosphorus, and pH  Outcome: Ongoing     Problem: Loneliness or Risk for Loneliness  Goal: Demonstrate positive use of time alone when socialization is not possible  Outcome: Ongoing     Problem: Patient Education: Go to Patient Education Activity  Goal: Patient/Family Education  Outcome: Ongoing     Problem: Falls - Risk of:  Goal: Will remain free from falls  Description: Will remain free from falls  Outcome: Ongoing  Goal: Absence of physical injury  Description: Absence of physical injury  Outcome: Ongoing     Problem: Pain:  Goal: Pain level will decrease  Description: Pain level will decrease  Outcome: Ongoing  Goal: Control of acute pain  Description: Control of acute pain  Outcome: Ongoing  Goal: Control of chronic pain  Description: Control of chronic pain  Outcome: Ongoing     Problem: Skin Integrity:  Goal: Will show no infection signs and symptoms  Description: Will show no infection signs and symptoms  Outcome: Ongoing  Goal: Absence of new skin breakdown  Description: Absence of new skin breakdown  Outcome: Ongoing

## 2021-12-31 LAB
ALBUMIN SERPL-MCNC: 2.9 G/DL (ref 3.5–5.2)
ALP BLD-CCNC: 117 U/L (ref 35–104)
ALT SERPL-CCNC: 22 U/L (ref 5–33)
ANION GAP SERPL CALCULATED.3IONS-SCNC: 10 MMOL/L (ref 7–19)
AST SERPL-CCNC: 12 U/L (ref 5–32)
BASOPHILS ABSOLUTE: 0 K/UL (ref 0–0.2)
BASOPHILS RELATIVE PERCENT: 0.4 % (ref 0–1)
BILIRUB SERPL-MCNC: <0.2 MG/DL (ref 0.2–1.2)
BUN BLDV-MCNC: 16 MG/DL (ref 6–20)
CALCIUM SERPL-MCNC: 9.3 MG/DL (ref 8.6–10)
CHLORIDE BLD-SCNC: 97 MMOL/L (ref 98–111)
CO2: 32 MMOL/L (ref 22–29)
CREAT SERPL-MCNC: 0.8 MG/DL (ref 0.5–0.9)
EOSINOPHILS ABSOLUTE: 0 K/UL (ref 0–0.6)
EOSINOPHILS RELATIVE PERCENT: 0.4 % (ref 0–5)
GFR AFRICAN AMERICAN: >59
GFR NON-AFRICAN AMERICAN: >60
GLUCOSE BLD-MCNC: 112 MG/DL (ref 70–99)
GLUCOSE BLD-MCNC: 131 MG/DL (ref 74–109)
GLUCOSE BLD-MCNC: 145 MG/DL (ref 70–99)
GLUCOSE BLD-MCNC: 237 MG/DL (ref 70–99)
GLUCOSE BLD-MCNC: 316 MG/DL (ref 70–99)
HCT VFR BLD CALC: 35.5 % (ref 37–47)
HEMOGLOBIN: 10.9 G/DL (ref 12–16)
IMMATURE GRANULOCYTES #: 0.3 K/UL
LYMPHOCYTES ABSOLUTE: 1.1 K/UL (ref 1.1–4.5)
LYMPHOCYTES RELATIVE PERCENT: 13.7 % (ref 20–40)
MAGNESIUM: 1.8 MG/DL (ref 1.6–2.6)
MCH RBC QN AUTO: 29.7 PG (ref 27–31)
MCHC RBC AUTO-ENTMCNC: 30.7 G/DL (ref 33–37)
MCV RBC AUTO: 96.7 FL (ref 81–99)
MONOCYTES ABSOLUTE: 0.6 K/UL (ref 0–0.9)
MONOCYTES RELATIVE PERCENT: 7.4 % (ref 0–10)
NEUTROPHILS ABSOLUTE: 6.1 K/UL (ref 1.5–7.5)
NEUTROPHILS RELATIVE PERCENT: 74.1 % (ref 50–65)
PDW BLD-RTO: 12.8 % (ref 11.5–14.5)
PERFORMED ON: ABNORMAL
PLATELET # BLD: 279 K/UL (ref 130–400)
PMV BLD AUTO: 9.8 FL (ref 9.4–12.3)
POTASSIUM SERPL-SCNC: 4 MMOL/L (ref 3.5–5)
RBC # BLD: 3.67 M/UL (ref 4.2–5.4)
SODIUM BLD-SCNC: 139 MMOL/L (ref 136–145)
TOTAL PROTEIN: 7.1 G/DL (ref 6.6–8.7)
WBC # BLD: 8.2 K/UL (ref 4.8–10.8)

## 2021-12-31 PROCEDURE — 1210000000 HC MED SURG R&B

## 2021-12-31 PROCEDURE — 2700000000 HC OXYGEN THERAPY PER DAY

## 2021-12-31 PROCEDURE — 94761 N-INVAS EAR/PLS OXIMETRY MLT: CPT

## 2021-12-31 PROCEDURE — 97535 SELF CARE MNGMENT TRAINING: CPT

## 2021-12-31 PROCEDURE — 83735 ASSAY OF MAGNESIUM: CPT

## 2021-12-31 PROCEDURE — 6360000002 HC RX W HCPCS: Performed by: STUDENT IN AN ORGANIZED HEALTH CARE EDUCATION/TRAINING PROGRAM

## 2021-12-31 PROCEDURE — 80053 COMPREHEN METABOLIC PANEL: CPT

## 2021-12-31 PROCEDURE — 6370000000 HC RX 637 (ALT 250 FOR IP)

## 2021-12-31 PROCEDURE — 85025 COMPLETE CBC W/AUTO DIFF WBC: CPT

## 2021-12-31 PROCEDURE — 2580000003 HC RX 258: Performed by: NURSE PRACTITIONER

## 2021-12-31 PROCEDURE — 6370000000 HC RX 637 (ALT 250 FOR IP): Performed by: HOSPITALIST

## 2021-12-31 PROCEDURE — 6360000002 HC RX W HCPCS: Performed by: HOSPITALIST

## 2021-12-31 PROCEDURE — 97530 THERAPEUTIC ACTIVITIES: CPT

## 2021-12-31 PROCEDURE — 6370000000 HC RX 637 (ALT 250 FOR IP): Performed by: INTERNAL MEDICINE

## 2021-12-31 PROCEDURE — 6370000000 HC RX 637 (ALT 250 FOR IP): Performed by: STUDENT IN AN ORGANIZED HEALTH CARE EDUCATION/TRAINING PROGRAM

## 2021-12-31 PROCEDURE — 82947 ASSAY GLUCOSE BLOOD QUANT: CPT

## 2021-12-31 PROCEDURE — 6370000000 HC RX 637 (ALT 250 FOR IP): Performed by: NURSE PRACTITIONER

## 2021-12-31 PROCEDURE — 6360000002 HC RX W HCPCS: Performed by: INTERNAL MEDICINE

## 2021-12-31 PROCEDURE — 36415 COLL VENOUS BLD VENIPUNCTURE: CPT

## 2021-12-31 RX ORDER — FLUCONAZOLE 100 MG/1
150 TABLET ORAL ONCE
Status: COMPLETED | OUTPATIENT
Start: 2021-12-31 | End: 2021-12-31

## 2021-12-31 RX ADMIN — BENZONATATE 100 MG: 100 CAPSULE ORAL at 21:47

## 2021-12-31 RX ADMIN — Medication 30 MG: at 09:31

## 2021-12-31 RX ADMIN — TIMOLOL MALEATE 1 DROP: 5 SOLUTION OPHTHALMIC at 09:32

## 2021-12-31 RX ADMIN — BENZONATATE 100 MG: 100 CAPSULE ORAL at 09:31

## 2021-12-31 RX ADMIN — PRAVASTATIN SODIUM 40 MG: 20 TABLET ORAL at 09:31

## 2021-12-31 RX ADMIN — INSULIN LISPRO 12 UNITS: 100 INJECTION, SOLUTION INTRAVENOUS; SUBCUTANEOUS at 21:49

## 2021-12-31 RX ADMIN — Medication 5 DROP: at 09:32

## 2021-12-31 RX ADMIN — ONDANSETRON HYDROCHLORIDE 8 MG: 4 TABLET, FILM COATED ORAL at 09:31

## 2021-12-31 RX ADMIN — ENOXAPARIN SODIUM 40 MG: 100 INJECTION SUBCUTANEOUS at 09:31

## 2021-12-31 RX ADMIN — LEVOFLOXACIN 750 MG: 500 TABLET, FILM COATED ORAL at 13:09

## 2021-12-31 RX ADMIN — PROMETHAZINE HYDROCHLORIDE 6.25 MG: 25 INJECTION INTRAMUSCULAR; INTRAVENOUS at 14:43

## 2021-12-31 RX ADMIN — Medication 5 DROP: at 21:48

## 2021-12-31 RX ADMIN — Medication 30 MG: at 17:47

## 2021-12-31 RX ADMIN — DEXAMETHASONE SODIUM PHOSPHATE 6 MG: 10 INJECTION INTRAMUSCULAR; INTRAVENOUS at 13:09

## 2021-12-31 RX ADMIN — INSULIN LISPRO 6 UNITS: 100 INJECTION, SOLUTION INTRAVENOUS; SUBCUTANEOUS at 17:48

## 2021-12-31 RX ADMIN — TIMOLOL MALEATE 1 DROP: 5 SOLUTION OPHTHALMIC at 21:48

## 2021-12-31 RX ADMIN — FLUCONAZOLE 150 MG: 100 TABLET ORAL at 14:44

## 2021-12-31 RX ADMIN — ALBUTEROL SULFATE 2 PUFF: 90 AEROSOL, METERED RESPIRATORY (INHALATION) at 09:31

## 2021-12-31 RX ADMIN — BENZONATATE 100 MG: 100 CAPSULE ORAL at 17:47

## 2021-12-31 RX ADMIN — ENOXAPARIN SODIUM 40 MG: 100 INJECTION SUBCUTANEOUS at 21:47

## 2021-12-31 RX ADMIN — INSULIN GLARGINE 20 UNITS: 100 INJECTION, SOLUTION SUBCUTANEOUS at 21:49

## 2021-12-31 RX ADMIN — ALBUTEROL SULFATE 2 PUFF: 90 AEROSOL, METERED RESPIRATORY (INHALATION) at 17:48

## 2021-12-31 RX ADMIN — Medication 30 MG: at 21:47

## 2021-12-31 RX ADMIN — ALBUTEROL SULFATE 2 PUFF: 90 AEROSOL, METERED RESPIRATORY (INHALATION) at 13:10

## 2021-12-31 RX ADMIN — INSULIN LISPRO 3 UNITS: 100 INJECTION, SOLUTION INTRAVENOUS; SUBCUTANEOUS at 09:33

## 2021-12-31 RX ADMIN — Medication 1 TABLET: at 09:31

## 2021-12-31 RX ADMIN — Medication 2000 UNITS: at 09:31

## 2021-12-31 RX ADMIN — INSULIN GLARGINE 20 UNITS: 100 INJECTION, SOLUTION SUBCUTANEOUS at 09:34

## 2021-12-31 RX ADMIN — SODIUM CHLORIDE, PRESERVATIVE FREE 10 ML: 5 INJECTION INTRAVENOUS at 21:47

## 2021-12-31 RX ADMIN — Medication 10 MG: at 21:47

## 2021-12-31 RX ADMIN — LEVOTHYROXINE SODIUM 100 MCG: 100 TABLET ORAL at 05:56

## 2021-12-31 RX ADMIN — ALPRAZOLAM 0.5 MG: 0.5 TABLET ORAL at 21:47

## 2021-12-31 RX ADMIN — ACETAMINOPHEN 650 MG: 325 TABLET ORAL at 14:44

## 2021-12-31 RX ADMIN — ALBUTEROL SULFATE 2 PUFF: 90 AEROSOL, METERED RESPIRATORY (INHALATION) at 21:48

## 2021-12-31 ASSESSMENT — PAIN SCALES - GENERAL
PAINLEVEL_OUTOF10: 3
PAINLEVEL_OUTOF10: 0

## 2021-12-31 NOTE — PROGRESS NOTES
Occupational Therapy     12/31/21 1504   Restrictions/Precautions   Restrictions/Precautions Fall Risk;Isolation   Position Activity Restriction   Other position/activity restrictions droplet plus precautions   Vision   Vision [facility]   Hearing   Hearing [facility]   General   Chart Reviewed Yes   Patient assessed for rehabilitation services? Yes   Response to previous treatment Patient with no complaints from previous session   Family / Caregiver Present No   Subjective   Subjective Pt sitting up in bed upon arrival for therapy. pt agreeable to participate. Pain Assessment   Patient Currently in Pain No   Oxygen Therapy   SpO2 94 %   O2 Device High flow nasal cannula   O2 Flow Rate (L/min) 10.5 L/min   Patient Observation   Observations O2 dropped in the 80's with mobility but is able to recover. HR elevated with mobility (133bpm). Pt requires cues for focusing on breathing techniques during mobility. Orientation   Overall Orientation Status WFL   ADL   Feeding Independent   Grooming Independent;Setup   UE Bathing Stand by assistance   LE Bathing Moderate assistance   UE Dressing Independent;Setup   LE Dressing Contact guard assistance;Minimal assistance   Toileting Supervision;Stand by assistance  (able to completed with SBA/ Supervision this date. )   Additional Comments Pt unable to reach around to wipe herself or clean herself. States she lays down on her bed to do it at home or takes a shower. Balance   Sitting Balance Independent   Standing Balance Contact guard assistance   Functional Mobility   Functional - Mobility Device Rolling Walker   Activity Other  (in room 38 ft)   Assist Level Contact guard assistance   Functional Mobility Comments CGA-SBA 38ft this date.    Bed mobility   Scooting Modified independent   Transfers   Stand Step Transfers Stand by assistance;Supervision   Sit to stand Stand by assistance;Supervision   Stand to sit Stand by [employer] Transfers   Toilet - Technique Stand step   Equipment Used Standard bedside commode   Toilet Transfer Stand by assistance;Supervision   Assessment   Performance deficits / Impairments Decreased functional mobility ; Decreased ADL status; Decreased strength;Decreased endurance;Decreased balance;Decreased fine motor control   Assessment Pt progressing as tolerated. Pt close to baseline with ADLs. Treatment Diagnosis Acute respiratory distress syndrome due to Covid 19   Prognosis Good   REQUIRES OT FOLLOW UP Yes   Treatment Initiated  Tx focused on BSc use, LBD techniques, and functional mobility at RW level 38ft this date. Discharge Recommendations Continue to assess pending progress; Patient would benefit from continued therapy after discharge   Activity Tolerance   Activity Tolerance Patient Tolerated treatment well   Activity Tolerance frequent rest breaks and cues to focus on breathing techniques. Pt limited by anxiety. Safety Devices   Safety Devices in place Yes   Type of devices Left in bed;Call light within reach; Bed alarm in place   Plan   Times per week 3-5   Times per day Daily   Electronically signed by RAJESH Salomon on 12/31/2021 at 3:15 PM

## 2021-12-31 NOTE — PLAN OF CARE
Problem: Airway Clearance - Ineffective  Goal: Achieve or maintain patent airway  12/31/2021 1051 by Jessica Martinez RN  Outcome: Ongoing  12/31/2021 0118 by Reagan Rocha RN  Outcome: Ongoing     Problem: Gas Exchange - Impaired  Goal: Absence of hypoxia  12/31/2021 1051 by Jessica Martinez RN  Outcome: Ongoing  12/31/2021 0118 by Reagan Rocha RN  Outcome: Ongoing  Goal: Promote optimal lung function  12/31/2021 1051 by Jessica Martinez RN  Outcome: Ongoing  12/31/2021 0118 by Reagan Rocha RN  Outcome: Ongoing     Problem: Breathing Pattern - Ineffective  Goal: Ability to achieve and maintain a regular respiratory rate  12/31/2021 1051 by Jessica Martinez RN  Outcome: Ongoing  12/31/2021 0118 by Reagan Rocha RN  Outcome: Ongoing     Problem:  Body Temperature -  Risk of, Imbalanced  Goal: Ability to maintain a body temperature within defined limits  12/31/2021 1051 by Jessica Martinez RN  Outcome: Ongoing  12/31/2021 0118 by Reagan Rocha RN  Outcome: Ongoing  Goal: Will regain or maintain usual level of consciousness  12/31/2021 1051 by Jessica Martinez RN  Outcome: Ongoing  12/31/2021 0118 by Reagan Rocha RN  Outcome: Ongoing  Goal: Complications related to the disease process, condition or treatment will be avoided or minimized  12/31/2021 1051 by Jessica Martinez RN  Outcome: Ongoing  12/31/2021 0118 by Reagan Rocha RN  Outcome: Ongoing     Problem: Isolation Precautions - Risk of Spread of Infection  Goal: Prevent transmission of infection  12/31/2021 1051 by Jessica Martinez RN  Outcome: Ongoing  12/31/2021 0118 by Reagan Rocha RN  Outcome: Ongoing     Problem: Nutrition Deficits  Goal: Optimize nutritional status  12/31/2021 1051 by Jessica Martinez RN  Outcome: Ongoing  12/31/2021 0118 by Reagan Rocha RN  Outcome: Ongoing     Problem: Risk for Fluid Volume Deficit  Goal: Maintain normal heart rhythm  12/31/2021 1051 by Jessica Martinez RN  Outcome: Ongoing  12/31/2021 0118 by Diana Fuentes NADIA Quarles  Outcome: Ongoing  Goal: Maintain absence of muscle cramping  12/31/2021 1051 by Jessica Martinez RN  Outcome: Ongoing  12/31/2021 0118 by Reagan Rocha RN  Outcome: Ongoing  Goal: Maintain normal serum potassium, sodium, calcium, phosphorus, and pH  12/31/2021 1051 by Jessica Martinez RN  Outcome: Ongoing  12/31/2021 0118 by Reagan Rocha RN  Outcome: Ongoing     Problem: Risk for Fluid Volume Deficit  Goal: Maintain normal heart rhythm  12/31/2021 1051 by Jessica Martinez RN  Outcome: Ongoing  12/31/2021 0118 by Reagan Rocha RN  Outcome: Ongoing  Goal: Maintain absence of muscle cramping  12/31/2021 1051 by Jessica Martinez RN  Outcome: Ongoing  12/31/2021 0118 by Reagan Rocha RN  Outcome: Ongoing  Goal: Maintain normal serum potassium, sodium, calcium, phosphorus, and pH  12/31/2021 1051 by Jessica Martinez RN  Outcome: Ongoing  12/31/2021 0118 by Reagan Rocha RN  Outcome: Ongoing     Problem: Loneliness or Risk for Loneliness  Goal: Demonstrate positive use of time alone when socialization is not possible  12/31/2021 1051 by Jessica Martinez RN  Outcome: Ongoing  12/31/2021 0118 by Reagan Rocha RN  Outcome: Ongoing     Problem: Fatigue  Goal: Verbalize increase energy and improved vitality  12/31/2021 1051 by Jessica Martinez RN  Outcome: Ongoing  12/31/2021 0118 by Reagan Rocha RN  Outcome: Ongoing     Problem: Falls - Risk of:  Goal: Will remain free from falls  Description: Will remain free from falls  12/31/2021 1051 by Jessica Martinez RN  Outcome: Ongoing  12/31/2021 0118 by Reagan Rocha RN  Outcome: Ongoing  Goal: Absence of physical injury  Description: Absence of physical injury  12/31/2021 1051 by Jessica Martinez RN  Outcome: Ongoing  12/31/2021 0118 by Reagan Rocha RN  Outcome: Ongoing     Problem: Pain:  Goal: Pain level will decrease  Description: Pain level will decrease  12/31/2021 1051 by Jessica Martinez RN  Outcome: Ongoing  12/31/2021 0118 by Reagan Rocha RN  Outcome: Ongoing  Goal: Control of acute pain  Description: Control of acute pain  12/31/2021 1051 by Jessica Martinez RN  Outcome: Ongoing  12/31/2021 0118 by Reagan Rocha RN  Outcome: Ongoing  Goal: Control of chronic pain  Description: Control of chronic pain  12/31/2021 1051 by Jessica Martinez RN  Outcome: Ongoing  12/31/2021 0118 by Reagan Rocha RN  Outcome: Ongoing     Problem: Nutrition  Goal: Optimal nutrition therapy  12/31/2021 1051 by Jessica Martinez RN  Outcome: Ongoing  12/31/2021 0118 by Reagan Rocha RN  Outcome: Ongoing     Problem: Skin Integrity:  Goal: Will show no infection signs and symptoms  Description: Will show no infection signs and symptoms  12/31/2021 1051 by Jessica Martinez RN  Outcome: Ongoing  12/31/2021 0118 by Reagan Rocha RN  Outcome: Ongoing  Goal: Absence of new skin breakdown  Description: Absence of new skin breakdown  12/31/2021 1051 by Jessica Martinez RN  Outcome: Ongoing  12/31/2021 0118 by Reagan Rocha RN  Outcome: Ongoing

## 2021-12-31 NOTE — PROGRESS NOTES
Hospitalist Progress Note  [facility]     Patient: Iam Atkins  : 1965  MRN: [id number]  Code Status: Partial Code    Hospital Day:    Date of Service: 2021    Subjective:   Patient seen in COVID-19 unit. No current complaints.     Past Medical History:   Diagnosis Date    Anemia     Asthma     Bronchitis, acute     COPD (chronic obstructive pulmonary disease) (HCC)     Diabetes ([address]     Glaucoma     Hyperlipidemia     Hypertension     Hyperthyroidism     Morbid obesity ([address]     Sleep apnea     uses c-pap       Past Surgical History:   Procedure Laterality Date    AXILLARY SURGERY Bilateral     excision and drainage of staph abscesses    BACK SURGERY  10/2004    Dr. Navneet Whyte, [place] L4, L5    CARPAL TUNNEL RELEASE Right     CHOLECYSTECTOMY      COLONOSCOPY  2007    Dr Nato Durant N/A 2020    Dr Kae Giron yr recall    DILATION AND CURETTAGE OF UTERUS N/A 2021    DILATATION AND CURETTAGE HYSTEROSCOPY NOVASURE ABLATION performed by Yissel Eason MD at [address] COLONOSCOPY FLX DX W/COLLJ [address] WHEN PFRMD N/A 2017    Dr Francisco Eldridge, actively inflamed internal hemorrhoids, residulal liquid and some solid food particles in the colon-5 yr recall due to prep    STOMACH SURGERY  2014    Dr. Stefany Friedman ( gastric sleeve)    UPPER GASTROINTESTINAL ENDOSCOPY  10/30/2012    Dr Tonny Srivastava esophagitis, gastritis, Iliana (-)    UPPER GASTROINTESTINAL ENDOSCOPY  2008    Dr Felicia Aase esophagitis    UPPER GASTROINTESTINAL ENDOSCOPY N/A 2020    Dr J Luis Lin       Family History   Problem Relation Age of Onset    Lung Cancer Mother     Kidney Cancer Mother     Lung Cancer Maternal Grandmother     Colon Cancer Maternal Grandfather     Liver Cancer Neg Hx     Liver Disease Neg Hx     Stomach Cancer Neg Hx     Rectal Cancer Neg Hx     Esophageal Cancer Neg Hx     Colon Polyps Neg Hx        Social History Socioeconomic History    Marital status:      Spouse name: Not on file    Number of children: Not on file    Years of education: Not on file    Highest education level: Not on file   Occupational History    Not on file   Tobacco Use    Smoking status: Former Smoker     Packs/day: 3.00     Years: 26.00     Pack years: 78.00     Types: Cigarettes     Quit date: 5/6/2011     Years since quitting: 10.6    Smokeless tobacco: Never Used   Vaping Use    Vaping Use: Never used   Substance and Sexual Activity    Alcohol use: No    Drug use: No    Sexual activity: Yes     Partners: Male   Other Topics Concern    Not on file   Social History Narrative    Not on file     Social Determinants of Health     Financial Resource Strain:     Difficulty of Paying Living Expenses: Not on file   Food Insecurity:     Worried About [address] in the Last Year: Not on file    Tiffanie of Food in the Last Year: Not on file   Transportation Needs:     Lack of Transportation (Medical): Not on file    Lack of Transportation (Non-Medical):  Not on file   Physical Activity:     Days of Exercise per Week: Not on file    Minutes of Exercise per Session: Not on file   Stress:     Feeling of Stress : Not on file   Social Connections:     Frequency of Communication with Friends and Family: Not on file    Frequency of Social Gatherings with Friends and Family: Not on file    Attends Buddhist Services: Not on file    Active Member of Clubs or Organizations: Not on file    Attends Club or Organization Meetings: Not on file    Marital Status: Not on file   Intimate Partner Violence:     Fear of Current or Ex-Partner: Not on file    Emotionally Abused: Not on file    Physically Abused: Not on file    Sexually Abused: Not on file   Housing Stability:     Unable to Pay for Housing in the Last Year: Not on file    Number of [place] in the Last Year: Not on file    Unstable Housing in the Last Year: Not on file       Current Facility-Administered Medications   Medication Dose Route Frequency Provider Last Rate Last Admin    dextromethorphan (DELSYM) 30 MG/5ML extended release liquid 30 mg  30 mg Oral TID PRN Brittaney Villeda MD   30 mg at 12/31/21 0931    dexamethasone (PF) (DECADRON) injection 6 mg  6 mg IntraVENous Q24H Brittaney Villeda MD   6 mg at 12/30/21 1210    levoFLOXacin (LEVAQUIN) tablet 750 mg  750 mg Oral Daily Brittaney Villeda MD   750 mg at 12/30/21 1209    carbamide peroxide (DEBROX) 6.5 % otic solution 5 drop  5 drop Right Ear BID Nato rGaff DO   5 drop at 12/31/21 0932    insulin lispro (HUMALOG) injection vial 0-18 Units  0-18 Units SubCUTAneous 4x Daily AC & HS PELON Roman - CNP   3 Units at 12/31/21 1523    loperamide (IMODIUM) capsule 2 mg  2 mg Oral 4x Daily PRN Nato Graff DO        ALPRAZolam [place]) tablet 0.5 mg  0.5 mg Oral Q6H PRN Shermon Simmonds, MD   0.5 mg at 12/30/21 2137    insulin glargine (LANTUS) injection vial 20 Units  20 Units SubCUTAneous BID Nato Graff DO   20 Units at 12/31/21 5165    promethazine (PHENERGAN) injection 6.25 mg  6.25 mg IntraMUSCular Q6H PRN Shermon Simmonds, MD   6.25 mg at 12/22/21 2353    benzonatate (TESSALON) capsule 100 mg  100 mg Oral TID PRN Nato Graff DO   100 mg at 12/31/21 0931    opium-belladonna (B&O SUPPRETTES) 16.2-60 MG suppository 60 mg  60 mg Rectal Q8H PRN Nato Graff DO        melatonin disintegrating tablet 10 mg  10 mg Oral Nightly Cassandra Vital MD   10 mg at 12/30/21 2137    Benzocaine-Menthol (CEPACOL) 1 lozenge  1 lozenge Oral Q2H PRN Cassandra Vital MD   1 lozenge at 12/29/21 2124    sodium chloride (OCEAN, BABY AYR) 0.65 % nasal spray 1 spray  1 spray Each Nostril PRN Cassandra Vital MD   1 spray at 12/13/21 1727    enoxaparin (LOVENOX) injection 40 mg  40 mg SubCUTAneous BID Cassandra Vital MD   40 mg at 12/31/21 0931    Vitamin D (CHOLECALCIFEROL) tablet 2,000 Units  2,000 Units Oral Daily Miriam Stuart MD   2,000 Units at 12/31/21 0931    LORazepam (ATIVAN) injection 0.5 mg  0.5 mg IntraVENous Q6H PRN Miriam Stuart MD   0.5 mg at 12/16/21 1118    albuterol sulfate  (90 Base) MCG/ACT inhaler 2 puff  2 puff Inhalation 4x Daily Viola Bromichelaoli, APRN   2 puff at 12/31/21 0931    calcium-cholecalciferol 500-200 MG-UNIT per tablet 1 tablet  1 tablet Oral Daily Viola Broccoli, APRN   1 tablet at 12/31/21 0931    HYDROcodone-acetaminophen (Gilmore Matsu) 7.5-325 MG per tablet 1 tablet  1 tablet Oral Q6H PRN Viola Broccoli, APRN   1 tablet at 12/23/21 0615    levothyroxine (SYNTHROID) tablet 100 mcg  100 mcg Oral Daily Viola Broccoli, APRN   100 mcg at 12/31/21 0556    timolol (TIMOPTIC) 0.5 % ophthalmic solution 1 drop  1 drop Both Eyes BID Viola Broccoli, APRN   1 drop at 12/31/21 0932    pravastatin (PRAVACHOL) tablet 40 mg  40 mg Oral Daily Viola Broccoli, APRN   40 mg at 12/31/21 0931    ondansetron (ZOFRAN) tablet 8 mg  8 mg Oral Q8H PRN Viola Broccoli, APRN   8 mg at 12/31/21 8351    meclizine (ANTIVERT) tablet 25 mg  25 mg Oral TID PRN Viola Broccoli, APRN   25 mg at 12/10/21 0301    sodium chloride flush 0.9 % injection 5-40 mL  5-40 mL IntraVENous 2 times per day Viola Broccoli, APRN   10 mL at 12/30/21 2137    sodium chloride flush 0.9 % injection 5-40 mL  5-40 mL IntraVENous PRN Viola Broccoli, APRN   10 mL at 12/15/21 1155    0.9 % sodium chloride infusion  25 mL IntraVENous PRN Viola Broccoli, APRN   Stopped at 12/11/21 2130    polyethylene glycol (GLYCOLAX) packet 17 g  17 g Oral Daily PRN Viola Broccoli, APRN   17 g at 12/17/21 2112    acetaminophen (TYLENOL) tablet 650 mg  650 mg Oral Q6H PRN Viola Hernandezccoli, APRN   650 mg at 12/30/21 1837    Or    acetaminophen (TYLENOL) suppository 650 mg  650 mg Rectal Q6H PRN Viola Hernandezccoli, APRN        glucose (GLUTOSE) 40 % oral gel tolerated  No supplemental home O2 requirement  TTE reviewed    Prior tobacco dependence  Counseled on continued cessation     Morbid obesity  Counseled     MAHIN  CPAP with sleep     DVT prophylaxis  Lovenox    Total critical care time: 42 minutes    Rhys Garza MD   12/31/2021 11:59 AM

## 2022-01-01 LAB
ALBUMIN SERPL-MCNC: 3 G/DL (ref 3.5–5.2)
ALP BLD-CCNC: 100 U/L (ref 35–104)
ALT SERPL-CCNC: 21 U/L (ref 5–33)
ANION GAP SERPL CALCULATED.3IONS-SCNC: 11 MMOL/L (ref 7–19)
AST SERPL-CCNC: 11 U/L (ref 5–32)
BASOPHILS ABSOLUTE: 0 K/UL (ref 0–0.2)
BASOPHILS RELATIVE PERCENT: 0.3 % (ref 0–1)
BILIRUB SERPL-MCNC: <0.2 MG/DL (ref 0.2–1.2)
BUN BLDV-MCNC: 15 MG/DL (ref 6–20)
CALCIUM SERPL-MCNC: 9 MG/DL (ref 8.6–10)
CHLORIDE BLD-SCNC: 100 MMOL/L (ref 98–111)
CO2: 31 MMOL/L (ref 22–29)
CREAT SERPL-MCNC: 0.7 MG/DL (ref 0.5–0.9)
EOSINOPHILS ABSOLUTE: 0 K/UL (ref 0–0.6)
EOSINOPHILS RELATIVE PERCENT: 0.2 % (ref 0–5)
GFR AFRICAN AMERICAN: >59
GFR NON-AFRICAN AMERICAN: >60
GLUCOSE BLD-MCNC: 132 MG/DL (ref 70–99)
GLUCOSE BLD-MCNC: 160 MG/DL (ref 70–99)
GLUCOSE BLD-MCNC: 193 MG/DL (ref 74–109)
GLUCOSE BLD-MCNC: 195 MG/DL (ref 70–99)
GLUCOSE BLD-MCNC: 297 MG/DL (ref 70–99)
HCT VFR BLD CALC: 33.5 % (ref 37–47)
HEMOGLOBIN: 10.5 G/DL (ref 12–16)
IMMATURE GRANULOCYTES #: 0.3 K/UL
LYMPHOCYTES ABSOLUTE: 1.1 K/UL (ref 1.1–4.5)
LYMPHOCYTES RELATIVE PERCENT: 12.9 % (ref 20–40)
MAGNESIUM: 2 MG/DL (ref 1.6–2.6)
MCH RBC QN AUTO: 30.7 PG (ref 27–31)
MCHC RBC AUTO-ENTMCNC: 31.3 G/DL (ref 33–37)
MCV RBC AUTO: 98 FL (ref 81–99)
MONOCYTES ABSOLUTE: 0.8 K/UL (ref 0–0.9)
MONOCYTES RELATIVE PERCENT: 8.7 % (ref 0–10)
NEUTROPHILS ABSOLUTE: 6.4 K/UL (ref 1.5–7.5)
NEUTROPHILS RELATIVE PERCENT: 74.1 % (ref 50–65)
PDW BLD-RTO: 13 % (ref 11.5–14.5)
PERFORMED ON: ABNORMAL
PLATELET # BLD: 243 K/UL (ref 130–400)
PMV BLD AUTO: 10 FL (ref 9.4–12.3)
POTASSIUM SERPL-SCNC: 4.8 MMOL/L (ref 3.5–5)
RBC # BLD: 3.42 M/UL (ref 4.2–5.4)
SODIUM BLD-SCNC: 142 MMOL/L (ref 136–145)
TOTAL PROTEIN: 5.7 G/DL (ref 6.6–8.7)
WBC # BLD: 8.6 K/UL (ref 4.8–10.8)

## 2022-01-01 PROCEDURE — 6370000000 HC RX 637 (ALT 250 FOR IP): Performed by: INTERNAL MEDICINE

## 2022-01-01 PROCEDURE — 6370000000 HC RX 637 (ALT 250 FOR IP): Performed by: NURSE PRACTITIONER

## 2022-01-01 PROCEDURE — 6370000000 HC RX 637 (ALT 250 FOR IP)

## 2022-01-01 PROCEDURE — 80053 COMPREHEN METABOLIC PANEL: CPT

## 2022-01-01 PROCEDURE — 82947 ASSAY GLUCOSE BLOOD QUANT: CPT

## 2022-01-01 PROCEDURE — 2580000003 HC RX 258: Performed by: NURSE PRACTITIONER

## 2022-01-01 PROCEDURE — 1210000000 HC MED SURG R&B

## 2022-01-01 PROCEDURE — 2700000000 HC OXYGEN THERAPY PER DAY

## 2022-01-01 PROCEDURE — 83735 ASSAY OF MAGNESIUM: CPT

## 2022-01-01 PROCEDURE — 36415 COLL VENOUS BLD VENIPUNCTURE: CPT

## 2022-01-01 PROCEDURE — 94761 N-INVAS EAR/PLS OXIMETRY MLT: CPT

## 2022-01-01 PROCEDURE — 6370000000 HC RX 637 (ALT 250 FOR IP): Performed by: STUDENT IN AN ORGANIZED HEALTH CARE EDUCATION/TRAINING PROGRAM

## 2022-01-01 PROCEDURE — 6370000000 HC RX 637 (ALT 250 FOR IP): Performed by: HOSPITALIST

## 2022-01-01 PROCEDURE — 85025 COMPLETE CBC W/AUTO DIFF WBC: CPT

## 2022-01-01 PROCEDURE — 6360000002 HC RX W HCPCS: Performed by: INTERNAL MEDICINE

## 2022-01-01 PROCEDURE — 6360000002 HC RX W HCPCS: Performed by: STUDENT IN AN ORGANIZED HEALTH CARE EDUCATION/TRAINING PROGRAM

## 2022-01-01 RX ORDER — BUMETANIDE 0.5 MG/1
0.5 TABLET ORAL 2 TIMES DAILY
Status: DISCONTINUED | OUTPATIENT
Start: 2022-01-01 | End: 2022-01-04 | Stop reason: HOSPADM

## 2022-01-01 RX ADMIN — SODIUM CHLORIDE, PRESERVATIVE FREE 10 ML: 5 INJECTION INTRAVENOUS at 09:35

## 2022-01-01 RX ADMIN — ONDANSETRON HYDROCHLORIDE 8 MG: 4 TABLET, FILM COATED ORAL at 20:51

## 2022-01-01 RX ADMIN — Medication 1 TABLET: at 09:36

## 2022-01-01 RX ADMIN — ALBUTEROL SULFATE 2 PUFF: 90 AEROSOL, METERED RESPIRATORY (INHALATION) at 09:36

## 2022-01-01 RX ADMIN — DEXAMETHASONE SODIUM PHOSPHATE 6 MG: 10 INJECTION INTRAMUSCULAR; INTRAVENOUS at 13:41

## 2022-01-01 RX ADMIN — TIMOLOL MALEATE 1 DROP: 5 SOLUTION OPHTHALMIC at 09:36

## 2022-01-01 RX ADMIN — BUMETANIDE 0.5 MG: 0.5 TABLET ORAL at 12:10

## 2022-01-01 RX ADMIN — Medication 5 DROP: at 20:17

## 2022-01-01 RX ADMIN — Medication 2000 UNITS: at 09:36

## 2022-01-01 RX ADMIN — ALBUTEROL SULFATE 2 PUFF: 90 AEROSOL, METERED RESPIRATORY (INHALATION) at 16:45

## 2022-01-01 RX ADMIN — ALBUTEROL SULFATE 2 PUFF: 90 AEROSOL, METERED RESPIRATORY (INHALATION) at 13:42

## 2022-01-01 RX ADMIN — LEVOTHYROXINE SODIUM 100 MCG: 100 TABLET ORAL at 05:11

## 2022-01-01 RX ADMIN — INSULIN GLARGINE 20 UNITS: 100 INJECTION, SOLUTION SUBCUTANEOUS at 09:38

## 2022-01-01 RX ADMIN — INSULIN LISPRO 3 UNITS: 100 INJECTION, SOLUTION INTRAVENOUS; SUBCUTANEOUS at 09:37

## 2022-01-01 RX ADMIN — BENZONATATE 100 MG: 100 CAPSULE ORAL at 20:14

## 2022-01-01 RX ADMIN — Medication 30 MG: at 20:14

## 2022-01-01 RX ADMIN — INSULIN GLARGINE 20 UNITS: 100 INJECTION, SOLUTION SUBCUTANEOUS at 20:52

## 2022-01-01 RX ADMIN — BENZONATATE 100 MG: 100 CAPSULE ORAL at 09:35

## 2022-01-01 RX ADMIN — ACETAMINOPHEN 650 MG: 325 TABLET ORAL at 12:09

## 2022-01-01 RX ADMIN — Medication 30 MG: at 09:35

## 2022-01-01 RX ADMIN — ENOXAPARIN SODIUM 40 MG: 100 INJECTION SUBCUTANEOUS at 20:13

## 2022-01-01 RX ADMIN — BUMETANIDE 0.5 MG: 0.5 TABLET ORAL at 20:14

## 2022-01-01 RX ADMIN — Medication 10 MG: at 20:14

## 2022-01-01 RX ADMIN — LEVOFLOXACIN 750 MG: 500 TABLET, FILM COATED ORAL at 13:41

## 2022-01-01 RX ADMIN — SODIUM CHLORIDE, PRESERVATIVE FREE 10 ML: 5 INJECTION INTRAVENOUS at 22:00

## 2022-01-01 RX ADMIN — ENOXAPARIN SODIUM 40 MG: 100 INJECTION SUBCUTANEOUS at 09:35

## 2022-01-01 RX ADMIN — ALBUTEROL SULFATE 2 PUFF: 90 AEROSOL, METERED RESPIRATORY (INHALATION) at 20:17

## 2022-01-01 RX ADMIN — ALPRAZOLAM 0.5 MG: 0.5 TABLET ORAL at 20:14

## 2022-01-01 RX ADMIN — INSULIN LISPRO 9 UNITS: 100 INJECTION, SOLUTION INTRAVENOUS; SUBCUTANEOUS at 20:51

## 2022-01-01 RX ADMIN — INSULIN LISPRO 3 UNITS: 100 INJECTION, SOLUTION INTRAVENOUS; SUBCUTANEOUS at 16:45

## 2022-01-01 RX ADMIN — ALPRAZOLAM 0.5 MG: 0.5 TABLET ORAL at 09:36

## 2022-01-01 RX ADMIN — PRAVASTATIN SODIUM 40 MG: 20 TABLET ORAL at 09:35

## 2022-01-01 RX ADMIN — HYDROCODONE BITARTRATE AND ACETAMINOPHEN 1 TABLET: 7.5; 325 TABLET ORAL at 20:14

## 2022-01-01 RX ADMIN — TIMOLOL MALEATE 1 DROP: 5 SOLUTION OPHTHALMIC at 20:17

## 2022-01-01 RX ADMIN — Medication 5 DROP: at 09:36

## 2022-01-01 ASSESSMENT — ENCOUNTER SYMPTOMS
COLOR CHANGE: 0
RHINORRHEA: 0
CONSTIPATION: 0
DIARRHEA: 0
COUGH: 1
VOMITING: 0
NAUSEA: 0
VOICE CHANGE: 0
BACK PAIN: 0
SHORTNESS OF BREATH: 1

## 2022-01-01 ASSESSMENT — PAIN SCALES - GENERAL
PAINLEVEL_OUTOF10: 3
PAINLEVEL_OUTOF10: 8

## 2022-01-01 NOTE — PLAN OF CARE
Problem: Airway Clearance - Ineffective  Goal: Achieve or maintain patent airway  12/31/2021 2319 by Pascual Carter RN  Outcome: Ongoing  12/31/2021 1051 by Esther Chen RN  Outcome: Ongoing     Problem: Gas Exchange - Impaired  Goal: Absence of hypoxia  12/31/2021 2319 by Pascual Carter RN  Outcome: Ongoing  12/31/2021 1051 by Esther Chen RN  Outcome: Ongoing  Goal: Promote optimal lung function  12/31/2021 2319 by Pascual Carter RN  Outcome: Ongoing  12/31/2021 1051 by Esther Chen RN  Outcome: Ongoing     Problem: Breathing Pattern - Ineffective  Goal: Ability to achieve and maintain a regular respiratory rate  12/31/2021 2319 by Pascual Carter RN  Outcome: Ongoing  12/31/2021 1051 by Esther Chen RN  Outcome: Ongoing     Problem:  Body Temperature -  Risk of, Imbalanced  Goal: Ability to maintain a body temperature within defined limits  12/31/2021 2319 by Pascual Carter RN  Outcome: Ongoing  12/31/2021 1051 by Esther Chen RN  Outcome: Ongoing  Goal: Will regain or maintain usual level of consciousness  12/31/2021 2319 by Pascual Carter RN  Outcome: Ongoing  12/31/2021 1051 by Esther Chen RN  Outcome: Ongoing  Goal: Complications related to the disease process, condition or treatment will be avoided or minimized  12/31/2021 2319 by Pascual Carter RN  Outcome: Ongoing  12/31/2021 1051 by Esther Chen RN  Outcome: Ongoing     Problem: Isolation Precautions - Risk of Spread of Infection  Goal: Prevent transmission of infection  12/31/2021 2319 by Pascual Carter RN  Outcome: Ongoing  12/31/2021 1051 by Esther Chen RN  Outcome: Ongoing     Problem: Nutrition Deficits  Goal: Optimize nutritional status  12/31/2021 2319 by Pascual Carter RN  Outcome: Ongoing  12/31/2021 1051 by Esther Chen RN  Outcome: Ongoing     Problem: Risk for Fluid Volume Deficit  Goal: Maintain normal heart rhythm  12/31/2021 2319 by Pascual Carter RN  Outcome: Ongoing  12/31/2021 1051 by Serafin Esposito Howard Moon RN  Outcome: Ongoing  Goal: Maintain absence of muscle cramping  12/31/2021 2319 by Lauren Ritter RN  Outcome: Ongoing  12/31/2021 1051 by Pamela Jay RN  Outcome: Ongoing  Goal: Maintain normal serum potassium, sodium, calcium, phosphorus, and pH  12/31/2021 2319 by Lauren Ritter RN  Outcome: Ongoing  12/31/2021 1051 by Pamela Jay RN  Outcome: Ongoing     Problem: Loneliness or Risk for Loneliness  Goal: Demonstrate positive use of time alone when socialization is not possible  12/31/2021 2319 by Lauren Ritter RN  Outcome: Ongoing  12/31/2021 1051 by Pamela Jay RN  Outcome: Ongoing     Problem: Fatigue  Goal: Verbalize increase energy and improved vitality  12/31/2021 2319 by Lauren Ritter RN  Outcome: Ongoing  12/31/2021 1051 by Pamela Jay RN  Outcome: Ongoing     Problem: Patient Education: Go to Patient Education Activity  Goal: Patient/Family Education  12/31/2021 2319 by Lauren Ritter RN  Outcome: Ongoing  12/31/2021 1051 by Pamela Jay RN  Outcome: Ongoing     Problem: Falls - Risk of:  Goal: Will remain free from falls  Description: Will remain free from falls  12/31/2021 2319 by Lauren Ritter RN  Outcome: Ongoing  12/31/2021 1051 by Pamela Jay RN  Outcome: Ongoing  Goal: Absence of physical injury  Description: Absence of physical injury  12/31/2021 2319 by Lauren Ritter RN  Outcome: Ongoing  12/31/2021 1051 by Pamela Jay RN  Outcome: Ongoing     Problem: Pain:  Goal: Pain level will decrease  Description: Pain level will decrease  12/31/2021 2319 by Lauren Ritter RN  Outcome: Ongoing  12/31/2021 1051 by Pamela Jay RN  Outcome: Ongoing  Goal: Control of acute pain  Description: Control of acute pain  12/31/2021 2319 by Lauren Ritter RN  Outcome: Ongoing  12/31/2021 1051 by Pamela Jay RN  Outcome: Ongoing  Goal: Control of chronic pain  Description: Control of chronic pain  12/31/2021 2319 by Lauren Ritter, RN  Outcome: Ongoing  12/31/2021 1051 by Keyonna Lee RN  Outcome: Ongoing     Problem: Nutrition  Goal: Optimal nutrition therapy  12/31/2021 2319 by Ab Fatima RN  Outcome: Ongoing  12/31/2021 1051 by Keyonna Lee RN  Outcome: Ongoing     Problem: Skin Integrity:  Goal: Will show no infection signs and symptoms  Description: Will show no infection signs and symptoms  12/31/2021 2319 by Ab Fatima RN  Outcome: Ongoing  12/31/2021 1051 by Keyonna Lee RN  Outcome: Ongoing  Goal: Absence of new skin breakdown  Description: Absence of new skin breakdown  12/31/2021 2319 by Ab Fatima RN  Outcome: Ongoing  12/31/2021 1051 by Keyonna Lee RN  Outcome: Ongoing

## 2022-01-01 NOTE — PROGRESS NOTES
Hospitalist Progress Note    Patient:  Apurva Archer  YOB: 1965  Date of Service: 1/1/2022  MRN: [id number]   Acct: [de-identified]   Primary Care Physician: Mike Wilson MD  Advance Directive: Partial Code  Admit Date: 12/9/2021       Hospital Day: 23  Referring Provider: Mary Arteaga DO    Patient Seen, Chart, Consults, Notes, Labs, Radiology studies reviewed. Subjective:  Apurva Archer is a 49 y.o. female  whom we are following for COVID-19 pneumonia, hypoxemic respiratory failure. She is continuing to make improvement. She is now down to 8 L via nasal cannula. We discussed about discharge. Her daughter is out of town and will be back on Tuesday to be able to be at home to help care for her.     Allergies:  Latex, Penicillins, Codeine, and Tape [adhesive tape]    Medicines:  Current Facility-Administered Medications   Medication Dose Route Frequency Provider Last Rate Last Admin    dextromethorphan (DELSYM) 30 MG/5ML extended release liquid 30 mg  30 mg Oral TID PRN Nikhil Crocker MD   30 mg at 01/01/22 0935    dexamethasone (PF) (DECADRON) injection 6 mg  6 mg IntraVENous Q24H Nikhil Crocker MD   6 mg at 12/31/21 1309    levoFLOXacin (LEVAQUIN) tablet 750 mg  750 mg Oral Daily Nikhil Crocker MD   750 mg at 12/31/21 1309    carbamide peroxide (DEBROX) 6.5 % otic solution 5 drop  5 drop Right Ear BID Mary Arteaga DO   5 drop at 01/01/22 0936    insulin lispro (HUMALOG) injection vial 0-18 Units  0-18 Units SubCUTAneous 4x Daily AC & HS Fabiola Stephens APRNATAN - CNP   3 Units at 01/01/22 6565    loperamide (IMODIUM) capsule 2 mg  2 mg Oral 4x Daily PRN Mary Arteaga DO        ALPRAZolam Doralee Marian) tablet 0.5 mg  0.5 mg Oral Q6H PRN Waqas Mccarty MD   0.5 mg at 01/01/22 0936    insulin glargine (LANTUS) injection vial 20 Units  20 Units SubCUTAneous BID Mary Arteaga DO   20 Units at 01/01/22 7278    promethazine (PHENERGAN) injection 6.25 mg  6.25 mg IntraMUSCular Q6H PRN Marci Barr MD   6.25 mg at 12/31/21 1443    benzonatate (TESSALON) capsule 100 mg  100 mg Oral TID PRN Michelle Maxcy, DO   100 mg at 01/01/22 0935    opium-belladonna (B&O SUPPRETTES) 16.2-60 MG suppository 60 mg  60 mg Rectal Q8H PRN Michelle Maxcy, DO        melatonin disintegrating tablet 10 mg  10 mg Oral Nightly Asha Martins MD   10 mg at 12/31/21 2147    Benzocaine-Menthol (CEPACOL) 1 lozenge  1 lozenge Oral Q2H PRN Asha Martins MD   1 lozenge at 12/29/21 2124    sodium chloride (OCEAN, BABY AYR) 0.65 % nasal spray 1 spray  1 spray Each Nostril PRN Asha Martins MD   1 spray at 12/13/21 1727    enoxaparin (LOVENOX) injection 40 mg  40 mg SubCUTAneous BID Asha Martins MD   40 mg at 01/01/22 0935    Vitamin D (CHOLECALCIFEROL) tablet 2,000 Units  2,000 Units Oral Daily Asha Martins MD   2,000 Units at 01/01/22 0936    LORazepam (ATIVAN) injection 0.5 mg  0.5 mg IntraVENous Q6H PRN Asha Martins MD   0.5 mg at 12/16/21 1118    albuterol sulfate  (90 Base) MCG/ACT inhaler 2 puff  2 puff Inhalation 4x Daily Doyne Pass, APRN   2 puff at 01/01/22 0936    calcium-cholecalciferol 500-200 MG-UNIT per tablet 1 tablet  1 tablet Oral Daily Doyne Pass, APRN   1 tablet at 01/01/22 0936    HYDROcodone-acetaminophen (NORCO) 7.5-325 MG per tablet 1 tablet  1 tablet Oral Q6H PRN Doyne Pass, APRN   1 tablet at 12/23/21 0615    levothyroxine (SYNTHROID) tablet 100 mcg  100 mcg Oral Daily Doyne Pass, APRN   100 mcg at 01/01/22 0511    timolol (TIMOPTIC) 0.5 % ophthalmic solution 1 drop  1 drop Both Eyes BID Doyne Pass, APRN   1 drop at 01/01/22 0936    pravastatin (PRAVACHOL) tablet 40 mg  40 mg Oral Daily Doyne Pass, APRN   40 mg at 01/01/22 0935    ondansetron (ZOFRAN) tablet 8 mg  8 mg Oral Q8H PRN Doyne Pass, APRN   8 mg at 12/31/21 0931    meclizine (ANTIVERT) tablet 25 mg  25 mg Oral TID PRN Franc Bilis, APRN   25 mg at 12/10/21 0301    sodium chloride flush 0.9 % injection 5-40 mL  5-40 mL IntraVENous 2 times per day Franc Bilis, APRN   10 mL at 01/01/22 0935    sodium chloride flush 0.9 % injection 5-40 mL  5-40 mL IntraVENous PRN Franc Bilis, APRN   10 mL at 12/15/21 1155    0.9 % sodium chloride infusion  25 mL IntraVENous PRN Franc Bilis, APRN   Stopped at 12/11/21 2130    polyethylene glycol (GLYCOLAX) packet 17 g  17 g Oral Daily PRN Franc Bilis, APRN   17 g at 12/17/21 2112    acetaminophen (TYLENOL) tablet 650 mg  650 mg Oral Q6H PRN Franc Bilis, APRN   650 mg at 12/31/21 1444    Or    acetaminophen (TYLENOL) suppository 650 mg  650 mg Rectal Q6H PRN Franc Bilis, APRN        glucose (GLUTOSE) 40 % oral gel 15 g  15 g Oral PRN Franc Bilis, APRN        dextrose 50 % IV solution  12.5 g IntraVENous PRN Franc Bilis, APRN        glucagon (rDNA) injection 1 mg  1 mg IntraMUSCular PRN Franc Bilis, APRN        dextrose 5 % solution  100 mL/hr IntraVENous PRN Franc Bilis, APRN           Past Medical History:  Past Medical History:   Diagnosis Date    Anemia     Asthma     Bronchitis, acute     COPD (chronic obstructive pulmonary disease) [facility] [place] [address]     Diabetes ([facility] [place] [address]     Glaucoma     Hyperlipidemia     Hypertension     Hyperthyroidism     Morbid obesity ([facility] [place] [address]     Sleep apnea     uses c-pap       Past Surgical History:  Past Surgical History:   Procedure Laterality Date    AXILLARY SURGERY Bilateral     excision and drainage of staph abscesses    BACK SURGERY  10/2004    Dr. Julianne Alves, [place] L4, L5    CARPAL TUNNEL RELEASE Right     CHOLECYSTECTOMY      COLONOSCOPY  03/20/2007    Dr Maggie Fuller N/A 9/11/2020    Dr Corwin Duckworth yr recall   [address] [place] [address] N/A 1/29/2021    DILATATION AND CURETTAGE HYSTEROSCOPY NOVASURE ABLATION performed by Josi Paige MD at [address] [place] [address] [place] COLONOSCOPY FLX DX W/COLLJ SPEC WHEN PFRMD N/A 2/8/2017    Dr Louann Valadez, actively inflamed internal hemorrhoids, residulal liquid and some solid food particles in the colon-5 yr recall due to prep    STOMACH SURGERY  01/20/2014    Dr. Justino Romero ( gastric sleeve)    UPPER GASTROINTESTINAL ENDOSCOPY  10/30/2012    Dr Myrna Hamlin esophagitis, gastritis, Iliana (-)    UPPER GASTROINTESTINAL ENDOSCOPY  01/23/2008    Dr Crowe Lisa esophagitis    UPPER GASTROINTESTINAL ENDOSCOPY N/A 9/11/2020    Dr Marlene Zhang       Family History  Family History   Problem Relation Age of Onset    Lung Cancer Mother     Kidney Cancer Mother     Lung Cancer Maternal Grandmother     Colon Cancer Maternal Grandfather     Liver Cancer Neg Hx     Liver Disease Neg Hx     Stomach Cancer Neg Hx     Rectal Cancer Neg Hx     Esophageal Cancer Neg Hx     Colon Polyps Neg Hx        Social History  Social History     Socioeconomic History    Marital status:      Spouse name: Not on file    Number of children: Not on file    Years of education: Not on file    Highest education level: Not on file   Occupational History    Not on file   Tobacco Use    Smoking status: Former Smoker     Packs/day: 3.00     Years: 26.00     Pack years: 78.00     Types: Cigarettes     Quit date: 5/6/2011     Years since quitting: 10.6    Smokeless tobacco: Never Used   Vaping Use    Vaping Use: Never used   Substance and Sexual Activity    Alcohol use: No    Drug use: No    Sexual activity: Yes     Partners: Male   Other Topics Concern    Not on file   Social History Narrative    Not on file     Social Determinants of Health     Financial Resource Strain:     Difficulty of Paying Living Expenses: Not on file   Food Insecurity:     Worried About [address] in the Last Year: Not on file    [place] of Food in the Last Year: Not on file   Transportation Needs:     Lack of Transportation (Medical):  Not on file    Lack of Transportation (Non-Medical): Not on file   Physical Activity:     Days of Exercise per Week: Not on file    Minutes of Exercise per Session: Not on file   Stress:     Feeling of Stress : Not on file   Social Connections:     Frequency of Communication with Friends and Family: Not on file    Frequency of Social Gatherings with Friends and Family: Not on file    Attends Voodoo Services: Not on file    Active Member of [address] [place] [address] or Organizations: Not on file    Attends Club or Organization Meetings: Not on file    Marital Status: Not on file   Intimate Partner Violence:     Fear of Current or Ex-Partner: Not on file    Emotionally Abused: Not on file    Physically Abused: Not on file    Sexually Abused: Not on file   Housing Stability:     Unable to Pay for Housing in the Last Year: Not on file    Number of [place] in the Last Year: Not on file    Unstable Housing in the Last Year: Not on file         Review of Systems:    Review of Systems   Constitutional: Negative for activity change and fatigue. HENT: Positive for nosebleeds. Negative for rhinorrhea and voice change. Eyes: Negative for visual disturbance. Respiratory: Positive for cough and shortness of breath. Cardiovascular: Negative for chest pain and leg swelling. Gastrointestinal: Negative for constipation, diarrhea, nausea and vomiting. Endocrine: Negative for polyuria. Genitourinary: Negative for difficulty urinating and dysuria. Musculoskeletal: Negative for arthralgias and back pain. Skin: Negative for color change. Allergic/Immunologic: Negative for immunocompromised state. Neurological: Negative for dizziness and headaches. Psychiatric/Behavioral: Negative for confusion. Objective:  Blood pressure 112/70, pulse 80, temperature 97.2 °F (36.2 °C), resp. rate 20, height 5' 5\" (1.651 m), weight (!) 332 lb (150.6 kg), SpO2 97 %, not currently breastfeeding.     Intake/Output Summary (Last 24 hours) at 1/1/2022 1025  Last data filed at 1/1/2022 0620  Gross per 24 hour   Intake 1450 ml   Output --   Net 1450 ml       Physical Exam  Vitals and nursing note reviewed. HENT:      Head: Normocephalic and atraumatic. Right Ear: External ear normal.      Left Ear: External ear normal.      Nose: Nose normal.      Mouth/Throat:      Mouth: Mucous membranes are moist.   Eyes:      Conjunctiva/sclera: Conjunctivae normal.      Pupils: Pupils are equal, round, and reactive to light. Cardiovascular:      Rate and Rhythm: Normal rate and regular rhythm. Heart sounds: Normal heart sounds. Pulmonary:      Effort: Pulmonary effort is normal.      Breath sounds: Normal breath sounds. Abdominal:      General: Abdomen is flat. Palpations: Abdomen is soft. Musculoskeletal:         General: Normal range of motion. Cervical back: Neck supple. No rigidity. No muscular tenderness. Skin:     General: Skin is warm and dry. Neurological:      Mental Status: She is alert and oriented to person, place, and time. Psychiatric:         Mood and Affect: Mood normal.         Labs:  BMP:   Recent Labs     12/30/21  0549 12/31/21  0514 01/01/22  0611    139 142   K 4.5 4.0 4.8   CL 99 97* 100   CO2 33* 32* 31*   BUN 10 16 15   CREATININE 0.7 0.8 0.7   CALCIUM 9.2 9.3 9.0     CBC:   Recent Labs     12/30/21  0549 12/31/21  0514 01/01/22  0611   WBC 7.4 8.2 8.6   HGB 10.6* 10.9* 10.5*   HCT 34.7* 35.5* 33.5*   MCV 98.3 96.7 98.0    279 243     LIVER PROFILE:   Recent Labs     12/30/21  0549 12/31/21  0514 01/01/22  0611   AST 12 12 11   ALT 23 22 21   BILITOT <0.2 <0.2 <0.2   ALKPHOS 114* 117* 100     PT/INR: No results for input(s): PROTIME, INR in the last 72 hours. APTT: No results for input(s): APTT in the last 72 hours. BNP:  No results for input(s): BNP in the last 72 hours. Ionized Calcium:No results for input(s): IONCA in the last 72 hours.   Magnesium:  Recent Labs     12/30/21  0549 12/31/21  0514 01/01/22  0611   MG 1.7 1.8 2.0     Phosphorus:No results for input(s): PHOS in the last 72 hours. HgbA1C: No results for input(s): LABA1C in the last 72 hours. Hepatic:   Recent Labs     12/30/21  0549 12/31/21  0514 01/01/22  0611   ALKPHOS 114* 117* 100   ALT 23 22 21   AST 12 12 11   PROT 6.0* 7.1 5.7*   BILITOT <0.2 <0.2 <0.2   LABALBU 2.9* 2.9* 3.0*     Lactic Acid: No results for input(s): LACTA in the last 72 hours. Troponin: No results for input(s): CKTOTAL, CKMB, TROPONINT in the last 72 hours. ABGs: No results for input(s): PH, PCO2, PO2, HCO3, O2SAT in the last 72 hours. CRP:  No results for input(s): CRP in the last 72 hours. Sed Rate:  No results for input(s): SEDRATE in the last 72 hours. Cultures:   No results for input(s): CULTURE in the last 72 hours. No results for input(s): BC, Mandy Jammie in the last 72 hours. No results for input(s): CXSURG in the last 72 hours. Radiology reports as per the Radiologist  Radiology: XR CHEST PORTABLE    Result Date: 12/9/2021  EXAM: XR CHEST PORTABLE INDICATION: Shortness of breath, COVID positive COMPARISON: None available. FINDINGS: Cardiac silhouette is normal in size. No pleural effusion or visible pneumothorax. Patchy bilateral airspace opacity is present. Central vascular congestion is noted. No acute osseous finding. Bilateral patchy airspace opacity. Central vascular congestion. Differential includes pneumonia and edema. Signed by Dr Reinaldo Thorpe. Continue ongoing medical management.     Acute hypoxemic respiratory failure due to COVID-19. Continue supportive care.     Steroid-induced hyperglycemia. Dexamethasone resumed. Continue Lantus. Will tentatively plan discharge to home on Tuesday.       Mary Kerr DO

## 2022-01-01 NOTE — PLAN OF CARE
Problem: Airway Clearance - Ineffective  Goal: Achieve or maintain patent airway  Outcome: Ongoing     Problem: Gas Exchange - Impaired  Goal: Absence of hypoxia  Outcome: Ongoing  Goal: Promote optimal lung function  Outcome: Ongoing     Problem: Breathing Pattern - Ineffective  Goal: Ability to achieve and maintain a regular respiratory rate  Outcome: Ongoing     Problem:  Body Temperature -  Risk of, Imbalanced  Goal: Ability to maintain a body temperature within defined limits  Outcome: Ongoing  Goal: Will regain or maintain usual level of consciousness  Outcome: Ongoing  Goal: Complications related to the disease process, condition or treatment will be avoided or minimized  Outcome: Ongoing     Problem: Isolation Precautions - Risk of Spread of Infection  Goal: Prevent transmission of infection  Outcome: Ongoing     Problem: Nutrition Deficits  Goal: Optimize nutritional status  Outcome: Ongoing     Problem: Risk for Fluid Volume Deficit  Goal: Maintain normal heart rhythm  Outcome: Ongoing  Goal: Maintain absence of muscle cramping  Outcome: Ongoing  Goal: Maintain normal serum potassium, sodium, calcium, phosphorus, and pH  Outcome: Ongoing     Problem: Loneliness or Risk for Loneliness  Goal: Demonstrate positive use of time alone when socialization is not possible  Outcome: Ongoing     Problem: Fatigue  Goal: Verbalize increase energy and improved vitality  Outcome: Ongoing     Problem: Patient Education: Go to Patient Education Activity  Goal: Patient/Family Education  Outcome: Ongoing     Problem: Falls - Risk of:  Goal: Will remain free from falls  Description: Will remain free from falls  Outcome: Ongoing  Goal: Absence of physical injury  Description: Absence of physical injury  Outcome: Ongoing     Problem: Pain:  Goal: Pain level will decrease  Description: Pain level will decrease  Outcome: Ongoing  Goal: Control of acute pain  Description: Control of acute pain  Outcome: Ongoing  Goal: Control of chronic pain  Description: Control of chronic pain  Outcome: Ongoing     Problem: Nutrition  Goal: Optimal nutrition therapy  Outcome: Ongoing     Problem: Skin Integrity:  Goal: Will show no infection signs and symptoms  Description: Will show no infection signs and symptoms  Outcome: Ongoing  Goal: Absence of new skin breakdown  Description: Absence of new skin breakdown  Outcome: Ongoing     Problem: Anxiety:  Goal: Level of anxiety will decrease  Description: Level of anxiety will decrease  Outcome: Ongoing

## 2022-01-02 LAB
ALBUMIN SERPL-MCNC: 3.2 G/DL (ref 3.5–5.2)
ALP BLD-CCNC: 98 U/L (ref 35–104)
ALT SERPL-CCNC: 22 U/L (ref 5–33)
ANION GAP SERPL CALCULATED.3IONS-SCNC: 11 MMOL/L (ref 7–19)
AST SERPL-CCNC: 13 U/L (ref 5–32)
BASOPHILS ABSOLUTE: 0 K/UL (ref 0–0.2)
BASOPHILS RELATIVE PERCENT: 0.4 % (ref 0–1)
BILIRUB SERPL-MCNC: <0.2 MG/DL (ref 0.2–1.2)
BUN BLDV-MCNC: 15 MG/DL (ref 6–20)
CALCIUM SERPL-MCNC: 9 MG/DL (ref 8.6–10)
CHLORIDE BLD-SCNC: 96 MMOL/L (ref 98–111)
CO2: 34 MMOL/L (ref 22–29)
CREAT SERPL-MCNC: 0.7 MG/DL (ref 0.5–0.9)
EOSINOPHILS ABSOLUTE: 0.1 K/UL (ref 0–0.6)
EOSINOPHILS RELATIVE PERCENT: 0.5 % (ref 0–5)
GFR AFRICAN AMERICAN: >59
GFR NON-AFRICAN AMERICAN: >60
GLUCOSE BLD-MCNC: 123 MG/DL (ref 70–99)
GLUCOSE BLD-MCNC: 154 MG/DL (ref 70–99)
GLUCOSE BLD-MCNC: 172 MG/DL (ref 70–99)
GLUCOSE BLD-MCNC: 173 MG/DL (ref 74–109)
GLUCOSE BLD-MCNC: 201 MG/DL (ref 70–99)
HCT VFR BLD CALC: 35.6 % (ref 37–47)
HEMOGLOBIN: 11.2 G/DL (ref 12–16)
IMMATURE GRANULOCYTES #: 0.4 K/UL
LYMPHOCYTES ABSOLUTE: 1.6 K/UL (ref 1.1–4.5)
LYMPHOCYTES RELATIVE PERCENT: 16.3 % (ref 20–40)
MAGNESIUM: 2 MG/DL (ref 1.6–2.6)
MCH RBC QN AUTO: 30.4 PG (ref 27–31)
MCHC RBC AUTO-ENTMCNC: 31.5 G/DL (ref 33–37)
MCV RBC AUTO: 96.7 FL (ref 81–99)
MONOCYTES ABSOLUTE: 0.6 K/UL (ref 0–0.9)
MONOCYTES RELATIVE PERCENT: 6.6 % (ref 0–10)
NEUTROPHILS ABSOLUTE: 6.8 K/UL (ref 1.5–7.5)
NEUTROPHILS RELATIVE PERCENT: 71.6 % (ref 50–65)
PDW BLD-RTO: 12.9 % (ref 11.5–14.5)
PERFORMED ON: ABNORMAL
PLATELET # BLD: 261 K/UL (ref 130–400)
PMV BLD AUTO: 9.6 FL (ref 9.4–12.3)
POTASSIUM SERPL-SCNC: 4.5 MMOL/L (ref 3.5–5)
RBC # BLD: 3.68 M/UL (ref 4.2–5.4)
SODIUM BLD-SCNC: 141 MMOL/L (ref 136–145)
TOTAL PROTEIN: 6 G/DL (ref 6.6–8.7)
WBC # BLD: 9.6 K/UL (ref 4.8–10.8)

## 2022-01-02 PROCEDURE — 6360000002 HC RX W HCPCS: Performed by: INTERNAL MEDICINE

## 2022-01-02 PROCEDURE — 1210000000 HC MED SURG R&B

## 2022-01-02 PROCEDURE — 6370000000 HC RX 637 (ALT 250 FOR IP): Performed by: INTERNAL MEDICINE

## 2022-01-02 PROCEDURE — 6360000002 HC RX W HCPCS: Performed by: STUDENT IN AN ORGANIZED HEALTH CARE EDUCATION/TRAINING PROGRAM

## 2022-01-02 PROCEDURE — 6370000000 HC RX 637 (ALT 250 FOR IP): Performed by: NURSE PRACTITIONER

## 2022-01-02 PROCEDURE — 36415 COLL VENOUS BLD VENIPUNCTURE: CPT

## 2022-01-02 PROCEDURE — 83735 ASSAY OF MAGNESIUM: CPT

## 2022-01-02 PROCEDURE — 6370000000 HC RX 637 (ALT 250 FOR IP): Performed by: HOSPITALIST

## 2022-01-02 PROCEDURE — 6370000000 HC RX 637 (ALT 250 FOR IP): Performed by: STUDENT IN AN ORGANIZED HEALTH CARE EDUCATION/TRAINING PROGRAM

## 2022-01-02 PROCEDURE — 2580000003 HC RX 258: Performed by: NURSE PRACTITIONER

## 2022-01-02 PROCEDURE — 2700000000 HC OXYGEN THERAPY PER DAY

## 2022-01-02 PROCEDURE — 85025 COMPLETE CBC W/AUTO DIFF WBC: CPT

## 2022-01-02 PROCEDURE — 82947 ASSAY GLUCOSE BLOOD QUANT: CPT

## 2022-01-02 PROCEDURE — 6370000000 HC RX 637 (ALT 250 FOR IP)

## 2022-01-02 PROCEDURE — 80053 COMPREHEN METABOLIC PANEL: CPT

## 2022-01-02 PROCEDURE — 94761 N-INVAS EAR/PLS OXIMETRY MLT: CPT

## 2022-01-02 RX ORDER — PANTOPRAZOLE SODIUM 40 MG/1
40 TABLET, DELAYED RELEASE ORAL
Status: DISCONTINUED | OUTPATIENT
Start: 2022-01-03 | End: 2022-01-04 | Stop reason: HOSPADM

## 2022-01-02 RX ORDER — SUCRALFATE 1 G/1
1 TABLET ORAL EVERY 6 HOURS SCHEDULED
Status: DISCONTINUED | OUTPATIENT
Start: 2022-01-02 | End: 2022-01-04 | Stop reason: HOSPADM

## 2022-01-02 RX ORDER — DEXAMETHASONE SODIUM PHOSPHATE 10 MG/ML
6 INJECTION, SOLUTION INTRAMUSCULAR; INTRAVENOUS EVERY 24 HOURS
Status: DISCONTINUED | OUTPATIENT
Start: 2022-01-03 | End: 2022-01-03

## 2022-01-02 RX ADMIN — Medication 1 TABLET: at 08:59

## 2022-01-02 RX ADMIN — Medication 5 DROP: at 21:55

## 2022-01-02 RX ADMIN — ALPRAZOLAM 0.5 MG: 0.5 TABLET ORAL at 22:05

## 2022-01-02 RX ADMIN — LEVOTHYROXINE SODIUM 100 MCG: 100 TABLET ORAL at 08:59

## 2022-01-02 RX ADMIN — ONDANSETRON HYDROCHLORIDE 8 MG: 4 TABLET, FILM COATED ORAL at 22:05

## 2022-01-02 RX ADMIN — BENZONATATE 100 MG: 100 CAPSULE ORAL at 12:29

## 2022-01-02 RX ADMIN — ALPRAZOLAM 0.5 MG: 0.5 TABLET ORAL at 16:27

## 2022-01-02 RX ADMIN — TIMOLOL MALEATE 1 DROP: 5 SOLUTION OPHTHALMIC at 09:00

## 2022-01-02 RX ADMIN — TIMOLOL MALEATE 1 DROP: 5 SOLUTION OPHTHALMIC at 21:54

## 2022-01-02 RX ADMIN — SODIUM CHLORIDE, PRESERVATIVE FREE 10 ML: 5 INJECTION INTRAVENOUS at 09:00

## 2022-01-02 RX ADMIN — LEVOFLOXACIN 750 MG: 500 TABLET, FILM COATED ORAL at 12:29

## 2022-01-02 RX ADMIN — ALBUTEROL SULFATE 2 PUFF: 90 AEROSOL, METERED RESPIRATORY (INHALATION) at 17:32

## 2022-01-02 RX ADMIN — Medication 30 MG: at 21:46

## 2022-01-02 RX ADMIN — INSULIN LISPRO 3 UNITS: 100 INJECTION, SOLUTION INTRAVENOUS; SUBCUTANEOUS at 17:32

## 2022-01-02 RX ADMIN — ACETAMINOPHEN 650 MG: 325 TABLET ORAL at 09:12

## 2022-01-02 RX ADMIN — HYDROCODONE BITARTRATE AND ACETAMINOPHEN 1 TABLET: 7.5; 325 TABLET ORAL at 04:42

## 2022-01-02 RX ADMIN — SUCRALFATE 1 G: 1 TABLET ORAL at 12:29

## 2022-01-02 RX ADMIN — Medication 2000 UNITS: at 08:59

## 2022-01-02 RX ADMIN — POLYETHYLENE GLYCOL 3350 17 G: 17 POWDER, FOR SOLUTION ORAL at 22:06

## 2022-01-02 RX ADMIN — BENZONATATE 100 MG: 100 CAPSULE ORAL at 21:46

## 2022-01-02 RX ADMIN — ENOXAPARIN SODIUM 40 MG: 100 INJECTION SUBCUTANEOUS at 21:46

## 2022-01-02 RX ADMIN — ACETAMINOPHEN 650 MG: 325 TABLET ORAL at 22:06

## 2022-01-02 RX ADMIN — ALBUTEROL SULFATE 2 PUFF: 90 AEROSOL, METERED RESPIRATORY (INHALATION) at 12:30

## 2022-01-02 RX ADMIN — INSULIN GLARGINE 20 UNITS: 100 INJECTION, SOLUTION SUBCUTANEOUS at 21:47

## 2022-01-02 RX ADMIN — INSULIN GLARGINE 20 UNITS: 100 INJECTION, SOLUTION SUBCUTANEOUS at 09:13

## 2022-01-02 RX ADMIN — ENOXAPARIN SODIUM 40 MG: 100 INJECTION SUBCUTANEOUS at 08:59

## 2022-01-02 RX ADMIN — ALBUTEROL SULFATE 2 PUFF: 90 AEROSOL, METERED RESPIRATORY (INHALATION) at 21:54

## 2022-01-02 RX ADMIN — SUCRALFATE 1 G: 1 TABLET ORAL at 17:32

## 2022-01-02 RX ADMIN — Medication 30 MG: at 12:49

## 2022-01-02 RX ADMIN — BUMETANIDE 0.5 MG: 0.5 TABLET ORAL at 08:59

## 2022-01-02 RX ADMIN — INSULIN LISPRO 6 UNITS: 100 INJECTION, SOLUTION INTRAVENOUS; SUBCUTANEOUS at 21:47

## 2022-01-02 RX ADMIN — Medication 10 MG: at 21:46

## 2022-01-02 RX ADMIN — PRAVASTATIN SODIUM 40 MG: 20 TABLET ORAL at 08:59

## 2022-01-02 RX ADMIN — Medication 5 DROP: at 09:00

## 2022-01-02 RX ADMIN — ALBUTEROL SULFATE 2 PUFF: 90 AEROSOL, METERED RESPIRATORY (INHALATION) at 09:00

## 2022-01-02 RX ADMIN — BUMETANIDE 0.5 MG: 0.5 TABLET ORAL at 21:46

## 2022-01-02 RX ADMIN — ONDANSETRON HYDROCHLORIDE 8 MG: 4 TABLET, FILM COATED ORAL at 09:12

## 2022-01-02 ASSESSMENT — PAIN SCALES - GENERAL
PAINLEVEL_OUTOF10: 7
PAINLEVEL_OUTOF10: 0
PAINLEVEL_OUTOF10: 5
PAINLEVEL_OUTOF10: 9
PAINLEVEL_OUTOF10: 6

## 2022-01-02 ASSESSMENT — PAIN - FUNCTIONAL ASSESSMENT: PAIN_FUNCTIONAL_ASSESSMENT: PREVENTS OR INTERFERES SOME ACTIVE ACTIVITIES AND ADLS

## 2022-01-02 ASSESSMENT — PAIN DESCRIPTION - PAIN TYPE: TYPE: ACUTE PAIN

## 2022-01-02 ASSESSMENT — PAIN DESCRIPTION - FREQUENCY: FREQUENCY: CONTINUOUS

## 2022-01-02 ASSESSMENT — PAIN DESCRIPTION - ORIENTATION: ORIENTATION: RIGHT;LEFT

## 2022-01-02 ASSESSMENT — PAIN DESCRIPTION - DIRECTION: RADIATING_TOWARDS: HEAD

## 2022-01-02 ASSESSMENT — PAIN DESCRIPTION - ONSET: ONSET: GRADUAL

## 2022-01-02 ASSESSMENT — PAIN DESCRIPTION - LOCATION: LOCATION: HEAD

## 2022-01-02 ASSESSMENT — PAIN DESCRIPTION - DESCRIPTORS: DESCRIPTORS: HEADACHE

## 2022-01-02 ASSESSMENT — PAIN DESCRIPTION - PROGRESSION: CLINICAL_PROGRESSION: NOT CHANGED

## 2022-01-02 NOTE — PROGRESS NOTES
Clinically unchanged. Awaiting her daughter to return home and will plan discharge. Tentatively Tuesday.

## 2022-01-03 LAB
ALBUMIN SERPL-MCNC: 3.4 G/DL (ref 3.5–5.2)
ALP BLD-CCNC: 111 U/L (ref 35–104)
ALT SERPL-CCNC: 29 U/L (ref 5–33)
ANION GAP SERPL CALCULATED.3IONS-SCNC: 14 MMOL/L (ref 7–19)
AST SERPL-CCNC: 19 U/L (ref 5–32)
BASOPHILS ABSOLUTE: 0.1 K/UL (ref 0–0.2)
BASOPHILS RELATIVE PERCENT: 0.8 % (ref 0–1)
BILIRUB SERPL-MCNC: <0.2 MG/DL (ref 0.2–1.2)
BUN BLDV-MCNC: 15 MG/DL (ref 6–20)
CALCIUM SERPL-MCNC: 9.3 MG/DL (ref 8.6–10)
CHLORIDE BLD-SCNC: 94 MMOL/L (ref 98–111)
CO2: 32 MMOL/L (ref 22–29)
CREAT SERPL-MCNC: 0.7 MG/DL (ref 0.5–0.9)
EOSINOPHILS ABSOLUTE: 0.5 K/UL (ref 0–0.6)
EOSINOPHILS RELATIVE PERCENT: 5.1 % (ref 0–5)
GFR AFRICAN AMERICAN: >59
GFR NON-AFRICAN AMERICAN: >60
GLUCOSE BLD-MCNC: 111 MG/DL (ref 70–99)
GLUCOSE BLD-MCNC: 138 MG/DL (ref 70–99)
GLUCOSE BLD-MCNC: 167 MG/DL (ref 70–99)
GLUCOSE BLD-MCNC: 233 MG/DL (ref 70–99)
GLUCOSE BLD-MCNC: 236 MG/DL (ref 74–109)
HCT VFR BLD CALC: 40.7 % (ref 37–47)
HEMOGLOBIN: 12.7 G/DL (ref 12–16)
IMMATURE GRANULOCYTES #: 0.5 K/UL
LYMPHOCYTES ABSOLUTE: 1.7 K/UL (ref 1.1–4.5)
LYMPHOCYTES RELATIVE PERCENT: 17.9 % (ref 20–40)
MAGNESIUM: 2 MG/DL (ref 1.6–2.6)
MCH RBC QN AUTO: 30.8 PG (ref 27–31)
MCHC RBC AUTO-ENTMCNC: 31.2 G/DL (ref 33–37)
MCV RBC AUTO: 98.5 FL (ref 81–99)
MONOCYTES ABSOLUTE: 0.8 K/UL (ref 0–0.9)
MONOCYTES RELATIVE PERCENT: 8.1 % (ref 0–10)
NEUTROPHILS ABSOLUTE: 5.8 K/UL (ref 1.5–7.5)
NEUTROPHILS RELATIVE PERCENT: 63.1 % (ref 50–65)
PDW BLD-RTO: 13.3 % (ref 11.5–14.5)
PERFORMED ON: ABNORMAL
PLATELET # BLD: 317 K/UL (ref 130–400)
PMV BLD AUTO: 9.8 FL (ref 9.4–12.3)
POTASSIUM SERPL-SCNC: 4.9 MMOL/L (ref 3.5–5)
RBC # BLD: 4.13 M/UL (ref 4.2–5.4)
SODIUM BLD-SCNC: 140 MMOL/L (ref 136–145)
TOTAL PROTEIN: 6.3 G/DL (ref 6.6–8.7)
WBC # BLD: 9.2 K/UL (ref 4.8–10.8)

## 2022-01-03 PROCEDURE — 83735 ASSAY OF MAGNESIUM: CPT

## 2022-01-03 PROCEDURE — 2700000000 HC OXYGEN THERAPY PER DAY

## 2022-01-03 PROCEDURE — 36415 COLL VENOUS BLD VENIPUNCTURE: CPT

## 2022-01-03 PROCEDURE — 6360000002 HC RX W HCPCS: Performed by: INTERNAL MEDICINE

## 2022-01-03 PROCEDURE — 85025 COMPLETE CBC W/AUTO DIFF WBC: CPT

## 2022-01-03 PROCEDURE — 1210000000 HC MED SURG R&B

## 2022-01-03 PROCEDURE — 80053 COMPREHEN METABOLIC PANEL: CPT

## 2022-01-03 PROCEDURE — 94660 CPAP INITIATION&MGMT: CPT

## 2022-01-03 PROCEDURE — 6370000000 HC RX 637 (ALT 250 FOR IP)

## 2022-01-03 PROCEDURE — 6370000000 HC RX 637 (ALT 250 FOR IP): Performed by: HOSPITALIST

## 2022-01-03 PROCEDURE — 94761 N-INVAS EAR/PLS OXIMETRY MLT: CPT

## 2022-01-03 PROCEDURE — 6360000002 HC RX W HCPCS: Performed by: STUDENT IN AN ORGANIZED HEALTH CARE EDUCATION/TRAINING PROGRAM

## 2022-01-03 PROCEDURE — 6370000000 HC RX 637 (ALT 250 FOR IP): Performed by: INTERNAL MEDICINE

## 2022-01-03 PROCEDURE — 82947 ASSAY GLUCOSE BLOOD QUANT: CPT

## 2022-01-03 PROCEDURE — 6370000000 HC RX 637 (ALT 250 FOR IP): Performed by: STUDENT IN AN ORGANIZED HEALTH CARE EDUCATION/TRAINING PROGRAM

## 2022-01-03 PROCEDURE — 6370000000 HC RX 637 (ALT 250 FOR IP): Performed by: NURSE PRACTITIONER

## 2022-01-03 RX ORDER — FLUCONAZOLE 100 MG/1
200 TABLET ORAL DAILY
Status: DISCONTINUED | OUTPATIENT
Start: 2022-01-03 | End: 2022-01-04 | Stop reason: HOSPADM

## 2022-01-03 RX ADMIN — BUMETANIDE 0.5 MG: 0.5 TABLET ORAL at 09:50

## 2022-01-03 RX ADMIN — PRAVASTATIN SODIUM 40 MG: 20 TABLET ORAL at 09:49

## 2022-01-03 RX ADMIN — Medication 1 LOZENGE: at 05:11

## 2022-01-03 RX ADMIN — ACETAMINOPHEN 650 MG: 325 TABLET ORAL at 09:50

## 2022-01-03 RX ADMIN — Medication 1 LOZENGE: at 09:50

## 2022-01-03 RX ADMIN — ENOXAPARIN SODIUM 40 MG: 100 INJECTION SUBCUTANEOUS at 19:42

## 2022-01-03 RX ADMIN — TIMOLOL MALEATE 1 DROP: 5 SOLUTION OPHTHALMIC at 19:44

## 2022-01-03 RX ADMIN — LEVOTHYROXINE SODIUM 100 MCG: 100 TABLET ORAL at 05:11

## 2022-01-03 RX ADMIN — ALBUTEROL SULFATE 2 PUFF: 90 AEROSOL, METERED RESPIRATORY (INHALATION) at 13:21

## 2022-01-03 RX ADMIN — TIMOLOL MALEATE 1 DROP: 5 SOLUTION OPHTHALMIC at 09:50

## 2022-01-03 RX ADMIN — INSULIN LISPRO 3 UNITS: 100 INJECTION, SOLUTION INTRAVENOUS; SUBCUTANEOUS at 13:24

## 2022-01-03 RX ADMIN — INSULIN GLARGINE 20 UNITS: 100 INJECTION, SOLUTION SUBCUTANEOUS at 09:52

## 2022-01-03 RX ADMIN — Medication 2000 UNITS: at 09:49

## 2022-01-03 RX ADMIN — ALBUTEROL SULFATE 2 PUFF: 90 AEROSOL, METERED RESPIRATORY (INHALATION) at 09:51

## 2022-01-03 RX ADMIN — ACETAMINOPHEN 650 MG: 325 TABLET ORAL at 15:29

## 2022-01-03 RX ADMIN — INSULIN LISPRO 6 UNITS: 100 INJECTION, SOLUTION INTRAVENOUS; SUBCUTANEOUS at 20:17

## 2022-01-03 RX ADMIN — FLUCONAZOLE 200 MG: 100 TABLET ORAL at 13:21

## 2022-01-03 RX ADMIN — Medication 1 TABLET: at 13:21

## 2022-01-03 RX ADMIN — ALPRAZOLAM 0.5 MG: 0.5 TABLET ORAL at 18:39

## 2022-01-03 RX ADMIN — Medication 5 DROP: at 09:50

## 2022-01-03 RX ADMIN — LEVOFLOXACIN 750 MG: 500 TABLET, FILM COATED ORAL at 13:20

## 2022-01-03 RX ADMIN — ALBUTEROL SULFATE 2 PUFF: 90 AEROSOL, METERED RESPIRATORY (INHALATION) at 19:43

## 2022-01-03 RX ADMIN — ALPRAZOLAM 0.5 MG: 0.5 TABLET ORAL at 05:15

## 2022-01-03 RX ADMIN — BENZONATATE 100 MG: 100 CAPSULE ORAL at 19:42

## 2022-01-03 RX ADMIN — SUCRALFATE 1 G: 1 TABLET ORAL at 13:21

## 2022-01-03 RX ADMIN — BENZONATATE 100 MG: 100 CAPSULE ORAL at 17:44

## 2022-01-03 RX ADMIN — POLYETHYLENE GLYCOL 3350 17 G: 17 POWDER, FOR SOLUTION ORAL at 17:45

## 2022-01-03 RX ADMIN — SUCRALFATE 1 G: 1 TABLET ORAL at 17:44

## 2022-01-03 RX ADMIN — PANTOPRAZOLE SODIUM 40 MG: 40 TABLET, DELAYED RELEASE ORAL at 05:11

## 2022-01-03 RX ADMIN — Medication 5 DROP: at 19:44

## 2022-01-03 RX ADMIN — Medication 1 LOZENGE: at 19:43

## 2022-01-03 RX ADMIN — ONDANSETRON HYDROCHLORIDE 8 MG: 4 TABLET, FILM COATED ORAL at 15:30

## 2022-01-03 RX ADMIN — DEXAMETHASONE 6 MG: 4 TABLET ORAL at 15:31

## 2022-01-03 RX ADMIN — Medication 10 MG: at 19:42

## 2022-01-03 RX ADMIN — ALBUTEROL SULFATE 2 PUFF: 90 AEROSOL, METERED RESPIRATORY (INHALATION) at 17:45

## 2022-01-03 RX ADMIN — SUCRALFATE 1 G: 1 TABLET ORAL at 00:11

## 2022-01-03 RX ADMIN — INSULIN GLARGINE 20 UNITS: 100 INJECTION, SOLUTION SUBCUTANEOUS at 20:17

## 2022-01-03 RX ADMIN — ENOXAPARIN SODIUM 40 MG: 100 INJECTION SUBCUTANEOUS at 09:50

## 2022-01-03 RX ADMIN — Medication 30 MG: at 19:42

## 2022-01-03 RX ADMIN — SUCRALFATE 1 G: 1 TABLET ORAL at 05:11

## 2022-01-03 ASSESSMENT — ENCOUNTER SYMPTOMS
COLOR CHANGE: 0
BACK PAIN: 0
COUGH: 1
RHINORRHEA: 0
CONSTIPATION: 0
SHORTNESS OF BREATH: 1
NAUSEA: 0
VOICE CHANGE: 0
DIARRHEA: 0
VOMITING: 0

## 2022-01-03 ASSESSMENT — PAIN SCALES - GENERAL
PAINLEVEL_OUTOF10: 0
PAINLEVEL_OUTOF10: 5
PAINLEVEL_OUTOF10: 5

## 2022-01-03 NOTE — PROGRESS NOTES
Hospitalist Progress Note    Patient:  Jeanette Ram  YOB: 1965  Date of Service: 1/3/2022  MRN: [id number]   Acct: [de-identified]   Primary Care Physician: Yvette Anguiano MD  Advance Directive: Partial Code  Admit Date: 12/9/2021       Hospital Day: 25  Referring Provider: Anaid Marquez DO    Patient Seen, Chart, Consults, Notes, Labs, Radiology studies reviewed. Subjective:  Jeanette Ram is a 49 y.o. female  whom we are following for COVID-19 pneumonia, hypoxemic respiratory failure. She is now down to 3 L. Her daughter will be able to come and pick her up and take her home tomorrow.   We will make arrangements for home O2    Allergies:  Latex, Penicillins, Codeine, and Tape [adhesive tape]    Medicines:  Current Facility-Administered Medications   Medication Dose Route Frequency Provider Last Rate Last Admin    fluconazole (DIFLUCAN) tablet 200 mg  200 mg Oral Daily Anaid Marquez, DO   200 mg at 01/03/22 1321    dexamethasone (DECADRON) tablet 6 mg  6 mg Oral Daily Anaid Marquez, DO   6 mg at 01/03/22 1531    pantoprazole (PROTONIX) tablet 40 mg  40 mg Oral QAM AC Anaid Moraess, DO   40 mg at 01/03/22 0511    sucralfate (CARAFATE) tablet 1 g  1 g Oral 4 times per day Anaid Marquez, DO   1 g at 01/03/22 1321    bumetanide (BUMEX) tablet 0.5 mg  0.5 mg Oral BID Anaid Marquez, DO   0.5 mg at 01/03/22 0950    dextromethorphan (DELSYM) 30 MG/5ML extended release liquid 30 mg  30 mg Oral TID PRN Rohan Frye MD   30 mg at 01/02/22 2146    levoFLOXacin (LEVAQUIN) tablet 750 mg  750 mg Oral Daily Rohan Frye MD   750 mg at 01/03/22 1320    carbamide peroxide (DEBROX) 6.5 % otic solution 5 drop  5 drop Right Ear BID Anaid Marquez, DO   5 drop at 01/03/22 0950    insulin lispro (HUMALOG) injection vial 0-18 Units  0-18 Units SubCUTAneous 4x Daily AC & HS Katheran Luis A, APRN - CNP   6 Units at 01/02/22 3059    loperamide (IMODIUM) capsule 2 mg  2 mg Oral 4x Daily PRN Vivian Good, DO        ALPRAZolam Genetta Vici) tablet 0.5 mg  0.5 mg Oral Q6H PRN Debo Estrada MD   0.5 mg at 01/03/22 0515    insulin glargine (LANTUS) injection vial 20 Units  20 Units SubCUTAneous BID Vivian Good, DO   20 Units at 01/03/22 6075    promethazine (PHENERGAN) injection 6.25 mg  6.25 mg IntraMUSCular Q6H PRN Debo Estrada MD   6.25 mg at 12/31/21 1443    benzonatate (TESSALON) capsule 100 mg  100 mg Oral TID PRN Vivian Good, DO   100 mg at 01/02/22 2146    opium-belladonna (B&O SUPPRETTES) 16.2-60 MG suppository 60 mg  60 mg Rectal Q8H PRN Vivian Good, DO        melatonin disintegrating tablet 10 mg  10 mg Oral Nightly Erendira Wong MD   10 mg at 01/02/22 2146    Benzocaine-Menthol (CEPACOL) 1 lozenge  1 lozenge Oral Q2H PRN Erendira Wong MD   1 lozenge at 01/03/22 0950    sodium chloride (OCEAN, BABY AYR) 0.65 % nasal spray 1 spray  1 spray Each Nostril PRN Erendira Wong MD   1 spray at 12/13/21 1727    enoxaparin (LOVENOX) injection 40 mg  40 mg SubCUTAneous BID Erendira Wong MD   40 mg at 01/03/22 0950    Vitamin D (CHOLECALCIFEROL) tablet 2,000 Units  2,000 Units Oral Daily Erendira Wong MD   2,000 Units at 01/03/22 0949    LORazepam (ATIVAN) injection 0.5 mg  0.5 mg IntraVENous Q6H PRN Erendira Wong MD   0.5 mg at 12/16/21 1118    albuterol sulfate  (90 Base) MCG/ACT inhaler 2 puff  2 puff Inhalation 4x Daily Desi Oddi, APRN   2 puff at 01/03/22 1321    calcium-cholecalciferol 500-200 MG-UNIT per tablet 1 tablet  1 tablet Oral Daily Desi Oddi, APRN   1 tablet at 01/03/22 1321    HYDROcodone-acetaminophen (NORCO) 7.5-325 MG per tablet 1 tablet  1 tablet Oral Q6H PRN PELON Moroe   1 tablet at 01/02/22 0442    levothyroxine (SYNTHROID) tablet 100 mcg  100 mcg Oral Daily PELON Moore   100 mcg at 01/03/22 0511    timolol (TIMOPTIC) 0.5 % ophthalmic solution 1 drop  1 drop Both Eyes BID PELON Dye   1 drop at 01/03/22 0950    pravastatin (PRAVACHOL) tablet 40 mg  40 mg Oral Daily PELON Dye   40 mg at 01/03/22 4209    ondansetron (ZOFRAN) tablet 8 mg  8 mg Oral Q8H PRN PELON Dye   8 mg at 01/03/22 1530    meclizine (ANTIVERT) tablet 25 mg  25 mg Oral TID PRN PELON Dye   25 mg at 12/10/21 0301    sodium chloride flush 0.9 % injection 5-40 mL  5-40 mL IntraVENous 2 times per day PELON Dye   10 mL at 01/02/22 0900    sodium chloride flush 0.9 % injection 5-40 mL  5-40 mL IntraVENous PRN PELON Dye   10 mL at 12/15/21 1155    0.9 % sodium chloride infusion  25 mL IntraVENous PRN PELON Dye   Stopped at 12/11/21 2130    polyethylene glycol (GLYCOLAX) packet 17 g  17 g Oral Daily PRN PELON Dye   17 g at 01/02/22 2206    acetaminophen (TYLENOL) tablet 650 mg  650 mg Oral Q6H PRN PELON Dye   650 mg at 01/03/22 1529    Or    acetaminophen (TYLENOL) suppository 650 mg  650 mg Rectal Q6H PRN PELON Dye        glucose (GLUTOSE) 40 % oral gel 15 g  15 g Oral PRN PELON Dye        dextrose 50 % IV solution  12.5 g IntraVENous PRN PELON Dye        glucagon (rDNA) injection 1 mg  1 mg IntraMUSCular PRN PELON Dye        dextrose 5 % solution  100 mL/hr IntraVENous PRN PELON Dye           Past Medical History:  Past Medical History:   Diagnosis Date    Anemia     Asthma     Bronchitis, acute     [facility]ronic obstructive [facility] disease) [facility] [address]     Diabetes ([facility] [facility]     Glaucoma     Hyperlipidemia     Hypertension     Hyperthyroidism     Morbid obesity ([address]     Sleep apnea     uses c-pap       Past Surgical History:  Past Surgical History:   Procedure Laterality Date    AXILLARY SURGERY Bilateral     excision and drainage of staph abscesses    BACK SURGERY 10/2004    Dr. Emma Rawls, [place] L4, L5    CARPAL TUNNEL RELEASE Right     CHOLECYSTECTOMY      COLONOSCOPY  03/20/2007    Dr Monserrat Acuna 9/11/2020    Dr Hudson Madelin yr recall    DILATION AND CURETTAGE OF UTERUS N/A 1/29/2021    DILATATION AND CURETTAGE HYSTEROSCOPY Indu Contreras performed by Geraldine Rodrigues MD at [address] [place] [address] FLX DX W/COLLJ [address] WHEN PFRMD N/A 2/8/2017    Dr Darcie Redman, actively inflamed internal hemorrhoids, residulal liquid and some solid food particles in the colon-5 yr recall due to prep    STOMACH SURGERY  01/20/2014    Dr. Hannah Ornelas ( gastric sleeve)    UPPER GASTROINTESTINAL ENDOSCOPY  10/30/2012    Dr Sai Leblanc esophagitis, gastritis, Iliana (-)    UPPER GASTROINTESTINAL ENDOSCOPY  01/23/2008    Dr Judah Landaverde esophagitis    UPPER GASTROINTESTINAL ENDOSCOPY N/A 9/11/2020    Dr Gabriel Joseph       Family History  Family History   Problem Relation Age of Onset    Lung Cancer Mother     Kidney Cancer Mother     Lung Cancer Maternal Grandmother     Colon Cancer Maternal Grandfather     Liver Cancer Neg Hx     Liver Disease Neg Hx     Stomach Cancer Neg Hx     Rectal Cancer Neg Hx     Esophageal Cancer Neg Hx     Colon Polyps Neg Hx        Social History  Social History     Socioeconomic History    Marital status:      Spouse name: Not on file    Number of children: Not on file    Years of education: Not on file    Highest education level: Not on file   Occupational History    Not on file   Tobacco Use    Smoking status: Former Smoker     Packs/day: 3.00     Years: 26.00     Pack years: 78.00     Types: Cigarettes     Quit date: 5/6/2011     Years since quitting: 10.6    Smokeless tobacco: Never Used   Vaping Use    Vaping Use: Never used   Substance and Sexual Activity    Alcohol use: No    Drug use: No    Sexual activity: Yes     Partners: Male   Other Topics Concern    Not on file   Social History Narrative    Not on file     Social Determinants of Health     Financial Resource Strain:     Difficulty of Paying Living Expenses: Not on file   Food Insecurity:     Worried About Running Out of Food in the Last Year: Not on file    Tiffanie of Food in the Last Year: Not on file   Transportation Needs:     Lack of Transportation (Medical): Not on file    Lack of Transportation (Non-Medical): Not on file   Physical Activity:     Days of Exercise per Week: Not on file    Minutes of Exercise per Session: Not on file   Stress:     Feeling of Stress : Not on file   Social Connections:     Frequency of Communication with Friends and Family: Not on file    Frequency of Social Gatherings with Friends and Family: Not on file    Attends [employer] Services: Not on file    Active Member of [address] [place] [address] [employer] or Organizations: Not on file    Attends Club or Organization Meetings: Not on file    Marital Status: Not on file   Intimate Partner Violence:     Fear of Current or Ex-Partner: Not on file    Emotionally Abused: Not on file    Physically Abused: Not on file    Sexually Abused: Not on file   Housing Stability:     Unable to Pay for Housing in the Last Year: Not on file    Number of [place] in the Last Year: Not on file    Unstable Housing in the Last Year: Not on file         Review of Systems:    Review of Systems   Constitutional: Negative for activity change and fatigue. HENT: Negative for rhinorrhea and voice change. Eyes: Negative for visual disturbance. Respiratory: Positive for cough and shortness of breath. Cardiovascular: Negative for chest pain and leg swelling. Gastrointestinal: Negative for constipation, diarrhea, nausea and vomiting. Endocrine: Negative for polyuria. Genitourinary: Negative for difficulty urinating and dysuria. Musculoskeletal: Positive for gait problem. Negative for arthralgias and back pain. Skin: Negative for color change.    Allergic/Immunologic: Negative for immunocompromised state.   Neurological: Positive for weakness. Negative for dizziness and headaches. Psychiatric/Behavioral: Negative for confusion. Objective:  Blood pressure 110/66, pulse 92, temperature 97.5 °F (36.4 °C), temperature source Temporal, resp. rate 20, height 5' 5\" (1.651 m), weight (!) 332 lb (150.6 kg), SpO2 92 %, not currently breastfeeding. Intake/Output Summary (Last 24 hours) at 1/3/2022 1553  Last data filed at 1/3/2022 1024  Gross per 24 hour   Intake 240 ml   Output --   Net 240 ml       Physical Exam  Vitals and nursing note reviewed. HENT:      Head: Normocephalic and atraumatic. Right Ear: External ear normal.      Left Ear: External ear normal.      Nose: Nose normal.      Mouth/Throat:      Mouth: Mucous membranes are moist.   Eyes:      Conjunctiva/sclera: Conjunctivae normal.      Pupils: Pupils are equal, round, and reactive to light. Cardiovascular:      Rate and Rhythm: Normal rate and regular rhythm. Heart sounds: Normal heart sounds. Pulmonary:      Effort: Pulmonary effort is normal.      Breath sounds: Normal breath sounds. Abdominal:      General: Abdomen is flat. Palpations: Abdomen is soft. Musculoskeletal:         General: Normal range of motion. Cervical back: Neck supple. No rigidity. No muscular tenderness. Skin:     General: Skin is warm and dry. Neurological:      Mental Status: She is alert and oriented to person, place, and time.    Psychiatric:         Mood and Affect: Mood normal.         Labs:  BMP:   Recent Labs     01/01/22  0611 01/02/22  0913 01/03/22  0923    141 140   K 4.8 4.5 4.9    96* 94*   CO2 31* 34* 32*   BUN 15 15 15   CREATININE 0.7 0.7 0.7   CALCIUM 9.0 9.0 9.3     CBC:   Recent Labs     01/01/22  0611 01/02/22  0913 01/03/22  0923   WBC 8.6 9.6 9.2   HGB 10.5* 11.2* 12.7   HCT 33.5* 35.6* 40.7   MCV 98.0 96.7 98.5    261 317     LIVER PROFILE:   Recent Labs     01/01/22  0611 01/02/22  0913 01/03/22 0923   AST 11 13 19   ALT 21 22 29   BILITOT <0.2 <0.2 <0.2   ALKPHOS 100 98 111*     PT/INR: No results for input(s): PROTIME, INR in the last 72 hours. APTT: No results for input(s): APTT in the last 72 hours. BNP:  No results for input(s): BNP in the last 72 hours. Ionized Calcium:No results for input(s): IONCA in the last 72 hours. Magnesium:  Recent Labs     01/01/22  0611 01/02/22  0913 01/03/22 0923   MG 2.0 2.0 2.0     Phosphorus:No results for input(s): PHOS in the last 72 hours. HgbA1C: No results for input(s): LABA1C in the last 72 hours. Hepatic:   Recent Labs     01/01/22  0611 01/02/22  0913 01/03/22 0923   ALKPHOS 100 98 111*   ALT 21 22 29   AST 11 13 19   PROT 5.7* 6.0* 6.3*   BILITOT <0.2 <0.2 <0.2   LABALBU 3.0* 3.2* 3.4*     Lactic Acid: No results for input(s): LACTA in the last 72 hours. Troponin: No results for input(s): CKTOTAL, CKMB, TROPONINT in the last 72 hours. ABGs: No results for input(s): PH, PCO2, PO2, HCO3, O2SAT in the last 72 hours. CRP:  No results for input(s): CRP in the last 72 hours. Sed Rate:  No results for input(s): SEDRATE in the last 72 hours. Cultures:   No results for input(s): CULTURE in the last 72 hours. No results for input(s): BC, [address] [place] in the last 72 hours. No results for input(s): CXSURG in the last 72 hours. Radiology reports as per the Radiologist  Radiology: XR CHEST PORTABLE    Result Date: 12/9/2021  EXAM: XR CHEST PORTABLE INDICATION: Shortness of breath, COVID positive COMPARISON: None available. FINDINGS: Cardiac silhouette is normal in size. No pleural effusion or visible pneumothorax. Patchy bilateral airspace opacity is present. Central vascular congestion is noted. No acute osseous finding. Bilateral patchy airspace opacity. Central vascular congestion. Differential includes pneumonia and edema. Signed by Dr Arie Coffey.   Continue ongoing medical management.     Acute hypoxemic respiratory failure due to COVID-19. Continue supportive care.     Steroid-induced hyperglycemia. Dexamethasone resumed. Continue Lantus.     Will tentatively plan discharge to home on Tuesday.       Ravindra Griffin DO

## 2022-01-03 NOTE — PROGRESS NOTES
Pt o2 noted to be 82% on the camera, I went directly into pts room to assess. Pt had o2 on forehead, I asked her to put it back into her nose. The pt then stated she was \"practicing\" on how long she could have it off before she got short of breath. I educated the pt on the dangers of removing her o2 and dropping her sats down to practice. I explaining the weaning process and pt verbalized understanding. Informed staff nurse of this and she will pass along to days to monitor closely, as pt was alert and oriented x 4.

## 2022-01-03 NOTE — PLAN OF CARE
Problem: Airway Clearance - Ineffective  Goal: Achieve or maintain patent airway  Outcome: Ongoing     Problem: Gas Exchange - Impaired  Goal: Absence of hypoxia  Outcome: Ongoing  Goal: Promote optimal lung function  Outcome: Ongoing     Problem: Breathing Pattern - Ineffective  Goal: Ability to achieve and maintain a regular respiratory rate  Outcome: Ongoing     Problem:  Body Temperature -  Risk of, Imbalanced  Goal: Ability to maintain a body temperature within defined limits  Outcome: Ongoing  Goal: Will regain or maintain usual level of consciousness  Outcome: Ongoing  Goal: Complications related to the disease process, condition or treatment will be avoided or minimized  Outcome: Ongoing     Problem: Isolation Precautions - Risk of Spread of Infection  Goal: Prevent transmission of infection  Outcome: Ongoing     Problem: Risk for Fluid Volume Deficit  Goal: Maintain normal heart rhythm  Outcome: Ongoing  Goal: Maintain absence of muscle cramping  Outcome: Ongoing  Goal: Maintain normal serum potassium, sodium, calcium, phosphorus, and pH  Outcome: Ongoing     Problem: Nutrition Deficits  Goal: Optimize nutritional status  Outcome: Ongoing     Problem: Loneliness or Risk for Loneliness  Goal: Demonstrate positive use of time alone when socialization is not possible  Outcome: Ongoing     Problem: Fatigue  Goal: Verbalize increase energy and improved vitality  Outcome: Ongoing     Problem: Patient Education: Go to Patient Education Activity  Goal: Patient/Family Education  Outcome: Ongoing     Problem: Falls - Risk of:  Goal: Will remain free from falls  Description: Will remain free from falls  Outcome: Ongoing  Goal: Absence of physical injury  Description: Absence of physical injury  Outcome: Ongoing     Problem: Pain:  Goal: Pain level will decrease  Description: Pain level will decrease  Outcome: Ongoing  Goal: Control of acute pain  Description: Control of acute pain  Outcome: Ongoing  Goal: Control of chronic pain  Description: Control of chronic pain  Outcome: Ongoing     Problem: Nutrition  Goal: Optimal nutrition therapy  Outcome: Ongoing     Problem: Skin Integrity:  Goal: Will show no infection signs and symptoms  Description: Will show no infection signs and symptoms  Outcome: Ongoing  Goal: Absence of new skin breakdown  Description: Absence of new skin breakdown  Outcome: Ongoing     Problem: Anxiety:  Goal: Level of anxiety will decrease  Description: Level of anxiety will decrease  Outcome: Ongoing

## 2022-01-03 NOTE — PROGRESS NOTES
16:20p - Home oxygen evaluation performed and results are as follows: SaO2 = 86% on R/A at rest, SaO2 = 88% on 2 lpm O2(NC) at rest, SaO2 = 92% on 3.5 lpm O2(NC) at rest, SaO2 = 81% on R/A at rest while ambulating(minimal), SaO2 = 87% on 2 lpm O2(NC) while ambulating(minimal), SaO2 = 91% on 3.5 lpm O2(NC) while ambulating(minimal). (Recovery SaO2).  TS RRT

## 2022-01-04 VITALS
HEART RATE: 88 BPM | BODY MASS INDEX: 48.82 KG/M2 | TEMPERATURE: 98.1 F | WEIGHT: 293 LBS | RESPIRATION RATE: 20 BRPM | DIASTOLIC BLOOD PRESSURE: 90 MMHG | SYSTOLIC BLOOD PRESSURE: 154 MMHG | OXYGEN SATURATION: 93 % | HEIGHT: 65 IN

## 2022-01-04 PROBLEM — U07.1 PNEUMONIA DUE TO COVID-19 VIRUS: Status: RESOLVED | Noted: 2021-12-09 | Resolved: 2022-01-04

## 2022-01-04 PROBLEM — K52.9 CHRONIC DIARRHEA: Status: RESOLVED | Noted: 2020-08-27 | Resolved: 2022-01-04

## 2022-01-04 PROBLEM — I10 HYPERTENSION: Status: RESOLVED | Noted: 2021-12-09 | Resolved: 2022-01-04

## 2022-01-04 PROBLEM — U07.1 ACUTE RESPIRATORY DISTRESS SYNDROME (ARDS) DUE TO COVID-19 VIRUS (HCC): Status: RESOLVED | Noted: 2021-12-12 | Resolved: 2022-01-04

## 2022-01-04 PROBLEM — U07.1 ACUTE HYPOXEMIC RESPIRATORY FAILURE DUE TO COVID-19 (HCC): Status: RESOLVED | Noted: 2021-12-11 | Resolved: 2022-01-04

## 2022-01-04 PROBLEM — J96.01 ACUTE HYPOXEMIC RESPIRATORY FAILURE DUE TO COVID-19 (HCC): Status: RESOLVED | Noted: 2021-12-11 | Resolved: 2022-01-04

## 2022-01-04 PROBLEM — J12.82 PNEUMONIA DUE TO COVID-19 VIRUS: Status: RESOLVED | Noted: 2021-12-09 | Resolved: 2022-01-04

## 2022-01-04 PROBLEM — E03.9 HYPOTHYROIDISM: Status: RESOLVED | Noted: 2021-12-09 | Resolved: 2022-01-04

## 2022-01-04 PROBLEM — R12 CHRONIC HEARTBURN: Status: RESOLVED | Noted: 2020-08-27 | Resolved: 2022-01-04

## 2022-01-04 PROBLEM — J80 ACUTE RESPIRATORY DISTRESS SYNDROME (ARDS) DUE TO COVID-19 VIRUS (HCC): Status: RESOLVED | Noted: 2021-12-12 | Resolved: 2022-01-04

## 2022-01-04 LAB
ALBUMIN SERPL-MCNC: 3.4 G/DL (ref 3.5–5.2)
ALP BLD-CCNC: 113 U/L (ref 35–104)
ALT SERPL-CCNC: 29 U/L (ref 5–33)
ANION GAP SERPL CALCULATED.3IONS-SCNC: 11 MMOL/L (ref 7–19)
AST SERPL-CCNC: 16 U/L (ref 5–32)
BASOPHILS ABSOLUTE: 0.1 K/UL (ref 0–0.2)
BASOPHILS RELATIVE PERCENT: 0.7 % (ref 0–1)
BILIRUB SERPL-MCNC: <0.2 MG/DL (ref 0.2–1.2)
BUN BLDV-MCNC: 16 MG/DL (ref 6–20)
CALCIUM SERPL-MCNC: 9.1 MG/DL (ref 8.6–10)
CHLORIDE BLD-SCNC: 96 MMOL/L (ref 98–111)
CO2: 30 MMOL/L (ref 22–29)
CREAT SERPL-MCNC: 0.8 MG/DL (ref 0.5–0.9)
EOSINOPHILS ABSOLUTE: 0 K/UL (ref 0–0.6)
EOSINOPHILS RELATIVE PERCENT: 0.2 % (ref 0–5)
GFR AFRICAN AMERICAN: >59
GFR NON-AFRICAN AMERICAN: >60
GLUCOSE BLD-MCNC: 243 MG/DL (ref 70–99)
GLUCOSE BLD-MCNC: 306 MG/DL (ref 74–109)
HCT VFR BLD CALC: 39.2 % (ref 37–47)
HEMOGLOBIN: 12 G/DL (ref 12–16)
IMMATURE GRANULOCYTES #: 0.5 K/UL
LYMPHOCYTES ABSOLUTE: 0.8 K/UL (ref 1.1–4.5)
LYMPHOCYTES RELATIVE PERCENT: 9.4 % (ref 20–40)
MAGNESIUM: 2 MG/DL (ref 1.6–2.6)
MCH RBC QN AUTO: 29.6 PG (ref 27–31)
MCHC RBC AUTO-ENTMCNC: 30.6 G/DL (ref 33–37)
MCV RBC AUTO: 96.8 FL (ref 81–99)
MONOCYTES ABSOLUTE: 0.3 K/UL (ref 0–0.9)
MONOCYTES RELATIVE PERCENT: 3.9 % (ref 0–10)
NEUTROPHILS ABSOLUTE: 7 K/UL (ref 1.5–7.5)
NEUTROPHILS RELATIVE PERCENT: 80.2 % (ref 50–65)
PDW BLD-RTO: 13.3 % (ref 11.5–14.5)
PERFORMED ON: ABNORMAL
PLATELET # BLD: 305 K/UL (ref 130–400)
PMV BLD AUTO: 9.7 FL (ref 9.4–12.3)
POTASSIUM SERPL-SCNC: 5.4 MMOL/L (ref 3.5–5)
RBC # BLD: 4.05 M/UL (ref 4.2–5.4)
SODIUM BLD-SCNC: 137 MMOL/L (ref 136–145)
TOTAL PROTEIN: 6.2 G/DL (ref 6.6–8.7)
WBC # BLD: 8.7 K/UL (ref 4.8–10.8)

## 2022-01-04 PROCEDURE — 94761 N-INVAS EAR/PLS OXIMETRY MLT: CPT

## 2022-01-04 PROCEDURE — 36415 COLL VENOUS BLD VENIPUNCTURE: CPT

## 2022-01-04 PROCEDURE — 6370000000 HC RX 637 (ALT 250 FOR IP)

## 2022-01-04 PROCEDURE — 2700000000 HC OXYGEN THERAPY PER DAY

## 2022-01-04 PROCEDURE — 80053 COMPREHEN METABOLIC PANEL: CPT

## 2022-01-04 PROCEDURE — 82947 ASSAY GLUCOSE BLOOD QUANT: CPT

## 2022-01-04 PROCEDURE — 6360000002 HC RX W HCPCS: Performed by: STUDENT IN AN ORGANIZED HEALTH CARE EDUCATION/TRAINING PROGRAM

## 2022-01-04 PROCEDURE — 6370000000 HC RX 637 (ALT 250 FOR IP): Performed by: INTERNAL MEDICINE

## 2022-01-04 PROCEDURE — 83735 ASSAY OF MAGNESIUM: CPT

## 2022-01-04 PROCEDURE — 85025 COMPLETE CBC W/AUTO DIFF WBC: CPT

## 2022-01-04 PROCEDURE — 6370000000 HC RX 637 (ALT 250 FOR IP): Performed by: STUDENT IN AN ORGANIZED HEALTH CARE EDUCATION/TRAINING PROGRAM

## 2022-01-04 PROCEDURE — 6360000002 HC RX W HCPCS: Performed by: INTERNAL MEDICINE

## 2022-01-04 PROCEDURE — 6370000000 HC RX 637 (ALT 250 FOR IP): Performed by: HOSPITALIST

## 2022-01-04 PROCEDURE — 6370000000 HC RX 637 (ALT 250 FOR IP): Performed by: NURSE PRACTITIONER

## 2022-01-04 RX ORDER — BENZONATATE 100 MG/1
100 CAPSULE ORAL 3 TIMES DAILY PRN
Qty: 21 CAPSULE | Refills: 0 | Status: SHIPPED | OUTPATIENT
Start: 2022-01-04 | End: 2022-01-11

## 2022-01-04 RX ORDER — FLUCONAZOLE 200 MG/1
200 TABLET ORAL DAILY
Qty: 3 TABLET | Refills: 0 | Status: SHIPPED | OUTPATIENT
Start: 2022-01-04 | End: 2022-01-07

## 2022-01-04 RX ORDER — DEXTROMETHORPHAN POLISTIREX 30 MG/5ML
30 SUSPENSION ORAL 3 TIMES DAILY PRN
Qty: 148 ML | Refills: 0 | Status: SHIPPED | OUTPATIENT
Start: 2022-01-04 | End: 2022-01-14

## 2022-01-04 RX ORDER — BUMETANIDE 0.5 MG/1
0.5 TABLET ORAL 2 TIMES DAILY
Qty: 14 TABLET | Refills: 0 | Status: SHIPPED | OUTPATIENT
Start: 2022-01-04 | End: 2022-01-11

## 2022-01-04 RX ADMIN — BUMETANIDE 0.5 MG: 0.5 TABLET ORAL at 09:27

## 2022-01-04 RX ADMIN — ONDANSETRON HYDROCHLORIDE 8 MG: 4 TABLET, FILM COATED ORAL at 10:01

## 2022-01-04 RX ADMIN — PRAVASTATIN SODIUM 40 MG: 20 TABLET ORAL at 09:27

## 2022-01-04 RX ADMIN — ENOXAPARIN SODIUM 40 MG: 100 INJECTION SUBCUTANEOUS at 09:27

## 2022-01-04 RX ADMIN — Medication 30 MG: at 10:01

## 2022-01-04 RX ADMIN — FLUCONAZOLE 200 MG: 100 TABLET ORAL at 09:27

## 2022-01-04 RX ADMIN — Medication 5 DROP: at 09:26

## 2022-01-04 RX ADMIN — SUCRALFATE 1 G: 1 TABLET ORAL at 05:07

## 2022-01-04 RX ADMIN — ALBUTEROL SULFATE 2 PUFF: 90 AEROSOL, METERED RESPIRATORY (INHALATION) at 09:26

## 2022-01-04 RX ADMIN — DEXAMETHASONE 6 MG: 4 TABLET ORAL at 09:27

## 2022-01-04 RX ADMIN — Medication 2000 UNITS: at 09:27

## 2022-01-04 RX ADMIN — ALPRAZOLAM 0.5 MG: 0.5 TABLET ORAL at 01:34

## 2022-01-04 RX ADMIN — ACETAMINOPHEN 650 MG: 325 TABLET ORAL at 05:06

## 2022-01-04 RX ADMIN — LEVOTHYROXINE SODIUM 100 MCG: 100 TABLET ORAL at 05:07

## 2022-01-04 RX ADMIN — INSULIN LISPRO 6 UNITS: 100 INJECTION, SOLUTION INTRAVENOUS; SUBCUTANEOUS at 09:32

## 2022-01-04 RX ADMIN — INSULIN GLARGINE 20 UNITS: 100 INJECTION, SOLUTION SUBCUTANEOUS at 09:32

## 2022-01-04 RX ADMIN — ACETAMINOPHEN 650 MG: 325 TABLET ORAL at 10:01

## 2022-01-04 RX ADMIN — Medication 1 TABLET: at 09:27

## 2022-01-04 RX ADMIN — BENZONATATE 100 MG: 100 CAPSULE ORAL at 10:01

## 2022-01-04 RX ADMIN — PANTOPRAZOLE SODIUM 40 MG: 40 TABLET, DELAYED RELEASE ORAL at 05:07

## 2022-01-04 RX ADMIN — TIMOLOL MALEATE 1 DROP: 5 SOLUTION OPHTHALMIC at 09:26

## 2022-01-04 ASSESSMENT — PAIN DESCRIPTION - DESCRIPTORS: DESCRIPTORS: HEADACHE

## 2022-01-04 ASSESSMENT — PAIN SCALES - GENERAL
PAINLEVEL_OUTOF10: 1
PAINLEVEL_OUTOF10: 1

## 2022-01-04 ASSESSMENT — PAIN DESCRIPTION - PAIN TYPE: TYPE: ACUTE PAIN

## 2022-01-04 ASSESSMENT — PAIN DESCRIPTION - LOCATION: LOCATION: HEAD

## 2022-01-04 NOTE — PLAN OF CARE
chronic pain  Description: Control of chronic pain  Outcome: Ongoing     Problem: Nutrition  Goal: Optimal nutrition therapy  Outcome: Ongoing     Problem: Skin Integrity:  Goal: Will show no infection signs and symptoms  Description: Will show no infection signs and symptoms  Outcome: Ongoing  Goal: Absence of new skin breakdown  Description: Absence of new skin breakdown  Outcome: Ongoing     Problem: Anxiety:  Goal: Level of anxiety will decrease  Description: Level of anxiety will decrease  Outcome: Ongoing

## 2022-01-04 NOTE — DISCHARGE SUMMARY
Discharge Summary    Ceasar Aj  :  1965  MRN:  [id number]    Admit date:  2021  Discharge date: 2022    Admitting Physician:  No admitting provider for patient encounter. Advance Directive: Partial Code    Consults: Palliative care    Primary Care Physician:  Feliciano Kuo MD    Discharge Diagnoses:  Principal Problem (Resolved):    Acute respiratory distress syndrome (ARDS) due to COVID-19 virus [facility])  Active Problems:    * No active hospital problems. *  Resolved Problems:    Acute hypoxemic respiratory failure due to COVID-19 [facility])    Chronic diarrhea    Chronic heartburn    Pneumonia due to COVID-19 virus    Hypertension    Hypothyroidism      Significant Diagnostic Studies:   XR CHEST PORTABLE    Result Date: 2021  EXAM: XR CHEST PORTABLE INDICATION: Shortness of breath, COVID positive COMPARISON: None available. FINDINGS: Cardiac silhouette is normal in size. No pleural effusion or visible pneumothorax. Patchy bilateral airspace opacity is present. Central vascular congestion is noted. No acute osseous finding. Bilateral patchy airspace opacity. Central vascular congestion. Differential includes pneumonia and edema. Signed by Dr Rudolph Busby      CBC:   Recent Labs     22  0913 22  0923 22  0424   WBC 9.6 9.2 8.7   HGB 11.2* 12.7 12.0    317 305     BMP:    Recent Labs     22  0913 22  0923 22  0424    140 137   K 4.5 4.9 5.4*   CL 96* 94* 96*   CO2 34* 32* 30*   BUN 15 15 16   CREATININE 0.7 0.7 0.8   GLUCOSE 173* 236* 306*     INR: No results for input(s): INR in the last 72 hours. Lipids: No results for input(s): CHOL, HDL in the last 72 hours. Invalid input(s): LDLCALCU  ABGs:No results for input(s): PHART, QUN9KFA, PO2ART, VKU3DWN, BEART, HGBAE, R1AUDSJT, CARBOXHGBART, 02THERAPY in the last 72 hours. HgBA1c:  No results for input(s): LABA1C in the last 72 hours.     Procedures: None  Hospital Course: Ms. Carroll was admitted on 12/9 with COVID-19 pneumonia. She was initially admitted to the fourth floor but then decompensated from a pulmonary standpoint and was transferred to CCU. She was treated with aggressive medical management. Fortunately she was able to avoid intubation. She was then transferred back to fourth floor. She had a prolonged hospitalization because of very high FiO2 requirements that prohibited her from returning home. She finally was able to wean down to 3 L nasal cannula and she will be discharged to home today. Physical Exam:  Vital Signs: BP (!) 145/88   Pulse 93   Temp 96.5 °F (35.8 °C) (Temporal)   Resp 20   Ht 5' 5\" (1.651 m)   Wt (!) 332 lb (150.6 kg)   SpO2 93%   BMI 55.25 kg/m²   General appearance:. Alert and Cooperative   HEENT: Normocephalic. Chest: clear to auscultation bilaterally without wheezes or rhonchi. Cardiac: Normal heart tones with regular rate and rhythm, S1, S2 normal. No murmurs, gallops, or rubs auscultated. Abdomen:soft, non-tender; normal bowel sounds, no masses, no organomegaly. Extremities: No clubbing or cyanosis. No peripheral edema. Peripheral pulses palpable. Neurologic: Grossly intact.     Discharge Medications:         Medication List      START taking these medications    benzonatate 100 MG capsule  Commonly known as: TESSALON  Take 1 capsule by mouth 3 times daily as needed for Cough     bumetanide 0.5 MG tablet  Commonly known as: BUMEX  Take 1 tablet by mouth 2 times daily for 7 days     dextromethorphan 30 MG/5ML extended release liquid  Commonly known as: DELSYM  Take 5 mLs by mouth 3 times daily as needed for Cough     fluconazole 200 MG tablet  Commonly known as: DIFLUCAN  Take 1 tablet by mouth daily for 3 days        CONTINUE taking these medications    * PROAIR HFA IN     * albuterol (2.5 MG/3ML) 0.083% nebulizer solution  Commonly known as: PROVENTIL     ALPRAZolam 1 MG tablet  Commonly known as: XANAX     B-D 3CC LUER-SHO SYR 25GX1\" 25G X 1\" 3 ML Misc  Generic drug: SYRINGE-NEEDLE (DISP) 3 ML     calcium carbonate 500 MG Tabs tablet  Commonly known as: OSCAL     cyanocobalamin 1000 MCG/ML injection     diclofenac sodium 1 % Gel  Commonly known as: VOLTAREN     Fish Oil 1200 MG Cpdr     HYDROcodone-acetaminophen 7.5-325 MG per tablet  Commonly known as: NORCO     hydrocortisone 2.5 % rectal cream  Commonly known as: ANUSOL-HC  Apply rectally 1-2 times daily (after a BM) for 5 days during hemorrhoid flare. levothyroxine 100 MCG tablet  Commonly known as: SYNTHROID     lisinopril-hydroCHLOROthiazide 20-12.5 MG per tablet  Commonly known as: PRINZIDE;ZESTORETIC     loratadine 10 MG tablet  Commonly known as: CLARITIN     meclizine 25 MG tablet  Commonly known as: ANTIVERT     NONFORMULARY     omeprazole 20 MG delayed release capsule  Commonly known as: PRILOSEC     ondansetron 8 MG tablet  Commonly known as: ZOFRAN     polyethylene glycol 17 GM/SCOOP powder  Commonly known as: GLYCOLAX     pravastatin 40 MG tablet  Commonly known as: PRAVACHOL     promethazine 6.25 MG/5ML syrup  Commonly known as: PHENERGAN     promethazine-dextromethorphan 6.25-15 MG/5ML syrup  Commonly known as: PROMETHAZINE-DM     timolol 0.5 % ophthalmic solution  Commonly known as: TIMOPTIC     vitamin C 500 MG tablet  Commonly known as: ASCORBIC ACID     Vitamin D3 50 MCG (2000 UT) Caps         * This list has 2 medication(s) that are the same as other medications prescribed for you. Read the directions carefully, and ask your doctor or other care provider to review them with you.             STOP taking these medications    furosemide 40 MG tablet  Commonly known as: LASIX           Where to Get Your Medications      [facility] [place], [address] [facility]  [address] [address]    Phone: [phone]   · benzonatate 100 MG capsule  · bumetanide 0.5 MG tablet  · dextromethorphan 30 MG/5ML extended release liquid  · fluconazole 200 MG tablet         Discharge Instructions: Follow up with Abhishek Can MD in 3-5 days. Take medications as directed. Resume activity as tolerated. Diet: ADULT ORAL NUTRITION SUPPLEMENT; Breakfast, Lunch, Dinner; Diabetic Oral Supplement  ADULT DIET; Regular; GI [place] (GERD/Peptic Ulcer); Safety Tray; Safety Tray (Disposables); pt does not eat beef or pork, does eat chicken and turkey, prefers toast with sugar free jelly for breakfast, fruits, & soups     Disposition: Patient is medically stable and will be discharged to home. Time spent on discharge 40 minutes.     Signed:  Jeanie Andujar DO

## 2022-01-05 ENCOUNTER — CARE COORDINATION (OUTPATIENT)
Dept: CASE MANAGEMENT | Age: 57
End: 2022-01-05

## 2022-01-05 NOTE — CARE COORDINATION
[facility] Transitions Initial Follow Up Call    Call within 2 business days of discharge: Yes    Patient: Dora Short Patient : 1965   MRN: <[id number]>  Reason for Admission: COVID+  Discharge Date: 22 RARS: Readmission Risk Score: 10.9 ( )      Last Discharge [facility]       Complaint Diagnosis Description Type Department Provider    21 Shortness of Breath; Positive For Covid-19 Hypoxia . .. ED to Hosp-Admission (Discharged) (ADMITTED) [facility]; Sangeeta Crawford ... Spoke with: ORIN    Facility: [facility]    Attempted to reach patient via phone for initial post hospital transition call. Unable to leave  at this time. Message states \"Wireless customer you are calling is not available. Please try again another time. \" and ends. Will schedule outreach attempt for another time. Magdaleno Deleon  [facility]  Care Transitions  [phone]    Care Transitions 24 Hour Call    Care Transitions Interventions         Follow Up  No future appointments.     Magdaleno Deleon LPN

## 2022-01-06 ENCOUNTER — CARE COORDINATION (OUTPATIENT)
Dept: CASE MANAGEMENT | Age: 57
End: 2022-01-06

## 2022-01-06 NOTE — CARE COORDINATION
[facility] Transitions Initial Follow Up Call    Call within 2 business days of discharge: Yes    Patient: Rolo Robles Patient : 1965   MRN: <[id number]>  Reason for Admission: [address]  Discharge Date: 22 RARS: Readmission Risk Score: 10.9 ( )      Last Discharge [facility]       Complaint Diagnosis Description Type Department Provider    21 Shortness of Breath; Positive For Covid-19 Hypoxia . .. ED to Hosp-Admission (Discharged) (ADMITTED) [address]; Symone Villalobos ... Spoke with: Sarahi Chicas    Non-face-to-face services provided:  Obtained and reviewed discharge summary and/or continuity of care documents  Education of patient/family/caregiver/guardian to support self-management-   Assessment and support for treatment adherence and medication management-      Care Transitions 24 Hour Call    Care Transitions Interventions     Counciled pt for over 30 minutes regarding her current symptoms. Pt reports anxiety (taking xanax), ongoing headaches, nose bleeds, coughing, fatigue, difficulty sleeping, diarrhea, and SOB on exertion. Reports using 3L O2 continuously and sat 92-95% at rest, 80-81% on exertion. Advised pt on what to do when she becomes SOB and O2 sat drops, and when to call the doctor or return to ED. Her diarrhea has resolved today. Discussed diet and fluid balance. 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 States her legs are still very swollen. She's taking bumex as prescribed. She's concerned about the fact that she was requiring insulin in the hospital and did not go home with it. Discussed hyperglycemia with steroids and symptoms of high BS. She is no longer on steroids. States she has f/u appt . Reviewed new and pertinent medications. Unable to keep pt focused to complete entire medication review.      States she thought she was supposed to have a home care nurse and she would really like one as she has a lot of anxiety about her health at this time. Placed call to her PCP office Dr. Barbara Mahmood to confirm f/u appt and request home care referral. LM on nurse line. Follow Up  No future appointments.     Vern Julian

## 2022-01-07 ENCOUNTER — CARE COORDINATION (OUTPATIENT)
Dept: CASE MANAGEMENT | Age: 57
End: 2022-01-07

## 2022-01-07 DIAGNOSIS — U07.1 COVID-19: Primary | ICD-10-CM

## 2022-01-07 PROCEDURE — 1111F DSCHRG MED/CURRENT MED MERGE: CPT | Performed by: FAMILY MEDICINE

## 2022-01-07 RX ORDER — GABAPENTIN 300 MG/1
300 CAPSULE ORAL 3 TIMES DAILY
COMMUNITY

## 2022-01-07 RX ORDER — ZINC GLUCONATE 50 MG
100 TABLET ORAL DAILY
COMMUNITY

## 2022-01-07 RX ORDER — ASPIRIN 81 MG/1
81 TABLET ORAL DAILY
COMMUNITY

## 2022-01-07 RX ORDER — LATANOPROST 50 UG/ML
1 SOLUTION/ DROPS OPHTHALMIC NIGHTLY
COMMUNITY

## 2022-01-07 NOTE — CARE COORDINATION
[facility] 45 Transitions Follow Up Call    2022    Patient: Padmini Hutchins  Patient : 1965   MRN: <[id number]>  Reason for Admission: COVID 19/PNA  Discharge Date: 22 RARS: Readmission Risk Score: 10.9 ( )    Spoke with: Padmini Hutchins reports that she continues with all the symptoms that she had when left the hospital. Patient c/o cp(soreness), sob, productive cough (with yellow sputum and at time with blood), dizziness, headache, nausea, diarrhea, abdominal pains, fever, or chills. Patient report that she do have have much of an appetite and fluid intake is good and having on and off diarrhea denies any problems with  bladder. Patient is taking all medications as ordered. Writer and patient did a complete medication review and 1111f was completed. She continues on O2 at 3 liters via n/c. O2 sat 91% checked when on phone with writer and after walking to the bathroom. Patient reports that she is using incentive spirometer. Denies any needs at this time. Patient instructed to continue to monitor s/s, reporting any that may present to MD immediately for early intervention. Reminded of COVID 19 precautions. Agreeable to f/u calls. Educated on the use of urgent care or physicians 24 hr access line if assistance is needed after hours. [facility] provided contact information for future needs.         Care Transitions Subsequent and Final Call    Subsequent and Final Calls  Do you have any ongoing symptoms?: Yes  Onset of Patient-reported symptoms: Other  Patient-reported symptoms: Fatigue, Cough, Nausea, Other  Interventions for patient-reported symptoms: Notified PCP/Physician  Have your medications changed?: No  Do you have any questions related to your medications?: No  Do you currently have any active services?: No  Are you currently active with any services?: Home Health  Do you have any needs or concerns that I can assist you with?: No  Identified Barriers: Stress  Care Transitions Interventions  No Identified Needs  Other Interventions: Follow Up  No future appointments.     Shayy Erickson LPN

## 2022-01-10 ENCOUNTER — TELEPHONE (OUTPATIENT)
Dept: ONCOLOGY | Facility: CLINIC | Age: 57
End: 2022-01-10

## 2022-01-10 NOTE — TELEPHONE ENCOUNTER
SPOKE W/PT TO LET HER KNOW SHE WOULD BE ON WAIT LIST FOR DR LINCOLN. PT STATES SHE IS UNDER COVID QUARANTINE AND ALSO THAT HER IRON LEVELS HAVE RESOLVED. SHE DID NOT WANT TO BE ON WAIT LIST. WILL CALL IF SHE NEEDS APPT IN THE FUTURE.

## 2022-01-10 NOTE — TELEPHONE ENCOUNTER
Caller: Vicki Agarwal    Relationship to patient: Self    Best call back number: [phone]    Chief complaint: PATIENT CALLED TO RESCHEDULE APPOINTMENT     Type of visit: FOLLOW UP        If rescheduling, when is the original appointment: 1-11-22     Additional notes:PATIENT JUST GOT OUT OF THE HOSPITAL, PLEASE CALL TO ADVISE

## 2022-01-11 ENCOUNTER — APPOINTMENT (OUTPATIENT)
Dept: LAB | Facility: HOSPITAL | Age: 57
End: 2022-01-11

## 2022-01-14 ENCOUNTER — CARE COORDINATION (OUTPATIENT)
Dept: CASE MANAGEMENT | Age: 57
End: 2022-01-14

## 2022-01-14 NOTE — CARE COORDINATION
[facility] Transitions Follow Up Call    2022    Patient: Joe Bashir  Patient : 1965   MRN: [id number]  Reason for Admission:   Discharge Date: 22 RARS: Readmission Risk Score: 10.9 ( )         Spoke with: N/A    Care Transitions Subsequent and Final Call    Subsequent and Final Calls  Are you currently active with any services?: Home Health  Care Transitions Interventions  Other Interventions: Follow Up  : Attempted to make contact with Edgar Loco for Covid f/u call without success. Unable to leave a message regarding intent of call and call back information. Will try again at a later time. No future appointments.     Peña Briones RN

## 2022-01-19 ENCOUNTER — CARE COORDINATION (OUTPATIENT)
Dept: CASE MANAGEMENT | Age: 57
End: 2022-01-19

## 2022-01-19 NOTE — CARE COORDINATION
[facility] Transitions Follow Up Call    2022    Patient: Nancy Ponce  Patient : 1965   MRN: [id number]  Reason for Admission: Covid  Discharge Date: 22 RARS: Readmission Risk Score: 10.9 ( )         Spoke with: [address] Transitions Subsequent and Final Call    Subsequent and Final Calls  Do you have any ongoing symptoms?: Yes  Onset of Patient-reported symptoms: Today  Patient-reported symptoms: Cough  Interventions for patient-reported symptoms: Notified PCP/Physician  Have your medications changed?: No  Do you have any questions related to your medications?: No  Do you currently have any active services?: No  Are you currently active with any services?: Home Health  Do you have any needs or concerns that I can assist you with?: No  Identified Barriers: None  Care Transitions Interventions  Other Interventions: Follow UP: Spoke with patient today for follow up call. She is coughing, SOA, says she dipped into the high 80's last night with 3.5 LPM of home oxygen on. She says she has had horrible coughing fits, not sleeping due to this. She says she has HFU with PCP on Friday. She does not know who the other appt was with, she had never heard of them. She says the cough syrup and cough pills are not working. She says she was suppose to come home with [facility] but has not seen anyone. CTN did see where she was open to having it ordered, but did not find that it was indeed ordered. CTN placed call to Dr. Leeann Aceves office but had to leave message on . Patient did call CTN back and stated she had fallen when she got out of the bath yesterday. She said she slipped and fell on her bottom, no injury she says, but wanted to make CTN aware. CTN advised her that if she did not hear back tomorrow to give the office a call and make sure to keep her f/u appt on Friday. And if she felt worse to seek medical attention in the nearest ED. Will follow up with her at a later time. No future appointments.     Dessa Kocher, RN

## 2022-01-21 ENCOUNTER — CARE COORDINATION (OUTPATIENT)
Dept: CASE MANAGEMENT | Age: 57
End: 2022-01-21

## 2022-01-21 NOTE — CARE COORDINATION
[facility] Transitions Follow Up Call    2022    Patient: Sarah Sims  Patient : 1965   MRN: [id number]  Reason for Admission:   Discharge Date: 22 RARS: Readmission Risk Score: 10.9 ( )         Spoke with: [address] Transitions Subsequent and Final Call    Subsequent and Final Calls  Are you currently active with any services?: Home Health  Care Transitions Interventions  Other Interventions: Follow Up : Spoke with patient today for follow up call and she sounds better. She says she has taken some cough meds and not coughing as much. The home health nurse has come and going over her medications with her. She has her HFU today with Dr. Lizabeth Smith around 2:30pm she says and her niece is transporting her. Did not tarry long since she has [facility] in home. Will follow up at a later time. No future appointments.     Reji Jimenez RN

## 2022-01-24 ENCOUNTER — TRANSCRIBE ORDERS (OUTPATIENT)
Dept: ADMINISTRATIVE | Facility: HOSPITAL | Age: 57
End: 2022-01-24

## 2022-01-24 DIAGNOSIS — Z87.01 PERSONAL HISTORY OF PNEUMONIA (RECURRENT): Primary | ICD-10-CM

## 2022-01-28 ENCOUNTER — CARE COORDINATION (OUTPATIENT)
Dept: CASE MANAGEMENT | Age: 57
End: 2022-01-28

## 2022-01-28 NOTE — CARE COORDINATION
[facility] Transitions Follow Up Call    2022    Patient: Suhail Peck  Patient : 1965   MRN: [id number]  Reason for Admission: Covid  Discharge Date: 22 RARS: Readmission Risk Score: 10.9 ( )         Spoke with: [address] Transitions Subsequent and Final Call    Subsequent and Final Calls  Are you currently active with any services?: Home Health  Care Transitions Interventions  Other Interventions: Follow Up : Spoke with patient today for follow up Covid call. She is resting, says she still has a cough. She is going to get a CXR today she says. She has had her HFU with PCP. Says she is doing better, but slow rate. She is eating better and getting around better. She is on 2.5 LMP and reading in the 90's. No complaints or issues today. Will resolve calls at this time. No future appointments.     Meghann Cherry RN

## 2022-02-08 ENCOUNTER — HOSPITAL ENCOUNTER (OUTPATIENT)
Dept: GENERAL RADIOLOGY | Facility: HOSPITAL | Age: 57
Discharge: HOME | End: 2022-02-08
Admitting: FAMILY MEDICINE

## 2022-02-08 DIAGNOSIS — Z87.01 PERSONAL HISTORY OF PNEUMONIA (RECURRENT): ICD-10-CM

## 2022-02-08 PROCEDURE — 71046 X-RAY EXAM CHEST 2 VIEWS: CPT

## 2022-02-28 ENCOUNTER — TRANSCRIBE ORDERS (OUTPATIENT)
Dept: ADMINISTRATIVE | Facility: HOSPITAL | Age: 57
End: 2022-02-28

## 2022-02-28 DIAGNOSIS — Z87.01 PERSONAL HISTORY OF PNEUMONIA (RECURRENT): Primary | ICD-10-CM

## 2022-03-01 ENCOUNTER — HOSPITAL ENCOUNTER (OUTPATIENT)
Dept: GENERAL RADIOLOGY | Facility: HOSPITAL | Age: 57
Discharge: HOME | End: 2022-03-01
Admitting: FAMILY MEDICINE

## 2022-03-01 DIAGNOSIS — Z87.01 PERSONAL HISTORY OF PNEUMONIA (RECURRENT): ICD-10-CM

## 2022-03-01 PROCEDURE — 71046 X-RAY EXAM CHEST 2 VIEWS: CPT

## 2022-07-21 ENCOUNTER — TRANSCRIBE ORDERS (OUTPATIENT)
Dept: ADMINISTRATIVE | Facility: HOSPITAL | Age: 57
End: 2022-07-21

## 2022-07-21 DIAGNOSIS — N95.8 POSTARTIFICIAL MENOPAUSAL SYNDROME: ICD-10-CM

## 2022-07-21 DIAGNOSIS — Z78.0 POST-MENOPAUSAL: ICD-10-CM

## 2022-07-21 DIAGNOSIS — N95.1 MENOPAUSAL STATE: ICD-10-CM

## 2022-07-21 DIAGNOSIS — Z12.31 SCREENING MAMMOGRAM FOR BREAST CANCER: Primary | ICD-10-CM

## 2022-07-21 DIAGNOSIS — R63.1 POLYDIPSIA: Primary | ICD-10-CM

## 2022-07-27 ENCOUNTER — TRANSCRIBE ORDERS (OUTPATIENT)
Dept: ADMINISTRATIVE | Facility: HOSPITAL | Age: 57
End: 2022-07-27

## 2022-07-27 DIAGNOSIS — E11.65 TYPE 2 DIABETES MELLITUS WITH HYPERGLYCEMIA, UNSPECIFIED WHETHER LONG TERM INSULIN USE: Primary | ICD-10-CM

## 2022-07-28 ENCOUNTER — HOSPITAL ENCOUNTER (OUTPATIENT)
Dept: BONE DENSITY | Facility: HOSPITAL | Age: 57
Discharge: HOME | End: 2022-07-28

## 2022-07-28 ENCOUNTER — HOSPITAL ENCOUNTER (OUTPATIENT)
Dept: MAMMOGRAPHY | Facility: HOSPITAL | Age: 57
Discharge: HOME | End: 2022-07-28

## 2022-07-28 DIAGNOSIS — Z12.31 SCREENING MAMMOGRAM FOR BREAST CANCER: ICD-10-CM

## 2022-07-28 DIAGNOSIS — N95.1 MENOPAUSAL STATE: ICD-10-CM

## 2022-07-28 PROCEDURE — 77063 BREAST TOMOSYNTHESIS BI: CPT

## 2022-07-28 PROCEDURE — 77067 SCR MAMMO BI INCL CAD: CPT

## 2022-07-28 PROCEDURE — 77080 DXA BONE DENSITY AXIAL: CPT

## 2022-08-01 ENCOUNTER — TRANSCRIBE ORDERS (OUTPATIENT)
Dept: ADMINISTRATIVE | Facility: HOSPITAL | Age: 57
End: 2022-08-01

## 2022-08-01 DIAGNOSIS — E11.65 TYPE 2 DIABETES MELLITUS WITH HYPERGLYCEMIA, UNSPECIFIED WHETHER LONG TERM INSULIN USE: Primary | ICD-10-CM

## 2022-08-11 LAB — HBA1C MFR BLD: 13.5 % (ref 4–6)

## 2022-08-15 ENCOUNTER — HOSPITAL ENCOUNTER (OUTPATIENT)
Dept: DIABETES SERVICES | Facility: HOSPITAL | Age: 57
Discharge: HOME | End: 2022-08-15

## 2022-08-17 ENCOUNTER — APPOINTMENT (OUTPATIENT)
Dept: DIABETES SERVICES | Facility: HOSPITAL | Age: 57
End: 2022-08-17

## 2022-08-19 RX ORDER — ASPIRIN 81 MG/1
TABLET, COATED ORAL
Qty: 30 TABLET | Refills: 11 | OUTPATIENT
Start: 2022-08-19

## 2022-08-19 NOTE — TELEPHONE ENCOUNTER
Refill request for Aspirin 81 mg. Per LOV patient was referred to bariatrics regarding her safety of taking aspirin. I do not see where patient was ever seen by bariatrics. I have called pharmacy to request refill from PCP as patient has no future appt with cardiology.

## 2022-10-01 ENCOUNTER — NURSE TRIAGE (OUTPATIENT)
Dept: CALL CENTER | Facility: HOSPITAL | Age: 57
End: 2022-10-01

## 2022-10-01 NOTE — TELEPHONE ENCOUNTER
She accidentally took 4 extra units of Tresiba flex pen, is feeling fine, blood sugar is 130 has not eaten, told her to call poison hotline, and eat something and to monitor blood sugars for next couple hours. Call us back if needed.   Reason for Disposition  • Diabetes drug error or overdose (e.g., insulin or extra dose)    Additional Information  • Negative: SEVERE difficulty breathing (e.g., struggling for each breath, speaks in single words)  • Negative: Bluish (or gray) lips or face now  • Negative: Seizure  • Negative: Difficult to awaken or acting confused (e.g., disoriented, slurred speech)  • Negative: Shock suspected (e.g., cold/pale/clammy skin, too weak to stand, low BP, rapid pulse)  • Negative: [1] Intentional overdose AND [2] suicidal thoughts or ideas  • Negative: Suicide attempt, known or suspected  • Negative: Sounds like a life-threatening emergency to the triager  • Negative: Inhalation of smoke or fumes  • Negative: Carbon monoxide exposure, known or suspected  • Negative: Chemical in the eye  • Negative: Chemical on the skin  • Negative: Swallowed a (non-poisonous) foreign body  • Negative: [1] HARMFUL SUBSTANCE or ACID or ALKALI ingestion (e.g., toilet , drain , lye, Clinitest tablets, ammonia, bleaches) AND [2] any symptoms (e.g., mouth pain, sore throat, breathing difficulty)  • Negative: [1] PETROLEUM PRODUCT ingestion (e.g., kerosene, gasoline, benzene, furniture polish, lighter fluid) AND [2] any symptoms (e.g., breathing difficulty, coughing, vomiting)  • Negative: [1] Poison Center advised caller to go to ED AND [2] caller seeking second opinion  • Negative: Patient sounds very sick or weak to the triager  • Negative: [1] HARMFUL SUBSTANCE or ACID or ALKALI ingestion (e.g., toilet , drain , lye, Clinitest tablets, ammonia, bleaches) AND [2] NO symptoms  • Negative: [1] PETROLEUM PRODUCT ingestion (e.g., kerosene, gasoline, benzene, furniture polish,  "lighter fluid) AND [2] NO symptoms  • Negative: MORE THAN A DOUBLE DOSE of a prescription or over-the-counter (OTC) drug  • Negative: [1] DOUBLE DOSE (an extra dose or lesser amount) of prescription drug AND [2] any symptoms (e.g., dizziness, nausea, pain, sleepiness)  • Negative: [1] DOUBLE DOSE (an extra dose or lesser amount) of over-the-counter (OTC) drug AND [2] any symptoms (e.g., dizziness, nausea, pain, sleepiness)  • Negative: Took another person's prescription drug  • Negative: Mercury spill (e.g., broken glass thermometer, broken spiral CFL lightbulb)  • Negative: Wild mushroom ingestion, questions about  • Negative: All OTHER POTENTIALLY HARMFUL SUBSTANCES (e.g., nearly all chemicals, plants, more than a double dose of a drug, took someone else's medicine) (Exception: known harmless substances and asymptomatic double dose of OTC drug)  • Negative: Patient or caller provides unclear information about type or amount of substance  • Negative: Triager unable to answer question  • Negative: [1] DOUBLE DOSE (an extra dose or lesser amount) of prescription drug AND [2] NO symptoms (Exception: a double dose of antibiotics)    Answer Assessment - Initial Assessment Questions  1. SUBSTANCE: \"What was swallowed?\" If necessary, have the caller look at the label on the container.       Not swallowed extra 4 units insulin  2. AMOUNT: \"How much was swallowed?\" (Err on the side of recording the maximal amount that is missing)       Not swallowed extra 4 units insulin  3. ONSET: \"When was it probably swallowed?\" (Minutes or hours ago)       30 minutes ago  4. SYMPTOMS: \"Do you have any symptoms?\" If Yes, ask: \"What are they?\" (e.g., abdominal pain, vomiting, weakness)       no  5. SUICIDAL: \"Did you take this to hurt or kill yourself?\"      no  6. PREGNANCY: \"Is there any chance you are pregnant?\" \"When was your last menstrual period?\"      no    Protocols used: POISONING-ADULT-AH      "

## 2022-12-15 ENCOUNTER — APPOINTMENT (OUTPATIENT)
Dept: GENERAL RADIOLOGY | Age: 57
End: 2022-12-15
Payer: COMMERCIAL

## 2022-12-15 ENCOUNTER — APPOINTMENT (OUTPATIENT)
Dept: CT IMAGING | Age: 57
End: 2022-12-15
Payer: COMMERCIAL

## 2022-12-15 ENCOUNTER — HOSPITAL ENCOUNTER (EMERGENCY)
Age: 57
Discharge: ANOTHER HOSPITAL | End: 2022-12-15
Attending: EMERGENCY MEDICINE
Payer: COMMERCIAL

## 2022-12-15 VITALS
WEIGHT: 293 LBS | DIASTOLIC BLOOD PRESSURE: 70 MMHG | TEMPERATURE: 98 F | RESPIRATION RATE: 30 BRPM | BODY MASS INDEX: 48.82 KG/M2 | OXYGEN SATURATION: 95 % | HEART RATE: 126 BPM | HEIGHT: 65 IN | SYSTOLIC BLOOD PRESSURE: 83 MMHG

## 2022-12-15 DIAGNOSIS — S72.352A CLOSED DISPLACED COMMINUTED FRACTURE OF SHAFT OF LEFT FEMUR, INITIAL ENCOUNTER (HCC): Primary | ICD-10-CM

## 2022-12-15 DIAGNOSIS — S72.141A CLOSED COMMINUTED INTERTROCHANTERIC FRACTURE OF RIGHT FEMUR, INITIAL ENCOUNTER (HCC): ICD-10-CM
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
## 2022-12-15 DIAGNOSIS — I95.9 HYPOTENSION, UNSPECIFIED HYPOTENSION TYPE: ICD-10-CM

## 2022-12-15 LAB
ABO/RH: NORMAL
ALBUMIN SERPL-MCNC: 3.7 G/DL (ref 3.5–5.2)
ALP BLD-CCNC: 116 U/L (ref 35–104)
ALT SERPL-CCNC: 594 U/L (ref 5–33)
ANION GAP SERPL CALCULATED.3IONS-SCNC: 17 MMOL/L (ref 7–19)
ANTIBODY SCREEN: NORMAL
AST SERPL-CCNC: 814 U/L (ref 5–32)
BACTERIA: NEGATIVE /HPF
BASOPHILS ABSOLUTE: 0.1 K/UL (ref 0–0.2)
BASOPHILS RELATIVE PERCENT: 0.5 % (ref 0–1)
BILIRUB SERPL-MCNC: <0.2 MG/DL (ref 0.2–1.2)
BILIRUBIN URINE: NEGATIVE
BLOOD BANK DISPENSE STATUS: NORMAL
BLOOD BANK PRODUCT CODE: NORMAL
BLOOD, URINE: NEGATIVE
BPU ID: NORMAL
BUN BLDV-MCNC: 17 MG/DL (ref 6–20)
CALCIUM SERPL-MCNC: 9.2 MG/DL (ref 8.6–10)
CHLORIDE BLD-SCNC: 104 MMOL/L (ref 98–111)
CLARITY: CLEAR
CO2: 19 MMOL/L (ref 22–29)
COLOR: YELLOW
CREAT SERPL-MCNC: 1 MG/DL (ref 0.5–0.9)
CRYSTALS, UA: ABNORMAL /HPF
DESCRIPTION BLOOD BANK: NORMAL
EOSINOPHILS ABSOLUTE: 0.2 K/UL (ref 0–0.6)
EOSINOPHILS RELATIVE PERCENT: 1.2 % (ref 0–5)
EPITHELIAL CELLS, UA: 3 /HPF (ref 0–5)
GFR SERPL CREATININE-BSD FRML MDRD: >60 ML/MIN/{1.73_M2}
GLUCOSE BLD-MCNC: 284 MG/DL (ref 74–109)
GLUCOSE URINE: NEGATIVE MG/DL
HCT VFR BLD CALC: 43.5 % (ref 37–47)
HEMOGLOBIN: 14.1 G/DL (ref 12–16)
HYALINE CASTS: 4 /HPF (ref 0–8)
IMMATURE GRANULOCYTES #: 0.4 K/UL
INR BLD: 1.05 (ref 0.88–1.18)
KETONES, URINE: NEGATIVE MG/DL
LACTIC ACID: 4.6 MMOL/L (ref 0.5–1.9)
LEUKOCYTE ESTERASE, URINE: NEGATIVE
LYMPHOCYTES ABSOLUTE: 4.8 K/UL (ref 1.1–4.5)
LYMPHOCYTES RELATIVE PERCENT: 35.2 % (ref 20–40)
MCH RBC QN AUTO: 30.9 PG (ref 27–31)
MCHC RBC AUTO-ENTMCNC: 32.4 G/DL (ref 33–37)
MCV RBC AUTO: 95.4 FL (ref 81–99)
MONOCYTES ABSOLUTE: 0.4 K/UL (ref 0–0.9)
MONOCYTES RELATIVE PERCENT: 3 % (ref 0–10)
NEUTROPHILS ABSOLUTE: 7.9 K/UL (ref 1.5–7.5)
NEUTROPHILS RELATIVE PERCENT: 57.4 % (ref 50–65)
NITRITE, URINE: NEGATIVE
PDW BLD-RTO: 12.5 % (ref 11.5–14.5)
PH UA: 5.5 (ref 5–8)
PLATELET # BLD: 380 K/UL (ref 130–400)
PMV BLD AUTO: 10.7 FL (ref 9.4–12.3)
POTASSIUM SERPL-SCNC: 5.2 MMOL/L (ref 3.5–5)
PROTEIN UA: 30 MG/DL
PROTHROMBIN TIME: 13.6 SEC (ref 12–14.6)
RBC # BLD: 4.56 M/UL (ref 4.2–5.4)
RBC UA: 7 /HPF (ref 0–4)
SODIUM BLD-SCNC: 140 MMOL/L (ref 136–145)
SPECIFIC GRAVITY UA: 1.02 (ref 1–1.03)
TOTAL PROTEIN: 6.7 G/DL (ref 6.6–8.7)
TROPONIN: <0.01 NG/ML (ref 0–0.03)
UROBILINOGEN, URINE: 0.2 E.U./DL
WBC # BLD: 13.8 K/UL (ref 4.8–10.8)
WBC UA: 3 /HPF (ref 0–5)

## 2022-12-15 PROCEDURE — 96376 TX/PRO/DX INJ SAME DRUG ADON: CPT

## 2022-12-15 PROCEDURE — 73590 X-RAY EXAM OF LOWER LEG: CPT

## 2022-12-15 PROCEDURE — 84484 ASSAY OF TROPONIN QUANT: CPT

## 2022-12-15 PROCEDURE — 71045 X-RAY EXAM CHEST 1 VIEW: CPT

## 2022-12-15 PROCEDURE — 96375 TX/PRO/DX INJ NEW DRUG ADDON: CPT

## 2022-12-15 PROCEDURE — P9016 RBC LEUKOCYTES REDUCED: HCPCS

## 2022-12-15 PROCEDURE — 72125 CT NECK SPINE W/O DYE: CPT

## 2022-12-15 PROCEDURE — 90715 TDAP VACCINE 7 YRS/> IM: CPT | Performed by: EMERGENCY MEDICINE

## 2022-12-15 PROCEDURE — 2580000003 HC RX 258: Performed by: EMERGENCY MEDICINE

## 2022-12-15 PROCEDURE — 93005 ELECTROCARDIOGRAM TRACING: CPT | Performed by: EMERGENCY MEDICINE

## 2022-12-15 PROCEDURE — 71260 CT THORAX DX C+: CPT

## 2022-12-15 PROCEDURE — 29505 APPLICATION LONG LEG SPLINT: CPT

## 2022-12-15 PROCEDURE — 6360000002 HC RX W HCPCS

## 2022-12-15 PROCEDURE — 74177 CT ABD & PELVIS W/CONTRAST: CPT

## 2022-12-15 PROCEDURE — 51702 INSERT TEMP BLADDER CATH: CPT

## 2022-12-15 PROCEDURE — 86900 BLOOD TYPING SEROLOGIC ABO: CPT

## 2022-12-15 PROCEDURE — 86850 RBC ANTIBODY SCREEN: CPT

## 2022-12-15 PROCEDURE — 99285 EMERGENCY DEPT VISIT HI MDM: CPT

## 2022-12-15 PROCEDURE — 80053 COMPREHEN METABOLIC PANEL: CPT

## 2022-12-15 PROCEDURE — 90471 IMMUNIZATION ADMIN: CPT | Performed by: EMERGENCY MEDICINE

## 2022-12-15 PROCEDURE — 73552 X-RAY EXAM OF FEMUR 2/>: CPT

## 2022-12-15 PROCEDURE — 81001 URINALYSIS AUTO W/SCOPE: CPT

## 2022-12-15 PROCEDURE — 12001 RPR S/N/AX/GEN/TRNK 2.5CM/<: CPT

## 2022-12-15 PROCEDURE — 73560 X-RAY EXAM OF KNEE 1 OR 2: CPT

## 2022-12-15 PROCEDURE — 86901 BLOOD TYPING SEROLOGIC RH(D): CPT

## 2022-12-15 PROCEDURE — 36556 INSERT NON-TUNNEL CV CATH: CPT

## 2022-12-15 PROCEDURE — 96366 THER/PROPH/DIAG IV INF ADDON: CPT

## 2022-12-15 PROCEDURE — 6360000002 HC RX W HCPCS: Performed by: EMERGENCY MEDICINE

## 2022-12-15 PROCEDURE — 83605 ASSAY OF LACTIC ACID: CPT

## 2022-12-15 PROCEDURE — 96365 THER/PROPH/DIAG IV INF INIT: CPT

## 2022-12-15 PROCEDURE — 6360000004 HC RX CONTRAST MEDICATION: Performed by: EMERGENCY MEDICINE

## 2022-12-15 PROCEDURE — 36415 COLL VENOUS BLD VENIPUNCTURE: CPT

## 2022-12-15 PROCEDURE — 70486 CT MAXILLOFACIAL W/O DYE: CPT

## 2022-12-15 PROCEDURE — 2500000003 HC RX 250 WO HCPCS

## 2022-12-15 PROCEDURE — 85610 PROTHROMBIN TIME: CPT

## 2022-12-15 PROCEDURE — 85025 COMPLETE CBC W/AUTO DIFF WBC: CPT

## 2022-12-15 PROCEDURE — 70450 CT HEAD/BRAIN W/O DYE: CPT

## 2022-12-15 PROCEDURE — 36430 TRANSFUSION BLD/BLD COMPNT: CPT

## 2022-12-15 PROCEDURE — 73562 X-RAY EXAM OF KNEE 3: CPT

## 2022-12-15 PROCEDURE — 71045 X-RAY EXAM CHEST 1 VIEW: CPT | Performed by: RADIOLOGY

## 2022-12-15 PROCEDURE — 86923 COMPATIBILITY TEST ELECTRIC: CPT

## 2022-12-15 PROCEDURE — 96374 THER/PROPH/DIAG INJ IV PUSH: CPT

## 2022-12-15 RX ORDER — ONDANSETRON 2 MG/ML
INJECTION INTRAMUSCULAR; INTRAVENOUS
Status: COMPLETED
Start: 2022-12-15 | End: 2022-12-15

## 2022-12-15 RX ORDER — NOREPINEPHRINE BIT/0.9 % NACL 16MG/250ML
INFUSION BOTTLE (ML) INTRAVENOUS
Status: COMPLETED
Start: 2022-12-15 | End: 2022-12-15

## 2022-12-15 RX ORDER — MORPHINE SULFATE 4 MG/ML
INJECTION, SOLUTION INTRAMUSCULAR; INTRAVENOUS
Status: COMPLETED
Start: 2022-12-15 | End: 2022-12-15

## 2022-12-15 RX ORDER — NOREPINEPHRINE BIT/0.9 % NACL 16MG/250ML
1-100 INFUSION BOTTLE (ML) INTRAVENOUS CONTINUOUS
Status: DISCONTINUED | OUTPATIENT
Start: 2022-12-15 | End: 2022-12-16 | Stop reason: HOSPADM

## 2022-12-15 RX ORDER — SODIUM CHLORIDE 9 MG/ML
INJECTION, SOLUTION INTRAVENOUS PRN
Status: DISCONTINUED | OUTPATIENT
Start: 2022-12-15 | End: 2022-12-16 | Stop reason: HOSPADM

## 2022-12-15 RX ORDER — 0.9 % SODIUM CHLORIDE 0.9 %
1000 INTRAVENOUS SOLUTION INTRAVENOUS ONCE
Status: COMPLETED | OUTPATIENT
Start: 2022-12-15 | End: 2022-12-15

## 2022-12-15 RX ORDER — ONDANSETRON 2 MG/ML
4 INJECTION INTRAMUSCULAR; INTRAVENOUS ONCE
Status: COMPLETED | OUTPATIENT
Start: 2022-12-15 | End: 2022-12-15

## 2022-12-15 RX ORDER — LIDOCAINE HYDROCHLORIDE AND EPINEPHRINE 10; 10 MG/ML; UG/ML
10 INJECTION, SOLUTION INFILTRATION; PERINEURAL ONCE
Status: DISCONTINUED | OUTPATIENT
Start: 2022-12-15 | End: 2022-12-16 | Stop reason: HOSPADM

## 2022-12-15 RX ORDER — SODIUM CHLORIDE 9 MG/ML
INJECTION, SOLUTION INTRAVENOUS CONTINUOUS
Status: DISCONTINUED | OUTPATIENT
Start: 2022-12-15 | End: 2022-12-16 | Stop reason: HOSPADM

## 2022-12-15 RX ORDER — MORPHINE SULFATE 4 MG/ML
4 INJECTION, SOLUTION INTRAMUSCULAR; INTRAVENOUS ONCE
Status: COMPLETED | OUTPATIENT
Start: 2022-12-15 | End: 2022-12-15

## 2022-12-15 RX ORDER — FENTANYL CITRATE 50 UG/ML
50 INJECTION, SOLUTION INTRAMUSCULAR; INTRAVENOUS ONCE
Status: COMPLETED | OUTPATIENT
Start: 2022-12-15 | End: 2022-12-15

## 2022-12-15 RX ADMIN — Medication 5 MCG/MIN: at 18:38

## 2022-12-15 RX ADMIN — SODIUM CHLORIDE 1000 ML: 9 INJECTION, SOLUTION INTRAVENOUS at 17:15

## 2022-12-15 RX ADMIN — FENTANYL CITRATE 50 MCG: 0.05 INJECTION, SOLUTION INTRAMUSCULAR; INTRAVENOUS at 17:43

## 2022-12-15 RX ADMIN — ONDANSETRON 4 MG: 2 INJECTION INTRAMUSCULAR; INTRAVENOUS at 19:26

## 2022-12-15 RX ADMIN — TETANUS TOXOID, REDUCED DIPHTHERIA TOXOID AND ACELLULAR PERTUSSIS VACCINE, ADSORBED 0.5 ML: 5; 2.5; 8; 8; 2.5 SUSPENSION INTRAMUSCULAR at 18:41

## 2022-12-15 RX ADMIN — MORPHINE SULFATE 4 MG: 4 INJECTION, SOLUTION INTRAMUSCULAR; INTRAVENOUS at 19:24

## 2022-12-15 RX ADMIN — SODIUM CHLORIDE: 9 INJECTION, SOLUTION INTRAVENOUS at 18:28

## 2022-12-15 RX ADMIN — ONDANSETRON 4 MG: 2 INJECTION INTRAMUSCULAR; INTRAVENOUS at 18:08

## 2022-12-15 RX ADMIN — IOPAMIDOL 70 ML: 755 INJECTION, SOLUTION INTRAVENOUS at 17:45

## 2022-12-15 RX ADMIN — CEFAZOLIN SODIUM 2000 MG: 1 INJECTION, POWDER, FOR SOLUTION INTRAMUSCULAR; INTRAVENOUS at 18:28

## 2022-12-15 ASSESSMENT — PAIN DESCRIPTION - DESCRIPTORS: DESCRIPTORS: SHARP;THROBBING

## 2022-12-15 ASSESSMENT — PAIN SCALES - GENERAL
PAINLEVEL_OUTOF10: 10

## 2022-12-15 ASSESSMENT — PAIN - FUNCTIONAL ASSESSMENT: PAIN_FUNCTIONAL_ASSESSMENT: 0-10

## 2022-12-15 ASSESSMENT — PAIN DESCRIPTION - LOCATION: LOCATION: BACK;CHEST;KNEE

## 2022-12-16 NOTE — ED NOTES
Manuela Brian MD at bedside to place central line     Mendel Dyers, RN  12/15/22 [address], RN  12/15/22 1329

## 2022-12-16 NOTE — ED NOTES
Pt arrived to ed via EMS with backboard and C-Spine secured. Pt was taken off and with Dr. Kathi Edmonds at bedside.       Colten Zeng RN  12/15/22 Jeanine Parks, RN  12/15/22 [id number]
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
## 2022-12-18 LAB
EKG P AXIS: NORMAL DEGREES
EKG P-R INTERVAL: NORMAL MS
EKG Q-T INTERVAL: 290 MS
EKG QRS DURATION: 98 MS
EKG QTC CALCULATION (BAZETT): 433 MS
EKG T AXIS: 61 DEGREES

## 2022-12-18 PROCEDURE — 93010 ELECTROCARDIOGRAM REPORT: CPT | Performed by: INTERNAL MEDICINE

## 2022-12-22 NOTE — CONSENT
Informed Consent for Blood Component Transfusion Note    I have discussed with the patient the rationale for blood component transfusion; its benefits in treating or preventing fatigue, organ damage, or death; and its risk which includes mild transfusion reactions, rare risk of blood borne infection, or more serious but rare reactions. I have discussed the alternatives to transfusion, including the risk and consequences of not receiving transfusion. The patient had an opportunity to ask questions and had agreed to proceed with transfusion of blood components.     Electronically signed by Panchito Ramirez MD on 12/22/22 at 12:53 PM CST

## 2023-01-12 ENCOUNTER — HOSPITAL ENCOUNTER (OUTPATIENT)
Facility: HOSPITAL | Age: 58
Discharge: NURSING, REHAB OR LONG-TERM CARE | End: 2023-02-01
Attending: INTERNAL MEDICINE | Admitting: INTERNAL MEDICINE
Payer: COMMERCIAL

## 2023-01-12 LAB
GLUCOSE BLDC GLUCOMTR-MCNC: 160 MG/DL (ref 70–130)
GLUCOSE BLDC GLUCOMTR-MCNC: 220 MG/DL (ref 70–130)

## 2023-01-12 PROCEDURE — 82962 GLUCOSE BLOOD TEST: CPT

## 2023-01-12 PROCEDURE — 25010000002 ENOXAPARIN PER 10 MG: Performed by: INTERNAL MEDICINE

## 2023-01-12 PROCEDURE — 63710000001 INSULIN DETEMIR PER 5 UNITS: Performed by: INTERNAL MEDICINE

## 2023-01-12 PROCEDURE — 63710000001 INSULIN LISPRO (HUMAN) PER 5 UNITS: Performed by: INTERNAL MEDICINE

## 2023-01-12 RX ORDER — ENOXAPARIN SODIUM 100 MG/ML
60 INJECTION SUBCUTANEOUS EVERY 12 HOURS
Status: DISCONTINUED | OUTPATIENT
Start: 2023-01-12 | End: 2023-01-18

## 2023-01-12 RX ORDER — ALBUTEROL SULFATE 2.5 MG/3ML
2.5 SOLUTION RESPIRATORY (INHALATION) EVERY 4 HOURS PRN
Status: DISCONTINUED | OUTPATIENT
Start: 2023-01-12 | End: 2023-02-01 | Stop reason: HOSPADM

## 2023-01-12 RX ORDER — OXYCODONE HYDROCHLORIDE 5 MG/1
10 TABLET ORAL EVERY 4 HOURS PRN
Status: DISPENSED | OUTPATIENT
Start: 2023-01-12 | End: 2023-01-19

## 2023-01-12 RX ORDER — CELECOXIB 100 MG/1
100 CAPSULE ORAL EVERY 12 HOURS SCHEDULED
Status: DISCONTINUED | OUTPATIENT
Start: 2023-01-12 | End: 2023-02-01 | Stop reason: HOSPADM

## 2023-01-12 RX ORDER — PRAVASTATIN SODIUM 40 MG
40 TABLET ORAL NIGHTLY
Status: DISCONTINUED | OUTPATIENT
Start: 2023-01-12 | End: 2023-02-01 | Stop reason: HOSPADM

## 2023-01-12 RX ORDER — DIAPER,BRIEF,INFANT-TODD,DISP
1 EACH MISCELLANEOUS
Status: DISCONTINUED | OUTPATIENT
Start: 2023-01-12 | End: 2023-02-01 | Stop reason: HOSPADM

## 2023-01-12 RX ORDER — CETIRIZINE HYDROCHLORIDE 10 MG/1
10 TABLET ORAL DAILY
Status: DISCONTINUED | OUTPATIENT
Start: 2023-01-13 | End: 2023-02-01 | Stop reason: HOSPADM

## 2023-01-12 RX ORDER — TIMOLOL MALEATE 5 MG/ML
1 SOLUTION/ DROPS OPHTHALMIC EVERY 12 HOURS SCHEDULED
Status: DISCONTINUED | OUTPATIENT
Start: 2023-01-12 | End: 2023-02-01 | Stop reason: HOSPADM

## 2023-01-12 RX ORDER — ONDANSETRON 2 MG/ML
4 INJECTION INTRAMUSCULAR; INTRAVENOUS EVERY 6 HOURS PRN
Status: DISCONTINUED | OUTPATIENT
Start: 2023-01-12 | End: 2023-02-01 | Stop reason: HOSPADM

## 2023-01-12 RX ORDER — DIAPER,BRIEF,INFANT-TODD,DISP
1 EACH MISCELLANEOUS EVERY 12 HOURS SCHEDULED
Status: DISCONTINUED | OUTPATIENT
Start: 2023-01-12 | End: 2023-02-01 | Stop reason: HOSPADM

## 2023-01-12 RX ORDER — ASPIRIN 81 MG/1
81 TABLET ORAL DAILY
Status: DISCONTINUED | OUTPATIENT
Start: 2023-01-13 | End: 2023-02-01 | Stop reason: HOSPADM

## 2023-01-12 RX ORDER — TIZANIDINE 4 MG/1
4 TABLET ORAL EVERY 8 HOURS SCHEDULED
Status: DISCONTINUED | OUTPATIENT
Start: 2023-01-12 | End: 2023-02-01 | Stop reason: HOSPADM

## 2023-01-12 RX ORDER — POLYETHYLENE GLYCOL 3350 17 G/17G
17 POWDER, FOR SOLUTION ORAL 2 TIMES DAILY
Status: DISCONTINUED | OUTPATIENT
Start: 2023-01-12 | End: 2023-02-01 | Stop reason: HOSPADM

## 2023-01-12 RX ORDER — SODIUM CHLORIDE 0.9 % (FLUSH) 0.9 %
10 SYRINGE (ML) INJECTION EVERY 12 HOURS
Status: DISCONTINUED | OUTPATIENT
Start: 2023-01-12 | End: 2023-02-01 | Stop reason: HOSPADM

## 2023-01-12 RX ORDER — LATANOPROST 50 UG/ML
1 SOLUTION/ DROPS OPHTHALMIC NIGHTLY
Status: DISCONTINUED | OUTPATIENT
Start: 2023-01-12 | End: 2023-02-01 | Stop reason: HOSPADM

## 2023-01-12 RX ORDER — INSULIN LISPRO 100 [IU]/ML
10 INJECTION, SOLUTION INTRAVENOUS; SUBCUTANEOUS
Status: DISCONTINUED | OUTPATIENT
Start: 2023-01-12 | End: 2023-02-01 | Stop reason: HOSPADM

## 2023-01-12 RX ORDER — INSULIN LISPRO 100 [IU]/ML
0-24 INJECTION, SOLUTION INTRAVENOUS; SUBCUTANEOUS
Status: DISCONTINUED | OUTPATIENT
Start: 2023-01-12 | End: 2023-02-01 | Stop reason: HOSPADM

## 2023-01-12 RX ORDER — MEMANTINE HYDROCHLORIDE 5 MG/1
10 TABLET ORAL EVERY 12 HOURS SCHEDULED
Status: DISCONTINUED | OUTPATIENT
Start: 2023-01-12 | End: 2023-02-01 | Stop reason: HOSPADM

## 2023-01-12 RX ORDER — ONDANSETRON 4 MG/1
4 TABLET, FILM COATED ORAL EVERY 6 HOURS PRN
Status: DISCONTINUED | OUTPATIENT
Start: 2023-01-12 | End: 2023-02-01 | Stop reason: HOSPADM

## 2023-01-12 RX ORDER — OLANZAPINE 5 MG/1
5 TABLET, ORALLY DISINTEGRATING ORAL 3 TIMES DAILY
Status: DISCONTINUED | OUTPATIENT
Start: 2023-01-12 | End: 2023-02-01 | Stop reason: HOSPADM

## 2023-01-12 RX ORDER — ACETAMINOPHEN 325 MG/1
650 TABLET ORAL EVERY 4 HOURS PRN
Status: DISCONTINUED | OUTPATIENT
Start: 2023-01-12 | End: 2023-02-01 | Stop reason: HOSPADM

## 2023-01-12 RX ORDER — BISACODYL 5 MG/1
5 TABLET, DELAYED RELEASE ORAL DAILY
Status: DISCONTINUED | OUTPATIENT
Start: 2023-01-13 | End: 2023-01-20

## 2023-01-12 RX ORDER — GABAPENTIN 400 MG/1
1200 CAPSULE ORAL EVERY 8 HOURS SCHEDULED
Status: DISCONTINUED | OUTPATIENT
Start: 2023-01-12 | End: 2023-02-01 | Stop reason: HOSPADM

## 2023-01-12 RX ORDER — AMOXICILLIN 250 MG
2 CAPSULE ORAL 2 TIMES DAILY
Status: DISCONTINUED | OUTPATIENT
Start: 2023-01-12 | End: 2023-01-20

## 2023-01-12 RX ORDER — NICOTINE POLACRILEX 4 MG
15 LOZENGE BUCCAL
Status: DISCONTINUED | OUTPATIENT
Start: 2023-01-12 | End: 2023-02-01 | Stop reason: HOSPADM

## 2023-01-12 RX ORDER — OXYCODONE HYDROCHLORIDE 5 MG/1
15 TABLET ORAL EVERY 4 HOURS PRN
Status: DISPENSED | OUTPATIENT
Start: 2023-01-12 | End: 2023-01-19

## 2023-01-12 RX ORDER — PANTOPRAZOLE SODIUM 40 MG/1
40 TABLET, DELAYED RELEASE ORAL
Status: DISCONTINUED | OUTPATIENT
Start: 2023-01-13 | End: 2023-02-01 | Stop reason: HOSPADM

## 2023-01-12 RX ORDER — ACETAMINOPHEN 650 MG/1
650 SUPPOSITORY RECTAL EVERY 4 HOURS PRN
Status: DISCONTINUED | OUTPATIENT
Start: 2023-01-12 | End: 2023-02-01 | Stop reason: HOSPADM

## 2023-01-12 RX ORDER — ACETAMINOPHEN 500 MG
1000 TABLET ORAL EVERY 8 HOURS
Status: DISCONTINUED | OUTPATIENT
Start: 2023-01-12 | End: 2023-02-01 | Stop reason: HOSPADM

## 2023-01-12 RX ORDER — ALPRAZOLAM 0.5 MG/1
0.5 TABLET ORAL 2 TIMES DAILY
Status: DISPENSED | OUTPATIENT
Start: 2023-01-12 | End: 2023-01-19

## 2023-01-12 RX ORDER — FUROSEMIDE 40 MG/1
40 TABLET ORAL DAILY
Status: DISCONTINUED | OUTPATIENT
Start: 2023-01-13 | End: 2023-01-14

## 2023-01-12 RX ORDER — DEXTROSE MONOHYDRATE 25 G/50ML
25 INJECTION, SOLUTION INTRAVENOUS
Status: DISCONTINUED | OUTPATIENT
Start: 2023-01-12 | End: 2023-02-01 | Stop reason: HOSPADM

## 2023-01-12 RX ORDER — LEVOTHYROXINE SODIUM 0.1 MG/1
100 TABLET ORAL
Status: DISCONTINUED | OUTPATIENT
Start: 2023-01-13 | End: 2023-02-01 | Stop reason: HOSPADM

## 2023-01-13 LAB
ALBUMIN SERPL-MCNC: 3.2 G/DL (ref 3.5–5.2)
ALBUMIN/GLOB SERPL: 1 G/DL
ALP SERPL-CCNC: 234 U/L (ref 39–117)
ALT SERPL W P-5'-P-CCNC: 10 U/L (ref 1–33)
ANION GAP SERPL CALCULATED.3IONS-SCNC: 10 MMOL/L (ref 5–15)
ANISOCYTOSIS BLD QL: NORMAL
AST SERPL-CCNC: 14 U/L (ref 1–32)
BASOPHILS # BLD AUTO: 0.05 10*3/MM3 (ref 0–0.2)
BASOPHILS NFR BLD AUTO: 0.8 % (ref 0–1.5)
BILIRUB SERPL-MCNC: 0.2 MG/DL (ref 0–1.2)
BUN SERPL-MCNC: 16 MG/DL (ref 6–20)
BUN/CREAT SERPL: 25.8 (ref 7–25)
CALCIUM SPEC-SCNC: 9.2 MG/DL (ref 8.6–10.5)
CHLORIDE SERPL-SCNC: 103 MMOL/L (ref 98–107)
CO2 SERPL-SCNC: 27 MMOL/L (ref 22–29)
CREAT SERPL-MCNC: 0.62 MG/DL (ref 0.57–1)
DEPRECATED RDW RBC AUTO: 65.7 FL (ref 37–54)
EGFRCR SERPLBLD CKD-EPI 2021: 105.3 ML/MIN/1.73
EOSINOPHIL # BLD AUTO: 0.48 10*3/MM3 (ref 0–0.4)
EOSINOPHIL NFR BLD AUTO: 7.6 % (ref 0.3–6.2)
ERYTHROCYTE [DISTWIDTH] IN BLOOD BY AUTOMATED COUNT: 16.6 % (ref 12.3–15.4)
GLOBULIN UR ELPH-MCNC: 3.3 GM/DL
GLUCOSE BLDC GLUCOMTR-MCNC: 158 MG/DL (ref 70–130)
GLUCOSE BLDC GLUCOMTR-MCNC: 161 MG/DL (ref 70–130)
GLUCOSE BLDC GLUCOMTR-MCNC: 184 MG/DL (ref 70–130)
GLUCOSE BLDC GLUCOMTR-MCNC: 192 MG/DL (ref 70–130)
GLUCOSE SERPL-MCNC: 145 MG/DL (ref 65–99)
HCT VFR BLD AUTO: 37.2 % (ref 34–46.6)
HGB BLD-MCNC: 10.8 G/DL (ref 12–15.9)
IMM GRANULOCYTES # BLD AUTO: 0.04 10*3/MM3 (ref 0–0.05)
IMM GRANULOCYTES NFR BLD AUTO: 0.6 % (ref 0–0.5)
LYMPHOCYTES # BLD AUTO: 2.36 10*3/MM3 (ref 0.7–3.1)
LYMPHOCYTES NFR BLD AUTO: 37.4 % (ref 19.6–45.3)
MCH RBC QN AUTO: 31 PG (ref 26.6–33)
MCHC RBC AUTO-ENTMCNC: 29 G/DL (ref 31.5–35.7)
MCV RBC AUTO: 106.9 FL (ref 79–97)
MONOCYTES # BLD AUTO: 0.83 10*3/MM3 (ref 0.1–0.9)
MONOCYTES NFR BLD AUTO: 13.2 % (ref 5–12)
NEUTROPHILS NFR BLD AUTO: 2.55 10*3/MM3 (ref 1.7–7)
NEUTROPHILS NFR BLD AUTO: 40.4 % (ref 42.7–76)
NRBC BLD AUTO-RTO: 0 /100 WBC (ref 0–0.2)
PLATELET # BLD AUTO: 330 10*3/MM3 (ref 140–450)
PMV BLD AUTO: 9.4 FL (ref 6–12)
POIKILOCYTOSIS BLD QL SMEAR: NORMAL
POTASSIUM SERPL-SCNC: 4.2 MMOL/L (ref 3.5–5.2)
PREALB SERPL-MCNC: 17.9 MG/DL (ref 20–40)
PROT SERPL-MCNC: 6.5 G/DL (ref 6–8.5)
RBC # BLD AUTO: 3.48 10*6/MM3 (ref 3.77–5.28)
SMALL PLATELETS BLD QL SMEAR: ADEQUATE
SODIUM SERPL-SCNC: 140 MMOL/L (ref 136–145)
WBC MORPH BLD: NORMAL
WBC NRBC COR # BLD: 6.31 10*3/MM3 (ref 3.4–10.8)

## 2023-01-13 PROCEDURE — 97167 OT EVAL HIGH COMPLEX 60 MIN: CPT

## 2023-01-13 PROCEDURE — 63710000001 INSULIN LISPRO (HUMAN) PER 5 UNITS: Performed by: INTERNAL MEDICINE

## 2023-01-13 PROCEDURE — 84134 ASSAY OF PREALBUMIN: CPT | Performed by: INTERNAL MEDICINE

## 2023-01-13 PROCEDURE — 97530 THERAPEUTIC ACTIVITIES: CPT | Performed by: PHYSICAL THERAPIST

## 2023-01-13 PROCEDURE — 97163 PT EVAL HIGH COMPLEX 45 MIN: CPT | Performed by: PHYSICAL THERAPIST

## 2023-01-13 PROCEDURE — 82962 GLUCOSE BLOOD TEST: CPT

## 2023-01-13 PROCEDURE — 97530 THERAPEUTIC ACTIVITIES: CPT

## 2023-01-13 PROCEDURE — 80053 COMPREHEN METABOLIC PANEL: CPT | Performed by: INTERNAL MEDICINE

## 2023-01-13 PROCEDURE — 85025 COMPLETE CBC W/AUTO DIFF WBC: CPT | Performed by: INTERNAL MEDICINE

## 2023-01-13 PROCEDURE — 63710000001 INSULIN DETEMIR PER 5 UNITS: Performed by: INTERNAL MEDICINE

## 2023-01-13 PROCEDURE — 85007 BL SMEAR W/DIFF WBC COUNT: CPT | Performed by: INTERNAL MEDICINE

## 2023-01-13 PROCEDURE — 25010000002 ENOXAPARIN PER 10 MG: Performed by: INTERNAL MEDICINE

## 2023-01-14 LAB
GLUCOSE BLDC GLUCOMTR-MCNC: 120 MG/DL (ref 70–130)
GLUCOSE BLDC GLUCOMTR-MCNC: 141 MG/DL (ref 70–130)
GLUCOSE BLDC GLUCOMTR-MCNC: 191 MG/DL (ref 70–130)
GLUCOSE BLDC GLUCOMTR-MCNC: 234 MG/DL (ref 70–130)

## 2023-01-14 PROCEDURE — 25010000002 ENOXAPARIN PER 10 MG: Performed by: INTERNAL MEDICINE

## 2023-01-14 PROCEDURE — 82962 GLUCOSE BLOOD TEST: CPT

## 2023-01-14 PROCEDURE — 63710000001 INSULIN DETEMIR PER 5 UNITS: Performed by: INTERNAL MEDICINE

## 2023-01-14 PROCEDURE — 97110 THERAPEUTIC EXERCISES: CPT

## 2023-01-14 PROCEDURE — 63710000001 INSULIN LISPRO (HUMAN) PER 5 UNITS: Performed by: INTERNAL MEDICINE

## 2023-01-14 RX ORDER — FUROSEMIDE 40 MG/1
40 TABLET ORAL
Status: DISCONTINUED | OUTPATIENT
Start: 2023-01-14 | End: 2023-02-01 | Stop reason: HOSPADM

## 2023-01-15 LAB
GLUCOSE BLDC GLUCOMTR-MCNC: 154 MG/DL (ref 70–130)
GLUCOSE BLDC GLUCOMTR-MCNC: 168 MG/DL (ref 70–130)
GLUCOSE BLDC GLUCOMTR-MCNC: 176 MG/DL (ref 70–130)
GLUCOSE BLDC GLUCOMTR-MCNC: 202 MG/DL (ref 70–130)

## 2023-01-15 PROCEDURE — 63710000001 INSULIN DETEMIR PER 5 UNITS: Performed by: INTERNAL MEDICINE

## 2023-01-15 PROCEDURE — 63710000001 INSULIN LISPRO (HUMAN) PER 5 UNITS: Performed by: INTERNAL MEDICINE

## 2023-01-15 PROCEDURE — 25010000002 ENOXAPARIN PER 10 MG: Performed by: INTERNAL MEDICINE

## 2023-01-15 PROCEDURE — 63710000001 ONDANSETRON PER 8 MG: Performed by: INTERNAL MEDICINE

## 2023-01-15 PROCEDURE — 97110 THERAPEUTIC EXERCISES: CPT

## 2023-01-15 PROCEDURE — 82962 GLUCOSE BLOOD TEST: CPT

## 2023-01-16 LAB
ANION GAP SERPL CALCULATED.3IONS-SCNC: 11 MMOL/L (ref 5–15)
ANION GAP SERPL CALCULATED.3IONS-SCNC: 9 MMOL/L (ref 5–15)
BASOPHILS # BLD AUTO: 0.04 10*3/MM3 (ref 0–0.2)
BASOPHILS NFR BLD AUTO: 0.8 % (ref 0–1.5)
BUN SERPL-MCNC: 18 MG/DL (ref 6–20)
BUN SERPL-MCNC: 19 MG/DL (ref 6–20)
BUN/CREAT SERPL: 25.7 (ref 7–25)
BUN/CREAT SERPL: 25.7 (ref 7–25)
CALCIUM SPEC-SCNC: 8.9 MG/DL (ref 8.6–10.5)
CALCIUM SPEC-SCNC: 9.3 MG/DL (ref 8.6–10.5)
CHLORIDE SERPL-SCNC: 102 MMOL/L (ref 98–107)
CHLORIDE SERPL-SCNC: 103 MMOL/L (ref 98–107)
CO2 SERPL-SCNC: 26 MMOL/L (ref 22–29)
CO2 SERPL-SCNC: 29 MMOL/L (ref 22–29)
CREAT SERPL-MCNC: 0.7 MG/DL (ref 0.57–1)
CREAT SERPL-MCNC: 0.74 MG/DL (ref 0.57–1)
DEPRECATED RDW RBC AUTO: 63.1 FL (ref 37–54)
EGFRCR SERPLBLD CKD-EPI 2021: 102.3 ML/MIN/1.73
EGFRCR SERPLBLD CKD-EPI 2021: 95.7 ML/MIN/1.73
EOSINOPHIL # BLD AUTO: 0.33 10*3/MM3 (ref 0–0.4)
EOSINOPHIL NFR BLD AUTO: 6.2 % (ref 0.3–6.2)
ERYTHROCYTE [DISTWIDTH] IN BLOOD BY AUTOMATED COUNT: 15.7 % (ref 12.3–15.4)
GLUCOSE BLDC GLUCOMTR-MCNC: 134 MG/DL (ref 70–130)
GLUCOSE BLDC GLUCOMTR-MCNC: 153 MG/DL (ref 70–130)
GLUCOSE BLDC GLUCOMTR-MCNC: 160 MG/DL (ref 70–130)
GLUCOSE BLDC GLUCOMTR-MCNC: 181 MG/DL (ref 70–130)
GLUCOSE SERPL-MCNC: 155 MG/DL (ref 65–99)
GLUCOSE SERPL-MCNC: 268 MG/DL (ref 65–99)
HCT VFR BLD AUTO: 39.1 % (ref 34–46.6)
HGB BLD-MCNC: 11.3 G/DL (ref 12–15.9)
IMM GRANULOCYTES # BLD AUTO: 0.02 10*3/MM3 (ref 0–0.05)
IMM GRANULOCYTES NFR BLD AUTO: 0.4 % (ref 0–0.5)
LYMPHOCYTES # BLD AUTO: 2.17 10*3/MM3 (ref 0.7–3.1)
LYMPHOCYTES NFR BLD AUTO: 41 % (ref 19.6–45.3)
MCH RBC QN AUTO: 31.2 PG (ref 26.6–33)
MCHC RBC AUTO-ENTMCNC: 28.9 G/DL (ref 31.5–35.7)
MCV RBC AUTO: 108 FL (ref 79–97)
MONOCYTES # BLD AUTO: 0.61 10*3/MM3 (ref 0.1–0.9)
MONOCYTES NFR BLD AUTO: 11.5 % (ref 5–12)
NEUTROPHILS NFR BLD AUTO: 2.12 10*3/MM3 (ref 1.7–7)
NEUTROPHILS NFR BLD AUTO: 40.1 % (ref 42.7–76)
NRBC BLD AUTO-RTO: 0 /100 WBC (ref 0–0.2)
PLATELET # BLD AUTO: 355 10*3/MM3 (ref 140–450)
PMV BLD AUTO: 9.5 FL (ref 6–12)
POTASSIUM SERPL-SCNC: 4 MMOL/L (ref 3.5–5.2)
POTASSIUM SERPL-SCNC: 4.2 MMOL/L (ref 3.5–5.2)
RBC # BLD AUTO: 3.62 10*6/MM3 (ref 3.77–5.28)
SODIUM SERPL-SCNC: 140 MMOL/L (ref 136–145)
SODIUM SERPL-SCNC: 140 MMOL/L (ref 136–145)
WBC NRBC COR # BLD: 5.29 10*3/MM3 (ref 3.4–10.8)

## 2023-01-16 PROCEDURE — 97530 THERAPEUTIC ACTIVITIES: CPT

## 2023-01-16 PROCEDURE — 63710000001 ONDANSETRON PER 8 MG: Performed by: INTERNAL MEDICINE

## 2023-01-16 PROCEDURE — 97535 SELF CARE MNGMENT TRAINING: CPT

## 2023-01-16 PROCEDURE — 82962 GLUCOSE BLOOD TEST: CPT

## 2023-01-16 PROCEDURE — 97110 THERAPEUTIC EXERCISES: CPT

## 2023-01-16 PROCEDURE — 80048 BASIC METABOLIC PNL TOTAL CA: CPT | Performed by: INTERNAL MEDICINE

## 2023-01-16 PROCEDURE — 25010000002 ENOXAPARIN PER 10 MG: Performed by: INTERNAL MEDICINE

## 2023-01-16 PROCEDURE — 63710000001 INSULIN DETEMIR PER 5 UNITS: Performed by: INTERNAL MEDICINE

## 2023-01-16 PROCEDURE — 63710000001 INSULIN LISPRO (HUMAN) PER 5 UNITS: Performed by: INTERNAL MEDICINE

## 2023-01-16 PROCEDURE — 85025 COMPLETE CBC W/AUTO DIFF WBC: CPT | Performed by: INTERNAL MEDICINE

## 2023-01-17 LAB
GLUCOSE BLDC GLUCOMTR-MCNC: 122 MG/DL (ref 70–130)
GLUCOSE BLDC GLUCOMTR-MCNC: 132 MG/DL (ref 70–130)
GLUCOSE BLDC GLUCOMTR-MCNC: 151 MG/DL (ref 70–130)
GLUCOSE BLDC GLUCOMTR-MCNC: 153 MG/DL (ref 70–130)

## 2023-01-17 PROCEDURE — 97530 THERAPEUTIC ACTIVITIES: CPT

## 2023-01-17 PROCEDURE — 97110 THERAPEUTIC EXERCISES: CPT

## 2023-01-17 PROCEDURE — 63710000001 INSULIN DETEMIR PER 5 UNITS: Performed by: INTERNAL MEDICINE

## 2023-01-17 PROCEDURE — 25010000002 ENOXAPARIN PER 10 MG: Performed by: INTERNAL MEDICINE

## 2023-01-17 PROCEDURE — 63710000001 INSULIN LISPRO (HUMAN) PER 5 UNITS: Performed by: INTERNAL MEDICINE

## 2023-01-17 PROCEDURE — 82962 GLUCOSE BLOOD TEST: CPT

## 2023-01-17 PROCEDURE — 97535 SELF CARE MNGMENT TRAINING: CPT

## 2023-01-18 LAB
GLUCOSE BLDC GLUCOMTR-MCNC: 136 MG/DL (ref 70–130)
GLUCOSE BLDC GLUCOMTR-MCNC: 136 MG/DL (ref 70–130)
GLUCOSE BLDC GLUCOMTR-MCNC: 149 MG/DL (ref 70–130)
GLUCOSE BLDC GLUCOMTR-MCNC: 176 MG/DL (ref 70–130)

## 2023-01-18 PROCEDURE — 97530 THERAPEUTIC ACTIVITIES: CPT

## 2023-01-18 PROCEDURE — 63710000001 INSULIN LISPRO (HUMAN) PER 5 UNITS: Performed by: INTERNAL MEDICINE

## 2023-01-18 PROCEDURE — 82962 GLUCOSE BLOOD TEST: CPT

## 2023-01-18 PROCEDURE — 97535 SELF CARE MNGMENT TRAINING: CPT

## 2023-01-18 PROCEDURE — 25010000002 ENOXAPARIN PER 10 MG: Performed by: INTERNAL MEDICINE

## 2023-01-18 PROCEDURE — 63710000001 INSULIN DETEMIR PER 5 UNITS: Performed by: INTERNAL MEDICINE

## 2023-01-18 PROCEDURE — 97110 THERAPEUTIC EXERCISES: CPT

## 2023-01-18 RX ORDER — LANOLIN ALCOHOL/MO/W.PET/CERES
3 CREAM (GRAM) TOPICAL NIGHTLY
Status: DISCONTINUED | OUTPATIENT
Start: 2023-01-18 | End: 2023-02-01 | Stop reason: HOSPADM

## 2023-01-19 LAB
ANION GAP SERPL CALCULATED.3IONS-SCNC: 9 MMOL/L (ref 5–15)
BASOPHILS # BLD AUTO: 0.06 10*3/MM3 (ref 0–0.2)
BASOPHILS NFR BLD AUTO: 1 % (ref 0–1.5)
BUN SERPL-MCNC: 19 MG/DL (ref 6–20)
BUN/CREAT SERPL: 27.1 (ref 7–25)
CALCIUM SPEC-SCNC: 9.5 MG/DL (ref 8.6–10.5)
CHLORIDE SERPL-SCNC: 101 MMOL/L (ref 98–107)
CO2 SERPL-SCNC: 31 MMOL/L (ref 22–29)
CREAT SERPL-MCNC: 0.7 MG/DL (ref 0.57–1)
DEPRECATED RDW RBC AUTO: 57 FL (ref 37–54)
EGFRCR SERPLBLD CKD-EPI 2021: 102.3 ML/MIN/1.73
EOSINOPHIL # BLD AUTO: 0.46 10*3/MM3 (ref 0–0.4)
EOSINOPHIL NFR BLD AUTO: 7.3 % (ref 0.3–6.2)
ERYTHROCYTE [DISTWIDTH] IN BLOOD BY AUTOMATED COUNT: 14.8 % (ref 12.3–15.4)
GLUCOSE BLDC GLUCOMTR-MCNC: 154 MG/DL (ref 70–130)
GLUCOSE BLDC GLUCOMTR-MCNC: 165 MG/DL (ref 70–130)
GLUCOSE BLDC GLUCOMTR-MCNC: 173 MG/DL (ref 70–130)
GLUCOSE BLDC GLUCOMTR-MCNC: 230 MG/DL (ref 70–130)
GLUCOSE SERPL-MCNC: 160 MG/DL (ref 65–99)
HCT VFR BLD AUTO: 40.1 % (ref 34–46.6)
HGB BLD-MCNC: 12.2 G/DL (ref 12–15.9)
IMM GRANULOCYTES # BLD AUTO: 0.03 10*3/MM3 (ref 0–0.05)
IMM GRANULOCYTES NFR BLD AUTO: 0.5 % (ref 0–0.5)
LYMPHOCYTES # BLD AUTO: 2.27 10*3/MM3 (ref 0.7–3.1)
LYMPHOCYTES NFR BLD AUTO: 36.2 % (ref 19.6–45.3)
MCH RBC QN AUTO: 31.4 PG (ref 26.6–33)
MCHC RBC AUTO-ENTMCNC: 30.4 G/DL (ref 31.5–35.7)
MCV RBC AUTO: 103.4 FL (ref 79–97)
MONOCYTES # BLD AUTO: 0.72 10*3/MM3 (ref 0.1–0.9)
MONOCYTES NFR BLD AUTO: 11.5 % (ref 5–12)
NEUTROPHILS NFR BLD AUTO: 2.73 10*3/MM3 (ref 1.7–7)
NEUTROPHILS NFR BLD AUTO: 43.5 % (ref 42.7–76)
NRBC BLD AUTO-RTO: 0 /100 WBC (ref 0–0.2)
PLATELET # BLD AUTO: 350 10*3/MM3 (ref 140–450)
PMV BLD AUTO: 9.8 FL (ref 6–12)
POTASSIUM SERPL-SCNC: 4.1 MMOL/L (ref 3.5–5.2)
RBC # BLD AUTO: 3.88 10*6/MM3 (ref 3.77–5.28)
SODIUM SERPL-SCNC: 141 MMOL/L (ref 136–145)
WBC NRBC COR # BLD: 6.27 10*3/MM3 (ref 3.4–10.8)

## 2023-01-19 PROCEDURE — 97110 THERAPEUTIC EXERCISES: CPT

## 2023-01-19 PROCEDURE — 85025 COMPLETE CBC W/AUTO DIFF WBC: CPT | Performed by: INTERNAL MEDICINE

## 2023-01-19 PROCEDURE — 82962 GLUCOSE BLOOD TEST: CPT

## 2023-01-19 PROCEDURE — 63710000001 INSULIN LISPRO (HUMAN) PER 5 UNITS: Performed by: INTERNAL MEDICINE

## 2023-01-19 PROCEDURE — 63710000001 INSULIN DETEMIR PER 5 UNITS: Performed by: INTERNAL MEDICINE

## 2023-01-19 PROCEDURE — 63710000001 ONDANSETRON PER 8 MG: Performed by: INTERNAL MEDICINE

## 2023-01-19 PROCEDURE — 97530 THERAPEUTIC ACTIVITIES: CPT

## 2023-01-19 PROCEDURE — 80048 BASIC METABOLIC PNL TOTAL CA: CPT | Performed by: INTERNAL MEDICINE

## 2023-01-19 RX ORDER — ALBUTEROL SULFATE 2.5 MG/3ML
2.5 SOLUTION RESPIRATORY (INHALATION)
Status: DISCONTINUED | OUTPATIENT
Start: 2023-01-19 | End: 2023-02-01 | Stop reason: HOSPADM

## 2023-01-19 RX ORDER — OXYCODONE HYDROCHLORIDE 5 MG/1
10 TABLET ORAL EVERY 4 HOURS PRN
Status: DISCONTINUED | OUTPATIENT
Start: 2023-01-19 | End: 2023-01-26

## 2023-01-19 RX ORDER — OXYCODONE HYDROCHLORIDE 5 MG/1
15 TABLET ORAL EVERY 4 HOURS PRN
Status: DISCONTINUED | OUTPATIENT
Start: 2023-01-19 | End: 2023-01-26

## 2023-01-19 RX ORDER — ALPRAZOLAM 0.5 MG/1
0.5 TABLET ORAL 2 TIMES DAILY
Status: DISCONTINUED | OUTPATIENT
Start: 2023-01-19 | End: 2023-01-26

## 2023-01-20 LAB
GLUCOSE BLDC GLUCOMTR-MCNC: 137 MG/DL (ref 70–130)
GLUCOSE BLDC GLUCOMTR-MCNC: 169 MG/DL (ref 70–130)
GLUCOSE BLDC GLUCOMTR-MCNC: 173 MG/DL (ref 70–130)
GLUCOSE BLDC GLUCOMTR-MCNC: 202 MG/DL (ref 70–130)

## 2023-01-20 PROCEDURE — 82962 GLUCOSE BLOOD TEST: CPT

## 2023-01-20 PROCEDURE — 97110 THERAPEUTIC EXERCISES: CPT

## 2023-01-20 PROCEDURE — 97530 THERAPEUTIC ACTIVITIES: CPT

## 2023-01-20 PROCEDURE — 63710000001 INSULIN LISPRO (HUMAN) PER 5 UNITS: Performed by: INTERNAL MEDICINE

## 2023-01-20 PROCEDURE — 63710000001 INSULIN DETEMIR PER 5 UNITS: Performed by: INTERNAL MEDICINE

## 2023-01-20 PROCEDURE — 25010000002 METHYLNALTREXONE 12 MG/0.6ML SOLUTION: Performed by: INTERNAL MEDICINE

## 2023-01-20 RX ORDER — BISACODYL 5 MG/1
5 TABLET, DELAYED RELEASE ORAL DAILY PRN
Status: DISCONTINUED | OUTPATIENT
Start: 2023-01-20 | End: 2023-02-01 | Stop reason: HOSPADM

## 2023-01-20 RX ORDER — AMOXICILLIN 250 MG
1 CAPSULE ORAL 2 TIMES DAILY
Status: DISCONTINUED | OUTPATIENT
Start: 2023-01-20 | End: 2023-02-01 | Stop reason: HOSPADM

## 2023-01-21 LAB
GLUCOSE BLDC GLUCOMTR-MCNC: 149 MG/DL (ref 70–130)
GLUCOSE BLDC GLUCOMTR-MCNC: 183 MG/DL (ref 70–130)
GLUCOSE BLDC GLUCOMTR-MCNC: 202 MG/DL (ref 70–130)
GLUCOSE BLDC GLUCOMTR-MCNC: 212 MG/DL (ref 70–130)

## 2023-01-21 PROCEDURE — 97530 THERAPEUTIC ACTIVITIES: CPT

## 2023-01-21 PROCEDURE — 97110 THERAPEUTIC EXERCISES: CPT

## 2023-01-21 PROCEDURE — 82962 GLUCOSE BLOOD TEST: CPT

## 2023-01-21 PROCEDURE — 63710000001 INSULIN DETEMIR PER 5 UNITS: Performed by: INTERNAL MEDICINE

## 2023-01-21 PROCEDURE — 63710000001 INSULIN LISPRO (HUMAN) PER 5 UNITS: Performed by: INTERNAL MEDICINE

## 2023-01-22 LAB
GLUCOSE BLDC GLUCOMTR-MCNC: 162 MG/DL (ref 70–130)
GLUCOSE BLDC GLUCOMTR-MCNC: 168 MG/DL (ref 70–130)
GLUCOSE BLDC GLUCOMTR-MCNC: 179 MG/DL (ref 70–130)
GLUCOSE BLDC GLUCOMTR-MCNC: 194 MG/DL (ref 70–130)
GLUCOSE BLDC GLUCOMTR-MCNC: 200 MG/DL (ref 70–130)

## 2023-01-22 PROCEDURE — 25010000002 METHYLNALTREXONE 12 MG/0.6ML SOLUTION: Performed by: INTERNAL MEDICINE

## 2023-01-22 PROCEDURE — 63710000001 INSULIN DETEMIR PER 5 UNITS: Performed by: INTERNAL MEDICINE

## 2023-01-22 PROCEDURE — 97110 THERAPEUTIC EXERCISES: CPT

## 2023-01-22 PROCEDURE — 82962 GLUCOSE BLOOD TEST: CPT

## 2023-01-22 PROCEDURE — 97530 THERAPEUTIC ACTIVITIES: CPT

## 2023-01-22 PROCEDURE — 63710000001 INSULIN LISPRO (HUMAN) PER 5 UNITS: Performed by: INTERNAL MEDICINE

## 2023-01-23 LAB
ANION GAP SERPL CALCULATED.3IONS-SCNC: 10 MMOL/L (ref 5–15)
BASOPHILS # BLD AUTO: 0.08 10*3/MM3 (ref 0–0.2)
BASOPHILS NFR BLD AUTO: 1.4 % (ref 0–1.5)
BUN SERPL-MCNC: 26 MG/DL (ref 6–20)
BUN/CREAT SERPL: 32.1 (ref 7–25)
CALCIUM SPEC-SCNC: 9.3 MG/DL (ref 8.6–10.5)
CHLORIDE SERPL-SCNC: 101 MMOL/L (ref 98–107)
CO2 SERPL-SCNC: 29 MMOL/L (ref 22–29)
CREAT SERPL-MCNC: 0.81 MG/DL (ref 0.57–1)
DEPRECATED RDW RBC AUTO: 54.2 FL (ref 37–54)
EGFRCR SERPLBLD CKD-EPI 2021: 85.9 ML/MIN/1.73
EOSINOPHIL # BLD AUTO: 0.5 10*3/MM3 (ref 0–0.4)
EOSINOPHIL NFR BLD AUTO: 8.8 % (ref 0.3–6.2)
ERYTHROCYTE [DISTWIDTH] IN BLOOD BY AUTOMATED COUNT: 14.2 % (ref 12.3–15.4)
GLUCOSE BLDC GLUCOMTR-MCNC: 184 MG/DL (ref 70–130)
GLUCOSE BLDC GLUCOMTR-MCNC: 188 MG/DL (ref 70–130)
GLUCOSE BLDC GLUCOMTR-MCNC: 217 MG/DL (ref 70–130)
GLUCOSE BLDC GLUCOMTR-MCNC: 247 MG/DL (ref 70–130)
GLUCOSE SERPL-MCNC: 184 MG/DL (ref 65–99)
HCT VFR BLD AUTO: 39.6 % (ref 34–46.6)
HGB BLD-MCNC: 11.9 G/DL (ref 12–15.9)
IMM GRANULOCYTES # BLD AUTO: 0.03 10*3/MM3 (ref 0–0.05)
IMM GRANULOCYTES NFR BLD AUTO: 0.5 % (ref 0–0.5)
LYMPHOCYTES # BLD AUTO: 2.33 10*3/MM3 (ref 0.7–3.1)
LYMPHOCYTES NFR BLD AUTO: 40.8 % (ref 19.6–45.3)
MCH RBC QN AUTO: 31 PG (ref 26.6–33)
MCHC RBC AUTO-ENTMCNC: 30.1 G/DL (ref 31.5–35.7)
MCV RBC AUTO: 103.1 FL (ref 79–97)
MONOCYTES # BLD AUTO: 0.7 10*3/MM3 (ref 0.1–0.9)
MONOCYTES NFR BLD AUTO: 12.3 % (ref 5–12)
NEUTROPHILS NFR BLD AUTO: 2.07 10*3/MM3 (ref 1.7–7)
NEUTROPHILS NFR BLD AUTO: 36.2 % (ref 42.7–76)
NRBC BLD AUTO-RTO: 0 /100 WBC (ref 0–0.2)
PLATELET # BLD AUTO: 340 10*3/MM3 (ref 140–450)
PMV BLD AUTO: 10.1 FL (ref 6–12)
POTASSIUM SERPL-SCNC: 4.2 MMOL/L (ref 3.5–5.2)
RBC # BLD AUTO: 3.84 10*6/MM3 (ref 3.77–5.28)
SODIUM SERPL-SCNC: 140 MMOL/L (ref 136–145)
WBC NRBC COR # BLD: 5.71 10*3/MM3 (ref 3.4–10.8)

## 2023-01-23 PROCEDURE — 97530 THERAPEUTIC ACTIVITIES: CPT

## 2023-01-23 PROCEDURE — 97110 THERAPEUTIC EXERCISES: CPT

## 2023-01-23 PROCEDURE — 63710000001 INSULIN DETEMIR PER 5 UNITS: Performed by: INTERNAL MEDICINE

## 2023-01-23 PROCEDURE — 82962 GLUCOSE BLOOD TEST: CPT

## 2023-01-23 PROCEDURE — 80048 BASIC METABOLIC PNL TOTAL CA: CPT | Performed by: INTERNAL MEDICINE

## 2023-01-23 PROCEDURE — 63710000001 INSULIN LISPRO (HUMAN) PER 5 UNITS: Performed by: INTERNAL MEDICINE

## 2023-01-23 PROCEDURE — 85025 COMPLETE CBC W/AUTO DIFF WBC: CPT | Performed by: INTERNAL MEDICINE

## 2023-01-24 LAB
GLUCOSE BLDC GLUCOMTR-MCNC: 163 MG/DL (ref 70–130)
GLUCOSE BLDC GLUCOMTR-MCNC: 185 MG/DL (ref 70–130)
GLUCOSE BLDC GLUCOMTR-MCNC: 193 MG/DL (ref 70–130)
GLUCOSE BLDC GLUCOMTR-MCNC: 219 MG/DL (ref 70–130)

## 2023-01-24 PROCEDURE — 63710000001 INSULIN LISPRO (HUMAN) PER 5 UNITS: Performed by: INTERNAL MEDICINE

## 2023-01-24 PROCEDURE — 63710000001 INSULIN DETEMIR PER 5 UNITS: Performed by: INTERNAL MEDICINE

## 2023-01-24 PROCEDURE — 82962 GLUCOSE BLOOD TEST: CPT

## 2023-01-24 PROCEDURE — 97530 THERAPEUTIC ACTIVITIES: CPT

## 2023-01-24 PROCEDURE — 25010000002 METHYLNALTREXONE 12 MG/0.6ML SOLUTION: Performed by: INTERNAL MEDICINE

## 2023-01-24 PROCEDURE — 97110 THERAPEUTIC EXERCISES: CPT

## 2023-01-25 VITALS — WEIGHT: 293 LBS | BODY MASS INDEX: 47.09 KG/M2 | HEIGHT: 66 IN

## 2023-01-25 LAB
GLUCOSE BLDC GLUCOMTR-MCNC: 126 MG/DL (ref 70–130)
GLUCOSE BLDC GLUCOMTR-MCNC: 154 MG/DL (ref 70–130)
GLUCOSE BLDC GLUCOMTR-MCNC: 186 MG/DL (ref 70–130)
GLUCOSE BLDC GLUCOMTR-MCNC: 201 MG/DL (ref 70–130)

## 2023-01-25 PROCEDURE — 97110 THERAPEUTIC EXERCISES: CPT

## 2023-01-25 PROCEDURE — 63710000001 ONDANSETRON PER 8 MG: Performed by: INTERNAL MEDICINE

## 2023-01-25 PROCEDURE — 97530 THERAPEUTIC ACTIVITIES: CPT

## 2023-01-25 PROCEDURE — 63710000001 INSULIN LISPRO (HUMAN) PER 5 UNITS: Performed by: INTERNAL MEDICINE

## 2023-01-25 PROCEDURE — 82962 GLUCOSE BLOOD TEST: CPT

## 2023-01-25 PROCEDURE — 63710000001 INSULIN DETEMIR PER 5 UNITS: Performed by: INTERNAL MEDICINE

## 2023-01-26 LAB
ANION GAP SERPL CALCULATED.3IONS-SCNC: 12 MMOL/L (ref 5–15)
BASOPHILS # BLD AUTO: 0.04 10*3/MM3 (ref 0–0.2)
BASOPHILS NFR BLD AUTO: 0.8 % (ref 0–1.5)
BUN SERPL-MCNC: 23 MG/DL (ref 6–20)
BUN/CREAT SERPL: 29.1 (ref 7–25)
CALCIUM SPEC-SCNC: 9.1 MG/DL (ref 8.6–10.5)
CHLORIDE SERPL-SCNC: 98 MMOL/L (ref 98–107)
CO2 SERPL-SCNC: 29 MMOL/L (ref 22–29)
CREAT SERPL-MCNC: 0.79 MG/DL (ref 0.57–1)
CRP SERPL-MCNC: 1.74 MG/DL (ref 0–0.5)
DEPRECATED RDW RBC AUTO: 53.1 FL (ref 37–54)
EGFRCR SERPLBLD CKD-EPI 2021: 88.5 ML/MIN/1.73
EOSINOPHIL # BLD AUTO: 0.34 10*3/MM3 (ref 0–0.4)
EOSINOPHIL NFR BLD AUTO: 7 % (ref 0.3–6.2)
ERYTHROCYTE [DISTWIDTH] IN BLOOD BY AUTOMATED COUNT: 13.9 % (ref 12.3–15.4)
ERYTHROCYTE [SEDIMENTATION RATE] IN BLOOD: 72 MM/HR (ref 0–30)
GLUCOSE BLDC GLUCOMTR-MCNC: 142 MG/DL (ref 70–130)
GLUCOSE BLDC GLUCOMTR-MCNC: 151 MG/DL (ref 70–130)
GLUCOSE BLDC GLUCOMTR-MCNC: 168 MG/DL (ref 70–130)
GLUCOSE BLDC GLUCOMTR-MCNC: 212 MG/DL (ref 70–130)
GLUCOSE SERPL-MCNC: 170 MG/DL (ref 65–99)
HBA1C MFR BLD: 5.2 % (ref 4.8–5.6)
HCT VFR BLD AUTO: 41.8 % (ref 34–46.6)
HGB BLD-MCNC: 12.6 G/DL (ref 12–15.9)
IMM GRANULOCYTES # BLD AUTO: 0.02 10*3/MM3 (ref 0–0.05)
IMM GRANULOCYTES NFR BLD AUTO: 0.4 % (ref 0–0.5)
LYMPHOCYTES # BLD AUTO: 1.86 10*3/MM3 (ref 0.7–3.1)
LYMPHOCYTES NFR BLD AUTO: 38.3 % (ref 19.6–45.3)
MCH RBC QN AUTO: 30.9 PG (ref 26.6–33)
MCHC RBC AUTO-ENTMCNC: 30.1 G/DL (ref 31.5–35.7)
MCV RBC AUTO: 102.5 FL (ref 79–97)
MONOCYTES # BLD AUTO: 0.55 10*3/MM3 (ref 0.1–0.9)
MONOCYTES NFR BLD AUTO: 11.3 % (ref 5–12)
NEUTROPHILS NFR BLD AUTO: 2.05 10*3/MM3 (ref 1.7–7)
NEUTROPHILS NFR BLD AUTO: 42.2 % (ref 42.7–76)
NRBC BLD AUTO-RTO: 0 /100 WBC (ref 0–0.2)
PLATELET # BLD AUTO: 301 10*3/MM3 (ref 140–450)
PMV BLD AUTO: 9.9 FL (ref 6–12)
POTASSIUM SERPL-SCNC: 4.5 MMOL/L (ref 3.5–5.2)
RBC # BLD AUTO: 4.08 10*6/MM3 (ref 3.77–5.28)
SODIUM SERPL-SCNC: 139 MMOL/L (ref 136–145)
WBC NRBC COR # BLD: 4.86 10*3/MM3 (ref 3.4–10.8)

## 2023-01-26 PROCEDURE — 85652 RBC SED RATE AUTOMATED: CPT | Performed by: INTERNAL MEDICINE

## 2023-01-26 PROCEDURE — 63710000001 INSULIN LISPRO (HUMAN) PER 5 UNITS: Performed by: INTERNAL MEDICINE

## 2023-01-26 PROCEDURE — 97110 THERAPEUTIC EXERCISES: CPT

## 2023-01-26 PROCEDURE — 83036 HEMOGLOBIN GLYCOSYLATED A1C: CPT | Performed by: INTERNAL MEDICINE

## 2023-01-26 PROCEDURE — 80048 BASIC METABOLIC PNL TOTAL CA: CPT | Performed by: INTERNAL MEDICINE

## 2023-01-26 PROCEDURE — 97530 THERAPEUTIC ACTIVITIES: CPT

## 2023-01-26 PROCEDURE — 85025 COMPLETE CBC W/AUTO DIFF WBC: CPT | Performed by: INTERNAL MEDICINE

## 2023-01-26 PROCEDURE — 82962 GLUCOSE BLOOD TEST: CPT

## 2023-01-26 PROCEDURE — 86140 C-REACTIVE PROTEIN: CPT | Performed by: INTERNAL MEDICINE

## 2023-01-26 PROCEDURE — 63710000001 INSULIN DETEMIR PER 5 UNITS: Performed by: INTERNAL MEDICINE

## 2023-01-26 RX ORDER — OXYCODONE HYDROCHLORIDE 5 MG/1
10 TABLET ORAL EVERY 4 HOURS PRN
Status: DISCONTINUED | OUTPATIENT
Start: 2023-01-26 | End: 2023-02-01 | Stop reason: HOSPADM

## 2023-01-26 RX ORDER — ALPRAZOLAM 0.5 MG/1
0.5 TABLET ORAL 2 TIMES DAILY
Status: DISCONTINUED | OUTPATIENT
Start: 2023-01-26 | End: 2023-01-29

## 2023-01-26 RX ORDER — OXYCODONE HYDROCHLORIDE 5 MG/1
15 TABLET ORAL EVERY 4 HOURS PRN
Status: DISCONTINUED | OUTPATIENT
Start: 2023-01-26 | End: 2023-02-01 | Stop reason: HOSPADM

## 2023-01-27 LAB
GLUCOSE BLDC GLUCOMTR-MCNC: 153 MG/DL (ref 70–130)
GLUCOSE BLDC GLUCOMTR-MCNC: 175 MG/DL (ref 70–130)
GLUCOSE BLDC GLUCOMTR-MCNC: 175 MG/DL (ref 70–130)
GLUCOSE BLDC GLUCOMTR-MCNC: 201 MG/DL (ref 70–130)

## 2023-01-27 PROCEDURE — 82962 GLUCOSE BLOOD TEST: CPT

## 2023-01-27 PROCEDURE — 97535 SELF CARE MNGMENT TRAINING: CPT

## 2023-01-27 PROCEDURE — 97168 OT RE-EVAL EST PLAN CARE: CPT

## 2023-01-27 PROCEDURE — 63710000001 INSULIN DETEMIR PER 5 UNITS: Performed by: INTERNAL MEDICINE

## 2023-01-27 PROCEDURE — 97110 THERAPEUTIC EXERCISES: CPT

## 2023-01-27 PROCEDURE — 63710000001 INSULIN LISPRO (HUMAN) PER 5 UNITS: Performed by: INTERNAL MEDICINE

## 2023-01-28 LAB
GLUCOSE BLDC GLUCOMTR-MCNC: 161 MG/DL (ref 70–130)
GLUCOSE BLDC GLUCOMTR-MCNC: 162 MG/DL (ref 70–130)
GLUCOSE BLDC GLUCOMTR-MCNC: 225 MG/DL (ref 70–130)
GLUCOSE BLDC GLUCOMTR-MCNC: 257 MG/DL (ref 70–130)

## 2023-01-28 PROCEDURE — 63710000001 INSULIN DETEMIR PER 5 UNITS: Performed by: INTERNAL MEDICINE

## 2023-01-28 PROCEDURE — 97530 THERAPEUTIC ACTIVITIES: CPT

## 2023-01-28 PROCEDURE — 82962 GLUCOSE BLOOD TEST: CPT

## 2023-01-28 PROCEDURE — 63710000001 INSULIN LISPRO (HUMAN) PER 5 UNITS: Performed by: INTERNAL MEDICINE

## 2023-01-28 PROCEDURE — 97110 THERAPEUTIC EXERCISES: CPT

## 2023-01-29 LAB
GLUCOSE BLDC GLUCOMTR-MCNC: 131 MG/DL (ref 70–130)
GLUCOSE BLDC GLUCOMTR-MCNC: 194 MG/DL (ref 70–130)
GLUCOSE BLDC GLUCOMTR-MCNC: 205 MG/DL (ref 70–130)
GLUCOSE BLDC GLUCOMTR-MCNC: 243 MG/DL (ref 70–130)

## 2023-01-29 PROCEDURE — 63710000001 INSULIN LISPRO (HUMAN) PER 5 UNITS: Performed by: INTERNAL MEDICINE

## 2023-01-29 PROCEDURE — 63710000001 INSULIN DETEMIR PER 5 UNITS: Performed by: INTERNAL MEDICINE

## 2023-01-29 PROCEDURE — 82962 GLUCOSE BLOOD TEST: CPT

## 2023-01-29 PROCEDURE — 63710000001 INSULIN DETEMIR PER 5 UNITS: Performed by: NURSE PRACTITIONER

## 2023-01-29 RX ORDER — ALPRAZOLAM 0.5 MG/1
0.5 TABLET ORAL 3 TIMES DAILY
Status: DISCONTINUED | OUTPATIENT
Start: 2023-01-29 | End: 2023-02-01 | Stop reason: HOSPADM

## 2023-01-30 LAB
ANION GAP SERPL CALCULATED.3IONS-SCNC: 9 MMOL/L (ref 5–15)
BASOPHILS # BLD AUTO: 0.05 10*3/MM3 (ref 0–0.2)
BASOPHILS NFR BLD AUTO: 0.7 % (ref 0–1.5)
BUN SERPL-MCNC: 27 MG/DL (ref 6–20)
BUN/CREAT SERPL: 35.5 (ref 7–25)
CALCIUM SPEC-SCNC: 8.9 MG/DL (ref 8.6–10.5)
CHLORIDE SERPL-SCNC: 101 MMOL/L (ref 98–107)
CO2 SERPL-SCNC: 28 MMOL/L (ref 22–29)
CREAT SERPL-MCNC: 0.76 MG/DL (ref 0.57–1)
DEPRECATED RDW RBC AUTO: 50.9 FL (ref 37–54)
EGFRCR SERPLBLD CKD-EPI 2021: 92.7 ML/MIN/1.73
EOSINOPHIL # BLD AUTO: 0.47 10*3/MM3 (ref 0–0.4)
EOSINOPHIL NFR BLD AUTO: 6.9 % (ref 0.3–6.2)
ERYTHROCYTE [DISTWIDTH] IN BLOOD BY AUTOMATED COUNT: 13.5 % (ref 12.3–15.4)
GLUCOSE BLDC GLUCOMTR-MCNC: 155 MG/DL (ref 70–130)
GLUCOSE BLDC GLUCOMTR-MCNC: 190 MG/DL (ref 70–130)
GLUCOSE BLDC GLUCOMTR-MCNC: 222 MG/DL (ref 70–130)
GLUCOSE BLDC GLUCOMTR-MCNC: 289 MG/DL (ref 70–130)
GLUCOSE SERPL-MCNC: 191 MG/DL (ref 65–99)
HCT VFR BLD AUTO: 41.7 % (ref 34–46.6)
HGB BLD-MCNC: 12.8 G/DL (ref 12–15.9)
IMM GRANULOCYTES # BLD AUTO: 0.02 10*3/MM3 (ref 0–0.05)
IMM GRANULOCYTES NFR BLD AUTO: 0.3 % (ref 0–0.5)
LYMPHOCYTES # BLD AUTO: 2.78 10*3/MM3 (ref 0.7–3.1)
LYMPHOCYTES NFR BLD AUTO: 41 % (ref 19.6–45.3)
MCH RBC QN AUTO: 31.1 PG (ref 26.6–33)
MCHC RBC AUTO-ENTMCNC: 30.7 G/DL (ref 31.5–35.7)
MCV RBC AUTO: 101.5 FL (ref 79–97)
MONOCYTES # BLD AUTO: 0.73 10*3/MM3 (ref 0.1–0.9)
MONOCYTES NFR BLD AUTO: 10.8 % (ref 5–12)
NEUTROPHILS NFR BLD AUTO: 2.73 10*3/MM3 (ref 1.7–7)
NEUTROPHILS NFR BLD AUTO: 40.3 % (ref 42.7–76)
NRBC BLD AUTO-RTO: 0 /100 WBC (ref 0–0.2)
PLATELET # BLD AUTO: 284 10*3/MM3 (ref 140–450)
PMV BLD AUTO: 10.3 FL (ref 6–12)
POTASSIUM SERPL-SCNC: 4 MMOL/L (ref 3.5–5.2)
RBC # BLD AUTO: 4.11 10*6/MM3 (ref 3.77–5.28)
SODIUM SERPL-SCNC: 138 MMOL/L (ref 136–145)
WBC NRBC COR # BLD: 6.78 10*3/MM3 (ref 3.4–10.8)

## 2023-01-30 PROCEDURE — 97530 THERAPEUTIC ACTIVITIES: CPT

## 2023-01-30 PROCEDURE — 97110 THERAPEUTIC EXERCISES: CPT

## 2023-01-30 PROCEDURE — 85025 COMPLETE CBC W/AUTO DIFF WBC: CPT | Performed by: INTERNAL MEDICINE

## 2023-01-30 PROCEDURE — 63710000001 INSULIN LISPRO (HUMAN) PER 5 UNITS: Performed by: INTERNAL MEDICINE

## 2023-01-30 PROCEDURE — 80048 BASIC METABOLIC PNL TOTAL CA: CPT | Performed by: INTERNAL MEDICINE

## 2023-01-30 PROCEDURE — 63710000001 INSULIN DETEMIR PER 5 UNITS: Performed by: NURSE PRACTITIONER

## 2023-01-30 PROCEDURE — 82962 GLUCOSE BLOOD TEST: CPT

## 2023-01-30 PROCEDURE — 63710000001 ONDANSETRON PER 8 MG: Performed by: INTERNAL MEDICINE

## 2023-01-31 LAB
GLUCOSE BLDC GLUCOMTR-MCNC: 167 MG/DL (ref 70–130)
GLUCOSE BLDC GLUCOMTR-MCNC: 185 MG/DL (ref 70–130)
GLUCOSE BLDC GLUCOMTR-MCNC: 239 MG/DL (ref 70–130)
GLUCOSE BLDC GLUCOMTR-MCNC: 265 MG/DL (ref 70–130)

## 2023-01-31 PROCEDURE — 63710000001 INSULIN LISPRO (HUMAN) PER 5 UNITS: Performed by: INTERNAL MEDICINE

## 2023-01-31 PROCEDURE — 97110 THERAPEUTIC EXERCISES: CPT

## 2023-01-31 PROCEDURE — 63710000001 INSULIN DETEMIR PER 5 UNITS: Performed by: INTERNAL MEDICINE

## 2023-01-31 PROCEDURE — 82962 GLUCOSE BLOOD TEST: CPT

## 2023-02-01 LAB
GLUCOSE BLDC GLUCOMTR-MCNC: 178 MG/DL (ref 70–130)
GLUCOSE BLDC GLUCOMTR-MCNC: 203 MG/DL (ref 70–130)
GLUCOSE BLDC GLUCOMTR-MCNC: 235 MG/DL (ref 70–130)

## 2023-02-01 PROCEDURE — 63710000001 INSULIN DETEMIR PER 5 UNITS: Performed by: INTERNAL MEDICINE

## 2023-02-01 PROCEDURE — 63710000001 INSULIN LISPRO (HUMAN) PER 5 UNITS: Performed by: INTERNAL MEDICINE

## 2023-02-01 PROCEDURE — 82962 GLUCOSE BLOOD TEST: CPT

## 2023-02-01 PROCEDURE — 97110 THERAPEUTIC EXERCISES: CPT | Performed by: PHYSICAL THERAPIST

## 2023-02-01 PROCEDURE — 97530 THERAPEUTIC ACTIVITIES: CPT
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
## 2024-06-01 NOTE — PROGRESS NOTES
Hospitalist Progress Note    Patient:  Ceasar Aj  YOB: 1965  Date of Service: 12/18/2021  MRN: [id number]   Acct: [de-identified]   Primary Care Physician: Feliciano Kuo MD  Advance Directive: [facility]  Admit Date: 12/9/2021       Hospital Day: 9  Referring Provider: Mary Kerr DO    Patient Seen, Chart, Consults, Notes, Labs, Radiology studies reviewed. Subjective:  Ceasar Aj is a 49 y.o. female  whom we are following for COVID-19 pneumonia, hypoxemic respiratory failure. No significant clinical change. She continues to hold her own. She is sitting up in the chair this afternoon. She is having quite a bit of cough. Her flow rate is down to 70 L and her FiO2 is 0.7.     Allergies:  Latex, Penicillins, Codeine, and Tape [adhesive tape]    Medicines:  Current Facility-Administered Medications   Medication Dose Route Frequency Provider Last Rate Last Admin    benzonatate (TESSALON) capsule 100 mg  100 mg Oral TID PRN Mary Kerr DO   100 mg at 12/18/21 1129    guaiFENesin (MUCINEX) extended release tablet 600 mg  600 mg Oral BID Mary Kerr DO   600 mg at 12/18/21 0813    opium-belladonna (B&O SUPPRETTES) 16.2-60 MG suppository 60 mg  60 mg Rectal Q8H PRN Mary Kerr DO        promethazine (PHENERGAN) injection 6.25 mg  6.25 mg IntraVENous Q6H PRN Mary Kerr DO   6.25 mg at 12/18/21 0436    dextromethorphan (DELSYM) 30 MG/5ML extended release liquid 30 mg  30 mg Oral BID PRN Mary Kerr DO   30 mg at 12/18/21 0436    dexamethasone (DECADRON) 12 mg in sodium chloride 0.9 % IVPB  12 mg IntraVENous Q24H Mary Kerr DO   Stopped at 12/18/21 1138    insulin glargine (LANTUS) injection vial 20 Units  20 Units SubCUTAneous Nightly Darlyn Ely MD   20 Units at 12/17/21 2112    insulin lispro (HUMALOG) injection vial 0-18 Units  0-18 Units SubCUTAneous Q6H Darlyn Ely MD   6 Units at 12/18/21 7080    You may take Tylenol every 6-8 hours in addition to the prescribed medications.  Apply heat to the neck for 20 minutes at a time every few hours.  Avoid any exercise or strenuous activity until the symptoms have resolved.  Return to the ER if any new or worsening symptoms   melatonin disintegrating tablet 10 mg  10 mg Oral Nightly Laurie Guevara MD   10 mg at 12/17/21 2202    Benzocaine-Menthol (CEPACOL) 1 lozenge  1 lozenge Oral Q2H PRN Laurie Guevara MD   1 lozenge at 12/14/21 1941    sodium chloride (OCEAN, BABY AYR) 0.65 % nasal spray 1 spray  1 spray Each Nostril PRN Laurie Guevara MD   1 spray at 12/13/21 1727    dexmedetomidine (PRECEDEX) 400 mcg in sodium chloride 0.9 % 100 mL infusion  0.1-1.4 mcg/kg/hr IntraVENous Continuous Laurie Guevara MD 7.4 mL/hr at 12/18/21 1616 0.2 mcg/kg/hr at 12/18/21 1616    enoxaparin (LOVENOX) injection 40 mg  40 mg SubCUTAneous BID Laurie Guevara MD   40 mg at 12/18/21 0810    budesonide-formoterol (SYMBICORT) 160-4.5 MCG/ACT inhaler 2 puff  2 puff Inhalation BID Laurie Guevara MD   2 puff at 12/18/21 0823    fluvoxaMINE (LUVOX) tablet 50 mg  50 mg Oral BID Laurie Guevara MD   50 mg at 12/18/21 1042    famotidine (PEPCID) injection 40 mg  40 mg IntraVENous Daily Laurie Guevara MD   40 mg at 12/18/21 5743    Vitamin D (CHOLECALCIFEROL) tablet 2,000 Units  2,000 Units Oral Daily Laurie Guevara MD   2,000 Units at 12/18/21 4000    LORazepam (ATIVAN) injection 0.5 mg  0.5 mg IntraVENous Q6H PRN Laurie Guevara MD   0.5 mg at 12/16/21 1118    ALPRAZolam (XANAX) tablet 1 mg  1 mg Oral Nightly PRN Laurie Guevara MD   1 mg at 12/17/21 2202    albuterol sulfate  (90 Base) MCG/ACT inhaler 2 puff  2 puff Inhalation 4x Daily Mo Dusty, APRN   2 puff at 12/18/21 1424    calcium-cholecalciferol 500-200 MG-UNIT per tablet 1 tablet  1 tablet Oral Daily Mo Dusty, APRN   1 tablet at 12/18/21 0813    HYDROcodone-acetaminophen ([place]) 7.5-325 MG per tablet 1 tablet  1 tablet Oral Q6H PRN Mo Dusty, APRN   1 tablet at 12/17/21 0903    levothyroxine (SYNTHROID) tablet 100 mcg  100 mcg Oral Daily Mo Dusty, APRN   100 mcg at 12/18/21 0516    ascorbic acid (VITAMIN C) tablet 1,000 mg  1,000 mg Oral Daily Harrie Crumble, APRN   1,000 mg at 12/18/21 1654    timolol (TIMOPTIC) 0.5 % ophthalmic solution 1 drop  1 drop Both Eyes BID Chris Salguerole, APRN   1 drop at 12/18/21 3281    pravastatin (PRAVACHOL) tablet 40 mg  40 mg Oral Daily Harrie Crumble, APRN   40 mg at 12/18/21 0813    polyethylene glycol (GLYCOLAX) packet 17 g  17 g Oral Daily Harrstuart Crnixonle, APRN   17 g at 12/16/21 0748    ondansetron (ZOFRAN) tablet 8 mg  8 mg Oral Q8H PRN Chris Crumble, APRN   8 mg at 12/10/21 1751    meclizine (ANTIVERT) tablet 25 mg  25 mg Oral TID PRN Chris Vazquezumble, APRN   25 mg at 12/10/21 0301    cetirizine (ZYRTEC) tablet 10 mg  10 mg Oral Daily Harrie Jose Mle, APRN   10 mg at 12/18/21 8933    sodium chloride flush 0.9 % injection 5-40 mL  5-40 mL IntraVENous 2 times per day Harrstuart Salguerole, APRN   10 mL at 12/18/21 0810    sodium chloride flush 0.9 % injection 5-40 mL  5-40 mL IntraVENous PRN Chris Vazquezumble, APRN   10 mL at 12/15/21 1155    0.9 % sodium chloride infusion  25 mL IntraVENous PRN Chris Vazquezumble, APRN   Stopped at 12/11/21 2130    polyethylene glycol (GLYCOLAX) packet 17 g  17 g Oral Daily PRN Chris Vazquezumble, APRN   17 g at 12/17/21 2112    acetaminophen (TYLENOL) tablet 650 mg  650 mg Oral Q6H PRN Chris Crumble, APRN   650 mg at 12/18/21 1553    Or    acetaminophen (TYLENOL) suppository 650 mg  650 mg Rectal Q6H PRN Chris Vazquezumble, APRN        zinc sulfate (ZINCATE) capsule 50 mg  50 mg Oral Daily Harrstuart Crumble, APRN   50 mg at 12/18/21 0812    glucose (GLUTOSE) 40 % oral gel 15 g  15 g Oral PRN Chris Vazquezumble, APRN        dextrose 50 % IV solution  12.5 g IntraVENous PRN Harrie Crumble, APRN        glucagon (rDNA) injection 1 mg  1 mg IntraMUSCular PRN PELON Tovar        dextrose 5 % solution  100 mL/hr IntraVENous PRN PELON Tovar        baricitinib Lorena Sutherland) tablet 4 mg  4 mg Oral Daily PELON Tovar 4 mg at 12/17/21 2202       Past Medical History:  Past Medical History:   Diagnosis Date    Anemia     Asthma     Bronchitis, acute     [facility] [facility]     Diabetes [facility] [facility]     Glaucoma     Hyperlipidemia     Hypertension     Hyperthyroidism     Morbid obesity ([facility] [address] [facility]     Sleep apnea     uses c-pap       Past Surgical History:  Past Surgical History:   Procedure Laterality Date    AXILLARY SURGERY Bilateral     excision and drainage of staph abscesses    BACK SURGERY  10/2004    Dr. Juan A Sethi, [place] L4, L5    CARPAL TUNNEL RELEASE Right     CHOLECYSTECTOMY      COLONOSCOPY  03/20/2007    Dr Knight Dose N/A 9/11/2020    Dr Sheryl Bassett yr recall    DILATION AND CURETTAGE OF UTERUS N/A 1/29/2021    DILATATION AND CURETTAGE HYSTEROSCOPY NOVASURE ABLATION performed by Milton Beckman MD at [facility] COLONOSCOPY FLX DX W/COLLJ [address] WHEN PFRMD N/A 2/8/2017    Dr Delbert Seth, actively inflamed internal hemorrhoids, residulal liquid and some solid food particles in the colon-5 yr recall due to prep    STOMACH SURGERY  01/20/2014    Dr. Anette Baez ( gastric sleeve)    UPPER GASTROINTESTINAL ENDOSCOPY  10/30/2012    Dr Mendoza Norm esophagitis, gastritis, Iliana (-)    UPPER GASTROINTESTINAL ENDOSCOPY  01/23/2008    Dr Seng Antoine esophagitis    UPPER GASTROINTESTINAL ENDOSCOPY N/A 9/11/2020    Dr Phi Levy       Family History  Family History   Problem Relation Age of Onset    Lung Cancer Mother     Kidney Cancer Mother     Lung Cancer Maternal Grandmother     Colon Cancer Maternal Grandfather     Liver Cancer Neg Hx     Liver Disease Neg Hx     Stomach Cancer Neg Hx     Rectal Cancer Neg Hx     Esophageal Cancer Neg Hx     Colon Polyps Neg Hx        Social History  Social History     Socioeconomic History    Marital status:      Spouse name: Not on file    Number of children: Not on file    Years of education: Not on file   [facility] change. Eyes: Negative for visual disturbance. Respiratory: Negative for cough and shortness of breath. Cardiovascular: Negative for chest pain and leg swelling. Gastrointestinal: Negative for constipation, diarrhea, nausea and vomiting. Endocrine: Negative for polyuria. Genitourinary: Negative for difficulty urinating and dysuria. Musculoskeletal: Positive for gait problem. Negative for arthralgias and back pain. Skin: Negative for color change. Allergic/Immunologic: Negative for immunocompromised state. Neurological: Positive for weakness. Negative for dizziness and headaches. Psychiatric/Behavioral: Negative for confusion. Objective:  Blood pressure 110/65, pulse 79, temperature 97.5 °F (36.4 °C), temperature source Temporal, resp. rate 30, height 5' 5\" (1.651 m), weight (!) 327 lb 4.8 oz (148.5 kg), SpO2 (!) 88 %, not currently breastfeeding. Intake/Output Summary (Last 24 hours) at 12/18/2021 1651  Last data filed at 12/18/2021 0931  Gross per 24 hour   Intake 380.39 ml   Output 550 ml   Net -169.61 ml       Physical Exam  Vitals and nursing note reviewed. HENT:      Head: Normocephalic and atraumatic. Right Ear: External ear normal.      Left Ear: External ear normal.      Nose: Nose normal.      Mouth/Throat:      Mouth: Mucous membranes are moist.   Eyes:      Conjunctiva/sclera: Conjunctivae normal.      Pupils: Pupils are equal, round, and reactive to light. Cardiovascular:      Rate and Rhythm: Normal rate and regular rhythm. Heart sounds: Normal heart sounds. Pulmonary:      Effort: Pulmonary effort is normal.      Breath sounds: Rhonchi present. Abdominal:      General: Abdomen is flat. Palpations: Abdomen is soft. Musculoskeletal:         General: Normal range of motion. Cervical back: Neck supple. No rigidity. No muscular tenderness. Skin:     General: Skin is warm and dry.    Neurological:      Mental Status: She is alert and oriented to person, place, and time. Psychiatric:         Mood and Affect: Mood normal.         Labs:  BMP:   Recent Labs     12/16/21 0415 12/17/21 0345 12/18/21 0342    141 137   K 5.0 4.7 5.2*   CL 98 101 99   CO2 28 28 27   BUN 30* 25* 25*   CREATININE 0.7 0.7 0.8   CALCIUM 8.9 9.4 9.0     CBC:   Recent Labs     12/16/21 0415 12/17/21 0345 12/18/21 0342   WBC 6.6 11.4* 14.3*   HGB 14.3 14.6 13.9   HCT 45.5 48.0* 45.6   MCV 94.6 96.8 95.8   * 612* 644*     LIVER PROFILE:   Recent Labs     12/16/21 0415 12/17/21 0345 12/18/21 0342   AST 28 25 27   ALT 32 31 32   BILITOT 0.3 0.3 0.3   ALKPHOS 97 99 105*     PT/INR: No results for input(s): PROTIME, INR in the last 72 hours. APTT: No results for input(s): APTT in the last 72 hours. BNP:  No results for input(s): BNP in the last 72 hours. Ionized Calcium:No results for input(s): IONCA in the last 72 hours. Magnesium:No results for input(s): MG in the last 72 hours. Phosphorus:No results for input(s): PHOS in the last 72 hours. HgbA1C: No results for input(s): LABA1C in the last 72 hours. Hepatic:   Recent Labs     12/16/21 0415 12/17/21 0345 12/18/21 0342   ALKPHOS 97 99 105*   ALT 32 31 32   AST 28 25 27   PROT 6.6 7.0 6.0*   BILITOT 0.3 0.3 0.3   LABALBU 3.3* 2.9* 3.1*     Lactic Acid: No results for input(s): LACTA in the last 72 hours. Troponin: No results for input(s): CKTOTAL, CKMB, TROPONINT in the last 72 hours. ABGs: No results for input(s): PH, PCO2, PO2, HCO3, O2SAT in the last 72 hours. CRP:    Recent Labs     12/16/21  0415   CRP 9.16*     Sed Rate:  No results for input(s): SEDRATE in the last 72 hours. Cultures:   No results for input(s): CULTURE in the last 72 hours. No results for input(s): BC, Lanis Amie in the last 72 hours. No results for input(s): CXSURG in the last 72 hours.     Radiology reports as per the Radiologist  Radiology: XR CHEST PORTABLE    Result Date: 12/9/2021  EXAM: XR CHEST PORTABLE INDICATION: Shortness of breath, COVID positive COMPARISON: None available. FINDINGS: Cardiac silhouette is normal in size. No pleural effusion or visible pneumothorax. Patchy bilateral airspace opacity is present. Central vascular congestion is noted. No acute osseous finding. Bilateral patchy airspace opacity. Central vascular congestion. Differential includes pneumonia and edema. Signed by Dr Erin Emmanuel     Acute respiratory distress syndrome (ARDS) due to COVID-19 virus. Continue ongoing medical management.     Acute hypoxemic respiratory failure due to COVID-19.   Continue supportive care.     Please document 39 minutes of critical care time for patient assessment, chart review, discussion with staff, .  126 Laurie Hernandez DO

## 2024-10-23 ENCOUNTER — TRANSCRIBE ORDERS (OUTPATIENT)
Dept: ADMINISTRATIVE | Facility: HOSPITAL | Age: 59
End: 2024-10-23
Payer: COMMERCIAL

## 2024-10-23 DIAGNOSIS — E11.65 TYPE 2 DIABETES MELLITUS WITH HYPERGLYCEMIA, UNSPECIFIED WHETHER LONG TERM INSULIN USE: Primary | ICD-10-CM

## 2025-01-23 ENCOUNTER — TRANSCRIBE ORDERS (OUTPATIENT)
Dept: ADMINISTRATIVE | Age: 60
End: 2025-01-23

## 2025-01-23 DIAGNOSIS — Z12.31 SCREENING MAMMOGRAM, ENCOUNTER FOR: Primary | ICD-10-CM

## 2025-01-23 DIAGNOSIS — N95.9 MENOPAUSAL AND PERIMENOPAUSAL DISORDER: ICD-10-CM

## 2025-03-14 ENCOUNTER — HOSPITAL ENCOUNTER (OUTPATIENT)
Dept: WOMENS IMAGING | Age: 60
Discharge: HOME | End: 2025-03-14
Payer: MEDICAID

## 2025-03-14 VITALS — HEIGHT: 66 IN | WEIGHT: 260 LBS | BODY MASS INDEX: 41.78 KG/M2

## 2025-03-14 DIAGNOSIS — N95.9 MENOPAUSAL AND PERIMENOPAUSAL DISORDER: ICD-10-CM

## 2025-03-14 DIAGNOSIS — Z12.31 SCREENING MAMMOGRAM, ENCOUNTER FOR: ICD-10-CM

## 2025-03-14 PROCEDURE — 77080 DXA BONE DENSITY AXIAL: CPT

## 2025-03-14 PROCEDURE — 77063 BREAST TOMOSYNTHESIS BI: CPT

## (undated) DEVICE — SET ENDOSCP SEAL HYSTEROSCOPE RIG OUTFLO CHN DISP MYOSURE

## (undated) DEVICE — GOWN,PREVENTION PLUS,XLN/XL,ST,24/CS: Brand: MEDLINE

## (undated) DEVICE — SET FLD MGMT OUTFLO TB DISP FOR CTRL SYS AQUILEX

## (undated) DEVICE — CATHETER FOL 2 W 10 FRX12 IN 3 CC URETH SLD TIP DOVER

## (undated) DEVICE — SOUND/DILATOR (ACCESSORY FOR CLEARVIEW UTERINE MANIPULATOR): Brand: CLEARVIEW

## (undated) DEVICE — ENDO KIT,LOURDES HOSPITAL: Brand: MEDLINE INDUSTRIES, INC.

## (undated) DEVICE — Z DISCONTINUED NO SUB IDED KIT ENDOMET ABLAT IMPED CTRL DEV W/ RF CTRL FTSWCH SUCT LN

## (undated) DEVICE — KIT CANSTR VAC TANTEM TB FOR AQUILEX FLD CTRL SYS

## (undated) DEVICE — GLOVE SURG SZ 65 CRM LTX FREE POLYISOPRENE POLYMER BEAD ANTI

## (undated) DEVICE — Y-TYPE TUR/BLADDER IRRIGATION SET, REGULATING CLAMP

## (undated) DEVICE — Z INACTIVE USE 2660664 SOLUTION IRRIG 3000ML 0.9% SOD CHL USP UROMATIC PLAS CONT

## (undated) DEVICE — FORCEPS BX L240CM JAW DIA2.4MM ORNG L CAP W/ NDL DISP RAD

## (undated) DEVICE — SURGICAL PROCEDURE PACK GYNECOLOGIC MIN LOURDES HOSP

## (undated) DEVICE — LEGGINGS, PAIR, CLEAR, STERILE: Brand: MEDLINE

## (undated) DEVICE — SET FLD MGMT CTRL SYS INFLO TB AQUILEX

## (undated) DEVICE — SOLUTION IV IRRIG POUR BRL 0.9% SODIUM CHL 2F7124

## (undated) DEVICE — TOWEL,OR,DSP,ST,BLUE,DLX,4/PK,20PK/CS: Brand: MEDLINE